# Patient Record
Sex: FEMALE | Race: BLACK OR AFRICAN AMERICAN | NOT HISPANIC OR LATINO | Employment: PART TIME | URBAN - METROPOLITAN AREA
[De-identification: names, ages, dates, MRNs, and addresses within clinical notes are randomized per-mention and may not be internally consistent; named-entity substitution may affect disease eponyms.]

---

## 2018-03-09 ENCOUNTER — HOSPITAL ENCOUNTER (EMERGENCY)
Facility: HOSPITAL | Age: 48
Discharge: HOME | End: 2018-03-09

## 2018-03-09 VITALS
RESPIRATION RATE: 15 BRPM | TEMPERATURE: 99 F | WEIGHT: 135 LBS | BODY MASS INDEX: 20.53 KG/M2 | OXYGEN SATURATION: 99 % | HEART RATE: 93 BPM | SYSTOLIC BLOOD PRESSURE: 167 MMHG | DIASTOLIC BLOOD PRESSURE: 83 MMHG

## 2018-03-09 DIAGNOSIS — B34.9 VIRAL SYNDROME: Primary | ICD-10-CM

## 2018-03-09 LAB
BASOPHILS # BLD AUTO: 0.01 K/UL
BASOPHILS NFR BLD: 0.1 %
DIFFERENTIAL METHOD: ABNORMAL
EOSINOPHIL # BLD AUTO: 0.5 K/UL
EOSINOPHIL NFR BLD: 6.9 %
ERYTHROCYTE [DISTWIDTH] IN BLOOD BY AUTOMATED COUNT: 14.9 %
HCT VFR BLD AUTO: 34.3 %
HGB BLD-MCNC: 10.8 G/DL
LYMPHOCYTES # BLD AUTO: 1 K/UL
LYMPHOCYTES NFR BLD: 14.2 %
MCH RBC QN AUTO: 25.2 PG
MCHC RBC AUTO-ENTMCNC: 31.5 G/DL
MCV RBC AUTO: 80 FL
MONOCYTES # BLD AUTO: 0.8 K/UL
MONOCYTES NFR BLD: 10.7 %
NEUTROPHILS # BLD AUTO: 4.8 K/UL
NEUTROPHILS NFR BLD: 68.1 %
PLATELET # BLD AUTO: 209 K/UL
PMV BLD AUTO: 12.1 FL
RBC # BLD AUTO: 4.29 M/UL
WBC # BLD AUTO: 7.1 K/UL

## 2018-03-09 PROCEDURE — 25000003 PHARM REV CODE 250: Performed by: NURSE PRACTITIONER

## 2018-03-09 PROCEDURE — 96374 THER/PROPH/DIAG INJ IV PUSH: CPT

## 2018-03-09 PROCEDURE — 63600175 PHARM REV CODE 636 W HCPCS: Performed by: NURSE PRACTITIONER

## 2018-03-09 PROCEDURE — 99283 EMERGENCY DEPT VISIT LOW MDM: CPT | Mod: 25

## 2018-03-09 PROCEDURE — 85025 COMPLETE CBC W/AUTO DIFF WBC: CPT

## 2018-03-09 RX ORDER — DEXAMETHASONE SODIUM PHOSPHATE 4 MG/ML
4 INJECTION, SOLUTION INTRA-ARTICULAR; INTRALESIONAL; INTRAMUSCULAR; INTRAVENOUS; SOFT TISSUE
Status: COMPLETED | OUTPATIENT
Start: 2018-03-09 | End: 2018-03-09

## 2018-03-09 RX ORDER — DEXAMETHASONE SODIUM PHOSPHATE 4 MG/ML
INJECTION, SOLUTION INTRA-ARTICULAR; INTRALESIONAL; INTRAMUSCULAR; INTRAVENOUS; SOFT TISSUE
Status: DISPENSED
Start: 2018-03-09 | End: 2018-03-10

## 2018-03-09 RX ORDER — DEXAMETHASONE SODIUM PHOSPHATE 4 MG/ML
8 INJECTION, SOLUTION INTRA-ARTICULAR; INTRALESIONAL; INTRAMUSCULAR; INTRAVENOUS; SOFT TISSUE
Status: DISCONTINUED | OUTPATIENT
Start: 2018-03-09 | End: 2018-03-09

## 2018-03-09 RX ORDER — DEXTROMETHORPHAN POLISTIREX 30 MG/5ML
60 SUSPENSION ORAL 2 TIMES DAILY
Qty: 150 ML | Refills: 0 | Status: SHIPPED | OUTPATIENT
Start: 2018-03-09 | End: 2018-03-19

## 2018-03-09 RX ORDER — CETIRIZINE HYDROCHLORIDE 10 MG/1
10 TABLET ORAL DAILY
Qty: 30 TABLET | Refills: 0 | Status: SHIPPED | OUTPATIENT
Start: 2018-03-09 | End: 2019-07-30

## 2018-03-09 RX ADMIN — SODIUM CHLORIDE 1000 ML: 0.9 INJECTION, SOLUTION INTRAVENOUS at 08:03

## 2018-03-09 RX ADMIN — DEXAMETHASONE SODIUM PHOSPHATE 4 MG: 4 INJECTION, SOLUTION INTRAMUSCULAR; INTRAVENOUS at 08:03

## 2018-03-10 NOTE — ED PROVIDER NOTES
Encounter Date: 3/9/2018       History     Chief Complaint   Patient presents with    Generalized Body Aches     Body aches and chilsl that started last night i feel awful.      48-year-old female presents to the emergency room complaining of body aches, chills, cough and just feeling bad since last night.  Has not taken any medications for symptoms.  Denies any nausea vomiting.  Denies any chest pain or shortness of breath.  States she feels generally achy and feverish.  Denies any burning or pain with urination.          Review of patient's allergies indicates:  No Known Allergies  Past Medical History:   Diagnosis Date    Aneurysm of cardiac wall, congenital     Diabetes type 2, uncontrolled     Hypertension      Past Surgical History:   Procedure Laterality Date    BREAST SURGERY      cyst removal     PARTIAL HYSTERECTOMY  2002     Family History   Problem Relation Age of Onset    Diabetes Mother     Heart disease Mother     Cancer Mother 40     breast    Diabetes Father     Cancer Maternal Grandmother 82     breast     Social History   Substance Use Topics    Smoking status: Never Smoker    Smokeless tobacco: Never Used    Alcohol use Yes      Comment: seldom     Review of Systems   Constitutional: Positive for chills. Negative for fever.   HENT: Positive for congestion. Negative for sore throat.    Respiratory: Positive for cough. Negative for shortness of breath.    Cardiovascular: Negative for chest pain.   Gastrointestinal: Negative for nausea.   Genitourinary: Negative for dysuria.   Musculoskeletal: Positive for arthralgias. Negative for back pain.   Skin: Negative for rash.   Neurological: Positive for weakness.   Hematological: Does not bruise/bleed easily.   All other systems reviewed and are negative.      Physical Exam     Initial Vitals [03/09/18 1826]   BP Pulse Resp Temp SpO2   (!) 177/85 104 15 99.1 °F (37.3 °C) 97 %      MAP       115.67         Physical Exam    Nursing note and  vitals reviewed.  Constitutional: She appears well-developed and well-nourished. No distress.   HENT:   Head: Normocephalic.   Nasal congestion and sinus pressure   Eyes: Conjunctivae are normal.   Neck: Normal range of motion. Neck supple.   Cardiovascular: Normal rate.   Pulmonary/Chest: Breath sounds normal. No respiratory distress.   Abdominal: Soft. Bowel sounds are normal. She exhibits no distension and no abdominal bruit. There is no tenderness. There is no rebound and no guarding. No hernia.   Neurological: She is alert and oriented to person, place, and time.   Skin: Skin is warm and dry. Capillary refill takes less than 2 seconds.   Psychiatric: She has a normal mood and affect. Her behavior is normal. Judgment and thought content normal.         ED Course   Procedures  Labs Reviewed   CBC W/ AUTO DIFFERENTIAL - Abnormal; Notable for the following:        Result Value    Hemoglobin 10.8 (*)     Hematocrit 34.3 (*)     MCV 80 (*)     MCH 25.2 (*)     MCHC 31.5 (*)     RDW 14.9 (*)     All other components within normal limits             Medical Decision Making:   Initial Assessment:   48-year-old female presents to the emergency room complaining of body aches, chills, cough and just feeling bad since last night.  Has not taken any medications for symptoms.  Denies any nausea vomiting.  Denies any chest pain or shortness of breath.  States she feels generally achy and feverish.  Denies any burning or pain with urination.  Differential Diagnosis:   URI, viral syndrome, RSV, pneumonia, bronchitis, croup, GERD, influenza, strep throat  ED Management:  Patient given medications for symptom relief.  Instructed to hydrate well.  Take Tylenol and Motrin as needed for fever.  Follow-up primary care doctor in 3-5 days.  If any symptoms worsen return to emergency room.  Patient verbalized understanding.                      Clinical Impression:   The encounter diagnosis was Viral syndrome.                            Sapna Ayon, ROSA  03/09/18 2033

## 2018-03-10 NOTE — ED NOTES
Ambulatory to ER room 6 with c/o generalized body aches x 2 days with cough; no distress noted; denies abd pain, N/V or any other complaints; will monitor closely.  ROSA Bruno at bedside for assessment

## 2018-03-16 ENCOUNTER — HOSPITAL ENCOUNTER (EMERGENCY)
Facility: HOSPITAL | Age: 48
Discharge: HOME | End: 2018-03-16
Attending: FAMILY MEDICINE

## 2018-03-16 VITALS
OXYGEN SATURATION: 99 % | HEIGHT: 68 IN | DIASTOLIC BLOOD PRESSURE: 113 MMHG | RESPIRATION RATE: 20 BRPM | SYSTOLIC BLOOD PRESSURE: 184 MMHG | TEMPERATURE: 98 F | BODY MASS INDEX: 19.7 KG/M2 | WEIGHT: 130 LBS | HEART RATE: 97 BPM

## 2018-03-16 DIAGNOSIS — N39.0 URINARY TRACT INFECTION WITHOUT HEMATURIA, SITE UNSPECIFIED: Primary | ICD-10-CM

## 2018-03-16 DIAGNOSIS — E11.9 TYPE 2 DIABETES MELLITUS WITHOUT COMPLICATION, WITHOUT LONG-TERM CURRENT USE OF INSULIN: ICD-10-CM

## 2018-03-16 DIAGNOSIS — R05.9 COUGH: ICD-10-CM

## 2018-03-16 LAB
ALBUMIN SERPL BCP-MCNC: 4.1 G/DL
ALP SERPL-CCNC: 105 U/L
ALT SERPL W/O P-5'-P-CCNC: 47 U/L
ANION GAP SERPL CALC-SCNC: 11 MMOL/L
AST SERPL-CCNC: 35 U/L
BACTERIA #/AREA URNS AUTO: ABNORMAL /HPF
BASOPHILS # BLD AUTO: 0.03 K/UL
BASOPHILS NFR BLD: 0.5 %
BILIRUB SERPL-MCNC: 0.3 MG/DL
BILIRUB UR QL STRIP: NEGATIVE
BUN SERPL-MCNC: 23 MG/DL
CALCIUM SERPL-MCNC: 9.5 MG/DL
CHLORIDE SERPL-SCNC: 96 MMOL/L
CLARITY UR REFRACT.AUTO: CLEAR
CO2 SERPL-SCNC: 30 MMOL/L
COLOR UR AUTO: ABNORMAL
CREAT SERPL-MCNC: 0.92 MG/DL
DIFFERENTIAL METHOD: ABNORMAL
EOSINOPHIL # BLD AUTO: 0.2 K/UL
EOSINOPHIL NFR BLD: 2.7 %
ERYTHROCYTE [DISTWIDTH] IN BLOOD BY AUTOMATED COUNT: 14.3 %
EST. GFR  (AFRICAN AMERICAN): >60 ML/MIN/1.73 M^2
EST. GFR  (NON AFRICAN AMERICAN): >60 ML/MIN/1.73 M^2
GLUCOSE SERPL-MCNC: 412 MG/DL
GLUCOSE UR QL STRIP: ABNORMAL
HCT VFR BLD AUTO: 37.4 %
HGB BLD-MCNC: 12 G/DL
HGB UR QL STRIP: NEGATIVE
KETONES UR QL STRIP: NEGATIVE
LEUKOCYTE ESTERASE UR QL STRIP: ABNORMAL
LYMPHOCYTES # BLD AUTO: 1.4 K/UL
LYMPHOCYTES NFR BLD: 26.2 %
MCH RBC QN AUTO: 25.3 PG
MCHC RBC AUTO-ENTMCNC: 32.1 G/DL
MCV RBC AUTO: 79 FL
MICROSCOPIC COMMENT: ABNORMAL
MONOCYTES # BLD AUTO: 0.4 K/UL
MONOCYTES NFR BLD: 7.1 %
NEUTROPHILS # BLD AUTO: 3.4 K/UL
NEUTROPHILS NFR BLD: 63.5 %
NITRITE UR QL STRIP: NEGATIVE
PH UR STRIP: 7 [PH] (ref 5–8)
PLATELET # BLD AUTO: 295 K/UL
PMV BLD AUTO: 10.4 FL
POTASSIUM SERPL-SCNC: 4.5 MMOL/L
PROT SERPL-MCNC: 7.3 G/DL
PROT UR QL STRIP: NEGATIVE
RBC # BLD AUTO: 4.75 M/UL
RBC #/AREA URNS AUTO: 3 /HPF (ref 0–4)
SODIUM SERPL-SCNC: 137 MMOL/L
SP GR UR STRIP: 1.01 (ref 1–1.03)
SQUAMOUS #/AREA URNS AUTO: ABNORMAL /HPF
URN SPEC COLLECT METH UR: ABNORMAL
UROBILINOGEN UR STRIP-ACNC: NEGATIVE EU/DL
WBC # BLD AUTO: 5.46 K/UL
WBC #/AREA URNS AUTO: 20 /HPF (ref 0–5)
WBC CLUMPS UR QL AUTO: ABNORMAL
YEAST UR QL AUTO: ABNORMAL

## 2018-03-16 PROCEDURE — 25000003 PHARM REV CODE 250: Performed by: FAMILY MEDICINE

## 2018-03-16 PROCEDURE — 80053 COMPREHEN METABOLIC PANEL: CPT

## 2018-03-16 PROCEDURE — 99284 EMERGENCY DEPT VISIT MOD MDM: CPT

## 2018-03-16 PROCEDURE — 81000 URINALYSIS NONAUTO W/SCOPE: CPT

## 2018-03-16 PROCEDURE — 85025 COMPLETE CBC W/AUTO DIFF WBC: CPT

## 2018-03-16 RX ORDER — METFORMIN HYDROCHLORIDE 1000 MG/1
1000 TABLET ORAL 2 TIMES DAILY WITH MEALS
COMMUNITY
End: 2019-06-17

## 2018-03-16 RX ORDER — BENZONATATE 100 MG/1
100 CAPSULE ORAL 3 TIMES DAILY PRN
Qty: 20 CAPSULE | Refills: 0 | Status: SHIPPED | OUTPATIENT
Start: 2018-03-16 | End: 2018-03-26

## 2018-03-16 RX ORDER — CIPROFLOXACIN 500 MG/1
500 TABLET ORAL
Status: COMPLETED | OUTPATIENT
Start: 2018-03-16 | End: 2018-03-16

## 2018-03-16 RX ORDER — CIPROFLOXACIN 500 MG/1
500 TABLET ORAL 2 TIMES DAILY
Qty: 20 TABLET | Refills: 0 | Status: SHIPPED | OUTPATIENT
Start: 2018-03-16 | End: 2018-03-26

## 2018-03-16 RX ADMIN — CIPROFLOXACIN 500 MG: 500 TABLET, FILM COATED ORAL at 09:03

## 2018-03-16 NOTE — ED TRIAGE NOTES
"Patient is AAOx3, reported x1 week reports dx with a "virus", weakness, fever x1 days ago. No relief with zyrtec & delysum. No relief, my patient reported been out from work for a week.   "

## 2018-03-17 NOTE — ED NOTES
BP reported to MD EMI states to instruct pt to take BP medication as soon as she get home. Pt states she understands and will. Pt states she normally takes her medication before she goes to bed and it is time for her to go bed.

## 2018-03-17 NOTE — ED TRIAGE NOTES
"Patient is AAOX3, reports x1 week reports dx with a "virus", weakness, fever x1 days ago. No relief with zyrtec & delysum.   "

## 2018-03-17 NOTE — ED PROVIDER NOTES
"Encounter Date: 3/16/2018       History     Chief Complaint   Patient presents with    Fatigue     x1 week reports dx with a "virus", weakness, fever x1 days ago. No relief with zyrtec & delysum     Patient complains of mild cough and intermittent shortness of breath since last 2 days.  She's been seen in the ER for fatigue and diffuse body aches.  Was diagnosed as viral syndrome-given steroid in the ER.  Patient also complains of low-grade fever.  No nausea or vomiting.  No chest pain.  No abdominal pain.  No diarrhea.  Patient has been taking Delsym.  The symptoms persisted she came to the ER.      The history is provided by the patient.     Review of patient's allergies indicates:  No Known Allergies  Past Medical History:   Diagnosis Date    Aneurysm of cardiac wall, congenital     Diabetes type 2, uncontrolled     Hypertension      Past Surgical History:   Procedure Laterality Date    BREAST SURGERY      cyst removal     PARTIAL HYSTERECTOMY  2002     Family History   Problem Relation Age of Onset    Diabetes Mother     Heart disease Mother     Cancer Mother 40     breast    Diabetes Father     Cancer Maternal Grandmother 82     breast     Social History   Substance Use Topics    Smoking status: Never Smoker    Smokeless tobacco: Never Used    Alcohol use Yes      Comment: seldom     Review of Systems   Constitutional: Positive for fever. Negative for activity change, appetite change, chills and fatigue.   HENT: Negative for congestion, ear discharge, ear pain, rhinorrhea, sinus pain, sinus pressure and sore throat.    Eyes: Negative for pain, redness and visual disturbance.   Respiratory: Negative for cough and shortness of breath.    Cardiovascular: Negative for chest pain.   Gastrointestinal: Negative for abdominal pain, diarrhea, nausea and vomiting.   Genitourinary: Negative for dysuria, flank pain and frequency.   Musculoskeletal: Negative for back pain, neck pain and neck stiffness.   Skin: " Negative for rash.   Neurological: Negative for dizziness, weakness and numbness.   Hematological: Does not bruise/bleed easily.   Psychiatric/Behavioral: Negative for behavioral problems, confusion, decreased concentration and hallucinations.       Physical Exam     Initial Vitals [03/16/18 1830]   BP Pulse Resp Temp SpO2   (!) 185/105 101 18 98 °F (36.7 °C) 97 %      MAP       131.67         Physical Exam    Nursing note and vitals reviewed.  Constitutional: Vital signs are normal. She appears well-developed and well-nourished. She is active.   HENT:   Head: Normocephalic and atraumatic.   Right Ear: External ear normal.   Left Ear: External ear normal.   Nose: Nose normal.   Mouth/Throat: Oropharynx is clear and moist.   Eyes: Conjunctivae, EOM and lids are normal. Pupils are equal, round, and reactive to light.   Neck: Trachea normal, normal range of motion and full passive range of motion without pain. Neck supple. Normal range of motion present. No neck rigidity.   Cardiovascular: Normal rate, regular rhythm, S1 normal, S2 normal, normal heart sounds, intact distal pulses and normal pulses.   Pulmonary/Chest: Breath sounds normal. No respiratory distress. She has no wheezes. She has no rhonchi. She has no rales.   Abdominal: Soft. Normal appearance and bowel sounds are normal. She exhibits no distension. There is no tenderness.   Musculoskeletal: Normal range of motion.   Lymphadenopathy:     She has no cervical adenopathy.   Neurological: She is alert and oriented to person, place, and time. She has normal reflexes. No cranial nerve deficit or sensory deficit. GCS eye subscore is 4. GCS verbal subscore is 5. GCS motor subscore is 6.   Skin: Skin is warm and intact. Capillary refill takes less than 2 seconds. No abrasion, no bruising and no rash noted.   Psychiatric: She has a normal mood and affect. Her speech is normal and behavior is normal. Judgment and thought content normal. She is not actively  hallucinating. Cognition and memory are normal. She is attentive.         ED Course   Procedures  Labs Reviewed   URINALYSIS   CBC W/ AUTO DIFFERENTIAL   COMPREHENSIVE METABOLIC PANEL          X-Rays:   Independently Interpreted Readings:   Chest X-Ray: Normal heart size.  No infiltrates.  No acute abnormalities.     Medical Decision Making:   Initial Assessment:   48-year-old lady presented to ER with fatigue and weakness and weakness.  She was diagnosed with viral syndrome about 10 days ago.  She was given a steroid shot and discharge but still complains of weakness and fatigue meso come to the ER for evaluation.  Differential Diagnosis:   Viral syndrome, urinary tract infection, pneumonia, anemia, dehydration.  Clinical Tests:   Lab Tests: Ordered and Reviewed  Medical Tests: Ordered and Reviewed  ED Management:  Patient urine showed infection and rest of her labs are within normal range.  He shouldn't is advised to hydrate adequately and continue antibiotic and follow-up with the primary care physician as needed she might be recovering from viral syndrome.                      Clinical Impression:   The primary encounter diagnosis was Urinary tract infection without hematuria, site unspecified. Diagnoses of Cough and Type 2 diabetes mellitus without complication, without long-term current use of insulin were also pertinent to this visit.    Disposition:   Disposition: Discharged  Condition: Martine Russ MD  03/17/18 0207

## 2018-03-17 NOTE — ED NOTES
"Pt updated on current plan of care, labs MD neema reviewing then will speak with pt, pt states she understands and responds with "thank you."  "

## 2019-06-17 ENCOUNTER — OFFICE VISIT (OUTPATIENT)
Dept: FAMILY MEDICINE | Facility: HOSPITAL | Age: 49
End: 2019-06-17
Attending: FAMILY MEDICINE

## 2019-06-17 ENCOUNTER — TELEPHONE (OUTPATIENT)
Dept: FAMILY MEDICINE | Facility: HOSPITAL | Age: 49
End: 2019-06-17

## 2019-06-17 VITALS
WEIGHT: 139.56 LBS | BODY MASS INDEX: 21.9 KG/M2 | SYSTOLIC BLOOD PRESSURE: 148 MMHG | HEIGHT: 67 IN | DIASTOLIC BLOOD PRESSURE: 102 MMHG | HEART RATE: 107 BPM

## 2019-06-17 DIAGNOSIS — I10 ESSENTIAL HYPERTENSION: ICD-10-CM

## 2019-06-17 DIAGNOSIS — E11.00 TYPE 2 DIABETES MELLITUS WITH HYPEROSMOLARITY WITHOUT COMA, WITHOUT LONG-TERM CURRENT USE OF INSULIN: Primary | Chronic | ICD-10-CM

## 2019-06-17 PROCEDURE — 99213 OFFICE O/P EST LOW 20 MIN: CPT | Performed by: STUDENT IN AN ORGANIZED HEALTH CARE EDUCATION/TRAINING PROGRAM

## 2019-06-17 RX ORDER — GLIPIZIDE 10 MG/1
10 TABLET ORAL
Qty: 90 TABLET | Refills: 3 | Status: SHIPPED | OUTPATIENT
Start: 2019-06-17 | End: 2019-06-26 | Stop reason: SDUPTHER

## 2019-06-17 RX ORDER — LISINOPRIL 20 MG/1
20 TABLET ORAL DAILY
Qty: 60 TABLET | Refills: 1 | Status: SHIPPED | OUTPATIENT
Start: 2019-06-17 | End: 2019-07-30

## 2019-06-17 RX ORDER — METFORMIN HYDROCHLORIDE 500 MG/1
TABLET ORAL
Qty: 180 TABLET | Refills: 3 | Status: SHIPPED | OUTPATIENT
Start: 2019-06-17 | End: 2019-06-26 | Stop reason: SDUPTHER

## 2019-06-17 NOTE — TELEPHONE ENCOUNTER
----- Message from Edward Muhammad sent at 6/17/2019  3:01 PM CDT -----  PT ASK IF DR AVILES CAN PLEASE GIVE HER A CALL, WAS NOT ABLE TO GET HER LABS DONE

## 2019-06-17 NOTE — PROGRESS NOTES
Subjective:       Patient ID: Bonnie Lino is a 49 y.o. female.    Chief Complaint: T2DM check-up    Patient is a 50 yo female pmhx of T2DM and HTN presenting for [facility] care and health maintenance. She has not taken medications for HTN or T2DM in over 8 or 9 months. Last A1c 3 years ago was 13, managed with glipizide, insulin, metformin, lisinopril-HCTZ. She denies n/v/d, problems with bowel or bladder function, peripheral neuropathy, blurry vision. She does report polydipsia and polyurea. No other complaints.       Pmhx- HTN and T2DM  Pshx- partial hysterectomy   Fhx- breast cancer in her mother and grandmother (on mothers side)  Shx- denies etoh use, never a smoker, denies illicit drug use  Medications- currently none  Allergies- NKDA    Health maintenance-    Last mammogram 3-4 years ago was normal  Never had a colonscopy  Last pap smear was 5 years ago and was normal     Review of Systems   Constitutional: Negative for chills and fever.   HENT: Negative for congestion, postnasal drip and sore throat.    Eyes: Negative for visual disturbance.   Respiratory: Negative for cough and shortness of breath.    Cardiovascular: Negative for chest pain.   Gastrointestinal: Negative for abdominal pain, diarrhea, nausea and vomiting.   Endocrine: Positive for polydipsia and polyuria.   Genitourinary: Negative for dysuria and frequency.   Musculoskeletal: Negative for gait problem.   Skin: Negative for rash.   Neurological: Negative for dizziness, weakness, numbness and headaches.       Objective:      Vitals:    06/17/19 1338   BP: (!) 148/102   Pulse: 107     Physical Exam   Constitutional: She is oriented to person, place, and time. She appears well-developed and well-nourished.   HENT:   Head: Normocephalic and atraumatic.   Eyes: Pupils are equal, round, and reactive to light. Conjunctivae are normal.   Neck: Normal range of motion.   Cardiovascular: Normal rate, regular rhythm, normal heart sounds and intact  distal pulses.   No murmur heard.  Pulmonary/Chest: Effort normal and breath sounds normal. No respiratory distress.   Abdominal: Soft. She exhibits no distension. There is no tenderness.   Musculoskeletal: Normal range of motion. She exhibits no edema.   Neurological: She is alert and oriented to person, place, and time.   Skin: Skin is warm and dry. Capillary refill takes less than 2 seconds.   Psychiatric: She has a normal mood and affect.   Nursing note and vitals reviewed.      Assessment:       1. Type 2 diabetes mellitus with hyperosmolarity without coma, without long-term current use of insulin    2. Essential hypertension        Plan:       Type 2 diabetes mellitus with hyperosmolarity without coma, without long-term current use of insulin  -     metFORMIN (GLUCOPHAGE) 500 MG tablet; Take 1 tab by mouth daily for first week.  Second week- take 1 tab by mouth twice daily  Dispense: 180 tablet; Refill: 3  -     glipiZIDE (GLUCOTROL) 10 MG tablet; Take 1 tablet (10 mg total) by mouth daily with breakfast.  Dispense: 90 tablet; Refill: 3        -     Lab work ordered- A1c, CBC, CMP, lipid panel- order sent with patient to [facility]    Essential hypertension  -     lisinopril (PRINIVIL,ZESTRIL) 20 MG tablet; Take 1 tablet (20 mg total) by mouth once daily.  Dispense: 60 tablet; Refill: 1    Health Maintenance:  Pap smear with HPV co testing at next visit  Will refer for colonoscopy at [facility] with free care  Will refer for mammogram at [facility]     Follow-up in 1 month for pap

## 2019-06-19 NOTE — PROGRESS NOTES
I have reviewed the notes, assessments, and/or procedures performed, I concur with her/his documentation of Bonnie Lino.

## 2019-06-26 DIAGNOSIS — I10 ESSENTIAL HYPERTENSION: ICD-10-CM

## 2019-06-26 DIAGNOSIS — F43.21 SITUATIONAL DEPRESSION: ICD-10-CM

## 2019-06-26 RX ORDER — GLIPIZIDE 10 MG/1
10 TABLET ORAL
Qty: 90 TABLET | Refills: 3 | Status: SHIPPED | OUTPATIENT
Start: 2019-06-26 | End: 2020-09-02 | Stop reason: SDUPTHER

## 2019-06-26 RX ORDER — METFORMIN HYDROCHLORIDE 500 MG/1
TABLET ORAL
Qty: 180 TABLET | Refills: 3 | Status: SHIPPED | OUTPATIENT
Start: 2019-06-26 | End: 2019-08-21 | Stop reason: DRUGHIGH

## 2019-06-26 RX ORDER — LISINOPRIL AND HYDROCHLOROTHIAZIDE 12.5; 2 MG/1; MG/1
1 TABLET ORAL DAILY
Qty: 30 TABLET | Refills: 11 | Status: SHIPPED | OUTPATIENT
Start: 2019-06-26 | End: 2019-11-26 | Stop reason: SDUPTHER

## 2019-07-30 ENCOUNTER — OFFICE VISIT (OUTPATIENT)
Dept: FAMILY MEDICINE | Facility: HOSPITAL | Age: 49
End: 2019-07-30
Attending: FAMILY MEDICINE

## 2019-07-30 VITALS
SYSTOLIC BLOOD PRESSURE: 152 MMHG | HEART RATE: 92 BPM | DIASTOLIC BLOOD PRESSURE: 94 MMHG | WEIGHT: 139.13 LBS | BODY MASS INDEX: 21.84 KG/M2 | HEIGHT: 67 IN

## 2019-07-30 DIAGNOSIS — E11.00 TYPE 2 DIABETES MELLITUS WITH HYPEROSMOLARITY WITHOUT COMA, WITHOUT LONG-TERM CURRENT USE OF INSULIN: Chronic | ICD-10-CM

## 2019-07-30 DIAGNOSIS — I10 ESSENTIAL HYPERTENSION: Primary | ICD-10-CM

## 2019-07-30 PROCEDURE — 99213 OFFICE O/P EST LOW 20 MIN: CPT | Performed by: STUDENT IN AN ORGANIZED HEALTH CARE EDUCATION/TRAINING PROGRAM

## 2019-07-30 NOTE — PROGRESS NOTES
Subjective:       Patient ID: Bonnie Lino is a 49 y.o. female.    Chief Complaint: Diabetes    Patient is a 50 yo female pmhx of T2DM and HTN presenting today for follow-up.  At last visit, patient was restarted on original medications.  She was unable to obtain lab work.  She reports improvement of symptoms- no longer experiencing increased thirst or urination.  She was able to obtain mammogram, results unknown.      Review of Systems   Constitutional: Negative for appetite change and fever.   HENT: Negative for congestion and sore throat.    Respiratory: Negative for cough, chest tightness and shortness of breath.    Cardiovascular: Negative for chest pain and palpitations.   Gastrointestinal: Negative for abdominal pain, diarrhea and nausea.   Endocrine: Negative for polydipsia and polyuria.   Neurological: Negative for dizziness, weakness and headaches.       Objective:      Vitals:    07/30/19 1113   BP: (!) 152/94   Pulse: 92     Physical Exam   Constitutional: She is oriented to person, place, and time. She appears well-developed and well-nourished. No distress.   HENT:   Head: Normocephalic and atraumatic.   Mouth/Throat: No oropharyngeal exudate.   Eyes: Pupils are equal, round, and reactive to light. No scleral icterus.   Neck: Normal range of motion.   Cardiovascular: Normal rate, regular rhythm and normal heart sounds.   No murmur heard.  Pulmonary/Chest: Effort normal. No respiratory distress.   Abdominal: Soft. She exhibits no distension.   Musculoskeletal: Normal range of motion. She exhibits no edema.   Neurological: She is alert and oriented to person, place, and time. No cranial nerve deficit. She exhibits normal muscle tone. Coordination normal.   Skin: Skin is warm and dry. She is not diaphoretic.   Nursing note and vitals reviewed.      Assessment:       1. Essential hypertension    2. Type 2 diabetes mellitus with hyperosmolarity without coma, without long-term current use of insulin         Plan:       Essential hypertension  - BP elevated today.  Patient reports multiple stressors- son recently diagnosed with  Crohn's disease and undergoing infusion treatment  - continue to monitor and continue current medication    Type 2 diabetes mellitus with hyperosmolarity without coma, without long-term current use of insulin  - on metformin 500 mg BID and glipizide 10mg daily  - have sent order for labwork with patient to [facility]- CBC, CMP, TSH, A1c, Lipid panel    Will follow-up in 1 month for pap      No follow-ups on file.

## 2019-08-21 ENCOUNTER — OFFICE VISIT (OUTPATIENT)
Dept: FAMILY MEDICINE | Facility: HOSPITAL | Age: 49
End: 2019-08-21
Attending: FAMILY MEDICINE

## 2019-08-21 VITALS
HEART RATE: 104 BPM | BODY MASS INDEX: 21.59 KG/M2 | WEIGHT: 137.56 LBS | SYSTOLIC BLOOD PRESSURE: 156 MMHG | DIASTOLIC BLOOD PRESSURE: 94 MMHG | HEIGHT: 67 IN

## 2019-08-21 DIAGNOSIS — E11.00 TYPE 2 DIABETES MELLITUS WITH HYPEROSMOLARITY WITHOUT COMA, WITHOUT LONG-TERM CURRENT USE OF INSULIN: Chronic | ICD-10-CM

## 2019-08-21 DIAGNOSIS — J06.9 VIRAL URI: Primary | ICD-10-CM

## 2019-08-21 PROCEDURE — 99213 OFFICE O/P EST LOW 20 MIN: CPT | Performed by: STUDENT IN AN ORGANIZED HEALTH CARE EDUCATION/TRAINING PROGRAM

## 2019-08-21 RX ORDER — METFORMIN HYDROCHLORIDE 1000 MG/1
1000 TABLET ORAL 2 TIMES DAILY WITH MEALS
Qty: 180 TABLET | Refills: 3 | Status: ON HOLD | OUTPATIENT
Start: 2019-08-21 | End: 2020-01-05 | Stop reason: HOSPADM

## 2019-08-21 RX ORDER — AZITHROMYCIN 250 MG/1
TABLET, FILM COATED ORAL
Qty: 6 TABLET | Refills: 0 | Status: SHIPPED | OUTPATIENT
Start: 2019-08-21 | End: 2019-08-26

## 2019-08-21 NOTE — PROGRESS NOTES
Subjective:       Patient ID: Bonnie Lino is a 49 y.o. female.    Chief Complaint: Cough; Chills; Nasal Congestion (running); and Sore Throat    Patient is a 48 yo female pmhx of HTN and T2DM presenting today for concerns of not feeling well.  Patient reports 3 day history of fever, chills, nasal congestion, cough and sore throat. Productive cough with green sputum.  Patient is a  and school recently started.  She reports multiple students with runny noses and congestion.      T2DM  - lab work reviewed  - A1c >15.5   - Patient compliant with glipizide 10mg and metformin 500mg BID    HTN  - patient compliant with lisinopril and HCTZ  - BP elevated at today's visit    Review of Systems   Constitutional: Positive for chills, fatigue and fever. Negative for appetite change.   HENT: Positive for congestion. Negative for sore throat.    Respiratory: Positive for cough. Negative for chest tightness and shortness of breath.    Cardiovascular: Negative for chest pain and palpitations.   Gastrointestinal: Negative for abdominal pain, diarrhea and nausea.   Musculoskeletal: Positive for arthralgias.   Neurological: Positive for weakness and headaches.       Objective:      Vitals:    08/21/19 1604   BP: (!) 156/94   Pulse: 104     Physical Exam   Constitutional: She is oriented to person, place, and time. She appears well-developed and well-nourished. No distress.   HENT:   Head: Normocephalic and atraumatic.   Mouth/Throat: No oropharyngeal exudate.   Eyes: Pupils are equal, round, and reactive to light. EOM are normal. No scleral icterus.   Neck: Normal range of motion.   Cardiovascular: Normal rate, regular rhythm and normal heart sounds.   No murmur heard.  Pulmonary/Chest: Effort normal. No respiratory distress.   Abdominal: Soft. She exhibits no distension.   Musculoskeletal: Normal range of motion. She exhibits no edema.   Lymphadenopathy:     She has no cervical adenopathy.   Neurological: She  is alert and oriented to person, place, and time. No cranial nerve deficit. She exhibits normal muscle tone. Coordination normal.   Skin: Skin is warm and dry. She is not diaphoretic.   Nursing note and vitals reviewed.      Assessment:       1. Viral URI    2. Type 2 diabetes mellitus with hyperosmolarity without coma, without long-term current use of insulin        Plan:       Viral URI       -     azithromycin (Z-SHAW) 250 MG tablet; Take 2 tablets by mouth on day      1; Take 1 tablet by mouth on days 2-5  Dispense: 6 tablet; Refill: 0       -     Discussed use of OTC cough drops, nasal sprays, ibuprofen and tylenol, hot tea and honey    Type 2 diabetes mellitus with hyperosmolarity without coma, without long-term current use of insulin  -     metFORMIN (GLUCOPHAGE) 1000 MG tablet; Take 1 tablet (1,000 mg total) by mouth 2 (two) times daily with meals.  Dispense: 180 tablet; Refill: 3        -     Discussed restarting patient on insulin as A1c >15.5.  Patient is currently uninsured.  She reports she may qualify for insurance through her work and will have a meeting within the week to see if she can receive coverage.  Once patient established with insurance, will need to initiate insulin therapy as well as provide glucometer.  Patient is also needing pap smear     No follow-ups on file.

## 2019-08-21 NOTE — LETTER
August 21, 2019    Bonnie Lino  [address]  [facility] [address]      [facility]  [address]  [place] [address]  Phone: [phone]  Fax: [phone] Bonnie Lino    Was treated here on 08/21/2019    May Return to work/school on 8/23/2019    No Restrictions        Sincerely,    Fatmata Lemus MD

## 2019-08-26 ENCOUNTER — OFFICE VISIT (OUTPATIENT)
Dept: URGENT CARE | Facility: CLINIC | Age: 49
End: 2019-08-26

## 2019-08-26 DIAGNOSIS — R03.0 ELEVATED BLOOD PRESSURE READING: ICD-10-CM

## 2019-08-26 DIAGNOSIS — B96.89 BACTERIAL SINUSITIS: Primary | ICD-10-CM

## 2019-08-26 DIAGNOSIS — R11.0 NAUSEA: ICD-10-CM

## 2019-08-26 DIAGNOSIS — R05.9 COUGH: ICD-10-CM

## 2019-08-26 DIAGNOSIS — R52 GENERALIZED BODY ACHES: ICD-10-CM

## 2019-08-26 DIAGNOSIS — J32.9 BACTERIAL SINUSITIS: Primary | ICD-10-CM

## 2019-08-26 LAB
CTP QC/QA: YES
FLUAV AG NPH QL: NEGATIVE
FLUBV AG NPH QL: NEGATIVE

## 2019-08-26 PROCEDURE — 99214 OFFICE O/P EST MOD 30 MIN: CPT | Mod: S$GLB,,, | Performed by: PHYSICIAN ASSISTANT

## 2019-08-26 PROCEDURE — 87804 POCT INFLUENZA A/B: ICD-10-PCS | Mod: QW,S$GLB,, | Performed by: PHYSICIAN ASSISTANT

## 2019-08-26 PROCEDURE — 87804 INFLUENZA ASSAY W/OPTIC: CPT | Mod: QW,S$GLB,, | Performed by: PHYSICIAN ASSISTANT

## 2019-08-26 PROCEDURE — 99214 PR OFFICE/OUTPT VISIT, EST, LEVL IV, 30-39 MIN: ICD-10-PCS | Mod: S$GLB,,, | Performed by: PHYSICIAN ASSISTANT

## 2019-08-26 RX ORDER — PREDNISONE 10 MG/1
20 TABLET ORAL DAILY
Qty: 6 TABLET | Refills: 0 | Status: SHIPPED | OUTPATIENT
Start: 2019-08-26 | End: 2019-08-29

## 2019-08-26 RX ORDER — BENZONATATE 100 MG/1
200 CAPSULE ORAL 3 TIMES DAILY PRN
Qty: 30 CAPSULE | Refills: 0 | Status: SHIPPED | OUTPATIENT
Start: 2019-08-26 | End: 2019-09-05

## 2019-08-26 RX ORDER — FLUTICASONE PROPIONATE 50 MCG
1 SPRAY, SUSPENSION (ML) NASAL DAILY PRN
Qty: 1 BOTTLE | Refills: 0 | Status: SHIPPED | OUTPATIENT
Start: 2019-08-26 | End: 2020-11-11

## 2019-08-26 RX ORDER — ONDANSETRON 4 MG/1
4 TABLET, ORALLY DISINTEGRATING ORAL EVERY 8 HOURS PRN
Qty: 12 TABLET | Refills: 0 | Status: SHIPPED | OUTPATIENT
Start: 2019-08-26 | End: 2020-05-07

## 2019-08-26 RX ORDER — AMOXICILLIN AND CLAVULANATE POTASSIUM 875; 125 MG/1; MG/1
1 TABLET, FILM COATED ORAL 2 TIMES DAILY
Qty: 20 TABLET | Refills: 0 | Status: SHIPPED | OUTPATIENT
Start: 2019-08-26 | End: 2019-09-03 | Stop reason: ALTCHOICE

## 2019-08-26 NOTE — PATIENT INSTRUCTIONS
If you were prescribed a narcotic or controlled medication, do not drive or operate heavy equipment or machinery while taking these medications.  You must understand that you've received an Urgent Care treatment only and that you may be released before all your medical problems are known or treated. You, the patient, will arrange for follow up care as instructed.  Follow up with your PCP or specialty clinic as directed if not improved or as needed. You can call [phone] to schedule an appointment with the appropriate provider.  If your condition worsens we recommend that you receive another evaluation at the Emergency Department for any concerns or worsening of condition.  Patient aware and verbalized understanding.    You tested NEGATIVE for flu today.  Patient aware and verbalized understanding.    Increase fluids and rest is important.  Avoid contact with sick individuals.  Humidifier use at home.  Augmentin RX as prescribed for sinusitis.  Prednisone RX as prescribed to help reduce inflammation/swelling.  Flonase Nasal Spray RX as prescribed for nasal congestion/seasonal allergies.  Tessalon Perles RX as prescribed for daytime cough.  Zofran RX as prescribed for nausea/vomiting as needed.  OTC Delsym as discussed for cough at night.  OTC Claritin or Zyrtec daily as directed.  OTC Tylenol or Motrin every 4 - 6 hours as needed for fever or pain.  Follow-up with your PCP in the next 48hrs or sooner for re-evaluation especially if no improvement in symptoms.  ER precautions given to patient.  Patient aware and verbalized understanding.      Self-Care for Sinusitis     Drinking plenty of water can help sinuses drain.   Sinusitis can often be managed with self-care. Self-care can keep sinuses moist and make you feel more comfortable. Remember to follow your doctor's instructions closely. This can make a big difference in getting your sinus problem under control.  Drink fluids  Drinking extra fluids helps thin  your mucus. This lets it drain from your sinuses more easily. Have a glass of water every hour or two. A humidifier helps in much the same way. Fluids can also offset the drying effects of certain medicines. If you use a humidifier, follow the product maker's instructions on how to use it. Clean it on a regular schedule.  Use saltwater rinses  Rinses help keep your sinuses and nose moist. Mix a teaspoon of salt in 8 ounces of fresh, warm water. Use a bulb syringe to gently squirt the water into your nose a few times a day. You can also buy ready-made saline nasal sprays.  Apply hot or cold packs  Applying heat to the area surrounding your sinuses may make you feel more comfortable. Use a hot water bottle or a hand towel dipped in hot water. Some people also find ice packs effective for relieving pain.  Medicines  Your doctor may prescribe medications to help treat your sinusitis. If you have an infection, antibiotics can help clear it up. If you are prescribed antibiotics, take all pills on schedule until they are gone, even if you feel better. Decongestants help relieve swelling. Use decongestant sprays for short periods only under the direction of your doctor. If you have allergies, your doctor may prescribe medications to help relieve them.   Date Last Reviewed: 10/1/2016  © 7715-7055 [employer]. [address] [place] [address], [place] [address]. All rights reserved. This information is not intended as a substitute for professional medical care. Always follow your healthcare professional's instructions.

## 2019-08-26 NOTE — LETTER
August 26, 2019      [facility]  [place] [address]  Phone: [phone]  Fax: [phone]       Patient: Bonnie Lino   YOB: 1970  Date of Visit: 08/26/2019    To Whom It May Concern:    Anuradha Lino  was at [facility] on 08/26/2019. She may return to work/school on 08/29/2019 with no restrictions. If you have any questions or concerns, or if I can be of further assistance, please do not hesitate to contact me.    Sincerely,    Radha Winchester MA

## 2019-08-26 NOTE — PROGRESS NOTES
"Subjective:       Patient ID: Bonnie Lino is a 49 y.o. female.    Vitals:  height is 5' 7" (1.702 m) and weight is 62.1 kg (137 lb). Her temperature is 97 °F (36.1 °C). Her blood pressure is 170/107 (abnormal) and her pulse is 110. Her respiration is 18 and oxygen saturation is 100%.     Chief Complaint: URI    URI    This is a recurrent problem. The current episode started in the past 7 days (6 days). The problem has been gradually worsening. There has been no fever. Associated symptoms include congestion, coughing, nausea and sinus pain. Pertinent negatives include no abdominal pain, chest pain, diarrhea, dysuria, ear pain, headaches, joint pain, joint swelling, neck pain, plugged ear sensation, rash, rhinorrhea, sneezing, sore throat, swollen glands, vomiting or wheezing. She has tried nothing for the symptoms.       Constitution: Positive for fatigue. Negative for chills, sweating and fever.   HENT: Positive for congestion, postnasal drip, sinus pain and sinus pressure. Negative for ear pain, nosebleeds, foreign body in nose, sore throat, trouble swallowing and voice change.    Neck: Negative for neck pain, neck stiffness, painful lymph nodes and neck swelling.   Cardiovascular: Negative for chest pain, leg swelling, palpitations, sob on exertion and passing out.   Eyes: Negative for eye pain, eye redness, photophobia, double vision, blurred vision and eyelid swelling.   Respiratory: Positive for cough and sputum production. Negative for chest tightness, bloody sputum, shortness of breath, stridor and wheezing.    Gastrointestinal: Positive for nausea. Negative for abdominal pain, abdominal bloating, vomiting, constipation, diarrhea and heartburn.   Genitourinary: Negative for dysuria.   Musculoskeletal: Positive for muscle ache. Negative for joint pain, joint swelling, back pain and muscle cramps.   Skin: Negative for rash.   Allergic/Immunologic: Negative for seasonal allergies and sneezing. "   Neurological: Negative for dizziness, light-headedness, passing out, loss of balance, headaches, altered mental status, loss of consciousness, numbness, tingling and seizures.   Hematologic/Lymphatic: Negative for swollen lymph nodes.   Psychiatric/Behavioral: Negative for altered mental status and nervous/anxious. The patient is not nervous/anxious.        Objective:      Physical Exam   Constitutional: She is oriented to person, place, and time. She appears well-developed and well-nourished. She is cooperative.  Non-toxic appearance. She does not appear ill. No distress.   Patient is stable, seated comfortably in NAD.   HENT:   Head: Normocephalic and atraumatic.   Right Ear: Hearing, tympanic membrane, external ear and ear canal normal.   Left Ear: Hearing, tympanic membrane, external ear and ear canal normal.   Nose: Mucosal edema and rhinorrhea present. No nasal deformity. No epistaxis. Right sinus exhibits maxillary sinus tenderness and frontal sinus tenderness. Left sinus exhibits maxillary sinus tenderness and frontal sinus tenderness.   Mouth/Throat: Uvula is midline and mucous membranes are normal. No trismus in the jaw. Normal dentition. No uvula swelling. Posterior oropharyngeal erythema present. No oropharyngeal exudate or posterior oropharyngeal edema. No tonsillar exudate.   Eyes: Conjunctivae and lids are normal. No scleral icterus.   Sclera clear bilat   Neck: Trachea normal, normal range of motion, full passive range of motion without pain and phonation normal. Neck supple.   Cardiovascular: Normal rate, regular rhythm, normal heart sounds, intact distal pulses and normal pulses.   Pulmonary/Chest: Effort normal and breath sounds normal. No respiratory distress.   Abdominal: Soft. Normal appearance and bowel sounds are normal. She exhibits no distension. There is no tenderness. There is no rigidity, no rebound, no guarding, no CVA tenderness, no tenderness at McBurney's point and negative  Wong's sign. No hernia.   Musculoskeletal: Normal range of motion. She exhibits no edema or deformity.   Lymphadenopathy:     She has no cervical adenopathy.   Neurological: She is alert and oriented to person, place, and time. She exhibits normal muscle tone. Coordination normal.   Skin: Skin is warm, dry and intact. Capillary refill takes less than 2 seconds. No rash noted. She is not diaphoretic. No pallor.   Psychiatric: She has a normal mood and affect. Her speech is normal and behavior is normal. Judgment and thought content normal. Cognition and memory are normal.   Nursing note and vitals reviewed.      Results for orders placed or performed in visit on 08/26/19   POCT Influenza A/B   Result Value Ref Range    Rapid Influenza A Ag Negative Negative    Rapid Influenza B Ag Negative Negative     Acceptable Yes        Assessment:       1. Bacterial sinusitis    2. Generalized body aches    3. Cough    4. Nausea    5. Elevated blood pressure reading        Plan:         Bacterial sinusitis  -     fluticasone propionate (FLONASE ALLERGY RELIEF) 50 mcg/actuation nasal spray; 1 spray (50 mcg total) by Each Nostril route daily as needed.  Dispense: 1 Bottle; Refill: 0  -     amoxicillin-clavulanate 875-125mg (AUGMENTIN) 875-125 mg per tablet; Take 1 tablet by mouth 2 (two) times daily. for 10 days  Dispense: 20 tablet; Refill: 0  -     predniSONE (DELTASONE) 10 MG tablet; Take 2 tablets (20 mg total) by mouth once daily. for 3 days  Dispense: 6 tablet; Refill: 0    Generalized body aches  -     POCT Influenza A/B    Cough  -     benzonatate (TESSALON PERLES) 100 MG capsule; Take 2 capsules (200 mg total) by mouth 3 (three) times daily as needed for Cough.  Dispense: 30 capsule; Refill: 0    Nausea  -     ondansetron (ZOFRAN-ODT) 4 MG TbDL; Take 1 tablet (4 mg total) by mouth every 8 (eight) hours as needed (nausea).  Dispense: 12 tablet; Refill: 0    Elevated blood pressure reading    Patient's BP  is elevated today. Patient did not take their  medication today. No headache, Chest pain, or SOB. Patient has been advised to take her medicine and monitor the BP for the next few days. If it stays elevated, patient should contact their PCP. ER precautions given to patient. Patient is stable, seated comfortably in NAD upon discharge. Patient aware, verbalized understanding and agreed with plan of care.    Patient Instructions     If you were prescribed a narcotic or controlled medication, do not drive or operate heavy equipment or machinery while taking these medications.  You must understand that you've received an Urgent Care treatment only and that you may be released before all your medical problems are known or treated. You, the patient, will arrange for follow up care as instructed.  Follow up with your PCP or specialty clinic as directed if not improved or as needed. You can call [phone] to schedule an appointment with the appropriate provider.  If your condition worsens we recommend that you receive another evaluation at the Emergency Department for any concerns or worsening of condition.  Patient aware and verbalized understanding.    You tested NEGATIVE for flu today.  Patient aware and verbalized understanding.    Increase fluids and rest is important.  Avoid contact with sick individuals.  Humidifier use at home.  Augmentin RX as prescribed for sinusitis.  Prednisone RX as prescribed to help reduce inflammation/swelling.  Flonase Nasal Spray RX as prescribed for nasal congestion/seasonal allergies.  Tessalon Perles RX as prescribed for daytime cough.  Zofran RX as prescribed for nausea/vomiting as needed.  OTC Delsym as discussed for cough at night.  OTC Claritin or Zyrtec daily as directed.  OTC Tylenol or Motrin every 4 - 6 hours as needed for fever or pain.  Follow-up with your PCP in the next 48hrs or sooner for re-evaluation especially if no improvement in symptoms.  ER precautions given to  patient.  Patient aware and verbalized understanding.      Self-Care for Sinusitis     Drinking plenty of water can help sinuses drain.   Sinusitis can often be managed with self-care. Self-care can keep sinuses moist and make you feel more comfortable. Remember to follow your doctor's instructions closely. This can make a big difference in getting your sinus problem under control.  Drink fluids  Drinking extra fluids helps thin your mucus. This lets it drain from your sinuses more easily. Have a glass of water every hour or two. A humidifier helps in much the same way. Fluids can also offset the drying effects of certain medicines. If you use a humidifier, follow the product maker's instructions on how to use it. Clean it on a regular schedule.  Use saltwater rinses  Rinses help keep your sinuses and nose moist. Mix a teaspoon of salt in 8 ounces of fresh, warm water. Use a bulb syringe to gently squirt the water into your nose a few times a day. You can also buy ready-made saline nasal sprays.  Apply hot or cold packs  Applying heat to the area surrounding your sinuses may make you feel more comfortable. Use a hot water bottle or a hand towel dipped in hot water. Some people also find ice packs effective for relieving pain.  Medicines  Your doctor may prescribe medications to help treat your sinusitis. If you have an infection, antibiotics can help clear it up. If you are prescribed antibiotics, take all pills on schedule until they are gone, even if you feel better. Decongestants help relieve swelling. Use decongestant sprays for short periods only under the direction of your doctor. If you have allergies, your doctor may prescribe medications to help relieve them.   Date Last Reviewed: 10/1/2016  © 7712-5666 [employer]. [address] [place] [address], [place] [address]. All rights reserved. This information is not intended as a substitute for professional medical care. Always follow your healthcare  professional's instructions.

## 2019-08-28 VITALS
OXYGEN SATURATION: 100 % | SYSTOLIC BLOOD PRESSURE: 170 MMHG | TEMPERATURE: 97 F | RESPIRATION RATE: 18 BRPM | DIASTOLIC BLOOD PRESSURE: 107 MMHG | HEART RATE: 110 BPM | BODY MASS INDEX: 21.5 KG/M2 | WEIGHT: 137 LBS | HEIGHT: 67 IN

## 2019-09-03 ENCOUNTER — HOSPITAL ENCOUNTER (EMERGENCY)
Facility: HOSPITAL | Age: 49
Discharge: HOME | End: 2019-09-03
Attending: EMERGENCY MEDICINE

## 2019-09-03 VITALS
HEIGHT: 67 IN | SYSTOLIC BLOOD PRESSURE: 188 MMHG | DIASTOLIC BLOOD PRESSURE: 85 MMHG | BODY MASS INDEX: 21.97 KG/M2 | OXYGEN SATURATION: 100 % | RESPIRATION RATE: 20 BRPM | TEMPERATURE: 98 F | WEIGHT: 140 LBS | HEART RATE: 94 BPM

## 2019-09-03 DIAGNOSIS — E11.65 UNCONTROLLED TYPE 2 DIABETES MELLITUS WITH HYPERGLYCEMIA: ICD-10-CM

## 2019-09-03 DIAGNOSIS — N17.9 AKI (ACUTE KIDNEY INJURY): Primary | ICD-10-CM

## 2019-09-03 DIAGNOSIS — R53.1 GENERALIZED WEAKNESS: ICD-10-CM

## 2019-09-03 LAB
ALBUMIN SERPL BCP-MCNC: 3.9 G/DL (ref 3.5–5.2)
ALP SERPL-CCNC: 82 U/L (ref 55–135)
ALT SERPL W/O P-5'-P-CCNC: 13 U/L (ref 10–44)
ANION GAP SERPL CALC-SCNC: 11 MMOL/L (ref 8–16)
AST SERPL-CCNC: 13 U/L (ref 10–40)
BACTERIA #/AREA URNS HPF: NORMAL /HPF
BASOPHILS # BLD AUTO: 0.02 K/UL (ref 0–0.2)
BASOPHILS NFR BLD: 0.3 % (ref 0–1.9)
BILIRUB SERPL-MCNC: 0.6 MG/DL (ref 0.1–1)
BILIRUB UR QL STRIP: NEGATIVE
BNP SERPL-MCNC: <10 PG/ML (ref 0–99)
BUN SERPL-MCNC: 29 MG/DL (ref 6–20)
CALCIUM SERPL-MCNC: 10 MG/DL (ref 8.7–10.5)
CHLORIDE SERPL-SCNC: 102 MMOL/L (ref 95–110)
CLARITY UR: CLEAR
CO2 SERPL-SCNC: 24 MMOL/L (ref 23–29)
COLOR UR: YELLOW
CREAT SERPL-MCNC: 2 MG/DL (ref 0.5–1.4)
DIFFERENTIAL METHOD: ABNORMAL
EOSINOPHIL # BLD AUTO: 0.2 K/UL (ref 0–0.5)
EOSINOPHIL NFR BLD: 2.8 % (ref 0–8)
ERYTHROCYTE [DISTWIDTH] IN BLOOD BY AUTOMATED COUNT: 13 % (ref 11.5–14.5)
EST. GFR  (AFRICAN AMERICAN): 33 ML/MIN/1.73 M^2
EST. GFR  (NON AFRICAN AMERICAN): 28 ML/MIN/1.73 M^2
GLUCOSE SERPL-MCNC: 251 MG/DL (ref 70–110)
GLUCOSE UR QL STRIP: ABNORMAL
HCT VFR BLD AUTO: 39.7 % (ref 37–48.5)
HGB BLD-MCNC: 12.9 G/DL (ref 12–16)
HGB UR QL STRIP: ABNORMAL
HYALINE CASTS #/AREA URNS LPF: 0 /LPF
KETONES UR QL STRIP: NEGATIVE
LEUKOCYTE ESTERASE UR QL STRIP: NEGATIVE
LYMPHOCYTES # BLD AUTO: 2 K/UL (ref 1–4.8)
LYMPHOCYTES NFR BLD: 31.4 % (ref 18–48)
MCH RBC QN AUTO: 25.8 PG (ref 27–31)
MCHC RBC AUTO-ENTMCNC: 32.5 G/DL (ref 32–36)
MCV RBC AUTO: 79 FL (ref 82–98)
MICROSCOPIC COMMENT: NORMAL
MONOCYTES # BLD AUTO: 0.3 K/UL (ref 0.3–1)
MONOCYTES NFR BLD: 3.9 % (ref 4–15)
NEUTROPHILS # BLD AUTO: 3.9 K/UL (ref 1.8–7.7)
NEUTROPHILS NFR BLD: 61.6 % (ref 38–73)
NITRITE UR QL STRIP: NEGATIVE
PH UR STRIP: 6 [PH] (ref 5–8)
PLATELET # BLD AUTO: 247 K/UL (ref 150–350)
PMV BLD AUTO: 11.5 FL (ref 9.2–12.9)
POCT GLUCOSE: 274 MG/DL (ref 70–110)
POTASSIUM SERPL-SCNC: 4.7 MMOL/L (ref 3.5–5.1)
PROT SERPL-MCNC: 7.6 G/DL (ref 6–8.4)
PROT UR QL STRIP: ABNORMAL
RBC # BLD AUTO: 5 M/UL (ref 4–5.4)
RBC #/AREA URNS HPF: 0 /HPF (ref 0–4)
SODIUM SERPL-SCNC: 137 MMOL/L (ref 136–145)
SP GR UR STRIP: 1.02 (ref 1–1.03)
SQUAMOUS #/AREA URNS HPF: 2 /HPF
TROPONIN I SERPL DL<=0.01 NG/ML-MCNC: <0.006 NG/ML (ref 0–0.03)
URN SPEC COLLECT METH UR: ABNORMAL
UROBILINOGEN UR STRIP-ACNC: NEGATIVE EU/DL
WBC # BLD AUTO: 6.41 K/UL (ref 3.9–12.7)
WBC #/AREA URNS HPF: 1 /HPF (ref 0–5)
YEAST URNS QL MICRO: NORMAL

## 2019-09-03 PROCEDURE — 85025 COMPLETE CBC W/AUTO DIFF WBC: CPT

## 2019-09-03 PROCEDURE — 84484 ASSAY OF TROPONIN QUANT: CPT

## 2019-09-03 PROCEDURE — 83880 ASSAY OF NATRIURETIC PEPTIDE: CPT

## 2019-09-03 PROCEDURE — 99285 EMERGENCY DEPT VISIT HI MDM: CPT | Mod: 25

## 2019-09-03 PROCEDURE — 80053 COMPREHEN METABOLIC PANEL: CPT

## 2019-09-03 PROCEDURE — 63600175 PHARM REV CODE 636 W HCPCS: Performed by: EMERGENCY MEDICINE

## 2019-09-03 PROCEDURE — 82962 GLUCOSE BLOOD TEST: CPT

## 2019-09-03 PROCEDURE — 81000 URINALYSIS NONAUTO W/SCOPE: CPT

## 2019-09-03 PROCEDURE — 93005 ELECTROCARDIOGRAM TRACING: CPT

## 2019-09-03 PROCEDURE — 96360 HYDRATION IV INFUSION INIT: CPT

## 2019-09-03 RX ADMIN — SODIUM CHLORIDE 1000 ML: 0.9 INJECTION, SOLUTION INTRAVENOUS at 04:09

## 2019-09-03 NOTE — ED NOTES
Pt presents to the ED w/ c/ of generalized fatigue. Pt reports that on 8/21 she was seen at urgent care and was started on abx for an URI. Reports generalized weakness and increased stress and denies improvement in symptoms since starting abx. Pt reports non productive cough for the past week. Pt reports a few episodes of vomiting a few days ago with intermittent nausea for the past few days. Reports that she gets winded easily. Denies SOB or chest pain but reports weakness at this time. Reports numbness in right foot for the past few days. Hx of diabetes.

## 2019-09-03 NOTE — DISCHARGE INSTRUCTIONS
Stay hydrated and drink 2 L of water per day  Follow up with outpatient lab and family practice on Thursday    Return for any emergent concerns      Thank you for choosing [facility]! We appreciate you coming to us for your medical care. We hope you feel better soon! Please come back to [facility] for all of your future medical needs.    Our goal in the emergency department is to always give you outstanding care and exceptional service. You may receive a survey by mail or e-mail in the next week regarding your experience in our ED. We would greatly appreciate your completing and returning the survey. Your feedback provides us with a way to recognize our staff who give very good care and it helps us learn how to improve when your experience was below our aspiration of excellence.       Sincerely,    Booker Ward MD  Medical Director  Emergency Department  [facility] [place]

## 2019-09-03 NOTE — ED PROVIDER NOTES
Encounter Date: 9/3/2019    SCRIBE #1 NOTE: I, Liberty Srivastava, am scribing for, and in the presence of,  Dr. Ward. I have scribed the entire note.       History     Chief Complaint   Patient presents with    Fatigue     pt was diagnosed with a URI on 8/21, then was started on antibiotics at urgent care last week. Has been having generalized weakness and increased stress. Also c/o numbness to right foot and weakness to right leg for the past few days.     This is a 49 y.o. female who has a past medical history of Aneurysm of cardiac wall, congenital, Diabetes type 2, uncontrolled, and Hypertension.     The patient presents to the Emergency Department with fatigue.   Symptoms are associated with weakness, dehydration, decreased appetite, some n/v/d and feeling tired.  Pt denies any pain.   The patient went to her doctor on August 21 due to coughing and sneezing for which Robitussin did not provide relief.  Her doctor gave her a Z-pack due to a diagnosed viral infection, which also did not provide relief.  The patient was given Augmentin a few days later.  She finished the Augmentin and continues to feel weak.    The history is provided by the patient.     Review of patient's allergies indicates:  No Known Allergies  Past Medical History:   Diagnosis Date    Aneurysm of cardiac wall, congenital     Diabetes type 2, uncontrolled     Hypertension      Past Surgical History:   Procedure Laterality Date    BREAST SURGERY      cyst removal     PARTIAL HYSTERECTOMY  2002     Family History   Problem Relation Age of Onset    Diabetes Mother     Heart disease Mother     Cancer Mother 40        breast    Diabetes Father     Cancer Maternal Grandmother 82        breast     Social History     Tobacco Use    Smoking status: Never Smoker    Smokeless tobacco: Never Used   Substance Use Topics    Alcohol use: Yes     Frequency: Monthly or less     Comment: seldom    Drug use: No     Review of Systems   Constitutional:  Positive for appetite change and fatigue. Negative for chills and fever.   HENT: Negative for rhinorrhea, sore throat and trouble swallowing.    Eyes: Negative for visual disturbance.   Respiratory: Positive for cough. Negative for shortness of breath.    Cardiovascular: Negative for chest pain.   Gastrointestinal: Positive for diarrhea, nausea and vomiting. Negative for abdominal pain.   Genitourinary: Negative for dysuria and hematuria.   Musculoskeletal: Negative for back pain.   Skin: Negative for rash.   Neurological: Positive for weakness. Negative for numbness and headaches.   Hematological: Negative for adenopathy.       Physical Exam     Initial Vitals [09/03/19 1042]   BP Pulse Resp Temp SpO2   133/83 (!) 128 20 98.7 °F (37.1 °C) 100 %      MAP       --         Physical Exam    Nursing note and vitals reviewed.  Constitutional: She appears well-developed and well-nourished. She is not diaphoretic. No distress.   HENT:   Head: Normocephalic and atraumatic.   Mouth/Throat: Oropharynx is clear and moist.   Eyes: Conjunctivae and EOM are normal. Pupils are equal, round, and reactive to light.   Neck: Normal range of motion. Neck supple.   Cardiovascular: Regular rhythm and normal heart sounds. Tachycardia present.  Exam reveals no gallop and no friction rub.    No murmur heard.  Borderline tachycardic   Pulmonary/Chest: Breath sounds normal. She has no wheezes. She has no rhonchi. She has no rales.   Abdominal: Soft. Bowel sounds are normal. There is tenderness (left lower quadrant). There is guarding (voluntary). There is no rebound.   Musculoskeletal: Normal range of motion. She exhibits no edema (bilateral lower extremities) or tenderness.   Normal strength to bilateral lower extremities   Lymphadenopathy:     She has no cervical adenopathy.   Neurological: She is alert and oriented to person, place, and time. She has normal strength. A sensory deficit (plantar aspect of right foot) is present.   Skin: Skin  is warm and dry. Capillary refill takes less than 2 seconds. No rash noted.         ED Course   Procedures  Labs Reviewed   URINALYSIS, REFLEX TO URINE CULTURE - Abnormal; Notable for the following components:       Result Value    Protein, UA 2+ (*)     Glucose, UA 3+ (*)     Occult Blood UA Trace (*)     All other components within normal limits    Narrative:     Preferred Collection Type->Urine, Clean Catch   CBC W/ AUTO DIFFERENTIAL - Abnormal; Notable for the following components:    Mean Corpuscular Volume 79 (*)     Mean Corpuscular Hemoglobin 25.8 (*)     Mono% 3.9 (*)     All other components within normal limits   COMPREHENSIVE METABOLIC PANEL - Abnormal; Notable for the following components:    Glucose 251 (*)     BUN, Bld 29 (*)     Creatinine 2.0 (*)     eGFR if  33 (*)     eGFR if non  28 (*)     All other components within normal limits   POCT GLUCOSE - Abnormal; Notable for the following components:    POCT Glucose 274 (*)     All other components within normal limits   URINALYSIS MICROSCOPIC    Narrative:     Preferred Collection Type->Urine, Clean Catch   COMPREHENSIVE METABOLIC PANEL   TROPONIN I   B-TYPE NATRIURETIC PEPTIDE   TROPONIN I   B-TYPE NATRIURETIC PEPTIDE     EKG Readings: (Independently Interpreted)   Rhythm: Normal Sinus Rhythm. Ectopy: No Ectopy. Conduction: Normal. ST Segments: Normal ST Segments. T Waves: Normal. Clinical Impression: Normal Sinus Rhythm   Rate 83     ECG Results          EKG 12-lead (Final result)  Result time 09/04/19 17:38:19    Final result by Interface, Lab In [facility] (09/04/19 17:38:19)                 Narrative:    Test Reason : R53.1,    Vent. Rate : 093 BPM     Atrial Rate : 093 BPM     P-R Int : 156 ms          QRS Dur : 078 ms      QT Int : 356 ms       P-R-T Axes : 076 058 070 degrees     QTc Int : 442 ms    Normal sinus rhythm  Normal ECG  No previous ECGs available  Confirmed by Edu Ferrera MD ([id number]) on  9/4/2019 5:38:05 PM    Referred By: SIGRID   SELF           Confirmed By:Edu Ferrera MD                            Imaging Results          X-Ray Chest PA And Lateral (Final result)  Result time 09/03/19 13:32:43    Final result by Leidy Goetz MD (09/03/19 13:32:43)                 Impression:      No acute cardiopulmonary abnormality.      Electronically signed by: Leidy Goetz  Date:    09/03/2019  Time:    13:32             Narrative:    EXAMINATION:  XR CHEST PA AND LATERAL    CLINICAL HISTORY:  Weakness    TECHNIQUE:  PA and lateral views of the chest were performed.    COMPARISON:  Multiple priors, most recent 03/16/2018    FINDINGS:  Surgical clips project over the left hemithorax.Left basilar scarring/atelectasis.  No focal consolidation, pleural effusion or pneumothorax.    Normal cardiomediastinal contour.    No acute or aggressive osseous abnormality.                                 Medical Decision Making:   Initial Assessment:   This is an emergent evaluation of a 49 y.o.female patient with presentation of fatigue and generalized weakness over the past 2 days.  Patient was previously diagnosed with a URI about a few weeks before and started on antibiotics.  URI symptoms have resolved, however patient now just does not feel good and has a little bit of loose stool.  Denies any pain, fever, chills,  complaints      Initial differentials include but are not limited to:  Dehydration, antibiotic side effect, ADELINA, electrolyte abnormality    Plan:  Basic labs, IV fluids    Clinical Tests:   Lab Tests: Ordered and Reviewed  Radiological Study: Reviewed and Ordered  Medical Tests: Reviewed and Ordered                   ED Course as of Sep 08 1942   Tue Sep 03, 2019   1154 This is a SORT/MSE of a 49 y.o. female presenting to the ED with c/o generalized weakness and fatigue over the past few days. Diagnosed with URI on 8/21 and started on antibiotics. Reports no improvement in symptoms. Associates  SOB. Denies chest pain.  Pertinent exam findings remarkable for tachycardia. Care will be transferred to an alternate provider when patient is roomed for a full evaluation and final disposition. JACKYJuanyOrtiz, FNP-BC 11:54 AM     [CH]   1537 BNP: <10 [NP]   1537 Troponin I: <0.006 [NP]   1538 Creatinine(!): 2.0 [NP]      ED Course User Index  [CH] Haresh Alcantar, NP  [NP] Booker Ward MD     Patient has mildly elevated creatinine and received IV hydration in the ED.  I was planed to give her further IV hydration prior to discharge, however patient states she would like to go home.  I encouraged her to continue aggressive p.o. hydration with at least 2 L of clears a day.  She is also instructed to follow up for a repeat chemistry and follow up with family practice clinic as referred.  Return for any other emergent concerns or decreased urination.      Clinical Impression:       ICD-10-CM ICD-9-CM   1. ADELINA (acute kidney injury) N17.9 584.9   2. Generalized weakness R53.1 780.79   3. Uncontrolled type 2 diabetes mellitus with hyperglycemia E11.65 250.02                   I, Dr. Booker Ward, personally performed the services described in this documentation.   All medical record entries made by the scribe were at my direction and in my presence.   I have reviewed the chart and agree that the record is accurate and complete.   Booker Ward MD.                Booker Ward MD  09/05/19 [id number]       Booker Ward MD  09/08/19 0494

## 2019-09-04 ENCOUNTER — TELEPHONE (OUTPATIENT)
Dept: FAMILY MEDICINE | Facility: HOSPITAL | Age: 49
End: 2019-09-04

## 2019-09-04 NOTE — TELEPHONE ENCOUNTER
----- Message from Edward Muhammad sent at 9/4/2019 10:00 AM CDT -----  Pt need order form to go get her blood work done for lab tiff she saw Dr Lemus on 08/21 and she said she was suppose to have blood work done.

## 2019-09-04 NOTE — TELEPHONE ENCOUNTER
----- Message from Selma Hooper MA sent at 9/4/2019  8:29 AM CDT -----  Patient was seen in er yesterday and orders were put in for blood work downstairs  but should have been filled out for lab tiff since patient has no insurance.  Please call patient at number above to discuss  lab order so she can .  Patient states the blood work has to be completed by tomorrow.  Thanks.

## 2019-09-05 NOTE — TELEPHONE ENCOUNTER
----- Message from Selma Hooper MA sent at 9/5/2019 12:07 PM CDT -----  Patient states she is still feeling bad and would like to discuss.  Please call patient at number above.  Thanks.

## 2019-09-06 ENCOUNTER — TELEPHONE (OUTPATIENT)
Dept: FAMILY MEDICINE | Facility: HOSPITAL | Age: 49
End: 2019-09-06

## 2019-09-06 DIAGNOSIS — E11.65 UNCONTROLLED TYPE 2 DIABETES MELLITUS WITH HYPERGLYCEMIA: Chronic | ICD-10-CM

## 2019-09-06 DIAGNOSIS — E87.5 HYPERKALEMIA: Primary | ICD-10-CM

## 2019-09-10 ENCOUNTER — TELEPHONE (OUTPATIENT)
Dept: FAMILY MEDICINE | Facility: HOSPITAL | Age: 49
End: 2019-09-10

## 2019-09-10 NOTE — TELEPHONE ENCOUNTER
----- Message from Edward Muhammad sent at 9/10/2019  8:51 AM CDT -----  PT CALL ASKING IF DR AVILES CAN PLEASE GIVE HER A CALL WITH LAB RESULTS.

## 2019-09-18 ENCOUNTER — TELEPHONE (OUTPATIENT)
Dept: FAMILY MEDICINE | Facility: HOSPITAL | Age: 49
End: 2019-09-18

## 2019-09-18 NOTE — TELEPHONE ENCOUNTER
----- Message from Edward Muhammad sent at 9/18/2019  9:00 AM CDT -----  PT CALL NEED HER LAB RESULTS HAS CALL A FEW TIMES ALREADY.

## 2019-11-06 ENCOUNTER — OFFICE VISIT (OUTPATIENT)
Dept: FAMILY MEDICINE | Facility: HOSPITAL | Age: 49
End: 2019-11-06
Attending: FAMILY MEDICINE

## 2019-11-06 VITALS
DIASTOLIC BLOOD PRESSURE: 93 MMHG | HEART RATE: 97 BPM | WEIGHT: 138.69 LBS | HEIGHT: 67 IN | SYSTOLIC BLOOD PRESSURE: 172 MMHG | BODY MASS INDEX: 21.77 KG/M2

## 2019-11-06 DIAGNOSIS — E78.5 HYPERLIPIDEMIA, UNSPECIFIED HYPERLIPIDEMIA TYPE: ICD-10-CM

## 2019-11-06 DIAGNOSIS — I10 ESSENTIAL HYPERTENSION: ICD-10-CM

## 2019-11-06 DIAGNOSIS — N76.0 ACUTE VAGINITIS: Primary | ICD-10-CM

## 2019-11-06 DIAGNOSIS — E11.65 UNCONTROLLED TYPE 2 DIABETES MELLITUS WITH HYPERGLYCEMIA: Chronic | ICD-10-CM

## 2019-11-06 PROCEDURE — 96372 THER/PROPH/DIAG INJ SC/IM: CPT

## 2019-11-06 PROCEDURE — 99213 OFFICE O/P EST LOW 20 MIN: CPT | Mod: 25 | Performed by: STUDENT IN AN ORGANIZED HEALTH CARE EDUCATION/TRAINING PROGRAM

## 2019-11-06 RX ORDER — AZITHROMYCIN 1 G/1
1 POWDER, FOR SUSPENSION ORAL ONCE
Qty: 1 PACKET | Refills: 0 | Status: SHIPPED | OUTPATIENT
Start: 2019-11-06 | End: 2019-11-06

## 2019-11-06 RX ORDER — CEFTRIAXONE 250 MG/1
250 INJECTION, POWDER, FOR SOLUTION INTRAMUSCULAR; INTRAVENOUS
Status: COMPLETED | OUTPATIENT
Start: 2019-11-06 | End: 2019-11-06

## 2019-11-06 RX ORDER — METRONIDAZOLE 500 MG/1
2000 TABLET ORAL ONCE
Qty: 4 TABLET | Refills: 0 | Status: SHIPPED | OUTPATIENT
Start: 2019-11-06 | End: 2019-11-06

## 2019-11-06 RX ADMIN — CEFTRIAXONE SODIUM 250 MG: 250 INJECTION, POWDER, FOR SOLUTION INTRAMUSCULAR; INTRAVENOUS at 04:11

## 2019-11-07 NOTE — PROGRESS NOTES
Subjective:       Patient ID: Bonnie Lino is a 49 y.o. female.    Chief Complaint: Vaginal Pain     HPI   50 y/o female with a pmhx of HTN, HLD and DM2, who presents to the clinic complaining of vaginal pain for the last week. Patient states that she is single and had sexual intercourse with her current partner 2 weeks ago. That was unprotected. Since that time, patient has had increased vaginal pain with white foul, frothy discharge. Patient reports that her partner has not had similar symptoms and denies any new sexual partners. Patient states that she is supposed to follow up with her PCP on Monday, however the discomfort was worsening which prompted her arrival  to clinic today.     Review of Systems   Constitutional: Negative for activity change and appetite change.   HENT: Negative for trouble swallowing.    Eyes: Negative for visual disturbance.   Respiratory: Negative for shortness of breath.    Cardiovascular: Negative for chest pain and palpitations.   Gastrointestinal: Negative for abdominal distention, diarrhea, nausea and vomiting.   Endocrine: Negative for cold intolerance and heat intolerance.   Genitourinary: Positive for dyspareunia, pelvic pain, vaginal discharge and vaginal pain. Negative for flank pain.   Musculoskeletal: Negative for arthralgias.   Skin: Negative for color change.   Allergic/Immunologic: Negative for immunocompromised state.   Neurological: Negative for headaches.   Hematological: Negative for adenopathy.   Psychiatric/Behavioral: Negative for agitation.       Objective:      Vitals:    11/06/19 1508   BP: (!) 172/93   Pulse: 97     Physical Exam   Constitutional: She is oriented to person, place, and time. She appears well-developed and well-nourished.   HENT:   Head: Normocephalic and atraumatic.   Eyes: Pupils are equal, round, and reactive to light. EOM are normal.   Neck: Normal range of motion. Neck supple.   Cardiovascular: Normal rate, regular rhythm, normal heart  sounds and intact distal pulses. Exam reveals no gallop and no friction rub.   No murmur heard.  Pulmonary/Chest: Effort normal and breath sounds normal. She has no wheezes. She has no rales.   Abdominal: Soft. Bowel sounds are normal. She exhibits no distension. There is no tenderness.   Genitourinary: Vaginal discharge found.   Genitourinary Comments: Exam performed with MAMIE Soto at bedside. White discharged noted along cervix. Cervix was normal appearing with frothy white vaginal discharge. Cervical Motion tenderness present.    Musculoskeletal: Normal range of motion.   Neurological: She is alert and oriented to person, place, and time.   Skin: Skin is warm and dry. Capillary refill takes less than 2 seconds.   Psychiatric: She has a normal mood and affect.       Assessment:       1. Acute vaginitis    2. Essential hypertension    3. Uncontrolled type 2 diabetes mellitus with hyperglycemia    4. Hyperlipidemia, unspecified hyperlipidemia type        Plan:       Acute vaginitis        -     G/C and Trichomonas screen collected   -     cefTRIAXone injection 250 mg  -     azithromycin (ZITHROMAX) 1 gram Pack; Take 1 g by mouth once. for 1 dose  Dispense: 1 packet; Refill: 0  -     metroNIDAZOLE (FLAGYL) 500 MG tablet; Take 4 tablets (2,000 mg total) by mouth once. for 1 dose  Dispense: 4 tablet; Refill: 0    Essential HTN         -    Blood pressure appears uncontrolled          -    May be elevated 2/2 to pain          -    Will need to be evaluated at next visit with PCP     Uncontrolled type 2 diabetes mellitus with hyperglycemia        -   Last  on file         -   Patient will require A1c at next visit         -   Diet and lifestyle changes discussed     Hyperlipidemia, unspecified hyperlipidemia type        -    Patient will require lipid panel at next visit             Follow up in about 1 week (around 11/13/2019) for DM2/HTN/HLD mgmt. .     Cade Zimmerman MD   [employer] [facility], PGY-2

## 2019-11-08 ENCOUNTER — TELEPHONE (OUTPATIENT)
Dept: FAMILY MEDICINE | Facility: HOSPITAL | Age: 49
End: 2019-11-08

## 2019-11-08 NOTE — TELEPHONE ENCOUNTER
----- Message from Selma Hooper MA sent at 11/7/2019 11:16 AM CST -----  Patient was seen yesterday; requests return call to number above to discuss no improvement in her condition.  Thanks.

## 2019-11-11 ENCOUNTER — OFFICE VISIT (OUTPATIENT)
Dept: FAMILY MEDICINE | Facility: HOSPITAL | Age: 49
End: 2019-11-11

## 2019-11-11 VITALS
SYSTOLIC BLOOD PRESSURE: 157 MMHG | HEIGHT: 66 IN | HEART RATE: 108 BPM | BODY MASS INDEX: 21.9 KG/M2 | DIASTOLIC BLOOD PRESSURE: 101 MMHG | WEIGHT: 136.25 LBS

## 2019-11-11 DIAGNOSIS — E11.00 TYPE 2 DIABETES MELLITUS WITH HYPEROSMOLARITY WITHOUT COMA, WITHOUT LONG-TERM CURRENT USE OF INSULIN: Primary | ICD-10-CM

## 2019-11-11 PROCEDURE — 99213 OFFICE O/P EST LOW 20 MIN: CPT | Performed by: STUDENT IN AN ORGANIZED HEALTH CARE EDUCATION/TRAINING PROGRAM

## 2019-11-11 RX ORDER — METRONIDAZOLE 500 MG/1
TABLET ORAL
Refills: 0 | COMMUNITY
Start: 2019-11-06 | End: 2019-11-26

## 2019-11-11 NOTE — PROGRESS NOTES
Subjective:       Patient ID: Bonnie Lino is a 49 y.o. female.    Chief Complaint: Follow-up (lab results)    Patient is a 48 yo female presenting for lab results.  She was seen last week for concerns of vaginitis.   Gonorrhea, chlamydia, and trich negative.  She received azithromycin, rocephin, and flagyl.  She reports improvement of symptoms since treatment.  She is also requesting A1c and lipid panel to be rechecked.  She will need to transition to insulin at next visit.      Review of Systems   Constitutional: Negative for appetite change and fever.   HENT: Negative for congestion and sore throat.    Respiratory: Negative for cough, chest tightness and shortness of breath.    Cardiovascular: Negative for chest pain and palpitations.   Gastrointestinal: Negative for abdominal pain, diarrhea and nausea.   Neurological: Negative for dizziness, weakness and headaches.       Objective:      Vitals:    11/11/19 1535   BP: (!) 157/101   Pulse: 108     Physical Exam   Constitutional: She is oriented to person, place, and time. She appears well-developed and well-nourished. No distress.   HENT:   Head: Normocephalic and atraumatic.   Eyes: Pupils are equal, round, and reactive to light.   Cardiovascular: Normal rate, regular rhythm and normal heart sounds.   No murmur heard.  Pulmonary/Chest: Effort normal. No respiratory distress.   Abdominal: Soft. She exhibits no distension.   Musculoskeletal: Normal range of motion.   Neurological: She is alert and oriented to person, place, and time. No cranial nerve deficit. She exhibits normal muscle tone. Coordination normal.   Skin: She is not diaphoretic.   Nursing note and vitals reviewed.      Assessment:       1. Type 2 diabetes mellitus with hyperosmolarity without coma, without long-term current use of insulin        Plan:       Type 2 diabetes mellitus with hyperosmolarity without coma, without long-term current use of insulin  - will recheck A1c   - transition to  insulin at next visit     Follow up in about 15 days (around 11/26/2019).

## 2019-11-26 ENCOUNTER — OFFICE VISIT (OUTPATIENT)
Dept: FAMILY MEDICINE | Facility: HOSPITAL | Age: 49
End: 2019-11-26
Attending: FAMILY MEDICINE

## 2019-11-26 VITALS
HEIGHT: 68 IN | SYSTOLIC BLOOD PRESSURE: 159 MMHG | DIASTOLIC BLOOD PRESSURE: 92 MMHG | BODY MASS INDEX: 20.98 KG/M2 | HEART RATE: 86 BPM | WEIGHT: 138.44 LBS

## 2019-11-26 DIAGNOSIS — I10 ESSENTIAL HYPERTENSION: ICD-10-CM

## 2019-11-26 DIAGNOSIS — F43.21 SITUATIONAL DEPRESSION: ICD-10-CM

## 2019-11-26 DIAGNOSIS — E11.65 UNCONTROLLED TYPE 2 DIABETES MELLITUS WITH HYPERGLYCEMIA: Primary | ICD-10-CM

## 2019-11-26 PROCEDURE — 99213 OFFICE O/P EST LOW 20 MIN: CPT | Performed by: STUDENT IN AN ORGANIZED HEALTH CARE EDUCATION/TRAINING PROGRAM

## 2019-11-26 RX ORDER — AMITRIPTYLINE HYDROCHLORIDE 25 MG/1
25 TABLET, FILM COATED ORAL NIGHTLY
Qty: 90 TABLET | Refills: 3 | Status: ON HOLD | OUTPATIENT
Start: 2019-11-26 | End: 2020-01-03

## 2019-11-26 RX ORDER — LISINOPRIL AND HYDROCHLOROTHIAZIDE 12.5; 2 MG/1; MG/1
1 TABLET ORAL DAILY
Qty: 30 TABLET | Refills: 11 | Status: SHIPPED | OUTPATIENT
Start: 2019-11-26 | End: 2019-12-19 | Stop reason: DRUGHIGH

## 2019-11-26 RX ORDER — INSULIN PUMP SYRINGE, 3 ML
EACH MISCELLANEOUS
Qty: 1 EACH | Refills: 0 | Status: SHIPPED | OUTPATIENT
Start: 2019-11-26 | End: 2023-09-28

## 2019-11-26 NOTE — PROGRESS NOTES
Subjective:       Patient ID: Bonnie Lino is a 49 y.o. female.    Chief Complaint: Hypertension    Patient is a 50 yo female pmhx of HTN and T2DM presenting today for follow-up.  Last A1c >15.5.  She is interested in starting insulin treatment at today's visit.  Has been on insulin before.    She also has concerns of difficulty sleeping.  She reports trouble falling asleep and staying asleep.      Review of Systems   Constitutional: Negative for appetite change and fever.   HENT: Negative for congestion and sore throat.    Eyes: Positive for visual disturbance.   Respiratory: Negative for cough, chest tightness and shortness of breath.    Cardiovascular: Negative for chest pain and palpitations.   Gastrointestinal: Negative for abdominal pain, diarrhea and nausea.   Neurological: Negative for dizziness, weakness and headaches.       Objective:      Vitals:    11/26/19 1544   BP: (!) 159/92   Pulse: 86     Physical Exam   Constitutional: She is oriented to person, place, and time. She appears well-developed and well-nourished. No distress.   HENT:   Head: Normocephalic and atraumatic.   Eyes: Pupils are equal, round, and reactive to light.   Cardiovascular: Normal rate, regular rhythm and normal heart sounds.   No murmur heard.  Pulmonary/Chest: Effort normal. No respiratory distress.   Abdominal: Soft.   Musculoskeletal: She exhibits no edema.   Neurological: She is alert and oriented to person, place, and time. No cranial nerve deficit. She exhibits normal muscle tone. Coordination normal.   Skin: She is not diaphoretic.   Nursing note and vitals reviewed.      Assessment:       1. Essential hypertension     2.     Uncontrolled type 2 diabetes mellitus with hyperglycemia  3.      Insomnia    Plan:     Essential hypertension  -     lisinopril-hydrochlorothiazide (PRINZIDE,ZESTORETIC) 20-12.5 mg per tablet; Take 1 tablet by mouth once daily.  Dispense: 30 tablet; Refill: 11    Uncontrolled type 2 diabetes  mellitus with hyperglycemia  -     blood-glucose meter (RELION ALL-IN-ONE METER) kit; Use as instructed  Dispense: 1 each; Refill: 0  -     insulin NPH-insulin regular, 70/30, (NOVOLIN 70/30) 100 unit/mL (70-30) injection; Inject 10 Units into the skin 2 (two) times daily.  Dispense: 10 mL; Refill: 0    Insomnia  -     amitriptyline (ELAVIL) 25 MG tablet; Take 1 tablet (25 mg total) by mouth every evening.  Dispense: 90 tablet; Refill: 3    Follow up in about 4 weeks (around 12/24/2019).

## 2019-12-19 ENCOUNTER — OFFICE VISIT (OUTPATIENT)
Dept: FAMILY MEDICINE | Facility: HOSPITAL | Age: 49
End: 2019-12-19
Attending: FAMILY MEDICINE

## 2019-12-19 VITALS
DIASTOLIC BLOOD PRESSURE: 96 MMHG | WEIGHT: 139.56 LBS | HEIGHT: 69 IN | HEART RATE: 91 BPM | BODY MASS INDEX: 20.67 KG/M2 | SYSTOLIC BLOOD PRESSURE: 146 MMHG

## 2019-12-19 DIAGNOSIS — I10 ESSENTIAL HYPERTENSION: ICD-10-CM

## 2019-12-19 DIAGNOSIS — E11.65 UNCONTROLLED TYPE 2 DIABETES MELLITUS WITH HYPERGLYCEMIA: Primary | ICD-10-CM

## 2019-12-19 DIAGNOSIS — N76.0 ACUTE VAGINITIS: ICD-10-CM

## 2019-12-19 PROCEDURE — 99213 OFFICE O/P EST LOW 20 MIN: CPT | Performed by: STUDENT IN AN ORGANIZED HEALTH CARE EDUCATION/TRAINING PROGRAM

## 2019-12-19 RX ORDER — FLUCONAZOLE 150 MG/1
150 TABLET ORAL DAILY
Qty: 1 TABLET | Refills: 0 | Status: SHIPPED | OUTPATIENT
Start: 2019-12-19 | End: 2019-12-20

## 2019-12-19 RX ORDER — LISINOPRIL AND HYDROCHLOROTHIAZIDE 20; 25 MG/1; MG/1
1 TABLET ORAL DAILY
Qty: 90 TABLET | Refills: 3 | Status: ON HOLD | OUTPATIENT
Start: 2019-12-19 | End: 2020-01-05 | Stop reason: HOSPADM

## 2019-12-19 NOTE — PROGRESS NOTES
Subjective:       Patient ID: Bonnie Lino is a 49 y.o. female.    Chief Complaint: T2DM f/u    Patient is a 50 yo female pmhx of HTN and T2DM presenting today for follow-up.  At last visit, she was started on Relion insulin for diabetes.  She is currently taking 11 units.  She reports fasting blood sugars are in the 200's.  She is also taking glipizide 10mg and metformin 1000mg BID.      She is on lisinopril 10 and HCTZ 12.5 mg for HTN.  BP is elevated today.    Patient also reports vaginal discharge. Foul smelling, white and thick      Review of Systems   Constitutional: Negative for appetite change and fever.   HENT: Negative for congestion and sore throat.    Respiratory: Negative for cough, chest tightness and shortness of breath.    Cardiovascular: Negative for chest pain and palpitations.   Gastrointestinal: Negative for abdominal pain, diarrhea and nausea.   Genitourinary: Positive for vaginal discharge. Negative for vaginal bleeding and vaginal pain.   Neurological: Negative for dizziness, weakness and headaches.       Objective:      Vitals:    12/19/19 1441   BP: (!) 146/96   Pulse: 91     Physical Exam   Constitutional: She is oriented to person, place, and time. She appears well-developed and well-nourished. No distress.   HENT:   Head: Normocephalic and atraumatic.   Mouth/Throat: No oropharyngeal exudate.   Eyes: Pupils are equal, round, and reactive to light.   Cardiovascular: Normal rate, regular rhythm and normal heart sounds.   No murmur heard.  Pulmonary/Chest: Effort normal. No respiratory distress.   Musculoskeletal: She exhibits no edema.   Neurological: She is alert and oriented to person, place, and time. No cranial nerve deficit. She exhibits normal muscle tone. Coordination normal.   Skin: She is not diaphoretic.   Nursing note and vitals reviewed.      Assessment:       1. Uncontrolled type 2 diabetes mellitus with hyperglycemia    2. Essential hypertension    3. Acute vaginitis         Plan:       Uncontrolled type 2 diabetes mellitus with hyperglycemia  - discussed titrating insulin by 1 unit every 3 days with a fasting blood glucose goal of 150.    Essential hypertension  -     lisinopril-hydrochlorothiazide (PRINZIDE,ZESTORETIC) 20-25 mg Tab; Take 1 tablet by mouth once daily.  Dispense: 90 tablet; Refill: 3    Acute vaginitis  -     fluconazole (DIFLUCAN) 150 MG Tab; Take 1 tablet (150 mg total) by mouth once daily. for 1 day  Dispense: 1 tablet; Refill: 0        Follow up in about 2 months (around 2/19/2020).

## 2019-12-20 NOTE — PROGRESS NOTES
I assume primary medical responsibility for this patient. I have reviewed the history, physical, and assessement & treatment plan with the resident and agree that the care is reasonable and necessary. This service has been performed by a resident without the presence of a teaching physician under the primary care exception. If necessary, an addendum of additional findings or evaluation beyond the resident documentation will be noted below.    Yareli Vitale MD

## 2020-01-02 ENCOUNTER — OFFICE VISIT (OUTPATIENT)
Dept: FAMILY MEDICINE | Facility: HOSPITAL | Age: 50
End: 2020-01-02
Payer: MEDICAID

## 2020-01-02 ENCOUNTER — TELEPHONE (OUTPATIENT)
Dept: FAMILY MEDICINE | Facility: HOSPITAL | Age: 50
End: 2020-01-02

## 2020-01-02 ENCOUNTER — HOSPITAL ENCOUNTER (OUTPATIENT)
Facility: HOSPITAL | Age: 50
Discharge: HOME | End: 2020-01-05
Attending: EMERGENCY MEDICINE | Admitting: FAMILY MEDICINE
Payer: MEDICAID

## 2020-01-02 VITALS
BODY MASS INDEX: 19.52 KG/M2 | HEART RATE: 115 BPM | TEMPERATURE: 97 F | HEIGHT: 69 IN | WEIGHT: 131.81 LBS | SYSTOLIC BLOOD PRESSURE: 155 MMHG | DIASTOLIC BLOOD PRESSURE: 98 MMHG

## 2020-01-02 DIAGNOSIS — E87.5 HYPERKALEMIA: ICD-10-CM

## 2020-01-02 DIAGNOSIS — E11.65 UNCONTROLLED TYPE 2 DIABETES MELLITUS WITH HYPERGLYCEMIA: Primary | ICD-10-CM

## 2020-01-02 DIAGNOSIS — E11.65 UNCONTROLLED TYPE 2 DIABETES MELLITUS WITH HYPERGLYCEMIA: Chronic | ICD-10-CM

## 2020-01-02 DIAGNOSIS — N17.9 AKI (ACUTE KIDNEY INJURY): ICD-10-CM

## 2020-01-02 DIAGNOSIS — R07.9 CHEST PAIN: ICD-10-CM

## 2020-01-02 DIAGNOSIS — E11.65 POORLY CONTROLLED DIABETES MELLITUS: Primary | ICD-10-CM

## 2020-01-02 DIAGNOSIS — E11.59 HYPERTENSION ASSOCIATED WITH TYPE 2 DIABETES MELLITUS: ICD-10-CM

## 2020-01-02 DIAGNOSIS — I15.2 HYPERTENSION ASSOCIATED WITH TYPE 2 DIABETES MELLITUS: ICD-10-CM

## 2020-01-02 DIAGNOSIS — E86.0 DEHYDRATION: ICD-10-CM

## 2020-01-02 LAB
ALBUMIN SERPL BCP-MCNC: 2.9 G/DL (ref 3.5–5.2)
ALBUMIN SERPL BCP-MCNC: 3.7 G/DL (ref 3.5–5.2)
ALP SERPL-CCNC: 108 U/L (ref 55–135)
ALP SERPL-CCNC: 84 U/L (ref 55–135)
ALT SERPL W/O P-5'-P-CCNC: 7 U/L (ref 10–44)
ALT SERPL W/O P-5'-P-CCNC: 8 U/L (ref 10–44)
ANION GAP SERPL CALC-SCNC: 12 MMOL/L (ref 8–16)
ANION GAP SERPL CALC-SCNC: 15 MMOL/L (ref 8–16)
AST SERPL-CCNC: 7 U/L (ref 10–40)
AST SERPL-CCNC: 9 U/L (ref 10–40)
B-OH-BUTYR BLD STRIP-SCNC: 0.1 MMOL/L (ref 0–0.5)
BACTERIA #/AREA URNS HPF: ABNORMAL /HPF
BASOPHILS # BLD AUTO: 0.02 K/UL (ref 0–0.2)
BASOPHILS NFR BLD: 0.2 % (ref 0–1.9)
BILIRUB SERPL-MCNC: 0.4 MG/DL (ref 0.1–1)
BILIRUB SERPL-MCNC: 0.5 MG/DL (ref 0.1–1)
BILIRUB UR QL STRIP: NEGATIVE
BUN SERPL-MCNC: 41 MG/DL (ref 6–20)
BUN SERPL-MCNC: 50 MG/DL (ref 6–20)
CALCIUM SERPL-MCNC: 9 MG/DL (ref 8.7–10.5)
CALCIUM SERPL-MCNC: 9.9 MG/DL (ref 8.7–10.5)
CHLORIDE SERPL-SCNC: 101 MMOL/L (ref 95–110)
CHLORIDE SERPL-SCNC: 94 MMOL/L (ref 95–110)
CK SERPL-CCNC: 53 U/L (ref 20–180)
CLARITY UR: CLEAR
CO2 SERPL-SCNC: 24 MMOL/L (ref 23–29)
CO2 SERPL-SCNC: 25 MMOL/L (ref 23–29)
COLOR UR: YELLOW
CREAT SERPL-MCNC: 2.2 MG/DL (ref 0.5–1.4)
CREAT SERPL-MCNC: 2.7 MG/DL (ref 0.5–1.4)
CREAT UR-MCNC: 89.7 MG/DL (ref 15–325)
DIFFERENTIAL METHOD: ABNORMAL
EOSINOPHIL # BLD AUTO: 0.2 K/UL (ref 0–0.5)
EOSINOPHIL NFR BLD: 2 % (ref 0–8)
ERYTHROCYTE [DISTWIDTH] IN BLOOD BY AUTOMATED COUNT: 12 % (ref 11.5–14.5)
EST. GFR  (AFRICAN AMERICAN): 23 ML/MIN/1.73 M^2
EST. GFR  (AFRICAN AMERICAN): 29 ML/MIN/1.73 M^2
EST. GFR  (NON AFRICAN AMERICAN): 20 ML/MIN/1.73 M^2
EST. GFR  (NON AFRICAN AMERICAN): 25 ML/MIN/1.73 M^2
GLUCOSE SERPL-MCNC: 349 MG/DL (ref 70–110)
GLUCOSE SERPL-MCNC: 441 MG/DL (ref 70–110)
GLUCOSE UR QL STRIP: ABNORMAL
HCT VFR BLD AUTO: 41.5 % (ref 37–48.5)
HGB BLD-MCNC: 13.6 G/DL (ref 12–16)
HGB UR QL STRIP: ABNORMAL
HYALINE CASTS #/AREA URNS LPF: 2 /LPF
KETONES UR QL STRIP: NEGATIVE
LEUKOCYTE ESTERASE UR QL STRIP: NEGATIVE
LIPASE SERPL-CCNC: 71 U/L (ref 4–60)
LYMPHOCYTES # BLD AUTO: 1.8 K/UL (ref 1–4.8)
LYMPHOCYTES NFR BLD: 20.5 % (ref 18–48)
MAGNESIUM SERPL-MCNC: 2.2 MG/DL (ref 1.6–2.6)
MCH RBC QN AUTO: 25.3 PG (ref 27–31)
MCHC RBC AUTO-ENTMCNC: 32.8 G/DL (ref 32–36)
MCV RBC AUTO: 77 FL (ref 82–98)
MICROSCOPIC COMMENT: ABNORMAL
MONOCYTES # BLD AUTO: 0.4 K/UL (ref 0.3–1)
MONOCYTES NFR BLD: 4.8 % (ref 4–15)
NEUTROPHILS # BLD AUTO: 6.5 K/UL (ref 1.8–7.7)
NEUTROPHILS NFR BLD: 72.5 % (ref 38–73)
NITRITE UR QL STRIP: NEGATIVE
PH UR STRIP: 6 [PH] (ref 5–8)
PLATELET # BLD AUTO: 340 K/UL (ref 150–350)
PMV BLD AUTO: 11.4 FL (ref 9.2–12.9)
POCT GLUCOSE: 428 MG/DL (ref 70–110)
POTASSIUM SERPL-SCNC: 3.7 MMOL/L (ref 3.5–5.1)
POTASSIUM SERPL-SCNC: 5.7 MMOL/L (ref 3.5–5.1)
PROT SERPL-MCNC: 6.5 G/DL (ref 6–8.4)
PROT SERPL-MCNC: 7.7 G/DL (ref 6–8.4)
PROT UR QL STRIP: ABNORMAL
RBC # BLD AUTO: 5.37 M/UL (ref 4–5.4)
RBC #/AREA URNS HPF: 2 /HPF (ref 0–4)
SODIUM SERPL-SCNC: 133 MMOL/L (ref 136–145)
SODIUM SERPL-SCNC: 138 MMOL/L (ref 136–145)
SODIUM UR-SCNC: 38 MMOL/L (ref 20–250)
SP GR UR STRIP: 1.02 (ref 1–1.03)
TROPONIN I SERPL DL<=0.01 NG/ML-MCNC: <0.006 NG/ML (ref 0–0.03)
URN SPEC COLLECT METH UR: ABNORMAL
UROBILINOGEN UR STRIP-ACNC: NEGATIVE EU/DL
WBC # BLD AUTO: 8.99 K/UL (ref 3.9–12.7)
WBC #/AREA URNS HPF: 2 /HPF (ref 0–5)
YEAST URNS QL MICRO: ABNORMAL

## 2020-01-02 PROCEDURE — G0378 HOSPITAL OBSERVATION PER HR: HCPCS

## 2020-01-02 PROCEDURE — 96361 HYDRATE IV INFUSION ADD-ON: CPT

## 2020-01-02 PROCEDURE — 83735 ASSAY OF MAGNESIUM: CPT

## 2020-01-02 PROCEDURE — 96375 TX/PRO/DX INJ NEW DRUG ADDON: CPT

## 2020-01-02 PROCEDURE — 99291 CRITICAL CARE FIRST HOUR: CPT | Mod: 25

## 2020-01-02 PROCEDURE — 83690 ASSAY OF LIPASE: CPT

## 2020-01-02 PROCEDURE — 82570 ASSAY OF URINE CREATININE: CPT

## 2020-01-02 PROCEDURE — 94640 AIRWAY INHALATION TREATMENT: CPT

## 2020-01-02 PROCEDURE — 82550 ASSAY OF CK (CPK): CPT

## 2020-01-02 PROCEDURE — 80053 COMPREHEN METABOLIC PANEL: CPT

## 2020-01-02 PROCEDURE — 82962 GLUCOSE BLOOD TEST: CPT

## 2020-01-02 PROCEDURE — 99213 OFFICE O/P EST LOW 20 MIN: CPT | Mod: 25 | Performed by: STUDENT IN AN ORGANIZED HEALTH CARE EDUCATION/TRAINING PROGRAM

## 2020-01-02 PROCEDURE — 63600175 PHARM REV CODE 636 W HCPCS: Performed by: EMERGENCY MEDICINE

## 2020-01-02 PROCEDURE — 82010 KETONE BODYS QUAN: CPT

## 2020-01-02 PROCEDURE — 63600175 PHARM REV CODE 636 W HCPCS: Performed by: STUDENT IN AN ORGANIZED HEALTH CARE EDUCATION/TRAINING PROGRAM

## 2020-01-02 PROCEDURE — 84484 ASSAY OF TROPONIN QUANT: CPT

## 2020-01-02 PROCEDURE — 93010 EKG 12-LEAD: ICD-10-PCS | Mod: ,,, | Performed by: INTERNAL MEDICINE

## 2020-01-02 PROCEDURE — 93005 ELECTROCARDIOGRAM TRACING: CPT

## 2020-01-02 PROCEDURE — 93010 ELECTROCARDIOGRAM REPORT: CPT | Mod: ,,, | Performed by: INTERNAL MEDICINE

## 2020-01-02 PROCEDURE — 25000003 PHARM REV CODE 250: Performed by: STUDENT IN AN ORGANIZED HEALTH CARE EDUCATION/TRAINING PROGRAM

## 2020-01-02 PROCEDURE — 94644 CONT INHLJ TX 1ST HOUR: CPT

## 2020-01-02 PROCEDURE — 84300 ASSAY OF URINE SODIUM: CPT

## 2020-01-02 PROCEDURE — 85025 COMPLETE CBC W/AUTO DIFF WBC: CPT

## 2020-01-02 PROCEDURE — 81000 URINALYSIS NONAUTO W/SCOPE: CPT

## 2020-01-02 PROCEDURE — 80053 COMPREHEN METABOLIC PANEL: CPT | Mod: 91

## 2020-01-02 PROCEDURE — 96365 THER/PROPH/DIAG IV INF INIT: CPT

## 2020-01-02 PROCEDURE — 25000242 PHARM REV CODE 250 ALT 637 W/ HCPCS: Performed by: EMERGENCY MEDICINE

## 2020-01-02 RX ORDER — DEXTROSE 50 % IN WATER (D50W) INTRAVENOUS SYRINGE
25
Status: COMPLETED | OUTPATIENT
Start: 2020-01-02 | End: 2020-01-02

## 2020-01-02 RX ORDER — ALBUTEROL SULFATE 2.5 MG/.5ML
15 SOLUTION RESPIRATORY (INHALATION)
Status: COMPLETED | OUTPATIENT
Start: 2020-01-02 | End: 2020-01-02

## 2020-01-02 RX ORDER — SODIUM CHLORIDE 9 MG/ML
INJECTION, SOLUTION INTRAVENOUS CONTINUOUS
Status: DISCONTINUED | OUTPATIENT
Start: 2020-01-02 | End: 2020-01-03

## 2020-01-02 RX ORDER — INSULIN ASPART 100 [IU]/ML
1-10 INJECTION, SOLUTION INTRAVENOUS; SUBCUTANEOUS
Status: CANCELLED | OUTPATIENT
Start: 2020-01-02

## 2020-01-02 RX ORDER — INDOMETHACIN 25 MG/1
50 CAPSULE ORAL
Status: COMPLETED | OUTPATIENT
Start: 2020-01-02 | End: 2020-01-02

## 2020-01-02 RX ADMIN — DEXTROSE MONOHYDRATE 25 G: 500 INJECTION PARENTERAL at 08:01

## 2020-01-02 RX ADMIN — CALCIUM GLUCONATE 1 G: 98 INJECTION, SOLUTION INTRAVENOUS at 08:01

## 2020-01-02 RX ADMIN — SODIUM CHLORIDE 2000 ML: 0.9 INJECTION, SOLUTION INTRAVENOUS at 05:01

## 2020-01-02 RX ADMIN — ALBUTEROL SULFATE 15 MG: 2.5 SOLUTION RESPIRATORY (INHALATION) at 07:01

## 2020-01-02 RX ADMIN — SODIUM BICARBONATE 50 MEQ: 84 INJECTION, SOLUTION INTRAVENOUS at 08:01

## 2020-01-02 RX ADMIN — INSULIN HUMAN 10 UNITS: 100 INJECTION, SOLUTION PARENTERAL at 08:01

## 2020-01-02 RX ADMIN — SODIUM CHLORIDE: 0.9 INJECTION, SOLUTION INTRAVENOUS at 10:01

## 2020-01-02 NOTE — TELEPHONE ENCOUNTER
----- Message from Arielle Chambers, Patient Care Assistant sent at 1/2/2020  8:08 AM CST -----  Contact: contact pt at [phone]  Pt needs a refill on Rx blood-glucose meter (RELION ALL-IN-ONE METER) kit pharmacy needs to know which brand the pt needs. Pharmacy says there is 3 different brands. Pt just can not remember which brand you recommended. She still has the Rx  You gave her.

## 2020-01-02 NOTE — ED TRIAGE NOTES
Pt presents to the ED with after seeing her PCP due to high BP.  Pt states she is newly diagnosed with DM and has not started taking her insulin due to not having filled the prescription.  Pt states there was some confusion on what she was supposed to take.  Pt also notes she has been nauseous with dry-heaving, as well as a dry cough for the past 2-3 days.  Pt states she is feeling weak for the past week or so as well and having to hold onto walls when walking.

## 2020-01-02 NOTE — ED PROVIDER NOTES
Encounter Date: 1/2/2020    SCRIBE #1 NOTE: I, Briseida Stone, am scribing for, and in the presence of,  Dr. Vitale. I have scribed the entire note.       History     Chief Complaint   Patient presents with    Fatigue     Reports CBG has been running high and over the past 4-5 days has been having fatigue and generalized weakness.  at triage.      Time seen by provider: 5:17 PM    This is a 49 y.o. female who presents with complaint of fatigue. Patient reports her blood sugar has been high for 5 days. She reports she has medication, and just received her insulin today. Patient reports she has weakness, cough, dehydration, nausea, and vomiting, but denies abdominal pain, or any other symptoms.    The history is provided by the patient.     Review of patient's allergies indicates:  No Known Allergies  Past Medical History:   Diagnosis Date    Aneurysm of cardiac wall, congenital     Diabetes type 2, uncontrolled     Hypertension      Past Surgical History:   Procedure Laterality Date    BREAST SURGERY      cyst removal     PARTIAL HYSTERECTOMY  2002     Family History   Problem Relation Age of Onset    Diabetes Mother     Heart disease Mother     Cancer Mother 40        breast    Diabetes Father     Cancer Maternal Grandmother 82        breast     Social History     Tobacco Use    Smoking status: Never Smoker    Smokeless tobacco: Never Used   Substance Use Topics    Alcohol use: Yes     Frequency: Monthly or less     Comment: seldom    Drug use: No     Review of Systems   Constitutional: Positive for fatigue.   Respiratory: Positive for cough.    Gastrointestinal: Positive for nausea and vomiting. Negative for abdominal pain.   Neurological: Positive for weakness.   All other systems reviewed and are negative.      Physical Exam     Initial Vitals [01/02/20 1600]   BP Pulse Resp Temp SpO2   119/75 108 17 97.8 °F (36.6 °C) 100 %      MAP       --         Physical Exam    Nursing note and vitals  reviewed.  Constitutional: She appears well-developed and well-nourished. She is not diaphoretic. No distress.   HENT:   Head: Normocephalic and atraumatic.   Mouth/Throat: Mucous membranes are dry.   Dry mucous membranes.   Eyes: Conjunctivae and EOM are normal.   Neck: Normal range of motion. Neck supple.   Cardiovascular: Regular rhythm and normal heart sounds. Tachycardia present.    Tachycardic.   Pulmonary/Chest: Breath sounds normal. No respiratory distress.   Lungs clear.   Abdominal: Soft. There is no tenderness.   Abdomen soft non tender.   Musculoskeletal: Normal range of motion. She exhibits no edema or tenderness.   Neurological: She is alert and oriented to person, place, and time. She has normal strength.   Skin: Skin is warm and dry. Capillary refill takes less than 2 seconds.         ED Course   Critical Care  Date/Time: 1/2/2020 6:53 PM  Performed by: Oseas Ruiz MD  Authorized by: Oseas Ruiz MD   Direct patient critical care time: 20 minutes  Additional history critical care time: 5 minutes  Ordering / reviewing critical care time: 15 minutes  Documentation critical care time: 15 minutes  Consulting other physicians critical care time: 5 minutes  Total critical care time (exclusive of procedural time) : 60 minutes  Critical care was time spent personally by me on the following activities: examination of patient, ordering and performing treatments and interventions, re-evaluation of patient's condition, pulse oximetry, review of old charts, ordering and review of laboratory studies, obtaining history from patient or surrogate, evaluation of patient's response to treatment and discussions with primary provider.        Labs Reviewed   URINALYSIS - Abnormal; Notable for the following components:       Result Value    Protein, UA 2+ (*)     Glucose, UA 3+ (*)     Occult Blood UA Trace (*)     All other components within normal limits   CBC W/ AUTO DIFFERENTIAL - Abnormal;  Notable for the following components:    Mean Corpuscular Volume 77 (*)     Mean Corpuscular Hemoglobin 25.3 (*)     All other components within normal limits   COMPREHENSIVE METABOLIC PANEL - Abnormal; Notable for the following components:    Sodium 133 (*)     Potassium 5.7 (*)     Chloride 94 (*)     Glucose 441 (*)     BUN, Bld 50 (*)     Creatinine 2.7 (*)     AST 9 (*)     ALT 8 (*)     eGFR if  23 (*)     eGFR if non  20 (*)     All other components within normal limits   LIPASE - Abnormal; Notable for the following components:    Lipase 71 (*)     All other components within normal limits   URINALYSIS MICROSCOPIC - Abnormal; Notable for the following components:    Bacteria Few (*)     Hyaline Casts, UA 2 (*)     All other components within normal limits   COMPREHENSIVE METABOLIC PANEL - Abnormal; Notable for the following components:    Glucose 349 (*)     BUN, Bld 41 (*)     Creatinine 2.2 (*)     Albumin 2.9 (*)     AST 7 (*)     ALT 7 (*)     eGFR if  29 (*)     eGFR if non  25 (*)     All other components within normal limits   POCT GLUCOSE - Abnormal; Notable for the following components:    POCT Glucose 428 (*)     All other components within normal limits   BETA - HYDROXYBUTYRATE, SERUM   CK   TROPONIN I   MAGNESIUM   CREATININE, URINE, RANDOM   SODIUM, URINE, RANDOM   SODIUM, URINE, RANDOM   CREATININE, URINE, RANDOM          Imaging Results    None          Medical Decision Making:   Clinical Tests:   Lab Tests: Ordered and Reviewed  ED Management:  49-year-old female with poorly controlled diabetes.  Patient is dehydrated and found to be in acute kidney injury. Potassium is 5.7 for which she was given treatment for here in the ED.  She will be admitted by the [facility] team for further treatment.                   ED Course as of Jan 02 1854   Thu Jan 02, 2020 1853 I discussed with [facility] resident, he will see  the patient in the ED to admit.    [RT]      ED Course User Index  [RT] Briseida Stone                Clinical Impression:       ICD-10-CM ICD-9-CM   1. Poorly controlled diabetes mellitus E11.65 250.00   2. Dehydration E86.0 276.51   3. ADELINA (acute kidney injury) N17.9 584.9   4. Hyperkalemia E87.5 276.7            I, Dr. Oseas Ruiz, personally performed the services described in this documentation. All medical record entries made by the scribe were at my direction and in my presence. I have reviewed the chart and agree that the record reflects my personal performance and is accurate and complete. Oseas Ruiz MD.  6:53 PM 01/02/2020                   Oseas Ruiz MD  01/03/20 1117

## 2020-01-02 NOTE — PROGRESS NOTES
Subjective:       Patient ID: Bonnie Lino is a 49 y.o. female.    Chief Complaint: weakness    Patient is a 50 yo female pmhx of HTN and T2DM presenting today for concerns of weakness.  Patient reports she has felt weak with little strength since 12/20.  She notes a decreased appetite and 8 lb weight loss since this time.  On 12/30, she stopped taking insulin and antihypertensives due to not feeling well.  She has been lethargic and confused at times.  She notes a cough for past 2 days with intermittent chills.  She notes feeling nauseated with emesis, non-bloody, non-bilious.  She was able to tolerate soup today.  She notes some constipation and increased urination.      Review of Systems   Constitutional: Positive for activity change, appetite change, chills and fatigue. Negative for fever.   HENT: Negative for congestion and sore throat.    Respiratory: Positive for cough. Negative for chest tightness and shortness of breath.    Cardiovascular: Negative for chest pain and palpitations.   Gastrointestinal: Positive for constipation, nausea and vomiting. Negative for abdominal pain.   Endocrine: Positive for polyuria.   Musculoskeletal: Positive for gait problem. Negative for arthralgias.   Neurological: Positive for weakness.       Objective:      Vitals:    01/02/20 1521   BP: (!) 155/98   Pulse: (!) 115   Temp: 96.9 °F (36.1 °C)     Physical Exam   Constitutional: She appears well-developed and well-nourished. No distress.   Appears uncomfortable, arrived in wheelchair   HENT:   Head: Normocephalic and atraumatic.   Eyes: Pupils are equal, round, and reactive to light.   Neck: Normal range of motion.   Cardiovascular: Regular rhythm.   No murmur heard.  tachycardic   Pulmonary/Chest: Effort normal. No respiratory distress.   Abdominal: Soft. She exhibits no distension.   Neurological: No cranial nerve deficit. Coordination normal.   Lethargic    Nursing note and vitals reviewed.      Assessment:       1.  Uncontrolled type 2 diabetes mellitus with hyperglycemia        Plan:       Uncontrolled type 2 diabetes mellitus with hyperglycemia  - as patient is lethargic and weak, has discontinued insulin for past 4 days, increased urination with nausea and emesis, concern for DKA is present.  Have discussed with patient and family member present.  Agreeable to evaluation in ED for concerns of hyperglycemia/DKA.  Patient will be escorted to ED with nursing staff.     Follow up in about 4 weeks (around 1/30/2020).

## 2020-01-03 LAB
25(OH)D3+25(OH)D2 SERPL-MCNC: 9 NG/ML (ref 30–96)
ALBUMIN SERPL BCP-MCNC: 2.7 G/DL (ref 3.5–5.2)
ALP SERPL-CCNC: 76 U/L (ref 55–135)
ALT SERPL W/O P-5'-P-CCNC: <5 U/L (ref 10–44)
ANION GAP SERPL CALC-SCNC: 10 MMOL/L (ref 8–16)
ANION GAP SERPL CALC-SCNC: 11 MMOL/L (ref 8–16)
ANION GAP SERPL CALC-SCNC: 11 MMOL/L (ref 8–16)
ANION GAP SERPL CALC-SCNC: 12 MMOL/L (ref 8–16)
AST SERPL-CCNC: 9 U/L (ref 10–40)
BASOPHILS # BLD AUTO: 0.01 K/UL (ref 0–0.2)
BASOPHILS NFR BLD: 0.2 % (ref 0–1.9)
BILIRUB SERPL-MCNC: 0.5 MG/DL (ref 0.1–1)
BUN SERPL-MCNC: 32 MG/DL (ref 6–20)
BUN SERPL-MCNC: 38 MG/DL (ref 6–20)
BUN SERPL-MCNC: 38 MG/DL (ref 6–20)
BUN SERPL-MCNC: 39 MG/DL (ref 6–20)
CALCIUM SERPL-MCNC: 8.7 MG/DL (ref 8.7–10.5)
CALCIUM SERPL-MCNC: 8.7 MG/DL (ref 8.7–10.5)
CALCIUM SERPL-MCNC: 8.9 MG/DL (ref 8.7–10.5)
CALCIUM SERPL-MCNC: 9 MG/DL (ref 8.7–10.5)
CHLORIDE SERPL-SCNC: 100 MMOL/L (ref 95–110)
CHLORIDE SERPL-SCNC: 101 MMOL/L (ref 95–110)
CO2 SERPL-SCNC: 24 MMOL/L (ref 23–29)
CO2 SERPL-SCNC: 25 MMOL/L (ref 23–29)
CO2 SERPL-SCNC: 26 MMOL/L (ref 23–29)
CO2 SERPL-SCNC: 26 MMOL/L (ref 23–29)
CREAT SERPL-MCNC: 1.8 MG/DL (ref 0.5–1.4)
CREAT SERPL-MCNC: 2.2 MG/DL (ref 0.5–1.4)
DIFFERENTIAL METHOD: ABNORMAL
EOSINOPHIL # BLD AUTO: 0.1 K/UL (ref 0–0.5)
EOSINOPHIL NFR BLD: 1.7 % (ref 0–8)
ERYTHROCYTE [DISTWIDTH] IN BLOOD BY AUTOMATED COUNT: 12 % (ref 11.5–14.5)
EST. GFR  (AFRICAN AMERICAN): 29 ML/MIN/1.73 M^2
EST. GFR  (AFRICAN AMERICAN): 37 ML/MIN/1.73 M^2
EST. GFR  (NON AFRICAN AMERICAN): 25 ML/MIN/1.73 M^2
EST. GFR  (NON AFRICAN AMERICAN): 32 ML/MIN/1.73 M^2
ESTIMATED AVG GLUCOSE: ABNORMAL MG/DL (ref 68–131)
GLUCOSE SERPL-MCNC: 389 MG/DL (ref 70–110)
GLUCOSE SERPL-MCNC: 474 MG/DL (ref 70–110)
GLUCOSE SERPL-MCNC: 482 MG/DL (ref 70–110)
GLUCOSE SERPL-MCNC: 482 MG/DL (ref 70–110)
HBA1C MFR BLD HPLC: >14 % (ref 4–5.6)
HCT VFR BLD AUTO: 33.2 % (ref 37–48.5)
HGB BLD-MCNC: 10.6 G/DL (ref 12–16)
LYMPHOCYTES # BLD AUTO: 1 K/UL (ref 1–4.8)
LYMPHOCYTES NFR BLD: 19.5 % (ref 18–48)
MAGNESIUM SERPL-MCNC: 1.8 MG/DL (ref 1.6–2.6)
MCH RBC QN AUTO: 25 PG (ref 27–31)
MCHC RBC AUTO-ENTMCNC: 31.9 G/DL (ref 32–36)
MCV RBC AUTO: 78 FL (ref 82–98)
MONOCYTES # BLD AUTO: 0.3 K/UL (ref 0.3–1)
MONOCYTES NFR BLD: 5.2 % (ref 4–15)
NEUTROPHILS # BLD AUTO: 3.9 K/UL (ref 1.8–7.7)
NEUTROPHILS NFR BLD: 73.4 % (ref 38–73)
PHOSPHATE SERPL-MCNC: 4.5 MG/DL (ref 2.7–4.5)
PLATELET # BLD AUTO: 253 K/UL (ref 150–350)
PMV BLD AUTO: 11.3 FL (ref 9.2–12.9)
POCT GLUCOSE: 149 MG/DL (ref 70–110)
POCT GLUCOSE: 169 MG/DL (ref 70–110)
POCT GLUCOSE: 231 MG/DL (ref 70–110)
POCT GLUCOSE: 375 MG/DL (ref 70–110)
POCT GLUCOSE: 393 MG/DL (ref 70–110)
POCT GLUCOSE: 401 MG/DL (ref 70–110)
POCT GLUCOSE: 407 MG/DL (ref 70–110)
POTASSIUM SERPL-SCNC: 4.4 MMOL/L (ref 3.5–5.1)
POTASSIUM SERPL-SCNC: 4.6 MMOL/L (ref 3.5–5.1)
PROCALCITONIN SERPL IA-MCNC: 0.28 NG/ML
PROT SERPL-MCNC: 6.2 G/DL (ref 6–8.4)
PTH-INTACT SERPL-MCNC: 91.6 PG/ML (ref 9–77)
RBC # BLD AUTO: 4.24 M/UL (ref 4–5.4)
SODIUM SERPL-SCNC: 136 MMOL/L (ref 136–145)
SODIUM SERPL-SCNC: 136 MMOL/L (ref 136–145)
SODIUM SERPL-SCNC: 137 MMOL/L (ref 136–145)
SODIUM SERPL-SCNC: 137 MMOL/L (ref 136–145)
TSH SERPL DL<=0.005 MIU/L-ACNC: 1.92 UIU/ML (ref 0.4–4)
WBC # BLD AUTO: 5.34 K/UL (ref 3.9–12.7)

## 2020-01-03 PROCEDURE — 94761 N-INVAS EAR/PLS OXIMETRY MLT: CPT

## 2020-01-03 PROCEDURE — 80053 COMPREHEN METABOLIC PANEL: CPT

## 2020-01-03 PROCEDURE — C9399 UNCLASSIFIED DRUGS OR BIOLOG: HCPCS | Performed by: STUDENT IN AN ORGANIZED HEALTH CARE EDUCATION/TRAINING PROGRAM

## 2020-01-03 PROCEDURE — G0378 HOSPITAL OBSERVATION PER HR: HCPCS

## 2020-01-03 PROCEDURE — 93010 EKG 12-LEAD: ICD-10-PCS | Mod: ,,, | Performed by: INTERNAL MEDICINE

## 2020-01-03 PROCEDURE — 82306 VITAMIN D 25 HYDROXY: CPT

## 2020-01-03 PROCEDURE — 83735 ASSAY OF MAGNESIUM: CPT

## 2020-01-03 PROCEDURE — 25000003 PHARM REV CODE 250: Performed by: STUDENT IN AN ORGANIZED HEALTH CARE EDUCATION/TRAINING PROGRAM

## 2020-01-03 PROCEDURE — 97116 GAIT TRAINING THERAPY: CPT

## 2020-01-03 PROCEDURE — 36415 COLL VENOUS BLD VENIPUNCTURE: CPT

## 2020-01-03 PROCEDURE — 93005 ELECTROCARDIOGRAM TRACING: CPT

## 2020-01-03 PROCEDURE — 85025 COMPLETE CBC W/AUTO DIFF WBC: CPT

## 2020-01-03 PROCEDURE — 84145 PROCALCITONIN (PCT): CPT

## 2020-01-03 PROCEDURE — 83970 ASSAY OF PARATHORMONE: CPT

## 2020-01-03 PROCEDURE — 97530 THERAPEUTIC ACTIVITIES: CPT

## 2020-01-03 PROCEDURE — 84443 ASSAY THYROID STIM HORMONE: CPT

## 2020-01-03 PROCEDURE — 97165 OT EVAL LOW COMPLEX 30 MIN: CPT

## 2020-01-03 PROCEDURE — 96361 HYDRATE IV INFUSION ADD-ON: CPT

## 2020-01-03 PROCEDURE — 97110 THERAPEUTIC EXERCISES: CPT

## 2020-01-03 PROCEDURE — 80048 BASIC METABOLIC PNL TOTAL CA: CPT

## 2020-01-03 PROCEDURE — 97535 SELF CARE MNGMENT TRAINING: CPT

## 2020-01-03 PROCEDURE — 84100 ASSAY OF PHOSPHORUS: CPT

## 2020-01-03 PROCEDURE — 96372 THER/PROPH/DIAG INJ SC/IM: CPT

## 2020-01-03 PROCEDURE — 97161 PT EVAL LOW COMPLEX 20 MIN: CPT

## 2020-01-03 PROCEDURE — 63600175 PHARM REV CODE 636 W HCPCS: Performed by: STUDENT IN AN ORGANIZED HEALTH CARE EDUCATION/TRAINING PROGRAM

## 2020-01-03 PROCEDURE — 93010 ELECTROCARDIOGRAM REPORT: CPT | Mod: ,,, | Performed by: INTERNAL MEDICINE

## 2020-01-03 PROCEDURE — 83036 HEMOGLOBIN GLYCOSYLATED A1C: CPT

## 2020-01-03 RX ORDER — HYDRALAZINE HYDROCHLORIDE 20 MG/ML
10 INJECTION INTRAMUSCULAR; INTRAVENOUS EVERY 8 HOURS PRN
Status: DISCONTINUED | OUTPATIENT
Start: 2020-01-03 | End: 2020-01-05 | Stop reason: HOSPADM

## 2020-01-03 RX ORDER — BENZONATATE 100 MG/1
100 CAPSULE ORAL 3 TIMES DAILY PRN
Status: DISCONTINUED | OUTPATIENT
Start: 2020-01-03 | End: 2020-01-05 | Stop reason: HOSPADM

## 2020-01-03 RX ORDER — ERGOCALCIFEROL 1.25 MG/1
50000 CAPSULE ORAL
Status: DISCONTINUED | OUTPATIENT
Start: 2020-01-04 | End: 2020-01-03

## 2020-01-03 RX ORDER — IBUPROFEN 200 MG
24 TABLET ORAL
Status: DISCONTINUED | OUTPATIENT
Start: 2020-01-03 | End: 2020-01-05 | Stop reason: HOSPADM

## 2020-01-03 RX ORDER — SODIUM CHLORIDE 0.9 % (FLUSH) 0.9 %
5 SYRINGE (ML) INJECTION
Status: DISCONTINUED | OUTPATIENT
Start: 2020-01-03 | End: 2020-01-05 | Stop reason: HOSPADM

## 2020-01-03 RX ORDER — NIFEDIPINE 30 MG/1
30 TABLET, EXTENDED RELEASE ORAL DAILY
Status: DISCONTINUED | OUTPATIENT
Start: 2020-01-03 | End: 2020-01-05 | Stop reason: HOSPADM

## 2020-01-03 RX ORDER — ACETAMINOPHEN 325 MG/1
650 TABLET ORAL EVERY 4 HOURS PRN
Status: DISCONTINUED | OUTPATIENT
Start: 2020-01-03 | End: 2020-01-05 | Stop reason: HOSPADM

## 2020-01-03 RX ORDER — INSULIN ASPART 100 [IU]/ML
1-10 INJECTION, SOLUTION INTRAVENOUS; SUBCUTANEOUS
Status: DISCONTINUED | OUTPATIENT
Start: 2020-01-03 | End: 2020-01-05 | Stop reason: HOSPADM

## 2020-01-03 RX ORDER — GLUCAGON 1 MG
1 KIT INJECTION
Status: DISCONTINUED | OUTPATIENT
Start: 2020-01-03 | End: 2020-01-05 | Stop reason: HOSPADM

## 2020-01-03 RX ORDER — ONDANSETRON 8 MG/1
8 TABLET, ORALLY DISINTEGRATING ORAL EVERY 6 HOURS PRN
Status: DISCONTINUED | OUTPATIENT
Start: 2020-01-03 | End: 2020-01-05 | Stop reason: HOSPADM

## 2020-01-03 RX ORDER — LIDOCAINE 50 MG/G
1 PATCH TOPICAL DAILY PRN
Status: DISCONTINUED | OUTPATIENT
Start: 2020-01-03 | End: 2020-01-05 | Stop reason: HOSPADM

## 2020-01-03 RX ORDER — ERGOCALCIFEROL 1.25 MG/1
50000 CAPSULE ORAL
Status: DISCONTINUED | OUTPATIENT
Start: 2020-01-04 | End: 2020-01-05 | Stop reason: HOSPADM

## 2020-01-03 RX ORDER — SODIUM CHLORIDE 9 MG/ML
INJECTION, SOLUTION INTRAVENOUS CONTINUOUS
Status: ACTIVE | OUTPATIENT
Start: 2020-01-03 | End: 2020-01-04

## 2020-01-03 RX ORDER — IBUPROFEN 200 MG
16 TABLET ORAL
Status: DISCONTINUED | OUTPATIENT
Start: 2020-01-03 | End: 2020-01-05 | Stop reason: HOSPADM

## 2020-01-03 RX ORDER — FAMOTIDINE 20 MG/1
20 TABLET, FILM COATED ORAL DAILY
Status: DISCONTINUED | OUTPATIENT
Start: 2020-01-03 | End: 2020-01-05 | Stop reason: HOSPADM

## 2020-01-03 RX ORDER — TALC
6 POWDER (GRAM) TOPICAL NIGHTLY PRN
Status: DISCONTINUED | OUTPATIENT
Start: 2020-01-03 | End: 2020-01-05 | Stop reason: HOSPADM

## 2020-01-03 RX ORDER — SODIUM CHLORIDE 9 MG/ML
INJECTION, SOLUTION INTRAVENOUS CONTINUOUS
Status: DISCONTINUED | OUTPATIENT
Start: 2020-01-03 | End: 2020-01-03

## 2020-01-03 RX ADMIN — BENZONATATE 100 MG: 100 CAPSULE ORAL at 03:01

## 2020-01-03 RX ADMIN — SODIUM CHLORIDE: 0.9 INJECTION, SOLUTION INTRAVENOUS at 08:01

## 2020-01-03 RX ADMIN — SODIUM CHLORIDE: 0.9 INJECTION, SOLUTION INTRAVENOUS at 06:01

## 2020-01-03 RX ADMIN — INSULIN DETEMIR 15 UNITS: 100 INJECTION, SOLUTION SUBCUTANEOUS at 01:01

## 2020-01-03 RX ADMIN — FAMOTIDINE 20 MG: 20 TABLET ORAL at 08:01

## 2020-01-03 RX ADMIN — NIFEDIPINE 30 MG: 30 TABLET, FILM COATED, EXTENDED RELEASE ORAL at 04:01

## 2020-01-03 RX ADMIN — INSULIN ASPART 10 UNITS: 100 INJECTION, SOLUTION INTRAVENOUS; SUBCUTANEOUS at 04:01

## 2020-01-03 RX ADMIN — ACETAMINOPHEN 650 MG: 325 TABLET ORAL at 05:01

## 2020-01-03 NOTE — PT/OT/SLP EVAL
Message  I contacted Shanell to follow-up with her regarding her cancelled fetal echo appointment and to obtain an update for our records following the patient's Fetal Consult with [facility] on 3/21/18.  No answer, VM left for the patient to contact me as soon as possible regarding this matter.     Signatures   Electronically signed by : Tri Crawley R.N.; Mar 26 2018 12:42PM CST (Author)     Occupational Therapy   Evaluation/tx    Name: Bonnie Lino  MRN: [id number]  Admitting Diagnosis:  Hyperkalemia      Recommendations:     Discharge Recommendations: (TBD pending progress)  Discharge Equipment Recommendations:  walker, rolling, bath bench, bedside commode  Barriers to discharge:  Inaccessible home environment, Decreased caregiver support    Assessment:   Pt presents w/ decreased overall endurance/conditioning, balance/mobility & coordination/sensation w/ subsequent decline in (I)/safety w/ BADLs, fxnl mobility & fxnl t/f's.  OT 5x/wk to increase phys/fxnl status & maximize potential to achieve established goals for d/c-->TBD pending progress w/ rec RW, BSC & TTB.    Bonnie Lino is a 49 y.o. female with a medical diagnosis of Hyperkalemia.  She presents with . Performance deficits affecting function: weakness, impaired endurance, impaired sensation, gait instability, impaired functional mobilty, impaired self care skills, impaired balance, decreased lower extremity function, decreased coordination, decreased safety awareness, abnormal tone, decreased ROM, impaired joint extensibility.      Rehab Prognosis: Good; patient would benefit from acute skilled OT services to address these deficits and reach maximum level of function.       Plan:     Patient to be seen 5 x/week to address the above listed problems via self-care/home management, therapeutic activities, therapeutic exercises  · Plan of Care Expires: 02/03/20  · Plan of Care Reviewed with: patient, family    Subjective     Chief Complaint: BLE heaviness  Patient/Family Comments/goals: return to PLOF    Occupational Profile:  Living Environment: alone in [facility] apt w/ 1flt w/ BHR to enter; t/s  Previous level of function: (I) w/o DME  Roles and Routines: IADLs, driving, working as a   Equipment Used at Home:  none  Assistance upon Discharge: limited    Pain/Comfort:  · Pain Rating 1: 0/10  · Pain Rating Post-Intervention 1:  "0/10    Patients cultural, spiritual, Caodaism conflicts given the current situation:      Objective:     Communicated with: nsg prior to session.  Patient found supine with bed alarm, peripheral IV, telemetry upon OT entry to room.    General Precautions: Standard, fall   Orthopedic Precautions:N/A   Braces: N/A     Occupational Performance:    Bed Mobility:    · Patient completed Supine to Sit with supervision    Functional Mobility/Transfers:  · Patient completed Sit <> Stand Transfer with minimum assistance  with  rolling walker   · Patient completed Toilet Transfer Stand Pivot technique with minimum assistance with  rolling walker and bedside commode  · Functional Mobility: via RW x ~3' w/ initial CGA, but increased to Mod A to maint    Activities of Daily Living:  · Grooming: total assistance standing; SBA sitting at sink  · Upper Body Dressing: supervision doff/don gown  · Toileting: moderate assistance for standing balance & Min for clothing mgmt    Cognitive/Visual Perceptual:  AO4    Physical Exam:  BUEs WFL  L shldr 4-/5; elb-->Ds 4/5  RUE 4/5 throughout    Decreased sensation B feet    Sit balance: F+  Stand balance: P+  Standing tolerance: P+    AMPAC 6 Click ADL:  AMPAC Total Score: 18    Treatment & Education:  Pt found in supine & agreeable to OT eval/tx this AM. Pt lives alone in 2nd fl apt w/ 1flt stair access w/ BHR. PLOF: (I) w/o DME w/ all fxnl tasks incl standing/sitting tub t/f's, IADLs, driving & working as a .  Currently, pt endorses BLE tingling/numbness/"heaviness" distal>proximal & perf the following: sup-->EOB w/ Sup & perf LAQs w/ 5 sec eccentric hold at EROM x ~10 reps ea; standing via RW w/ Min A & perf WSing x ~2min w/ emph on active sustained knee ext; amb via RW x ~3' w/ initial CGA, but increasing A to maint standing 2/2 progressive loss of B quad fxn. Brought BSC directly behind pt to A to sitting. Pt perf standing from BSC x 3 attempts using vanity/sink for " "BUE support to facilitate B knee "lock out" extension for toileting tasks w/ Min A to maint static balance during uni/bilat UE tasks. Perf G/H, gown change & UB hygiene tasks sitting at sink w/ SBA. Pt req'ed Min RBs throughout, but demo'ed increasing standing tolerance throughout tx. Perf sidestepping to R x ~1' followed by pivot t/f via RW-->b/s chair w/ Min A. Edu/tx re: HEP, general safety techs, endurance tx, energy conservation tx. Pt/pt's uncle verbalized understanding. Pt left UIC w/ alarm, uncle present & nsg notified.  **Pt & nsg informed that pt currently too unsafe for taking a shower or amb to toilet. Rec pt to get bed/sponge baths & use BSC until BLE strength/stability improves.     Patient left up in chair with all lines intact, call button in reach, chair alarm on, nsg notified and uncle present    GOALS:   Multidisciplinary Problems     Occupational Therapy Goals        Problem: Occupational Therapy Goal    Goal Priority Disciplines Outcome Interventions   Occupational Therapy Goal     OT, PT/OT Ongoing, Progressing    Description:  Goals to be met by: 02/03     Patient will increase functional independence with ADLs by performing:    LE Dressing with Stand-by Assistance.  Grooming while standing at sink with Stand-by Assistance.  Toileting from toilet with Stand-by Assistance for hygiene and clothing management.   Toilet transfer to toilet with Stand-by Assistance.  Increased functional strength to WFL for ADLs.                      History:     Past Medical History:   Diagnosis Date    Aneurysm of cardiac wall, congenital     Diabetes type 2, uncontrolled     Hypertension        Past Surgical History:   Procedure Laterality Date    BREAST SURGERY      cyst removal     PARTIAL HYSTERECTOMY  2002       Time Tracking:     OT Date of Treatment: 01/03/20  OT Start Time: 0906  OT Stop Time: 1030  OT Total Time (min): 84 min    Billable Minutes:Evaluation 10  Self Care/Home Management " 45  Therapeutic Activity 15  Therapeutic Exercise 14  Total Time 84    MEGHNA Santiago  1/3/2020

## 2020-01-03 NOTE — ED NOTES
Pt appears to be anxious, moving hands in rhythmic motion as if trembling.  Pt states that she doesn't know what happened but that she just started trembling suddenly.  Pt able to stop movement when hands placed lightly on her arms.  Pt is tearful and states she is maybe just cold. Pt provided with extra blanket. VSS, will continue to monitor

## 2020-01-03 NOTE — ED NOTES
Pt lying in bed resting comfortably with eyes closed, easily arousable, in no acute distress.  Pt aware of plan of care to be admitted.  Bed locked and in lowest position, side rails up x 2, call light within reach, VSS, will continue to monitor

## 2020-01-03 NOTE — SUBJECTIVE & OBJECTIVE
Past Medical History:   Diagnosis Date    Aneurysm of cardiac wall, congenital     Diabetes type 2, uncontrolled     Hypertension        Past Surgical History:   Procedure Laterality Date    BREAST SURGERY      cyst removal     PARTIAL HYSTERECTOMY  2002       Review of patient's allergies indicates:  No Known Allergies    No current facility-administered medications on file prior to encounter.      Current Outpatient Medications on File Prior to Encounter   Medication Sig    amitriptyline (ELAVIL) 25 MG tablet Take 1 tablet (25 mg total) by mouth every evening.    aspirin (ECOTRIN) 81 MG EC tablet Take 1 tablet (81 mg total) by mouth once daily.    benzocaine-resorcinol (VAGISIL) 5-2 % vaginal cream Place vaginally nightly.    blood glucose strip-disp meter Kit 1 application by Misc.(Non-Drug; Combo Route) route 3 (three) times daily.    blood-glucose meter (RELION ALL-IN-ONE METER) kit Use as instructed    fluticasone propionate (FLONASE ALLERGY RELIEF) 50 mcg/actuation nasal spray 1 spray (50 mcg total) by Each Nostril route daily as needed.    glipiZIDE (GLUCOTROL) 10 MG tablet Take 1 tablet (10 mg total) by mouth daily with breakfast.    insulin NPH-insulin regular, 70/30, (NOVOLIN 70/30) 100 unit/mL (70-30) injection Inject 10 Units into the skin 2 (two) times daily.    lancets Misc 1 application by Misc.(Non-Drug; Combo Route) route 3 (three) times daily.    lisinopril-hydrochlorothiazide (PRINZIDE,ZESTORETIC) 20-25 mg Tab Take 1 tablet by mouth once daily.    metFORMIN (GLUCOPHAGE) 1000 MG tablet Take 1 tablet (1,000 mg total) by mouth 2 (two) times daily with meals.    ondansetron (ZOFRAN-ODT) 4 MG TbDL Take 1 tablet (4 mg total) by mouth every 8 (eight) hours as needed (nausea).     Family History     Problem Relation (Age of Onset)    Cancer Mother (40), Maternal Grandmother (82)    Diabetes Mother, Father    Heart disease Mother        Tobacco Use    Smoking status: Never Smoker     Smokeless tobacco: Never Used   Substance and Sexual Activity    Alcohol use: Yes     Frequency: Monthly or less     Comment: seldom    Drug use: No    Sexual activity: Not on file     Review of Systems   Constitutional: Positive for appetite change, chills, fatigue and unexpected weight change. Negative for fever.   HENT: Positive for congestion.    Eyes: Positive for visual disturbance (intermittent blurry vision).   Respiratory: Positive for cough and shortness of breath. Negative for choking, chest tightness and wheezing.    Cardiovascular: Negative for chest pain, palpitations and leg swelling.   Gastrointestinal: Positive for nausea and vomiting. Negative for abdominal distention, abdominal pain, constipation and diarrhea.   Endocrine: Positive for polydipsia and polyuria.   Genitourinary: Negative for dysuria, flank pain and hematuria.   Skin: Negative for rash and wound.   Neurological: Positive for weakness, numbness (bilateral lower extremities) and headaches (frontal, associated with allergies). Negative for seizures and syncope.   Psychiatric/Behavioral: Negative for agitation and behavioral problems.     Objective:     Vital Signs (Most Recent):  Temp: 97.8 °F (36.6 °C) (01/02/20 1600)  Pulse: 97 (01/02/20 1914)  Resp: 16 (01/02/20 1914)  BP: 110/76 (01/02/20 1806)  SpO2: 100 % (01/02/20 1914) Vital Signs (24h Range):  Temp:  [96.9 °F (36.1 °C)-97.8 °F (36.6 °C)] 97.8 °F (36.6 °C)  Pulse:  [] 97  Resp:  [16-17] 16  SpO2:  [99 %-100 %] 100 %  BP: (110-155)/(75-98) 110/76     Weight: 59.4 kg (131 lb)  Body mass index is 20.52 kg/m².    Physical Exam   Constitutional: She is oriented to person, place, and time. She appears well-developed and well-nourished. No distress.   Patient very tearful during interview. Patient began to have bilateral upper extremity tremors but kept conversing throughout.      HENT:   Head: Normocephalic and atraumatic.   Eyes: Pupils are equal, round, and reactive to  light. EOM are normal.   Neck: Normal range of motion. Neck supple.   Cardiovascular: Regular rhythm, normal heart sounds and intact distal pulses. Tachycardia present. Exam reveals no gallop and no friction rub.   No murmur heard.  Pulmonary/Chest: Effort normal and breath sounds normal. No stridor. No respiratory distress. She has no wheezes. She has no rales. She exhibits no tenderness.   Abdominal: Soft. Bowel sounds are normal. She exhibits no distension. There is no tenderness.   Neurological: She is alert and oriented to person, place, and time. She has normal strength. No cranial nerve deficit or sensory deficit.   Skin: Skin is warm and dry. Capillary refill takes less than 2 seconds. No rash noted. She is not diaphoretic. No erythema. No pallor.   Nursing note and vitals reviewed.        CRANIAL NERVES     CN III, IV, VI   Pupils are equal, round, and reactive to light.  Extraocular motions are normal.        Significant Labs:   A1C: No results for input(s): HGBA1C in the last 4320 hours.  CBC:   Recent Labs   Lab 01/02/20  1737   WBC 8.99   HGB 13.6   HCT 41.5        CMP:   Recent Labs   Lab 01/02/20  1737   *   K 5.7*   CL 94*   CO2 24   *   BUN 50*   CREATININE 2.7*   CALCIUM 9.9   PROT 7.7   ALBUMIN 3.7   BILITOT 0.5   ALKPHOS 108   AST 9*   ALT 8*   ANIONGAP 15   EGFRNONAA 20*     POCT Glucose:   Recent Labs   Lab 01/02/20  1602   POCTGLUCOSE 428*     Urine Culture: No results for input(s): LABURIN in the last 48 hours.  Urine Studies:   Recent Labs   Lab 01/02/20  1759   COLORU Yellow   APPEARANCEUA Clear   PHUR 6.0   SPECGRAV 1.025   PROTEINUA 2+*   GLUCUA 3+*   KETONESU Negative   BILIRUBINUA Negative   OCCULTUA Trace*   NITRITE Negative   UROBILINOGEN Negative   LEUKOCYTESUR Negative   RBCUA 2   WBCUA 2   BACTERIA Few*   HYALINECASTS 2*     All pertinent labs within the past 24 hours have been reviewed.    Significant Imaging: I have reviewed and interpreted all pertinent  imaging results/findings within the past 24 hours.

## 2020-01-03 NOTE — SUBJECTIVE & OBJECTIVE
Interval History: Patient is doing better this morning. Tolerating diet with no nausea or vomiting. Ambulating with assistance. Denies any fever, chills, sob, chest pain, abdominal pain.     Review of Systems   Constitutional: Negative for activity change, chills, fatigue and unexpected weight change.   Respiratory: Negative for choking, shortness of breath and wheezing.    Cardiovascular: Negative for chest pain and leg swelling.   Gastrointestinal: Negative for abdominal pain, nausea and vomiting.   Skin: Negative for rash and wound.   Neurological: Positive for weakness.     Objective:     Vital Signs (Most Recent):  Temp: 97.5 °F (36.4 °C) (01/03/20 0430)  Pulse: 92 (01/03/20 0430)  Resp: 18 (01/03/20 0430)  BP: 119/72 (01/03/20 0430)  SpO2: 95 % (01/03/20 0430) Vital Signs (24h Range):  Temp:  [96.9 °F (36.1 °C)-98.1 °F (36.7 °C)] 97.5 °F (36.4 °C)  Pulse:  [] 92  Resp:  [14-48] 18  SpO2:  [95 %-100 %] 95 %  BP: ()/(52-98) 119/72     Weight: 61.5 kg (135 lb 9.3 oz)  Body mass index is 21.24 kg/m².    Intake/Output Summary (Last 24 hours) at 1/3/2020 0730  Last data filed at 1/3/2020 0625  Gross per 24 hour   Intake 3165 ml   Output 400 ml   Net 2765 ml      Physical Exam   Constitutional: She is oriented to person, place, and time. She appears well-developed and well-nourished. No distress.   HENT:   Head: Normocephalic and atraumatic.   Eyes: Pupils are equal, round, and reactive to light. EOM are normal.   Neck: Normal range of motion. Neck supple.   Cardiovascular: Normal rate, regular rhythm, normal heart sounds and intact distal pulses. Exam reveals no gallop and no friction rub.   No murmur heard.  Pulmonary/Chest: Effort normal and breath sounds normal. No stridor. No respiratory distress. She has no wheezes. She has no rales. She exhibits no tenderness.   Abdominal: Soft. Bowel sounds are normal. She exhibits no distension. There is no tenderness.   Neurological: She is alert and oriented to  person, place, and time. She has normal strength. No cranial nerve deficit or sensory deficit.   Skin: Skin is warm and dry. Capillary refill takes less than 2 seconds. No rash noted. She is not diaphoretic. No erythema. No pallor.   Nursing note and vitals reviewed.      Significant Labs:   A1C: No results for input(s): HGBA1C in the last 4320 hours.  CBC:   Recent Labs   Lab 01/02/20 1737 01/03/20  0439   WBC 8.99 5.34   HGB 13.6 10.6*   HCT 41.5 33.2*    253     CMP:   Recent Labs   Lab 01/02/20 1737 01/02/20 2212 01/03/20  0439   * 138 137  137   K 5.7* 3.7 4.6  4.6   CL 94* 101 100  100   CO2 24 25 26  26   * 349* 482*  482*   BUN 50* 41* 38*  38*   CREATININE 2.7* 2.2* 2.2*  2.2*   CALCIUM 9.9 9.0 8.7  8.7   PROT 7.7 6.5 6.2   ALBUMIN 3.7 2.9* 2.7*   BILITOT 0.5 0.4 0.5   ALKPHOS 108 84 76   AST 9* 7* 9*   ALT 8* 7* <5*   ANIONGAP 15 12 11  11   EGFRNONAA 20* 25* 25*  25*     Magnesium:   Recent Labs   Lab 01/02/20 1737 01/03/20  0439   MG 2.2 1.8       Significant Imaging:   Imaging Results    None

## 2020-01-03 NOTE — PLAN OF CARE
Problem: Physical Therapy Goal  Goal: Physical Therapy Goal  Description  Goals to be met by: 2/3/2020     Patient will increase functional independence with mobility by performin. Supine to sit with [place]  2. Sit to stand transfer with [place]  3. Bed to chair transfer with [place] using most appropriate AD or none  4. Gait  x >200 feet with [place] using no AD.      Outcome: Ongoing, Progressing   Recommendation pending progress Home with HH vs out patient

## 2020-01-03 NOTE — PLAN OF CARE
Patient AAOx3  Lives at home alone  No dme or home health  Patient does NOT have insurance- notified Lillie with [facility] and they will see patient to eval for medicaid    Per Lillie with Medicaid note-Room visit made, screening complete. Pt states she is single, working, no  minors, not 63, no VOC, no [place] moms. Pt is over income for expansion @ this  time.     Sees [facility] family medicine  Wants her discharge meds to go to [facility]    Future Appointments   Date Time Provider Department Center   1/6/2020  8:40 AM Jeff Garcia PA-C [facility]   2/10/2020  2:40 PM Fatmata Lemus MD [facility]     PT/OT eval pending    This  put name on white board and explained blue discharge folder to patient. Discharge planning brochure and/or business card given to patient.  Patient verbalized understanding.       01/03/20 1035   Discharge Assessment   Assessment Type Discharge Planning Assessment   Confirmed/corrected address and phone number on facesheet? Yes   Assessment information obtained from? Patient   Communicated expected length of stay with patient/caregiver yes   Prior to hospitilization cognitive status: Alert/Oriented   Prior to hospitalization functional status: Independent   Current cognitive status: Alert/Oriented   Current Functional Status: Independent;Assistive Equipment   Lives With alone   Is patient able to care for self after discharge? Yes   Patient's perception of discharge disposition home or selfcare   Readmission Within the Last 30 Days no previous admission in last 30 days   Patient currently being followed by outpatient case management? No   Patient currently receives any other outside agency services? No   Equipment Currently Used at Home glucometer   Do you have any problems affording any of your prescribed medications? No   Is the patient taking medications as prescribed? yes   Does the patient have transportation home? Yes   Transportation Anticipated  family or friend will provide   Discharge Plan A Home   Discharge Plan B Home     Jessica Mansfield, RN, CCM, CMSRN  RN Transition Navigator  [phone]

## 2020-01-03 NOTE — ASSESSMENT & PLAN NOTE
Glucose 441 on admission.   Hold oral hypoglycemics.  F/U A1c.  Given 10U regular insulin in ED.  Moderate SSI.  Diabetic diet.   Goal 140-180.   Continue to monitor.

## 2020-01-03 NOTE — NURSING
Dr duarte notified of  and pt c/o H/a and tylenol prn dose given. Stated that's fine, we dont want to give her any insulin at present time. No orders given.

## 2020-01-03 NOTE — PT/OT/SLP PROGRESS
Physical Therapy      Patient Name:  Bonnie Lino   MRN:  [id number]    Patient being seen by O T requested to be seen later in pm 2/2 fatigue. Will follow up as able    Nick Boston, PT

## 2020-01-03 NOTE — ED NOTES
Pt lying in bed resting comfortably with family at the bedside, in no acute distress.  Pt aware of plan of care to receive medications to correct potassium and BS levels.  Pt provided with extra blankets and a pillow.  Bed locked and in lowest position, side rails up x 2, call llight within reach, VSS, will continue to monitor

## 2020-01-03 NOTE — PT/OT/SLP EVAL
Physical Therapy Evaluation    Patient Name:  Bonnie Lino   MRN:  [id number]    Recommendations:     Discharge Recommendations:  other (see comments)(pending progress Home with HH vs out patient)   Discharge Equipment Recommendations: other (see comments)(TBD)   Barriers to discharge: Decreased caregiver support    Assessment:     Bonnie Lino is a 49 y.o. female admitted with a medical diagnosis of Hyperkalemia.  She presents with the following impairments/functional limitations:  weakness, impaired functional mobilty, gait instability, decreased lower extremity function, impaired balance, impaired endurance . Patient with decline in functional mobility and gait disturbance. .    Rehab Prognosis: Good; patient would benefit from acute skilled PT services to address these deficits and reach maximum level of function.    Recent Surgery: * No surgery found *      Plan:     During this hospitalization, patient to be seen 6 x/week to address the identified rehab impairments via gait training, therapeutic activities, therapeutic exercises and progress toward the following goals:    · Plan of Care Expires:  02/03/20    Subjective     Chief Complaint: generalized weakness  Patient/Family Comments/goals: go home  Pain/Comfort:  · Pain Rating 1: 0/10  · Pain Rating Post-Intervention 1: 0/10    Patients cultural, spiritual, Methodist conflicts given the current situation:      Living Environment:  Lives in 2 floor apt with no elevator access. T/s combo  Prior to admission, patients level of function was independent.  Equipment used at home: none.  DME owned (not currently used): none.  Upon discharge, patient will have assistance from unknown.    Objective:     Communicated with primary nurse prior to session.  Patient found HOB elevated with peripheral IV, telemetry  upon PT entry to room.    General Precautions: Standard, fall   Orthopedic Precautions:N/A   Braces: N/A     Exams:  · RLE ROM: WFL  · RLE Strength:  WFL  · LLE ROM: WFL  · LLE Strength: WFL    Functional Mobility:  · Bed Mobility:     · Supine to Sit: supervision  · Sit to Supine: supervision  · Transfers:     · Sit to Stand:  contact guard assistance and minimum assistance with rolling walker  · Gait: steps 4 forward and 4 backward with tendency to buckle then self correct  · Balance: poor + with RW      Therapeutic Activities and Exercises:   Reviewed and instructed in ankle pumps, heel slides, hip ABD/ADD and SLR    AM-PAC 6 CLICK MOBILITY  Total Score:18     Patient left HOB elevated with all lines intact, call button in reach and family/friend  present.    GOALS:   Multidisciplinary Problems     Physical Therapy Goals        Problem: Physical Therapy Goal    Goal Priority Disciplines Outcome Goal Variances Interventions   Physical Therapy Goal     PT, PT/OT Ongoing, Progressing     Description:  Goals to be met by: 2/3/2020     Patient will increase functional independence with mobility by performin. Supine to sit with [place]  2. Sit to stand transfer with [place]  3. Bed to chair transfer with [place] using most appropriate AD or none  4. Gait  x >200 feet with [place] using no AD.                       History:     Past Medical History:   Diagnosis Date    Aneurysm of cardiac wall, congenital     Diabetes type 2, uncontrolled     Hypertension        Past Surgical History:   Procedure Laterality Date    BREAST SURGERY      cyst removal     PARTIAL HYSTERECTOMY         Time Tracking:     PT Received On: 20  PT Start Time: 1315     PT Stop Time: 1340  PT Total Time (min): 25 min     Billable Minutes: Evaluation 10 and Gait Training 15      Nick Boston, PT  2020

## 2020-01-03 NOTE — ASSESSMENT & PLAN NOTE
In the ED, K of 5.7  Received Albuterol, Insulin, Calcium Gluconate and Sodium bicarb in the ED  EKG: NSR  Repeat K of 3.7  This AM, K 4.6  Will continue to monitor with PM labs

## 2020-01-03 NOTE — ASSESSMENT & PLAN NOTE
Baseline Cr unknown but was 2.0 in September and 0.8 in 2018  Continue NaCl 150cc/hr.  Avoid nephrotoxic agents.   Renal U/S pending.   Will continue to monitor.

## 2020-01-03 NOTE — ASSESSMENT & PLAN NOTE
K 5.7 in ED  EKG pending.  Albuterol, insulin, calcium gluconate and sodium bicarb in ED.  Pending repeat CMP.   Need tighter glycemic control.  Will reassess.

## 2020-01-03 NOTE — PLAN OF CARE
met with patient to discuss SW consult for grief. Patient was able to verify demographics and expressed to SW that her mother had recently passed 3 months ago. SW informed patient that resources could be offered. Patient informed  SW she thinks she would like to speak with someone regarding her recent loss. DREA provided community resources regarding dealing with grief and [facility] Human Resources number for further help.       01/03/20 7644   Post-Acute Status   Post-Acute Authorization Other   Other Status Community Services

## 2020-01-03 NOTE — HOSPITAL COURSE
In the ED, patient was found with potassium of 5.7. Patient received Calcium gluconate, albuterol, insulin, and D50. EKG showed NSR. Repeat BMP showed K of 3.7. Creatinine was found to be 2.7 on admission elevated from baseline. Patient was started on 1.5 mIVF. Anti-hypertensives were held on admission. Creatinine downtrended. Patient was started on Procardia and Detemir 15U BID. Patient's blood pressure was elevated and was restarted on Lisinopril. US of the kidneys shows morphology with elevated resistive indices bilaterally suggesting medical renal disease, without hydronephrosis.Patient tolerating diet with no nausea or vomiting. Ambulating with assistance.     Discussed with patient to start her Insulin 70/30 at 20U BID. Medications were sent to the pharmacy. Patient's weakness improved with Gabapentin. Patient was stable for discharge with follow up with PCP.

## 2020-01-03 NOTE — PROGRESS NOTES
[facility]  Progress Note    Patient Name: Bonnie Lino  MRN: [id number]  Patient Class: OP- Observation   Admission Date: 1/2/2020  Length of Stay: 0 days  Attending Physician: Severyn Yaroshevsky, MD  Primary Care Provider: Fatmata Lemus MD        Subjective:     Principal Problem:Hyperkalemia        HPI:  49 year old female with PMH HTN and uncontrolled T2DM(A1C >15.5%) brought to ED from our clinic earlier today for fatigue and lower extremity weakness for the past 1 week. Patient also endorses a cough and dry heaving with associated SOB for the past 2 days. Reports increased thirst and urination recently. She has not taken her medications for the past few days because she has not been feeling well. Her am fasting BG has been ranging in the 300s at home. She has lost weight from not eating.  Patient's mother recently passed away 3 months ago and states she has been very upset and feeling more weak since. Patient lives alone. Denies F/chills/abdominal pain/CP/palpitations. Was constipated a couple days ago, relieved with OTC laxative. She has lower extremity numbness and tingling. Patient denies tobacco or drug use. Drinks ETOH occasionally. According to PCP notes, A1c>15.5 (patient uses outside lab).     Overview/Hospital Course:  No notes on file    Interval History: Patient is doing better this morning. Tolerating diet with no nausea or vomiting. Ambulating with assistance. Denies any fever, chills, sob, chest pain, abdominal pain.     Review of Systems   Constitutional: Negative for activity change, chills, fatigue and unexpected weight change.   Respiratory: Negative for choking, shortness of breath and wheezing.    Cardiovascular: Negative for chest pain and leg swelling.   Gastrointestinal: Negative for abdominal pain, nausea and vomiting.   Skin: Negative for rash and wound.   Neurological: Positive for weakness.     Objective:     Vital Signs (Most  Recent):  Temp: 97.5 °F (36.4 °C) (01/03/20 0430)  Pulse: 92 (01/03/20 0430)  Resp: 18 (01/03/20 0430)  BP: 119/72 (01/03/20 0430)  SpO2: 95 % (01/03/20 0430) Vital Signs (24h Range):  Temp:  [96.9 °F (36.1 °C)-98.1 °F (36.7 °C)] 97.5 °F (36.4 °C)  Pulse:  [] 92  Resp:  [14-48] 18  SpO2:  [95 %-100 %] 95 %  BP: ()/(52-98) 119/72     Weight: 61.5 kg (135 lb 9.3 oz)  Body mass index is 21.24 kg/m².    Intake/Output Summary (Last 24 hours) at 1/3/2020 0730  Last data filed at 1/3/2020 0625  Gross per 24 hour   Intake 3165 ml   Output 400 ml   Net 2765 ml      Physical Exam   Constitutional: She is oriented to person, place, and time. She appears well-developed and well-nourished. No distress.   HENT:   Head: Normocephalic and atraumatic.   Eyes: Pupils are equal, round, and reactive to light. EOM are normal.   Neck: Normal range of motion. Neck supple.   Cardiovascular: Normal rate, regular rhythm, normal heart sounds and intact distal pulses. Exam reveals no gallop and no friction rub.   No murmur heard.  Pulmonary/Chest: Effort normal and breath sounds normal. No stridor. No respiratory distress. She has no wheezes. She has no rales. She exhibits no tenderness.   Abdominal: Soft. Bowel sounds are normal. She exhibits no distension. There is no tenderness.   Neurological: She is alert and oriented to person, place, and time. She has normal strength. No cranial nerve deficit or sensory deficit.   Skin: Skin is warm and dry. Capillary refill takes less than 2 seconds. No rash noted. She is not diaphoretic. No erythema. No pallor.   Nursing note and vitals reviewed.      Significant Labs:   A1C: No results for input(s): HGBA1C in the last 4320 hours.  CBC:   Recent Labs   Lab 01/02/20 1737 01/03/20 0439   WBC 8.99 5.34   HGB 13.6 10.6*   HCT 41.5 33.2*    253     CMP:   Recent Labs   Lab 01/02/20 1737 01/02/20 2212 01/03/20 0439   * 138 137  137   K 5.7* 3.7 4.6  4.6   CL 94* 101 100  100    CO2 24 25 26  26   * 349* 482*  482*   BUN 50* 41* 38*  38*   CREATININE 2.7* 2.2* 2.2*  2.2*   CALCIUM 9.9 9.0 8.7  8.7   PROT 7.7 6.5 6.2   ALBUMIN 3.7 2.9* 2.7*   BILITOT 0.5 0.4 0.5   ALKPHOS 108 84 76   AST 9* 7* 9*   ALT 8* 7* <5*   ANIONGAP 15 12 11  11   EGFRNONAA 20* 25* 25*  25*     Magnesium:   Recent Labs   Lab 01/02/20  1737 01/03/20  0439   MG 2.2 1.8       Significant Imaging:   Imaging Results    None             Assessment/Plan:      50 yo female with history of uncontrolled diabetes, HTN, HLD presenting for fatigue and weakness found to be hyperkalemic and elevated blood glucose with improvement this morning.     * Hyperkalemia  In the ED, K of 5.7  Received Albuterol, Insulin, Calcium Gluconate and Sodium bicarb in the ED  EKG: NSR  Repeat K of 3.7  This AM, K 4.6  Will continue to monitor with PM labs    ADELINA (acute kidney injury)  Baseline Cr unknown but was 2.0 in September and 0.8 in 2018  Cr on admission: 2.7  Cr improving  Continue IVFs  Avoid nephrotoxic agents.   Renal U/S pending.   Will continue to monitor.       HLD (hyperlipidemia)  Hold home meds.       Diabetes type 2, uncontrolled  On admission, glucose 441  Received 10U Insulin in the ED   Home medications include Insulin 70/30, Metformin and Glipizide  Not compliant with insulin at home  Will hold home oral hypoglycemics  A1C: Pending  Started on Detemir 15U  Moderate SSI  Diabetic Diet  Consulted Diabetic education  Goal glucose of 140-180 while inpatient  Continue to monitor.     Hypertension  BP goal < 140/80  Will hold home meds due to ADELINA        VTE Risk Mitigation (From admission, onward)         Ordered     IP VTE LOW RISK PATIENT  Once      01/03/20 0102     Place sequential compression device  Until discontinued      01/03/20 0102                Dispo: Pending PT/OT evaluation      Sosa Devine, PGY-2  [facility]  01/03/2020 7:55 AM

## 2020-01-03 NOTE — ASSESSMENT & PLAN NOTE
Baseline Cr unknown but was 2.0 in September and 0.8 in 2018  Cr on admission: 2.7  Cr improving  Continue IVFs  Avoid nephrotoxic agents.   Renal U/S pending.   Will continue to monitor.

## 2020-01-03 NOTE — ED NOTES
Pt lying in bed resting comfortably with family at the bedside, in no acute distress.  Pt aware of plan of care to await results from blood and urinalysis.  Bed locked and in lowest position, side rails up x 2, call light within reach, VSS, will continue to monitor

## 2020-01-03 NOTE — PLAN OF CARE
"  Problem: Occupational Therapy Goal  Goal: Occupational Therapy Goal  Description  Goals to be met by: 02/03     Patient will increase functional independence with ADLs by performing:    LE Dressing with Stand-by Assistance.  Grooming while standing at sink with Stand-by Assistance.  Toileting from toilet with Stand-by Assistance for hygiene and clothing management.   Toilet transfer to toilet with Stand-by Assistance.  Increased functional strength to WFL for ADLs.     Outcome: Ongoing, Progressing   Pt found in supine & agreeable to OT eval/tx this AM. Pt lives alone in 2nd fl apt w/ 1flt stair access w/ BHR. PLOF: (I) w/o DME w/ all fxnl tasks incl standing/sitting tub t/f's, IADLs, driving & working as a .  Currently, pt endorses BLE tingling/numbness/"heaviness" distal>proximal & perf the following: sup-->EOB w/ Sup & perf LAQs w/ 5 sec eccentric hold at EROM x ~10 reps ea; standing via RW w/ Min A & perf WSing x ~2min w/ emph on active sustained knee ext; amb via RW x ~3' w/ initial CGA, but increasing A to maint standing 2/2 progressive loss of B quad fxn. Brought BSC directly behind pt to A to sitting. Pt perf standing from BSC x 3 attempts using vanity/sink for BUE support to facilitate B knee "lock out" extension for toileting tasks w/ Min A to maint static balance during uni/bilat UE tasks. Perf G/H, gown change & UB hygiene tasks sitting at sink w/ SBA. Pt req'ed Min RBs throughout, but demo'ed increasing standing tolerance throughout tx. Perf sidestepping to R x ~1' followed by pivot t/f via RW-->b/s chair w/ Min A. Edu/tx re: HEP, general safety techs, endurance tx, energy conservation tx. Pt/pt's uncle verbalized understanding. Pt left [facility]IC w/ alarm, uncle present & nsg notified.  **Pt & nsg informed that pt currently too unsafe for taking a shower or amb to toilet. Rec pt to get bed/sponge baths & use BSC until BLE strength/stability improves.     Pt presents w/ decreased overall " endurance/conditioning, balance/mobility & coordination/sensation w/ subsequent decline in (I)/safety w/ BADLs, fxnl mobility & fxnl t/f's.  OT 5x/wk to increase phys/fxnl status & maximize potential to achieve established goals for d/c-->TBD pending progress w/ rec RW, BSC & TTB.

## 2020-01-03 NOTE — H&P
[facility] Medicine  History & Physical    Patient Name: Bonnie Lino  MRN: [id number]  Admission Date: 1/2/2020  Attending Physician: Severyn Yaroshevsky, MD   Primary Care Provider: Fatmata Lemus MD         Patient information was obtained from patient and ER records.     Subjective:     Principal Problem:Hyperkalemia    Chief Complaint:   Chief Complaint   Patient presents with    Fatigue     Reports CBG has been running high and over the past 4-5 days has been having fatigue and generalized weakness.  at triage.         HPI: 49 year old female with PMH HTN and uncontrolled T2DM(A1C >15.5%) brought to ED from our clinic earlier today for fatigue and lower extremity weakness for the past 1 week. Patient also endorses a cough and dry heaving with associated SOB for the past 2 days. Reports increased thirst and urination recently. She has not taken her medications for the past few days because she has not been feeling well. Her am fasting BG has been ranging in the 300s at home. She has lost weight from not eating.  Patient's mother recently passed away 3 months ago and states she has been very upset and feeling more weak since. Patient lives alone. Denies F/chills/abdominal pain/CP/palpitations. Was constipated a couple days ago, relieved with OTC laxative. She has lower extremity numbness and tingling. Patient denies tobacco or drug use. Drinks ETOH occasionally. According to PCP notes, A1c>15.5 (patient uses outside lab).     Past Medical History:   Diagnosis Date    Aneurysm of cardiac wall, congenital     Diabetes type 2, uncontrolled     Hypertension        Past Surgical History:   Procedure Laterality Date    BREAST SURGERY      cyst removal     PARTIAL HYSTERECTOMY  2002       Review of patient's allergies indicates:  No Known Allergies    No current facility-administered medications on file prior to encounter.      Current Outpatient Medications on File  Prior to Encounter   Medication Sig    amitriptyline (ELAVIL) 25 MG tablet Take 1 tablet (25 mg total) by mouth every evening.    aspirin (ECOTRIN) 81 MG EC tablet Take 1 tablet (81 mg total) by mouth once daily.    benzocaine-resorcinol (VAGISIL) 5-2 % vaginal cream Place vaginally nightly.    blood glucose strip-disp meter Kit 1 application by Misc.(Non-Drug; Combo Route) route 3 (three) times daily.    blood-glucose meter (RELION ALL-IN-ONE METER) kit Use as instructed    fluticasone propionate (FLONASE ALLERGY RELIEF) 50 mcg/actuation nasal spray 1 spray (50 mcg total) by Each Nostril route daily as needed.    glipiZIDE (GLUCOTROL) 10 MG tablet Take 1 tablet (10 mg total) by mouth daily with breakfast.    insulin NPH-insulin regular, 70/30, (NOVOLIN 70/30) 100 unit/mL (70-30) injection Inject 10 Units into the skin 2 (two) times daily.    lancets Misc 1 application by Misc.(Non-Drug; Combo Route) route 3 (three) times daily.    lisinopril-hydrochlorothiazide (PRINZIDE,ZESTORETIC) 20-25 mg Tab Take 1 tablet by mouth once daily.    metFORMIN (GLUCOPHAGE) 1000 MG tablet Take 1 tablet (1,000 mg total) by mouth 2 (two) times daily with meals.    ondansetron (ZOFRAN-ODT) 4 MG TbDL Take 1 tablet (4 mg total) by mouth every 8 (eight) hours as needed (nausea).     Family History     Problem Relation (Age of Onset)    Cancer Mother (40), Maternal Grandmother (82)    Diabetes Mother, Father    Heart disease Mother        Tobacco Use    Smoking status: Never Smoker    Smokeless tobacco: Never Used   Substance and Sexual Activity    Alcohol use: Yes     Frequency: Monthly or less     Comment: seldom    Drug use: No    Sexual activity: Not on file     Review of Systems   Constitutional: Positive for appetite change, chills, fatigue and unexpected weight change. Negative for fever.   HENT: Positive for congestion.    Eyes: Positive for visual disturbance (intermittent blurry vision).   Respiratory: Positive  for cough and shortness of breath. Negative for choking, chest tightness and wheezing.    Cardiovascular: Negative for chest pain, palpitations and leg swelling.   Gastrointestinal: Positive for nausea and vomiting. Negative for abdominal distention, abdominal pain, constipation and diarrhea.   Endocrine: Positive for polydipsia and polyuria.   Genitourinary: Negative for dysuria, flank pain and hematuria.   Skin: Negative for rash and wound.   Neurological: Positive for weakness, numbness (bilateral lower extremities) and headaches (frontal, associated with allergies). Negative for seizures and syncope.   Psychiatric/Behavioral: Negative for agitation and behavioral problems.     Objective:     Vital Signs (Most Recent):  Temp: 97.8 °F (36.6 °C) (01/02/20 1600)  Pulse: 97 (01/02/20 1914)  Resp: 16 (01/02/20 1914)  BP: 110/76 (01/02/20 1806)  SpO2: 100 % (01/02/20 1914) Vital Signs (24h Range):  Temp:  [96.9 °F (36.1 °C)-97.8 °F (36.6 °C)] 97.8 °F (36.6 °C)  Pulse:  [] 97  Resp:  [16-17] 16  SpO2:  [99 %-100 %] 100 %  BP: (110-155)/(75-98) 110/76     Weight: 59.4 kg (131 lb)  Body mass index is 20.52 kg/m².    Physical Exam   Constitutional: She is oriented to person, place, and time. She appears well-developed and well-nourished. No distress.   Patient very tearful during interview. Patient began to have bilateral upper extremity tremors but kept conversing throughout.      HENT:   Head: Normocephalic and atraumatic.   Eyes: Pupils are equal, round, and reactive to light. EOM are normal.   Neck: Normal range of motion. Neck supple.   Cardiovascular: Regular rhythm, normal heart sounds and intact distal pulses. Tachycardia present. Exam reveals no gallop and no friction rub.   No murmur heard.  Pulmonary/Chest: Effort normal and breath sounds normal. No stridor. No respiratory distress. She has no wheezes. She has no rales. She exhibits no tenderness.   Abdominal: Soft. Bowel sounds are normal. She exhibits no  distension. There is no tenderness.   Neurological: She is alert and oriented to person, place, and time. She has normal strength. No cranial nerve deficit or sensory deficit.   Skin: Skin is warm and dry. Capillary refill takes less than 2 seconds. No rash noted. She is not diaphoretic. No erythema. No pallor.   Nursing note and vitals reviewed.        CRANIAL NERVES     CN III, IV, VI   Pupils are equal, round, and reactive to light.  Extraocular motions are normal.        Significant Labs:   A1C: No results for input(s): HGBA1C in the last 4320 hours.  CBC:   Recent Labs   Lab 01/02/20  1737   WBC 8.99   HGB 13.6   HCT 41.5        CMP:   Recent Labs   Lab 01/02/20  1737   *   K 5.7*   CL 94*   CO2 24   *   BUN 50*   CREATININE 2.7*   CALCIUM 9.9   PROT 7.7   ALBUMIN 3.7   BILITOT 0.5   ALKPHOS 108   AST 9*   ALT 8*   ANIONGAP 15   EGFRNONAA 20*     POCT Glucose:   Recent Labs   Lab 01/02/20  1602   POCTGLUCOSE 428*     Urine Culture: No results for input(s): LABURIN in the last 48 hours.  Urine Studies:   Recent Labs   Lab 01/02/20  1759   COLORU Yellow   APPEARANCEUA Clear   PHUR 6.0   SPECGRAV 1.025   PROTEINUA 2+*   GLUCUA 3+*   KETONESU Negative   BILIRUBINUA Negative   OCCULTUA Trace*   NITRITE Negative   UROBILINOGEN Negative   LEUKOCYTESUR Negative   RBCUA 2   WBCUA 2   BACTERIA Few*   HYALINECASTS 2*     All pertinent labs within the past 24 hours have been reviewed.    Significant Imaging: I have reviewed and interpreted all pertinent imaging results/findings within the past 24 hours.    Assessment/Plan:     * Hyperkalemia  K 5.7 in ED  EKG pending.  Albuterol, insulin, calcium gluconate and sodium bicarb in ED.  Pending repeat CMP.   Need tighter glycemic control.  Will reassess.    ADELINA (acute kidney injury)  Baseline Cr unknown but was 2.0 in September and 0.8 in 2018  Continue NaCl 150cc/hr.  Avoid nephrotoxic agents.   Renal U/S pending.   Will continue to monitor.       HLD  (hyperlipidemia)  Hold home meds.       Diabetes type 2, uncontrolled  Glucose 441 on admission.   Hold oral hypoglycemics.  F/U A1c.  Given 10U regular insulin in ED.  Moderate SSI.  Diabetic diet.   Goal 140-180.   Continue to monitor.       Hypertension  Will hold home meds.         VTE Risk Mitigation (From admission, onward)    None             Te Claudio MD HO-1  Department of Hospital Medicine   [facility]

## 2020-01-03 NOTE — ASSESSMENT & PLAN NOTE
On admission, glucose 441  Received 10U Insulin in the ED   Home medications include Insulin 70/30, Metformin and Glipizide  Not compliant with insulin at home  Will hold home oral hypoglycemics  A1C: Pending  Started on Detemir 15U  Moderate SSI  Diabetic Diet  Consulted Diabetic education  Goal glucose of 140-180 while inpatient  Continue to monitor.

## 2020-01-03 NOTE — HPI
49 year old female with PMH HTN and uncontrolled T2DM(A1C >15.5%) brought to ED from our clinic earlier today for fatigue and lower extremity weakness for the past 1 week. Patient also endorses a cough and dry heaving with associated SOB for the past 2 days. Reports increased thirst and urination recently. She has not taken her medications for the past few days because she has not been feeling well. Her am fasting BG has been ranging in the 300s at home. She has lost weight from not eating.  Patient's mother recently passed away 3 months ago and states she has been very upset and feeling more weak since. Patient lives alone. Denies F/chills/abdominal pain/CP/palpitations. Was constipated a couple days ago, relieved with OTC laxative. She has lower extremity numbness and tingling. Patient denies tobacco or drug use. Drinks ETOH occasionally. According to PCP notes, A1c>15.5 (patient uses outside lab).

## 2020-01-03 NOTE — PLAN OF CARE
Pt AAOX4. Denies pain at this time. VSS. Family at the bedside. Safety maintained. Bed in lowest locked position. Bed alarm set. Will continue to monitor.  Problem: Fall Injury Risk  Goal: Absence of Fall and Fall-Related Injury  Outcome: Ongoing, Progressing     Problem: Adult Inpatient Plan of Care  Goal: Plan of Care Review  Outcome: Ongoing, Progressing  Goal: Absence of Hospital-Acquired Illness or Injury  Outcome: Ongoing, Progressing  Goal: Optimal Comfort and Wellbeing  Outcome: Ongoing, Progressing  Goal: Readiness for Transition of Care  Outcome: Ongoing, Progressing

## 2020-01-04 LAB
ALBUMIN SERPL BCP-MCNC: 2.5 G/DL (ref 3.5–5.2)
ALP SERPL-CCNC: 66 U/L (ref 55–135)
ALT SERPL W/O P-5'-P-CCNC: 6 U/L (ref 10–44)
ANION GAP SERPL CALC-SCNC: 9 MMOL/L (ref 8–16)
AST SERPL-CCNC: 12 U/L (ref 10–40)
BASOPHILS # BLD AUTO: 0.01 K/UL (ref 0–0.2)
BASOPHILS NFR BLD: 0.3 % (ref 0–1.9)
BILIRUB SERPL-MCNC: 0.4 MG/DL (ref 0.1–1)
BUN SERPL-MCNC: 28 MG/DL (ref 6–20)
CALCIUM SERPL-MCNC: 8.6 MG/DL (ref 8.7–10.5)
CHLORIDE SERPL-SCNC: 106 MMOL/L (ref 95–110)
CO2 SERPL-SCNC: 25 MMOL/L (ref 23–29)
CREAT SERPL-MCNC: 1.5 MG/DL (ref 0.5–1.4)
DIFFERENTIAL METHOD: ABNORMAL
EOSINOPHIL # BLD AUTO: 0.1 K/UL (ref 0–0.5)
EOSINOPHIL NFR BLD: 3.3 % (ref 0–8)
ERYTHROCYTE [DISTWIDTH] IN BLOOD BY AUTOMATED COUNT: 12.1 % (ref 11.5–14.5)
EST. GFR  (AFRICAN AMERICAN): 46 ML/MIN/1.73 M^2
EST. GFR  (NON AFRICAN AMERICAN): 40 ML/MIN/1.73 M^2
GLUCOSE SERPL-MCNC: 169 MG/DL (ref 70–110)
HCT VFR BLD AUTO: 30.3 % (ref 37–48.5)
HGB BLD-MCNC: 9.6 G/DL (ref 12–16)
LYMPHOCYTES # BLD AUTO: 1.4 K/UL (ref 1–4.8)
LYMPHOCYTES NFR BLD: 34.8 % (ref 18–48)
MAGNESIUM SERPL-MCNC: 1.7 MG/DL (ref 1.6–2.6)
MCH RBC QN AUTO: 24.9 PG (ref 27–31)
MCHC RBC AUTO-ENTMCNC: 31.7 G/DL (ref 32–36)
MCV RBC AUTO: 79 FL (ref 82–98)
MONOCYTES # BLD AUTO: 0.2 K/UL (ref 0.3–1)
MONOCYTES NFR BLD: 5.8 % (ref 4–15)
NEUTROPHILS # BLD AUTO: 2.2 K/UL (ref 1.8–7.7)
NEUTROPHILS NFR BLD: 55.8 % (ref 38–73)
PHOSPHATE SERPL-MCNC: 3.4 MG/DL (ref 2.7–4.5)
PLATELET # BLD AUTO: 236 K/UL (ref 150–350)
PMV BLD AUTO: 11.3 FL (ref 9.2–12.9)
POCT GLUCOSE: 177 MG/DL (ref 70–110)
POCT GLUCOSE: 182 MG/DL (ref 70–110)
POCT GLUCOSE: 261 MG/DL (ref 70–110)
POCT GLUCOSE: 361 MG/DL (ref 70–110)
POTASSIUM SERPL-SCNC: 3.9 MMOL/L (ref 3.5–5.1)
PROT SERPL-MCNC: 5.7 G/DL (ref 6–8.4)
RBC # BLD AUTO: 3.85 M/UL (ref 4–5.4)
SODIUM SERPL-SCNC: 140 MMOL/L (ref 136–145)
WBC # BLD AUTO: 3.99 K/UL (ref 3.9–12.7)

## 2020-01-04 PROCEDURE — 97110 THERAPEUTIC EXERCISES: CPT | Mod: CQ

## 2020-01-04 PROCEDURE — 94761 N-INVAS EAR/PLS OXIMETRY MLT: CPT

## 2020-01-04 PROCEDURE — 97116 GAIT TRAINING THERAPY: CPT | Mod: CQ

## 2020-01-04 PROCEDURE — 25000003 PHARM REV CODE 250: Performed by: STUDENT IN AN ORGANIZED HEALTH CARE EDUCATION/TRAINING PROGRAM

## 2020-01-04 PROCEDURE — 96375 TX/PRO/DX INJ NEW DRUG ADDON: CPT

## 2020-01-04 PROCEDURE — 84100 ASSAY OF PHOSPHORUS: CPT

## 2020-01-04 PROCEDURE — G0378 HOSPITAL OBSERVATION PER HR: HCPCS

## 2020-01-04 PROCEDURE — 36415 COLL VENOUS BLD VENIPUNCTURE: CPT

## 2020-01-04 PROCEDURE — 63600175 PHARM REV CODE 636 W HCPCS: Performed by: STUDENT IN AN ORGANIZED HEALTH CARE EDUCATION/TRAINING PROGRAM

## 2020-01-04 PROCEDURE — 97530 THERAPEUTIC ACTIVITIES: CPT | Mod: CQ

## 2020-01-04 PROCEDURE — 80053 COMPREHEN METABOLIC PANEL: CPT

## 2020-01-04 PROCEDURE — 96361 HYDRATE IV INFUSION ADD-ON: CPT

## 2020-01-04 PROCEDURE — 96372 THER/PROPH/DIAG INJ SC/IM: CPT

## 2020-01-04 PROCEDURE — 85025 COMPLETE CBC W/AUTO DIFF WBC: CPT

## 2020-01-04 PROCEDURE — 83735 ASSAY OF MAGNESIUM: CPT

## 2020-01-04 RX ORDER — GABAPENTIN 300 MG/1
300 CAPSULE ORAL 3 TIMES DAILY
Status: DISCONTINUED | OUTPATIENT
Start: 2020-01-04 | End: 2020-01-05 | Stop reason: HOSPADM

## 2020-01-04 RX ORDER — LISINOPRIL 2.5 MG/1
2.5 TABLET ORAL DAILY
Status: DISCONTINUED | OUTPATIENT
Start: 2020-01-04 | End: 2020-01-05 | Stop reason: HOSPADM

## 2020-01-04 RX ORDER — ATORVASTATIN CALCIUM 40 MG/1
40 TABLET, FILM COATED ORAL NIGHTLY
Status: DISCONTINUED | OUTPATIENT
Start: 2020-01-04 | End: 2020-01-05 | Stop reason: HOSPADM

## 2020-01-04 RX ORDER — HYDROCHLOROTHIAZIDE 12.5 MG/1
12.5 TABLET ORAL DAILY
Status: DISCONTINUED | OUTPATIENT
Start: 2020-01-04 | End: 2020-01-05 | Stop reason: HOSPADM

## 2020-01-04 RX ORDER — FOLIC ACID 5 MG/ML
1 INJECTION, SOLUTION INTRAMUSCULAR; INTRAVENOUS; SUBCUTANEOUS DAILY
Status: DISCONTINUED | OUTPATIENT
Start: 2020-01-04 | End: 2020-01-05 | Stop reason: HOSPADM

## 2020-01-04 RX ADMIN — ERGOCALCIFEROL 50000 UNITS: 1.25 CAPSULE ORAL at 09:01

## 2020-01-04 RX ADMIN — BENZONATATE 100 MG: 100 CAPSULE ORAL at 02:01

## 2020-01-04 RX ADMIN — HYDRALAZINE HYDROCHLORIDE 10 MG: 20 INJECTION INTRAMUSCULAR; INTRAVENOUS at 12:01

## 2020-01-04 RX ADMIN — GABAPENTIN 300 MG: 300 CAPSULE ORAL at 08:01

## 2020-01-04 RX ADMIN — SODIUM CHLORIDE: 0.9 INJECTION, SOLUTION INTRAVENOUS at 01:01

## 2020-01-04 RX ADMIN — ATORVASTATIN CALCIUM 40 MG: 40 TABLET, FILM COATED ORAL at 08:01

## 2020-01-04 RX ADMIN — BENZONATATE 100 MG: 100 CAPSULE ORAL at 03:01

## 2020-01-04 RX ADMIN — ACETAMINOPHEN 650 MG: 325 TABLET ORAL at 10:01

## 2020-01-04 RX ADMIN — FAMOTIDINE 20 MG: 20 TABLET ORAL at 09:01

## 2020-01-04 RX ADMIN — INSULIN ASPART 2 UNITS: 100 INJECTION, SOLUTION INTRAVENOUS; SUBCUTANEOUS at 09:01

## 2020-01-04 RX ADMIN — SODIUM CHLORIDE: 0.9 INJECTION, SOLUTION INTRAVENOUS at 10:01

## 2020-01-04 RX ADMIN — ACETAMINOPHEN 650 MG: 325 TABLET ORAL at 06:01

## 2020-01-04 RX ADMIN — LISINOPRIL 2.5 MG: 2.5 TABLET ORAL at 05:01

## 2020-01-04 RX ADMIN — GABAPENTIN 300 MG: 300 CAPSULE ORAL at 02:01

## 2020-01-04 RX ADMIN — INSULIN ASPART 3 UNITS: 100 INJECTION, SOLUTION INTRAVENOUS; SUBCUTANEOUS at 08:01

## 2020-01-04 RX ADMIN — INSULIN ASPART 2 UNITS: 100 INJECTION, SOLUTION INTRAVENOUS; SUBCUTANEOUS at 05:01

## 2020-01-04 RX ADMIN — FOLIC ACID 1 MG: 5 INJECTION, SOLUTION INTRAMUSCULAR; INTRAVENOUS; SUBCUTANEOUS at 02:01

## 2020-01-04 RX ADMIN — HYDROCHLOROTHIAZIDE 12.5 MG: 12.5 TABLET ORAL at 05:01

## 2020-01-04 RX ADMIN — NIFEDIPINE 30 MG: 30 TABLET, FILM COATED, EXTENDED RELEASE ORAL at 09:01

## 2020-01-04 RX ADMIN — INSULIN ASPART 10 UNITS: 100 INJECTION, SOLUTION INTRAVENOUS; SUBCUTANEOUS at 12:01

## 2020-01-04 NOTE — ASSESSMENT & PLAN NOTE
BP goal < 140/80  Will hold home meds due to ADELINA  Started on Procardia  BP stable and wnl this am

## 2020-01-04 NOTE — ASSESSMENT & PLAN NOTE
In the ED, K of 5.7  Received Albuterol, Insulin, Calcium Gluconate and Sodium bicarb in the ED  EKG: NSR  Repeat K of 3.7  This AM, K 3.9  Will continue to monitor

## 2020-01-04 NOTE — PROGRESS NOTES
[facility] Medicine  Progress Note    Patient Name: Bonnie Lino  MRN: [id number]  Patient Class: OP- Observation   Admission Date: 1/2/2020  Length of Stay: 0 days  Attending Physician: Severyn Yaroshevsky, MD  Primary Care Provider: Fatmata Lemus MD        Subjective:     Principal Problem:Hyperkalemia        HPI:  49 year old female with PMH HTN and uncontrolled T2DM(A1C >15.5%) brought to ED from our clinic earlier today for fatigue and lower extremity weakness for the past 1 week. Patient also endorses a cough and dry heaving with associated SOB for the past 2 days. Reports increased thirst and urination recently. She has not taken her medications for the past few days because she has not been feeling well. Her am fasting BG has been ranging in the 300s at home. She has lost weight from not eating.  Patient's mother recently passed away 3 months ago and states she has been very upset and feeling more weak since. Patient lives alone. Denies F/chills/abdominal pain/CP/palpitations. Was constipated a couple days ago, relieved with OTC laxative. She has lower extremity numbness and tingling. Patient denies tobacco or drug use. Drinks ETOH occasionally. According to PCP notes, A1c>15.5 (patient uses outside lab).     Overview/Hospital Course:  In the ED, patient was found with potassium of 5.7. Patient received Calcium gluconate, albuterol, insulin, and D50. EKG showed NSR. Repeat BMP showed K of 3.7. Creatinine was found to be 2.7 on admission elevated from baseline. Patient was started on 1.5 mIVF. Anti-hypertensives were held on admission. Creatinine downtrended. Patient was started on Procardia and Detemir 15U BID. Patient tolerating diet with no nausea or vomiting. Ambulating with assistance.   US of the kidneys shows morphology with elevated resistive indices bilaterally suggesting medical renal disease, without hydronephrosis.    Interval History: The patient was seen and  examined this morning at bedside, the patient is resting comfortably in NAD. The patient has no acute complaints this morning. She does note still feeling weak. The patient is tolerating diet well, good urinary output and has worked with PT/OT. The patient feels moderately better than on admission. The nurse reports no acute events over night. The patient denies any chest pain, SOB or focal neuro deficits       Review of Systems   Constitutional: Negative for activity change, chills, fatigue, fever and unexpected weight change.   HENT: Negative for congestion, ear pain, sinus pressure and sinus pain.    Eyes: Negative for visual disturbance.   Respiratory: Negative for cough, choking, chest tightness, shortness of breath and wheezing.    Cardiovascular: Negative for chest pain and leg swelling.   Gastrointestinal: Negative for abdominal pain, constipation, diarrhea, nausea and vomiting.   Genitourinary: Negative for difficulty urinating, dysuria, flank pain, frequency and urgency.   Musculoskeletal: Negative for back pain, myalgias, neck pain and neck stiffness.   Skin: Negative for rash and wound.   Neurological: Positive for weakness (improving ). Negative for dizziness, light-headedness, numbness and headaches.     Objective:     Vital Signs (Most Recent):  Temp: 98.3 °F (36.8 °C) (01/04/20 0359)  Pulse: 90 (01/04/20 0400)  Resp: 18 (01/04/20 0359)  BP: 109/60 (01/04/20 0359)  SpO2: 98 % (01/04/20 0215) Vital Signs (24h Range):  Temp:  [97.2 °F (36.2 °C)-98.7 °F (37.1 °C)] 98.3 °F (36.8 °C)  Pulse:  [] 90  Resp:  [17-18] 18  SpO2:  [97 %-100 %] 98 %  BP: (109-134)/(60-83) 109/60     Weight: 61 kg (134 lb 7.7 oz)  Body mass index is 21.06 kg/m².    Intake/Output Summary (Last 24 hours) at 1/4/2020 0620  Last data filed at 1/4/2020 0500  Gross per 24 hour   Intake 4150 ml   Output 1750 ml   Net 2400 ml      Physical Exam   Constitutional: She is oriented to person, place, and time. She appears well-developed  and well-nourished. No distress.   HENT:   Head: Normocephalic and atraumatic.   Right Ear: External ear normal.   Left Ear: External ear normal.   Mouth/Throat: Oropharynx is clear and moist.   Eyes: Pupils are equal, round, and reactive to light. EOM are normal.   Neck: Normal range of motion. Neck supple.   Cardiovascular: Normal rate, regular rhythm, normal heart sounds and intact distal pulses. Exam reveals no gallop and no friction rub.   No murmur heard.  Pulmonary/Chest: Effort normal and breath sounds normal. No stridor. No respiratory distress. She has no wheezes. She has no rales. She exhibits no tenderness.   Abdominal: Soft. Bowel sounds are normal. She exhibits no distension. There is no tenderness.   Musculoskeletal: Normal range of motion.   Neurological: She is alert and oriented to person, place, and time. She has normal strength. No cranial nerve deficit or sensory deficit.   Skin: Skin is warm and dry. Capillary refill takes less than 2 seconds. No rash noted. She is not diaphoretic. No erythema. No pallor.   Nursing note and vitals reviewed.      Significant Labs:   CBC:   Recent Labs   Lab 01/02/20  1737 01/03/20  0439 01/04/20  0413   WBC 8.99 5.34 3.99   HGB 13.6 10.6* 9.6*   HCT 41.5 33.2* 30.3*    253 236     CMP:   Recent Labs   Lab 01/02/20  2212 01/03/20  0439 01/03/20  1312 01/04/20  0413    136  137  137 136 140   K 3.7 4.6  4.6  4.6 4.4 3.9    100  100  100 101 106   CO2 25 24  26  26 25 25   * 474*  482*  482* 389* 169*   BUN 41* 39*  38*  38* 32* 28*   CREATININE 2.2* 2.2*  2.2*  2.2* 1.8* 1.5*   CALCIUM 9.0 8.9  8.7  8.7 9.0 8.6*   PROT 6.5 6.2  --  5.7*   ALBUMIN 2.9* 2.7*  --  2.5*   BILITOT 0.4 0.5  --  0.4   ALKPHOS 84 76  --  66   AST 7* 9*  --  12   ALT 7* <5*  --  6*   ANIONGAP 12 12  11  11 10 9   EGFRNONAA 25* 25*  25*  25* 32* 40*     Magnesium:   Recent Labs   Lab 01/02/20  1737 01/03/20  0439 01/04/20  0413   MG 2.2 1.8 1.7      POCT Glucose:   Recent Labs   Lab 01/03/20  1838 01/03/20  2047 01/03/20  2321   POCTGLUCOSE 169* 149* 231*     All pertinent labs within the past 24 hours have been reviewed.    Significant Imaging:   US Retroperitoneal Complete (Kidney and   Final Result      Normal renal morphology with elevated resistive indices bilaterally suggesting medical renal disease, without hydronephrosis.         Electronically signed by: Sujit Fox MD   Date:    01/03/2020   Time:    21:36           I have reviewed and interpreted all pertinent imaging results/findings within the past 24 hours.      Assessment/Plan:      * Hyperkalemia  In the ED, K of 5.7  Received Albuterol, Insulin, Calcium Gluconate and Sodium bicarb in the ED  EKG: NSR  Repeat K of 3.7  This AM, K 3.9  Will continue to monitor     ADELINA (acute kidney injury)  Baseline Cr unknown but was 2.0 in September and 0.8 in 2018  Cr on admission: 2.7  Cr improving now 1.5  Continue IVFs  Avoid nephrotoxic agents.   Renal U/S Normal renal morphology with elevated resistive indices bilaterally suggesting medical renal disease, without hydronephrosis.          HLD (hyperlipidemia)  Hold home meds.       Diabetes type 2, uncontrolled  On admission, glucose 441  Received 10U Insulin in the ED   Home medications include Insulin 70/30, Metformin and Glipizide  Not compliant with insulin at home  Will hold home oral hypoglycemics  A1C> 14  Started on Detemir 15U  Moderate SSI  Diabetic Diet  Consulted Diabetic education  Goal glucose of 140-180 while inpatient  Continue to monitor.     BGs much improved while admitted. Plan to D/c today with tailored home regimen will be discussed with pt at d/c    Hypertension  BP goal < 140/80  Will hold home meds due to ADELINA  Started on Procardia  BP stable and wnl this am      DISPO: Pt much improved since admission, Hyperkalemia resolved, Cr continues to trend down, BGs much better, Plan to D/C today, pt has Relion for now to use at home  until can get approved for insulin assistance program. Also started her on procardia for BP which has been stable here.    VTE Risk Mitigation (From admission, onward)         Ordered     IP VTE LOW RISK PATIENT  Once      01/03/20 0102     Place sequential compression device  Until discontinued      01/03/20 0102                      Al Kruse MD  Department of Hospital Medicine   [facility]

## 2020-01-04 NOTE — PLAN OF CARE
Problem: Fall Injury Risk  Goal: Absence of Fall and Fall-Related Injury  Outcome: Ongoing, Progressing  Intervention: Identify and Manage Contributors to Fall Injury Risk  Flowsheets (Taken 1/3/2020 2108)  Self-Care Promotion: independence encouraged; BADL personal objects within reach; BADL personal routines maintained  Medication Review/Management: medications reviewed; high risk medications identified  Intervention: Promote Injury-Free Environment  Flowsheets (Taken 1/3/2020 2108)  Safety Promotion/Fall Prevention: assistive device/personal item within reach; bed alarm set; diversional activities provided; Fall Risk reviewed with patient/family; Fall Risk signage in place; family to remain at bedside; nonskid shoes/socks when out of bed; side rails raised x 3; supervised activity; instructed to call staff for mobility; medications reviewed  Environmental Safety Modification: assistive device/personal items within reach; clutter free environment maintained; room organization consistent     Problem: Adult Inpatient Plan of Care  Goal: Plan of Care Review  Outcome: Ongoing, Progressing  Flowsheets (Taken 1/3/2020 2108)  Plan of Care Reviewed With: patient     Problem: Glycemic Control Impaired  Goal: Blood Glucose Level Within Desired Range  Outcome: Ongoing, Progressing  Intervention: Optimize Glycemic Control  Flowsheets (Taken 1/3/2020 2108)  Glycemic Management: blood glucose monitoring     Cued into patient's room.  Permission received per patient to turn camera to view patient.  Introduced as VN for night shift that will be working with floor nurse and nursing assistant.  Family member at bedside.  Educated patient on VN's role in patient care. Plan of care reviewed with patient. Education per flowsheet.  Opportunity given for questions and questions answered.  Requesting sleeping pill in an hour; KONG Alexandre notified.  Instructed to call for assistance.  Will cont to monitor.    Labs, notes, and orders  reviewed.

## 2020-01-04 NOTE — NURSING
Permission attained to enter room via virtual system.  Virtual rounds completed as documented.  Today's lab values, notes, and vital signs up to now have been reviewed. Educated regarding current plan of care including orthostatic vitals   Physical therapy in room and has attained values .  Values documented in chart.  Attending resident  entered room.   Values given verbally to him.  Physical therapist notified the resident of pt's problems ambulating with and without the walker. This nurse has left room to allow md and pt to discuss further plan of care

## 2020-01-04 NOTE — ASSESSMENT & PLAN NOTE
On admission, glucose 441  Received 10U Insulin in the ED   Home medications include Insulin 70/30, Metformin and Glipizide  Not compliant with insulin at home  Will hold home oral hypoglycemics  A1C> 14  Started on Detemir 15U  Moderate SSI  Diabetic Diet  Consulted Diabetic education  Goal glucose of 140-180 while inpatient  Continue to monitor.     BGs much improved while admitted. Plan to D/c today with tailored home regimen will be discussed with pt at d/c

## 2020-01-04 NOTE — PLAN OF CARE
Problem: Physical Therapy Goal  Goal: Physical Therapy Goal  Description  Goals to be met by: 2/3/2020     Patient will increase functional independence with mobility by performin. Supine to sit with Ross  2. Sit to stand transfer with Ross  3. Bed to chair transfer with Ross using most appropriate AD or none  4. Gait  x >200 feet with Ross using no AD.      Outcome: Ongoing, Progressing   Pt continues to work toward established goals.

## 2020-01-04 NOTE — SUBJECTIVE & OBJECTIVE
Interval History: The patient was seen and examined this morning at bedside, the patient is resting comfortably in NAD. The patient has no acute complaints this morning. She does note still feeling weak. The patient is tolerating diet well, good urinary output and has worked with PT/OT. The patient feels moderately better than on admission. The nurse reports no acute events over night. The patient denies any chest pain, SOB or focal neuro deficits       Review of Systems   Constitutional: Negative for activity change, chills, fatigue, fever and unexpected weight change.   HENT: Negative for congestion, ear pain, sinus pressure and sinus pain.    Eyes: Negative for visual disturbance.   Respiratory: Negative for cough, choking, chest tightness, shortness of breath and wheezing.    Cardiovascular: Negative for chest pain and leg swelling.   Gastrointestinal: Negative for abdominal pain, constipation, diarrhea, nausea and vomiting.   Genitourinary: Negative for difficulty urinating, dysuria, flank pain, frequency and urgency.   Musculoskeletal: Negative for back pain, myalgias, neck pain and neck stiffness.   Skin: Negative for rash and wound.   Neurological: Positive for weakness (improving ). Negative for dizziness, light-headedness, numbness and headaches.     Objective:     Vital Signs (Most Recent):  Temp: 98.3 °F (36.8 °C) (01/04/20 0359)  Pulse: 90 (01/04/20 0400)  Resp: 18 (01/04/20 0359)  BP: 109/60 (01/04/20 0359)  SpO2: 98 % (01/04/20 0215) Vital Signs (24h Range):  Temp:  [97.2 °F (36.2 °C)-98.7 °F (37.1 °C)] 98.3 °F (36.8 °C)  Pulse:  [] 90  Resp:  [17-18] 18  SpO2:  [97 %-100 %] 98 %  BP: (109-134)/(60-83) 109/60     Weight: 61 kg (134 lb 7.7 oz)  Body mass index is 21.06 kg/m².    Intake/Output Summary (Last 24 hours) at 1/4/2020 0620  Last data filed at 1/4/2020 0500  Gross per 24 hour   Intake 4150 ml   Output 1750 ml   Net 2400 ml      Physical Exam   Constitutional: She is oriented to person,  place, and time. She appears well-developed and well-nourished. No distress.   HENT:   Head: Normocephalic and atraumatic.   Right Ear: External ear normal.   Left Ear: External ear normal.   Mouth/Throat: Oropharynx is clear and moist.   Eyes: Pupils are equal, round, and reactive to light. EOM are normal.   Neck: Normal range of motion. Neck supple.   Cardiovascular: Normal rate, regular rhythm, normal heart sounds and intact distal pulses. Exam reveals no gallop and no friction rub.   No murmur heard.  Pulmonary/Chest: Effort normal and breath sounds normal. No stridor. No respiratory distress. She has no wheezes. She has no rales. She exhibits no tenderness.   Abdominal: Soft. Bowel sounds are normal. She exhibits no distension. There is no tenderness.   Musculoskeletal: Normal range of motion.   Neurological: She is alert and oriented to person, place, and time. She has normal strength. No cranial nerve deficit or sensory deficit.   Skin: Skin is warm and dry. Capillary refill takes less than 2 seconds. No rash noted. She is not diaphoretic. No erythema. No pallor.   Nursing note and vitals reviewed.      Significant Labs:   CBC:   Recent Labs   Lab 01/02/20  1737 01/03/20  0439 01/04/20  0413   WBC 8.99 5.34 3.99   HGB 13.6 10.6* 9.6*   HCT 41.5 33.2* 30.3*    253 236     CMP:   Recent Labs   Lab 01/02/20  2212 01/03/20  0439 01/03/20  1312 01/04/20  0413    136  137  137 136 140   K 3.7 4.6  4.6  4.6 4.4 3.9    100  100  100 101 106   CO2 25 24  26  26 25 25   * 474*  482*  482* 389* 169*   BUN 41* 39*  38*  38* 32* 28*   CREATININE 2.2* 2.2*  2.2*  2.2* 1.8* 1.5*   CALCIUM 9.0 8.9  8.7  8.7 9.0 8.6*   PROT 6.5 6.2  --  5.7*   ALBUMIN 2.9* 2.7*  --  2.5*   BILITOT 0.4 0.5  --  0.4   ALKPHOS 84 76  --  66   AST 7* 9*  --  12   ALT 7* <5*  --  6*   ANIONGAP 12 12  11  11 10 9   EGFRNONAA 25* 25*  25*  25* 32* 40*     Magnesium:   Recent Labs   Lab 01/02/20  1731  01/03/20  0439 01/04/20  0413   MG 2.2 1.8 1.7     POCT Glucose:   Recent Labs   Lab 01/03/20  1838 01/03/20  2047 01/03/20  2321   POCTGLUCOSE 169* 149* 231*     All pertinent labs within the past 24 hours have been reviewed.    Significant Imaging:   US Retroperitoneal Complete (Kidney and   Final Result      Normal renal morphology with elevated resistive indices bilaterally suggesting medical renal disease, without hydronephrosis.         Electronically signed by: Sujit Fox MD   Date:    01/03/2020   Time:    21:36           I have reviewed and interpreted all pertinent imaging results/findings within the past 24 hours.

## 2020-01-04 NOTE — ASSESSMENT & PLAN NOTE
Baseline Cr unknown but was 2.0 in September and 0.8 in 2018  Cr on admission: 2.7  Cr improving now 1.5  Continue IVFs  Avoid nephrotoxic agents.   Renal U/S Normal renal morphology with elevated resistive indices bilaterally suggesting medical renal disease, without hydronephrosis.

## 2020-01-04 NOTE — PT/OT/SLP PROGRESS
"Physical Therapy Treatment    Patient Name:  Bonnie Lino   MRN:  [id number]    Recommendations:     Discharge Recommendations:  ( Pending progress home with home health vs out patient PT)   Discharge Equipment Recommendations: ( TBD)   Barriers to discharge: Decreased caregiver support    Assessment:     Bonnie Lino is a 49 y.o. female admitted with a medical diagnosis of Hyperkalemia.  She presents with the following impairments/functional limitations:  weakness, impaired endurance, gait instability, impaired functional mobilty, impaired balance, decreased lower extremity function. Pt performed 3 ambulation trials with and without RW requiring min/mod A. Demonstrates bilateral knee buckling throughout ambulation which was increased during 3rd trial. BP's taken starting during 2nd ambulation trail at nurses request - In standing 171/92  after ~2 minutes in sitting 176/94 , again in sitting after ~2 minutes 181/99 , in supine after ~5 minutes 193/100 . Would benefit from continued PT services to increase pt's independent functional mobility to level prior to admission addressing all impairments listed above.    Rehab Prognosis: Good; patient would benefit from acute skilled PT services to address these deficits and reach maximum level of function.    Recent Surgery: * No surgery found *      Plan:     During this hospitalization, patient to be seen 6 x/week to address the identified rehab impairments via gait training, therapeutic activities, therapeutic exercises and progress toward the following goals:    · Plan of Care Expires:  02/03/20    Subjective     Chief Complaint: Numbness and tingling in BLE's and feet  Patient/Family Comments/goals: "I don't know why my feet are tingling and feel like they are on fire".  Pain/Comfort:  · Pain Rating 1: (Describes BLE's and feet as being numb and tingling)  · Location - Side 1: Bilateral  · Location - Orientation 1: generalized  · Location 1: " (BLE's and feet)  · Pain Addressed 1: Cessation of Activity, Distraction, Nurse notified  · Pain Rating Post-Intervention 1: (Same as above)      Objective:     Communicated with  nurse prior to session.  Patient found up in chair with peripheral IV, telemetry upon PT entry to room.     General Precautions: Standard, fall   Orthopedic Precautions:N/A   Braces: N/A     Functional Mobility:  · Bed Mobility:     · Rolling Left:  independence and modified independence  · Scooting: independence and modified independence  · Bridging: independence and  To HOB  · Supine to Sit: modified independence and supervision  · Sit to Supine: modified independence and supervision  · Transfers:     · Sit to Stand:  contact guard assistance and minimum assistance with no AD and hand-held assist  · Gait:  3 trials with and without RW. 1st trial ~12 ft with pt holding and advancing IV pole also holding PTA's hand,  2nd trial ~30 ft with RW, 3rd trial ~10 ft pt holding and advancing IV pole and holding PTA's hand.  · Balance: static stance (No AD)=F dynamic stance (ambulation)= F-      AM-PAC 6 CLICK MOBILITY  Turning over in bed (including adjusting bedclothes, sheets and blankets)?: 4  Sitting down on and standing up from a chair with arms (e.g., wheelchair, bedside commode, etc.): 3  Moving from lying on back to sitting on the side of the bed?: 4  Moving to and from a bed to a chair (including a wheelchair)?: 3  Need to walk in hospital room?: 2  Climbing 3-5 steps with a railing?: 2  Basic Mobility Total Score: 18       Therapeutic Activities and Exercises:   Prior to beginning seated therapeutic exercises pt stated she felt tingling and a burning sensation in her feet and BLE's. Became tearful when explaining this and was redirected by PTA back to exercises being performed. 1 x 10 reps BLE's consisting of hip add/abd, LAQ's with an approximate 3 sec hold at end range, and hip/knee flexion (seated marching). 3 ambulation trials  performed with and without RW which required min/mod A as pt demonstrates bilateral knee buckling which was increased during 3rd ambulation trial. 3rd ambulation trial with resident present. 1st trial ~12 ft with pt holding onto and advancing IV pole and holding PTA's hand, 2nd trial ~30 ft with and without RW with nurse (Dominga) present, 3rd trial ~10 ft with pt holding and advancing IV pole holding PTA's hand. Throughout ambulation distances pt's bilateral knees buckle which she does not correct despite verbal cueing. Also demonstrates an increased trunk flexion which she will correct only after max and constant verbal/tatcile cueing. Please see BP readings in assessment note above.    Patient left HOB elevated with all lines intact, call button in reach, bed alarm on and  nurse notified..    GOALS:   Multidisciplinary Problems     Physical Therapy Goals        Problem: Physical Therapy Goal    Goal Priority Disciplines Outcome Goal Variances Interventions   Physical Therapy Goal     PT, PT/OT Ongoing, Progressing     Description:  Goals to be met by: 2/3/2020     Patient will increase functional independence with mobility by performin. Supine to sit with [place]  2. Sit to stand transfer with [place]  3. Bed to chair transfer with [place] using most appropriate AD or none  4. Gait  x >200 feet with [place] using no AD.                       Time Tracking:     PT Received On: 20  PT Start Time: 1113     PT Stop Time: 1205  PT Total Time (min): 52 min     Billable Minutes: Gait Training  21, Therapeutic Activity 16 and Therapeutic Exercise  15    Treatment Type: Treatment  PT/PTA: PTA     PTA Visit Number: Manpreet     Nora Riley, PTA  2020

## 2020-01-05 VITALS
TEMPERATURE: 98 F | BODY MASS INDEX: 22.63 KG/M2 | OXYGEN SATURATION: 97 % | RESPIRATION RATE: 18 BRPM | SYSTOLIC BLOOD PRESSURE: 128 MMHG | WEIGHT: 144.19 LBS | DIASTOLIC BLOOD PRESSURE: 76 MMHG | HEIGHT: 67 IN | HEART RATE: 88 BPM

## 2020-01-05 LAB
ALBUMIN SERPL BCP-MCNC: 2.5 G/DL (ref 3.5–5.2)
ALP SERPL-CCNC: 66 U/L (ref 55–135)
ALT SERPL W/O P-5'-P-CCNC: 7 U/L (ref 10–44)
ANION GAP SERPL CALC-SCNC: 10 MMOL/L (ref 8–16)
AST SERPL-CCNC: 12 U/L (ref 10–40)
BASOPHILS # BLD AUTO: 0.01 K/UL (ref 0–0.2)
BASOPHILS NFR BLD: 0.2 % (ref 0–1.9)
BILIRUB SERPL-MCNC: 0.2 MG/DL (ref 0.1–1)
BUN SERPL-MCNC: 28 MG/DL (ref 6–20)
CALCIUM SERPL-MCNC: 9.1 MG/DL (ref 8.7–10.5)
CHLORIDE SERPL-SCNC: 106 MMOL/L (ref 95–110)
CO2 SERPL-SCNC: 24 MMOL/L (ref 23–29)
CREAT SERPL-MCNC: 1.4 MG/DL (ref 0.5–1.4)
DIFFERENTIAL METHOD: ABNORMAL
EOSINOPHIL # BLD AUTO: 0.2 K/UL (ref 0–0.5)
EOSINOPHIL NFR BLD: 3.8 % (ref 0–8)
ERYTHROCYTE [DISTWIDTH] IN BLOOD BY AUTOMATED COUNT: 12.1 % (ref 11.5–14.5)
EST. GFR  (AFRICAN AMERICAN): 50 ML/MIN/1.73 M^2
EST. GFR  (NON AFRICAN AMERICAN): 44 ML/MIN/1.73 M^2
GLUCOSE SERPL-MCNC: 227 MG/DL (ref 70–110)
HCT VFR BLD AUTO: 31.3 % (ref 37–48.5)
HGB BLD-MCNC: 9.8 G/DL (ref 12–16)
LYMPHOCYTES # BLD AUTO: 1.6 K/UL (ref 1–4.8)
LYMPHOCYTES NFR BLD: 37.2 % (ref 18–48)
MAGNESIUM SERPL-MCNC: 1.7 MG/DL (ref 1.6–2.6)
MCH RBC QN AUTO: 24.8 PG (ref 27–31)
MCHC RBC AUTO-ENTMCNC: 31.3 G/DL (ref 32–36)
MCV RBC AUTO: 79 FL (ref 82–98)
MONOCYTES # BLD AUTO: 0.2 K/UL (ref 0.3–1)
MONOCYTES NFR BLD: 4.1 % (ref 4–15)
NEUTROPHILS # BLD AUTO: 2.3 K/UL (ref 1.8–7.7)
NEUTROPHILS NFR BLD: 54.7 % (ref 38–73)
PHOSPHATE SERPL-MCNC: 4.5 MG/DL (ref 2.7–4.5)
PLATELET # BLD AUTO: 259 K/UL (ref 150–350)
PMV BLD AUTO: 11.3 FL (ref 9.2–12.9)
POCT GLUCOSE: 174 MG/DL (ref 70–110)
POCT GLUCOSE: 225 MG/DL (ref 70–110)
POCT GLUCOSE: 379 MG/DL (ref 70–110)
POTASSIUM SERPL-SCNC: 3.7 MMOL/L (ref 3.5–5.1)
PROT SERPL-MCNC: 5.9 G/DL (ref 6–8.4)
RBC # BLD AUTO: 3.95 M/UL (ref 4–5.4)
SODIUM SERPL-SCNC: 140 MMOL/L (ref 136–145)
WBC # BLD AUTO: 4.17 K/UL (ref 3.9–12.7)

## 2020-01-05 PROCEDURE — 85025 COMPLETE CBC W/AUTO DIFF WBC: CPT

## 2020-01-05 PROCEDURE — 96372 THER/PROPH/DIAG INJ SC/IM: CPT

## 2020-01-05 PROCEDURE — 84100 ASSAY OF PHOSPHORUS: CPT

## 2020-01-05 PROCEDURE — 25000003 PHARM REV CODE 250: Performed by: STUDENT IN AN ORGANIZED HEALTH CARE EDUCATION/TRAINING PROGRAM

## 2020-01-05 PROCEDURE — 83735 ASSAY OF MAGNESIUM: CPT

## 2020-01-05 PROCEDURE — 94761 N-INVAS EAR/PLS OXIMETRY MLT: CPT

## 2020-01-05 PROCEDURE — 80053 COMPREHEN METABOLIC PANEL: CPT

## 2020-01-05 PROCEDURE — 63600175 PHARM REV CODE 636 W HCPCS: Performed by: STUDENT IN AN ORGANIZED HEALTH CARE EDUCATION/TRAINING PROGRAM

## 2020-01-05 PROCEDURE — 96376 TX/PRO/DX INJ SAME DRUG ADON: CPT

## 2020-01-05 PROCEDURE — 36415 COLL VENOUS BLD VENIPUNCTURE: CPT

## 2020-01-05 PROCEDURE — G0378 HOSPITAL OBSERVATION PER HR: HCPCS

## 2020-01-05 PROCEDURE — 96375 TX/PRO/DX INJ NEW DRUG ADDON: CPT

## 2020-01-05 RX ORDER — MAGNESIUM SULFATE HEPTAHYDRATE 40 MG/ML
2 INJECTION, SOLUTION INTRAVENOUS ONCE
Status: COMPLETED | OUTPATIENT
Start: 2020-01-05 | End: 2020-01-05

## 2020-01-05 RX ORDER — GABAPENTIN 300 MG/1
300 CAPSULE ORAL 3 TIMES DAILY
Qty: 90 CAPSULE | Refills: 11 | Status: SHIPPED | OUTPATIENT
Start: 2020-01-05 | End: 2020-12-14 | Stop reason: SDUPTHER

## 2020-01-05 RX ORDER — LISINOPRIL 2.5 MG/1
2.5 TABLET ORAL DAILY
Qty: 90 TABLET | Refills: 3 | Status: SHIPPED | OUTPATIENT
Start: 2020-01-06 | End: 2020-06-08 | Stop reason: DRUGHIGH

## 2020-01-05 RX ORDER — VIT C/E/ZN/COPPR/LUTEIN/ZEAXAN 250MG-90MG
1000 CAPSULE ORAL DAILY
Qty: 30 CAPSULE | Refills: 0 | Status: SHIPPED | OUTPATIENT
Start: 2020-01-05 | End: 2020-02-04

## 2020-01-05 RX ORDER — NIFEDIPINE 30 MG/1
30 TABLET, EXTENDED RELEASE ORAL DAILY
Qty: 30 TABLET | Refills: 11 | Status: SHIPPED | OUTPATIENT
Start: 2020-01-06 | End: 2020-01-30 | Stop reason: DRUGHIGH

## 2020-01-05 RX ADMIN — FOLIC ACID 1 MG: 5 INJECTION, SOLUTION INTRAMUSCULAR; INTRAVENOUS; SUBCUTANEOUS at 09:01

## 2020-01-05 RX ADMIN — ACETAMINOPHEN 650 MG: 325 TABLET ORAL at 04:01

## 2020-01-05 RX ADMIN — GABAPENTIN 300 MG: 300 CAPSULE ORAL at 09:01

## 2020-01-05 RX ADMIN — HYDROCHLOROTHIAZIDE 12.5 MG: 12.5 TABLET ORAL at 09:01

## 2020-01-05 RX ADMIN — GABAPENTIN 300 MG: 300 CAPSULE ORAL at 03:01

## 2020-01-05 RX ADMIN — FAMOTIDINE 20 MG: 20 TABLET ORAL at 09:01

## 2020-01-05 RX ADMIN — MAGNESIUM SULFATE IN WATER 2 G: 40 INJECTION, SOLUTION INTRAVENOUS at 09:01

## 2020-01-05 RX ADMIN — LISINOPRIL 2.5 MG: 2.5 TABLET ORAL at 09:01

## 2020-01-05 RX ADMIN — NIFEDIPINE 30 MG: 30 TABLET, FILM COATED, EXTENDED RELEASE ORAL at 09:01

## 2020-01-05 RX ADMIN — INSULIN ASPART 4 UNITS: 100 INJECTION, SOLUTION INTRAVENOUS; SUBCUTANEOUS at 09:01

## 2020-01-05 RX ADMIN — BENZONATATE 100 MG: 100 CAPSULE ORAL at 05:01

## 2020-01-05 RX ADMIN — INSULIN ASPART 2 UNITS: 100 INJECTION, SOLUTION INTRAVENOUS; SUBCUTANEOUS at 01:01

## 2020-01-05 RX ADMIN — INSULIN ASPART 10 UNITS: 100 INJECTION, SOLUTION INTRAVENOUS; SUBCUTANEOUS at 06:01

## 2020-01-05 RX ADMIN — BENZONATATE 100 MG: 100 CAPSULE ORAL at 01:01

## 2020-01-05 NOTE — PLAN OF CARE
POC reviewed, pt verbalize understanding. Patient denies pain/discomfort. NSR on tele monitor. Fall precaution maintained: bed on lowest position, call light within reach, bed alarm set, side rail up x 2, non skid sock on. Will continue to monitor.

## 2020-01-05 NOTE — ASSESSMENT & PLAN NOTE
In the ED, K of 5.7  Received Albuterol, Insulin, Calcium Gluconate and Sodium bicarb in the ED  EKG: NSR  Repeat K of 3.7  This AM, K WNL  Will continue to monitor

## 2020-01-05 NOTE — SUBJECTIVE & OBJECTIVE
Interval History: Patient reports improvement in her weakness from yesterday. Tolerating diet with no nausea or vomiting. Ambulating with assistance. Denies any fever, chills, sob, chest pain.     Review of Systems   Constitutional: Negative for activity change, chills, fatigue, fever and unexpected weight change.   HENT: Negative for congestion, ear pain, sinus pressure and sinus pain.    Eyes: Negative for visual disturbance.   Respiratory: Negative for cough, choking, chest tightness, shortness of breath and wheezing.    Cardiovascular: Negative for chest pain and leg swelling.   Gastrointestinal: Negative for abdominal pain, constipation, diarrhea, nausea and vomiting.   Genitourinary: Negative for difficulty urinating, dysuria, flank pain, frequency and urgency.   Musculoskeletal: Negative for back pain, myalgias, neck pain and neck stiffness.   Skin: Negative for rash and wound.   Neurological: Positive for weakness (improving ). Negative for dizziness, light-headedness, numbness and headaches.     Objective:     Vital Signs (Most Recent):  Temp: 97.1 °F (36.2 °C) (01/05/20 0759)  Pulse: 73 (01/05/20 0759)  Resp: 19 (01/05/20 0759)  BP: 133/77 (01/05/20 0759)  SpO2: 98 % (01/05/20 0821) Vital Signs (24h Range):  Temp:  [97.1 °F (36.2 °C)-98 °F (36.7 °C)] 97.1 °F (36.2 °C)  Pulse:  [] 73  Resp:  [18-20] 19  SpO2:  [95 %-98 %] 98 %  BP: (129-193)/() 133/77     Weight: 65.4 kg (144 lb 2.9 oz)  Body mass index is 22.58 kg/m².    Intake/Output Summary (Last 24 hours) at 1/5/2020 1122  Last data filed at 1/5/2020 0700  Gross per 24 hour   Intake 250 ml   Output 1475 ml   Net -1225 ml      Physical Exam   Constitutional: She is oriented to person, place, and time. She appears well-developed and well-nourished. No distress.   HENT:   Head: Normocephalic and atraumatic.   Right Ear: External ear normal.   Left Ear: External ear normal.   Mouth/Throat: Oropharynx is clear and moist.   Eyes: Pupils are equal,  round, and reactive to light. EOM are normal.   Neck: Normal range of motion. Neck supple.   Cardiovascular: Normal rate, regular rhythm, normal heart sounds and intact distal pulses. Exam reveals no gallop and no friction rub.   No murmur heard.  Pulmonary/Chest: Effort normal and breath sounds normal. No stridor. No respiratory distress. She has no wheezes. She has no rales. She exhibits no tenderness.   Abdominal: Soft. Bowel sounds are normal. She exhibits no distension. There is no tenderness.   Musculoskeletal: Normal range of motion.   Neurological: She is alert and oriented to person, place, and time. She has normal strength. No cranial nerve deficit or sensory deficit.   Skin: Skin is warm and dry. Capillary refill takes less than 2 seconds. No rash noted. She is not diaphoretic. No erythema. No pallor.   Nursing note and vitals reviewed.      Significant Labs:   CBC:   Recent Labs   Lab 01/04/20  0413 01/05/20  0404   WBC 3.99 4.17   HGB 9.6* 9.8*   HCT 30.3* 31.3*    259     CMP:   Recent Labs   Lab 01/03/20  1312 01/04/20  0413 01/05/20  0404    140 140   K 4.4 3.9 3.7    106 106   CO2 25 25 24   * 169* 227*   BUN 32* 28* 28*   CREATININE 1.8* 1.5* 1.4   CALCIUM 9.0 8.6* 9.1   PROT  --  5.7* 5.9*   ALBUMIN  --  2.5* 2.5*   BILITOT  --  0.4 0.2   ALKPHOS  --  66 66   AST  --  12 12   ALT  --  6* 7*   ANIONGAP 10 9 10   EGFRNONAA 32* 40* 44*       Significant Imaging:   Imaging Results    None

## 2020-01-05 NOTE — PROGRESS NOTES
[facility] Medicine  Progress Note    Patient Name: Bonnie Lino  MRN: [id number]  Patient Class: OP- Observation   Admission Date: 1/2/2020  Length of Stay: 0 days  Attending Physician: Severyn Yaroshevsky, MD  Primary Care Provider: Fatmata Lemus MD        Subjective:     Principal Problem:Hyperkalemia        HPI:  49 year old female with PMH HTN and uncontrolled T2DM(A1C >15.5%) brought to ED from our clinic earlier today for fatigue and lower extremity weakness for the past 1 week. Patient also endorses a cough and dry heaving with associated SOB for the past 2 days. Reports increased thirst and urination recently. She has not taken her medications for the past few days because she has not been feeling well. Her am fasting BG has been ranging in the 300s at home. She has lost weight from not eating.  Patient's mother recently passed away 3 months ago and states she has been very upset and feeling more weak since. Patient lives alone. Denies F/chills/abdominal pain/CP/palpitations. Was constipated a couple days ago, relieved with OTC laxative. She has lower extremity numbness and tingling. Patient denies tobacco or drug use. Drinks ETOH occasionally. According to PCP notes, A1c>15.5 (patient uses outside lab).     Overview/Hospital Course:  In the ED, patient was found with potassium of 5.7. Patient received Calcium gluconate, albuterol, insulin, and D50. EKG showed NSR. Repeat BMP showed K of 3.7. Creatinine was found to be 2.7 on admission elevated from baseline. Patient was started on 1.5 mIVF. Anti-hypertensives were held on admission. Creatinine downtrended. Patient was started on Procardia and Detemir 15U BID. Patient tolerating diet with no nausea or vomiting. Ambulating with assistance.   US of the kidneys shows morphology with elevated resistive indices bilaterally suggesting medical renal disease, without hydronephrosis.    Interval History: Patient reports  improvement in her weakness from yesterday. Tolerating diet with no nausea or vomiting. Ambulating with assistance. Denies any fever, chills, sob, chest pain.     Review of Systems   Constitutional: Negative for activity change, chills, fatigue, fever and unexpected weight change.   HENT: Negative for congestion, ear pain, sinus pressure and sinus pain.    Eyes: Negative for visual disturbance.   Respiratory: Negative for cough, choking, chest tightness, shortness of breath and wheezing.    Cardiovascular: Negative for chest pain and leg swelling.   Gastrointestinal: Negative for abdominal pain, constipation, diarrhea, nausea and vomiting.   Genitourinary: Negative for difficulty urinating, dysuria, flank pain, frequency and urgency.   Musculoskeletal: Negative for back pain, myalgias, neck pain and neck stiffness.   Skin: Negative for rash and wound.   Neurological: Positive for weakness (improving ). Negative for dizziness, light-headedness, numbness and headaches.     Objective:     Vital Signs (Most Recent):  Temp: 97.1 °F (36.2 °C) (01/05/20 0759)  Pulse: 73 (01/05/20 0759)  Resp: 19 (01/05/20 0759)  BP: 133/77 (01/05/20 0759)  SpO2: 98 % (01/05/20 0821) Vital Signs (24h Range):  Temp:  [97.1 °F (36.2 °C)-98 °F (36.7 °C)] 97.1 °F (36.2 °C)  Pulse:  [] 73  Resp:  [18-20] 19  SpO2:  [95 %-98 %] 98 %  BP: (129-193)/() 133/77     Weight: 65.4 kg (144 lb 2.9 oz)  Body mass index is 22.58 kg/m².    Intake/Output Summary (Last 24 hours) at 1/5/2020 1122  Last data filed at 1/5/2020 0700  Gross per 24 hour   Intake 250 ml   Output 1475 ml   Net -1225 ml      Physical Exam   Constitutional: She is oriented to person, place, and time. She appears well-developed and well-nourished. No distress.   HENT:   Head: Normocephalic and atraumatic.   Right Ear: External ear normal.   Left Ear: External ear normal.   Mouth/Throat: Oropharynx is clear and moist.   Eyes: Pupils are equal, round, and reactive to light. EOM  are normal.   Neck: Normal range of motion. Neck supple.   Cardiovascular: Normal rate, regular rhythm, normal heart sounds and intact distal pulses. Exam reveals no gallop and no friction rub.   No murmur heard.  Pulmonary/Chest: Effort normal and breath sounds normal. No stridor. No respiratory distress. She has no wheezes. She has no rales. She exhibits no tenderness.   Abdominal: Soft. Bowel sounds are normal. She exhibits no distension. There is no tenderness.   Musculoskeletal: Normal range of motion.   Neurological: She is alert and oriented to person, place, and time. She has normal strength. No cranial nerve deficit or sensory deficit.   Skin: Skin is warm and dry. Capillary refill takes less than 2 seconds. No rash noted. She is not diaphoretic. No erythema. No pallor.   Nursing note and vitals reviewed.      Significant Labs:   CBC:   Recent Labs   Lab 01/04/20  0413 01/05/20  0404   WBC 3.99 4.17   HGB 9.6* 9.8*   HCT 30.3* 31.3*    259     CMP:   Recent Labs   Lab 01/03/20  1312 01/04/20  0413 01/05/20  0404    140 140   K 4.4 3.9 3.7    106 106   CO2 25 25 24   * 169* 227*   BUN 32* 28* 28*   CREATININE 1.8* 1.5* 1.4   CALCIUM 9.0 8.6* 9.1   PROT  --  5.7* 5.9*   ALBUMIN  --  2.5* 2.5*   BILITOT  --  0.4 0.2   ALKPHOS  --  66 66   AST  --  12 12   ALT  --  6* 7*   ANIONGAP 10 9 10   EGFRNONAA 32* 40* 44*       Significant Imaging:   Imaging Results    None             Assessment/Plan:      50 yo female with history of HTN, DMII (A1C > 14) presenting with history of generalized fatigue and weakness found with hyperkalemia and elevated glucose. Hyperkalemia has now resolved.     * Hyperkalemia  In the ED, K of 5.7  Received Albuterol, Insulin, Calcium Gluconate and Sodium bicarb in the ED  EKG: NSR  Repeat K of 3.7  This AM, K WNL  Will continue to monitor     ADELINA (acute kidney injury)  Baseline Cr unknown but was 2.0 in September and 0.8 in 2018  Cr on admission: 2.7  Cr  improving now to 1.4  Continue IVFs  Avoid nephrotoxic agents.   Renal U/S Normal renal morphology with elevated resistive indices bilaterally suggesting medical renal disease, without hydronephrosis.          HLD (hyperlipidemia)  Hold home meds.       Diabetes type 2, uncontrolled  On admission, glucose 441  Received 10U Insulin in the ED   Home medications include Insulin 70/30, Metformin and Glipizide  Not compliant with insulin at home  Will hold home oral hypoglycemics  A1C> 14  Started on Detemir 15U BID  Moderate SSI  Diabetic Diet  Consulted Diabetic education  Goal glucose of 140-180 while inpatient  Continue to monitor.         Hypertension  BP goal < 140/80  Home meds originally held due to ADELINA  Started on Procardia  Restarted on home medications  BP stable and wnl this am        Ppx: SCD  PT/OT: Following  Diet: Diabetic, Cardiac    Dispo: Pending PT evaluation, Most likely discharge today.     Discussed with attending    Sosa Devine, PGY-2  [facility]  01/05/2020 11:28 AM

## 2020-01-05 NOTE — ASSESSMENT & PLAN NOTE
Baseline Cr unknown but was 2.0 in September and 0.8 in 2018  Cr on admission: 2.7  Cr improving now to 1.4  Continue IVFs  Avoid nephrotoxic agents.   Renal U/S Normal renal morphology with elevated resistive indices bilaterally suggesting medical renal disease, without hydronephrosis.

## 2020-01-05 NOTE — PLAN OF CARE
Patient safety maintained. Medications administered per order. PRN hydralazine given once for elevated BP. Patient started on gabapentin due to reports of numbness and tingling to the lower extremities. Assistance provided for patient ambulating to bathroom. Continued telemetry monitoring and glucose level check.

## 2020-01-05 NOTE — ASSESSMENT & PLAN NOTE
BP goal < 140/80  Home meds originally held due to ADELINA  Started on Procardia  Restarted on home medications  BP stable and wnl this am

## 2020-01-05 NOTE — PLAN OF CARE
Discharge orders noted, no HH or HME ordered.    Future Appointments   Date Time Provider Department Center   1/6/2020  8:40 AM Jeff Garcia PA-C [facility]   2/10/2020  2:40 PM Fatmata Lemus MD [facility]       Pt's nurse will go over medications/signs and symptoms prior to discharge       01/05/20 1548   Final Note   Assessment Type Final Discharge Note   Anticipated Discharge Disposition Home   What phone number can be called within the next 1-3 days to see how you are doing after discharge? [phone]   Hospital Follow Up  Appt(s) scheduled? Yes   Right Care Referral Info   Post Acute Recommendation No Care     Alicia Delgado, RN Transitional Navigator  [phone]

## 2020-01-05 NOTE — ASSESSMENT & PLAN NOTE
On admission, glucose 441  Received 10U Insulin in the ED   Home medications include Insulin 70/30, Metformin and Glipizide  Not compliant with insulin at home  Will hold home oral hypoglycemics  A1C> 14  Started on Detemir 15U BID  Moderate SSI  Diabetic Diet  Consulted Diabetic education  Goal glucose of 140-180 while inpatient  Continue to monitor.

## 2020-01-06 ENCOUNTER — OFFICE VISIT (OUTPATIENT)
Dept: FAMILY MEDICINE | Facility: HOSPITAL | Age: 50
End: 2020-01-06
Attending: FAMILY MEDICINE
Payer: MEDICAID

## 2020-01-06 VITALS
BODY MASS INDEX: 22 KG/M2 | DIASTOLIC BLOOD PRESSURE: 92 MMHG | WEIGHT: 140.19 LBS | SYSTOLIC BLOOD PRESSURE: 142 MMHG | HEART RATE: 88 BPM | HEIGHT: 67 IN

## 2020-01-06 DIAGNOSIS — N17.9 AKI (ACUTE KIDNEY INJURY): ICD-10-CM

## 2020-01-06 DIAGNOSIS — E78.5 HYPERLIPIDEMIA, UNSPECIFIED HYPERLIPIDEMIA TYPE: ICD-10-CM

## 2020-01-06 DIAGNOSIS — I10 ESSENTIAL HYPERTENSION: ICD-10-CM

## 2020-01-06 DIAGNOSIS — E11.65 UNCONTROLLED TYPE 2 DIABETES MELLITUS WITH HYPERGLYCEMIA: Primary | Chronic | ICD-10-CM

## 2020-01-06 PROCEDURE — 99213 OFFICE O/P EST LOW 20 MIN: CPT | Performed by: PHYSICIAN ASSISTANT

## 2020-01-06 NOTE — PLAN OF CARE
Patient has received discharge instructions. Prescriptions called to pharmacy of pt's choice. Instructions reviewed with pt using teachback method. All questions answered to pt satisfaction. IV access and telemetry removed per floor nurse. Transport requested for discharge.

## 2020-01-06 NOTE — NURSING
Permission attained to enter room via virtual system.  Virtual rounds completed as documented.  Today's lab values, notes, and vital signs up to now have been reviewed. Much stronger today   Reported ambulation much improved   Ready for discharge

## 2020-01-06 NOTE — NURSING
Patient safety maintained. Medications administered per order. Assistance provided as needed. Printed documentation provided for discharge. IV and telemetry removed, patient tolerated well. Educations completed per VN.

## 2020-01-06 NOTE — PROGRESS NOTES
Subjective:       Patient ID: Bonnie Lino is a 49 y.o. female.    Chief Complaint: Follow-up (hospital)    PMH of HTN and uncontrolled T2DM who was recently admitted due to lower extremity weakness and hyperkalemia (resolved). She was sent to ED from clinic. For a few weeks prior to her admission she had not been feeling good and for the few days prior she did not take any of her medications. In the ED she was found to have K+ of 5.7. She was givent calcium gluconate, albuterol, insulin, and D50. EKG showed NSR. Repeat BMP showed K of 3.7. Creatinine was found to be 2.7, elevated from baseline. Started on Procardia and Detemir 15U BID. US of the kidneys shows morphology with elevated resistive indices bilaterally suggesting medical renal disease without hydronephrosis.     Today she is unaccompanied during the visit. She was just released from the hospital last night and has not been able to  her medications yet. She will be doing that later today. She does report feeling much better, but is still having some LE weakness.     Hyperkalemia:  Resolved prior to admission. Last K+ 3.7.     ADELINA:  Baseline Cr unknown; on admission: 2.7, improved to 1.4.Renal U/S Normal renal morphology with elevated resistive indices bilaterally suggesting medical renal disease without hydronephrosis.       DM2:  Last A1c>14. Currently taking 10u 70/30 BID and glipizide. AM BG today was 340s. Last night was the first time using her insulin. Will start monitoring her blood glucose at home and recording. Brought paperwork today for medication assistance.     HTN:  BP goal < 140/80; mildly elevated today at 142/92. She has not been able to  her procardia yet, but will do so today. She does have a BP cuff at home and will start monitoring.      LE Weakness:  Improved since her admission. It was recommended that she participate in formal PT, but she is currently uninsured. She is doing strengthening exercises at home, which  she thinks are helping.     HLD:  Was previously on a statin, but is not currently taking.     Review of Systems   Constitutional: Negative.    HENT: Negative.    Respiratory: Negative.    Cardiovascular: Negative.    Gastrointestinal: Negative.    Musculoskeletal: Negative.    Skin: Negative.    Neurological: Positive for weakness.   Psychiatric/Behavioral: Negative.        Objective:      Vitals:    01/06/20 0855   BP: (!) 142/92   Pulse: 88     Physical Exam   Constitutional: She is oriented to person, place, and time. She appears well-developed and well-nourished. No distress.   HENT:   Head: Normocephalic and atraumatic.   Eyes: Conjunctivae and EOM are normal. No scleral icterus.   Neck: Normal range of motion. No JVD present. No tracheal deviation present.   Cardiovascular: Normal rate.   Pulmonary/Chest: Effort normal. No respiratory distress.   Musculoskeletal: Normal range of motion. She exhibits no deformity.   Neurological: She is alert and oriented to person, place, and time. Coordination normal.   Skin: Skin is warm and dry. She is not diaphoretic.   Psychiatric: She has a normal mood and affect. Her behavior is normal. Thought content normal.   Nursing note and vitals reviewed.      Assessment:       1. Uncontrolled type 2 diabetes mellitus with hyperglycemia    2. Essential hypertension    3. ADELINA (acute kidney injury)    4. Hyperlipidemia, unspecified hyperlipidemia type        Plan:       Uncontrolled type 2 diabetes mellitus with hyperglycemia   -A1c>14   -Will continue glipizide 10mg daily and 10u 70/30 BID at this time, but will likely need to titrate up   -Paperwork completed for Triseba and novolog   -Will monitor and record at home    Essential hypertension   -BP mildly elevated at 142/92   -Will  procardia today and begin   -Will monitor at home and record    ADELINA (acute kidney injury)   -Improved   -Will check again at follow up visit    Hyperlipidemia, unspecified hyperlipidemia  type   -Likely needs to begin statin again    Future Appointments   Date Time Provider Department Center   1/21/2020  3:00 PM Bettie Martino, PhD [facility]   2/10/2020  2:40 PM Fatmata Lemus MD [facility]       No follow-ups on file.

## 2020-01-07 NOTE — DISCHARGE SUMMARY
[facility] Hospital Medicine  Discharge Summary      Patient Name: Bonnie Lino  MRN: [id number]  Admission Date: 1/2/2020  Hospital Length of Stay: 0 days  Discharge Date and Time: 1/5/2020  6:45 PM  Attending Physician: No att. providers found   Discharging Provider: Sosa Devine MD  Primary Care Provider: Fatmata Lemus MD      HPI:   49 year old female with PMH HTN and uncontrolled T2DM(A1C >15.5%) brought to ED from our clinic earlier today for fatigue and lower extremity weakness for the past 1 week. Patient also endorses a cough and dry heaving with associated SOB for the past 2 days. Reports increased thirst and urination recently. She has not taken her medications for the past few days because she has not been feeling well. Her am fasting BG has been ranging in the 300s at home. She has lost weight from not eating.  Patient's mother recently passed away 3 months ago and states she has been very upset and feeling more weak since. Patient lives alone. Denies F/chills/abdominal pain/CP/palpitations. Was constipated a couple days ago, relieved with OTC laxative. She has lower extremity numbness and tingling. Patient denies tobacco or drug use. Drinks ETOH occasionally. According to PCP notes, A1c>15.5 (patient uses outside lab).     * No surgery found *      Hospital Course:   In the ED, patient was found with potassium of 5.7. Patient received Calcium gluconate, albuterol, insulin, and D50. EKG showed NSR. Repeat BMP showed K of 3.7. Creatinine was found to be 2.7 on admission elevated from baseline. Patient was started on 1.5 mIVF. Anti-hypertensives were held on admission. Creatinine downtrended. Patient was started on Procardia and Detemir 15U BID. Patient's blood pressure was elevated and was restarted on Lisinopril. US of the kidneys shows morphology with elevated resistive indices bilaterally suggesting medical renal disease, without hydronephrosis.Patient tolerating diet  with no nausea or vomiting. Ambulating with assistance.     Discussed with patient to start her Insulin 70/30 at 20U BID. Medications were sent to the pharmacy. Patient's weakness improved with Gabapentin. Patient was stable for discharge with follow up with PCP.        Consults:   Consults (From admission, onward)        Status Ordering Provider     Case Management  Once     Provider:  (Not yet assigned)    Completed GAVI ENGEL     Inpatient consult to Social Work  Once     Provider:  (Not yet assigned)    Completed YAROSHEVSKY, SEVERYN          No new Assessment & Plan notes have been filed under this hospital service since the last note was generated.  Service: Hospital Medicine    Final Active Diagnoses:    Diagnosis Date Noted POA    PRINCIPAL PROBLEM:  Hyperkalemia [E87.5] 09/06/2019 Yes    ADELINA (acute kidney injury) [N17.9] 01/02/2020 Yes    Diabetes type 2, uncontrolled [E11.65] 09/23/2014 Yes     Chronic    HLD (hyperlipidemia) [E78.5] 09/23/2014 Yes    Hypertension [I10]  Yes      Problems Resolved During this Admission:       Discharged Condition: stable    Disposition: Home or Self Care    Follow Up:  Follow-up Information     Fatmata Lemus MD. Go on 2/10/2020.    Specialty:  Family Medicine  Why:  @ 2:40 pm  Contact information:  [address]  [address]  [place] [address]  [phone]             Jeff L Garcia, PA-C. Go on 1/6/2020.    Specialty:  Family Medicine  Why:  @ 8:40 AM  Contact information:  [address]  [address]  [place] [address]  [phone]                 Patient Instructions:      Diet diabetic     Notify your health care provider if you experience any of the following:  temperature >100.4     Notify your health care provider if you experience any of the following:  persistent nausea and vomiting or diarrhea     Notify your health care provider if you experience any of the following:  severe uncontrolled pain     Notify your health care provider if you  experience any of the following:  redness, tenderness, or signs of infection (pain, swelling, redness, odor or green/yellow discharge around incision site)     Notify your health care provider if you experience any of the following:  difficulty breathing or increased cough     Notify your health care provider if you experience any of the following:  severe persistent headache     Notify your health care provider if you experience any of the following:  worsening rash     Activity as tolerated       Significant Diagnostic Studies: Labs:     Pending Diagnostic Studies:     None         Medications:  Reconciled Home Medications:      Medication List      START taking these medications    cholecalciferol (vitamin D3) 25 mcg (1,000 unit) capsule  Commonly known as:  VITAMIN D3  Take 1 capsule (1,000 Units total) by mouth once daily.     gabapentin 300 MG capsule  Commonly known as:  NEURONTIN  Take 1 capsule (300 mg total) by mouth 3 (three) times daily.     lisinopril 2.5 MG tablet  Commonly known as:  PRINIVIL,ZESTRIL  Take 1 tablet (2.5 mg total) by mouth once daily.     NIFEdipine 30 MG (OSM) 24 hr tablet  Commonly known as:  PROCARDIA-XL  Take 1 tablet (30 mg total) by mouth once daily.        CHANGE how you take these medications    insulin NPH-insulin regular (70/30) 100 unit/mL (70-30) injection  Commonly known as:  NOVOLIN 70/30  Inject 20 Units into the skin 2 (two) times daily.  What changed:  how much to take        CONTINUE taking these medications    blood glucose strip-disp meter Kit  1 application by Misc.(Non-Drug; Combo Route) route 3 (three) times daily.     blood-glucose meter kit  Commonly known as:  ReliOn All-In-One Meter  Use as instructed     fluticasone propionate 50 mcg/actuation nasal spray  Commonly known as:  Flonase Allergy Relief  1 spray (50 mcg total) by Each Nostril route daily as needed.     glipiZIDE 10 MG tablet  Commonly known as:  GLUCOTROL  Take 1 tablet (10 mg total) by mouth  daily with breakfast.     lancets Misc  1 application by Misc.(Non-Drug; Combo Route) route 3 (three) times daily.     ondansetron 4 MG Tbdl  Commonly known as:  ZOFRAN-ODT  Take 1 tablet (4 mg total) by mouth every 8 (eight) hours as needed (nausea).        STOP taking these medications    lisinopril-hydrochlorothiazide 20-25 mg Tab  Commonly known as:  PRINZIDE,ZESTORETIC     metFORMIN 1000 MG tablet  Commonly known as:  GLUCOPHAGE            Indwelling Lines/Drains at time of discharge:   Lines/Drains/Airways     None                 Time spent on the discharge of patient: > 30 minutes  Patient was seen and examined on the date of discharge and determined to be suitable for discharge.         Sosa Devine MD  Department of Hospital Medicine  [facility]

## 2020-01-09 ENCOUNTER — TELEPHONE (OUTPATIENT)
Dept: FAMILY MEDICINE | Facility: HOSPITAL | Age: 50
End: 2020-01-09

## 2020-01-09 NOTE — TELEPHONE ENCOUNTER
----- Message from Arielle Chambers, Patient Care Assistant sent at 1/9/2020  2:02 PM CST -----  Contact: contact pt at [phone]  Mrs. Bull from [employer] Patient Assistance [employer] (phone nubmer [phone]) fax number ([phone]) received paper work from the above pt but the hippa guidelines page is missing. It needs to be signed and dated by the pt.

## 2020-01-14 ENCOUNTER — TELEPHONE (OUTPATIENT)
Dept: FAMILY MEDICINE | Facility: HOSPITAL | Age: 50
End: 2020-01-14

## 2020-01-14 NOTE — TELEPHONE ENCOUNTER
----- Message from Christie Downs sent at 1/14/2020  4:07 PM CST -----  Contact: pt called [phone]  Pt would like to talk to  in regards to her ankle swelling. She is very concerned. Please call pt back

## 2020-01-21 ENCOUNTER — OFFICE VISIT (OUTPATIENT)
Dept: FAMILY MEDICINE | Facility: HOSPITAL | Age: 50
End: 2020-01-21
Attending: FAMILY MEDICINE
Payer: MEDICAID

## 2020-01-21 DIAGNOSIS — Z63.4 BEREAVEMENT, UNCOMPLICATED: ICD-10-CM

## 2020-01-21 PROCEDURE — 99211 OFF/OP EST MAY X REQ PHY/QHP: CPT | Performed by: PSYCHOLOGIST

## 2020-01-21 SDOH — SOCIAL DETERMINANTS OF HEALTH (SDOH): DISSAPEARANCE AND DEATH OF FAMILY MEMBER: Z63.4

## 2020-01-23 NOTE — PROGRESS NOTES
"[facility] [place]   [address]  [place] [address]    [facility]   Initial Note    Provider introduced herself to Patient and explained her role in the clinic (e.g., behavioral health services).  Descriptions of the assessment process, the initial 50-55 minute diagnostic appointment, and 20-30 minute follow-up appointments were given. Patient was fully cooperative throughout the interview and was an adequate historian. Patient willingly signed a consent form for treatment and limits of confidentiality were explained in this context.    Note: All information described in this report has been directly reported by the patient in the appointment (unless specifically noted) except for Mental Status, Diagnosis, and Conclusions/Recommendations/Initial Treatment Goals.      Date of Service: 2020   Patient Name:  Bonnie Lino  Preferred Name: Bonnie  MRN: [id number]  : 1970  Age:  49 y.o.  CPT Code: 34806  Length of Session: 30 Minute  Present in Session: Patient  Provider: Bettie Martino, PhD,     Referral Source:  Patient was referred by her PCP (Fatmata Lemus MD) for an assessment of deprssion.    Chief Complaint(s): "My mother passed away in September."    Assessment of Chief Complaint(s):  Patient presented with concerns related to a change in mood after the death of her mother in 2019. She endorsed feeling sad and crying for no reason. She endorsed having some loss of interest in enjoyable activities. She reported an increase in feeling bad about herself since her mother passed. For instance, she stated she has been feeling more lonely since her divorce three years ago.      Past treatment for referral problem:  None.    Psychotropic Medications:  None.    Other Problems:  Patient reported she was recently hospitalized for an illness.     Current Mental Health Symptoms:   Psychotic symptoms: Denied.   Suicidal Ideation: Patient denied any current " "suicidal ideation, plan, means, or intent.   Homicidal Ideation: Patient denied any current homicidal ideation, plan, means, or intent.    Self-harming behaviors: Patient denied engaging in any current self-harming behaviors (e.g., cutting or burning).     Mental Health History:  Psychiatric History:  Psychiatric Hospitalizations: Denied.   Suicidal ideation/attempts: Denied.   Psychiatric Medications:Patient denied taking any other past psychiatric medications.   Family Psychiatric History: Patient denied past family psychiatric history or past suicide attempts.   Psychotherapy History:Denied.     Functional Assessment/Typical Day:  Appetite: Patient denied changes in appetite.  Sleep: Patient denied problems related to falling asleep, staying asleep, waking too early and sleeping too much.  Work:  Patient is currently employed as a . Patient denied changes in her work performance.  Physical Activity: Patient endorsed engaging in current physical activity.     Current Substance Use  Caffeine:  Patient denied any current caffeine use.  Tobacco:  Patient denied any current tobacco use.   Alcohol:  Patient denied any current alcohol use.   Street Drugs:  Patient denied any current street drug use.     Intervention & Response:  Rapport building, assessment, and psychoeducation were utilized.  Patient was open to receiving psychoeducation about grief, bereavement, and depression. Also discussed psychotropic medications.     Handouts given:  What is "normal" grief?  Coping with Depression    Measures:  PHQ-2: 2: Minimal depression (0-4).  GT-7: 1: Minimal anxiety (0-5).    Interactive Complexity:  None.    Mental Status Exam:  Appearance: Patient was appropriately dressed for her session and had good hygiene.  Expressed Mood: alright."  Affect: Flat. Affect was consistent with expressed mood.  Attitude during Interview: Open and friendly.  Movement and Behavior: No unusual movements or psychomotor " changes.  Speech: Normal rate/tone/volume, without pressure  Eye Contact: Makes eye contact  Thought processes: Clear, coherent, and organized.  Thought Content: No indication of suicidal ideation, homicidal ideation, hallucinations, delusions, obsessions, ideas of reference, or symptoms of dissociation.  Orientation: Oriented x 4 (person, place, time, and situation)  Memory/concentration: WNL  Judgement: Good.    Diagnostic Impressions:  (V62.82) (Z63.4) Uncomplicated Bereavement  R/O MDD    Treatment Recommendations:  On the basis of the above information, the following recommendations are made with respect to treatment:    1. 1. Ongoing evaluation is recommended to determine whether or not Patient develops symptoms of complicated grief (e.g., unusually severe, prolonged, impairment of functioning, emotional pain, an inability to accept the loss, and difficulty imagining a meaningful future).      2. It is recommended that Patient continue to engage in social support networks and participate in activities with friends and family members.     Need for Future Services:  Patient will return to the clinic in 2-4 weeks for an appointment with Provider.         Guillaume,     Bettie Martino, PhD,   Licensed Psychologist

## 2020-01-30 ENCOUNTER — OFFICE VISIT (OUTPATIENT)
Dept: FAMILY MEDICINE | Facility: HOSPITAL | Age: 50
End: 2020-01-30
Attending: FAMILY MEDICINE
Payer: MEDICAID

## 2020-01-30 VITALS
WEIGHT: 145.5 LBS | SYSTOLIC BLOOD PRESSURE: 147 MMHG | DIASTOLIC BLOOD PRESSURE: 93 MMHG | HEART RATE: 94 BPM | HEIGHT: 67 IN | BODY MASS INDEX: 22.84 KG/M2

## 2020-01-30 DIAGNOSIS — E11.65 UNCONTROLLED TYPE 2 DIABETES MELLITUS WITH HYPERGLYCEMIA: Primary | ICD-10-CM

## 2020-01-30 DIAGNOSIS — I10 ESSENTIAL HYPERTENSION: ICD-10-CM

## 2020-01-30 PROCEDURE — 99214 OFFICE O/P EST MOD 30 MIN: CPT | Performed by: STUDENT IN AN ORGANIZED HEALTH CARE EDUCATION/TRAINING PROGRAM

## 2020-01-30 RX ORDER — NIFEDIPINE 60 MG/1
60 TABLET, EXTENDED RELEASE ORAL DAILY
Qty: 30 TABLET | Refills: 11 | Status: SHIPPED | OUTPATIENT
Start: 2020-01-30 | End: 2020-09-02

## 2020-01-30 NOTE — PROGRESS NOTES
Subjective:       Patient ID: Bonnie Lino is a 49 y.o. female.    Chief Complaint: T2DM follow-up    Patient is a 50 yo female pmhx of T2DM and HTN presenting today for follow-up.  Patient on procardia 30mg daily and Tresiba 15 units qhs.  Fasting BG range 200-300.  She denies hypoglycemic events.  She reports improvement in diet, limiting sodas to once weekly.      Review of Systems   Constitutional: Negative for appetite change and fever.   HENT: Negative for congestion and sore throat.    Respiratory: Negative for cough, chest tightness and shortness of breath.    Cardiovascular: Negative for chest pain and palpitations.   Gastrointestinal: Negative for abdominal pain, diarrhea and nausea.   Neurological: Negative for dizziness, weakness and headaches.       Objective:      Vitals:    01/30/20 1432   BP: (!) 147/93   Pulse: 94     Physical Exam   Constitutional: She is oriented to person, place, and time. She appears well-developed and well-nourished. No distress.   HENT:   Head: Normocephalic and atraumatic.   Eyes: Pupils are equal, round, and reactive to light.   Cardiovascular: Normal rate, regular rhythm and normal heart sounds.   No murmur heard.  Pulmonary/Chest: Effort normal. No respiratory distress.   Musculoskeletal: She exhibits edema.   Neurological: She is alert and oriented to person, place, and time. No cranial nerve deficit. She exhibits normal muscle tone. Coordination normal.   Skin: She is not diaphoretic.   Nursing note and vitals reviewed.      Assessment:       1. Uncontrolled type 2 diabetes mellitus with hyperglycemia    2. Essential hypertension        Plan:       Uncontrolled type 2 diabetes mellitus with hyperglycemia  -     Ambulatory consult to Diabetic Education; Future; Expected date: 01/30/2020  -     Will increase tresiba to 20 units qhs today.  Given instructions to titrate insulin by 2 units nightly every 3 days if blood glucose above 150.      Essential hypertension  -      NIFEdipine (PROCARDIA-XL) 60 MG (OSM) 24 hr tablet; Take 1 tablet (60 mg total) by mouth once daily.  Dispense: 30 tablet; Refill: 11      Follow up in about 4 weeks (around 2/27/2020).

## 2020-01-31 ENCOUNTER — TELEPHONE (OUTPATIENT)
Dept: FAMILY MEDICINE | Facility: HOSPITAL | Age: 50
End: 2020-01-31

## 2020-02-26 ENCOUNTER — OFFICE VISIT (OUTPATIENT)
Dept: FAMILY MEDICINE | Facility: HOSPITAL | Age: 50
End: 2020-02-26
Attending: FAMILY MEDICINE
Payer: MEDICAID

## 2020-02-26 VITALS
DIASTOLIC BLOOD PRESSURE: 87 MMHG | HEART RATE: 100 BPM | WEIGHT: 147.94 LBS | SYSTOLIC BLOOD PRESSURE: 131 MMHG | HEIGHT: 68 IN | BODY MASS INDEX: 22.42 KG/M2

## 2020-02-26 DIAGNOSIS — E11.9 TYPE 2 DIABETES MELLITUS WITHOUT COMPLICATION: ICD-10-CM

## 2020-02-26 DIAGNOSIS — E11.65 UNCONTROLLED TYPE 2 DIABETES MELLITUS WITH HYPERGLYCEMIA: Primary | ICD-10-CM

## 2020-02-26 DIAGNOSIS — I10 ESSENTIAL HYPERTENSION: ICD-10-CM

## 2020-02-26 PROCEDURE — 99213 OFFICE O/P EST LOW 20 MIN: CPT | Performed by: STUDENT IN AN ORGANIZED HEALTH CARE EDUCATION/TRAINING PROGRAM

## 2020-02-26 NOTE — PROGRESS NOTES
Subjective:       Patient ID: Bonnie Lino is a 50 y.o. female.    Chief Complaint: T2DM follow-up    Patient is a 49 yo female pmhx of T2DM and HTN presenting today for follow-up.  Patient on procardia 60mg daily and BP is at goal.  Taking Tresiba 24 units qhs at 7:30 PM.  Morning  this morning.  Ranges from 's in the morning.  She denies hypoglycemic events.  She reports improvement in diet, limiting sodas to once weekly, drinks sugar free juices and drinks.          Review of Systems   Constitutional: Negative for appetite change and fever.   HENT: Negative for congestion and sore throat.    Respiratory: Negative for cough, chest tightness and shortness of breath.    Cardiovascular: Negative for chest pain and palpitations.   Gastrointestinal: Negative for abdominal pain, diarrhea and nausea.   Neurological: Negative for dizziness, weakness and headaches.       Objective:      Vitals:    02/26/20 1439   BP: 131/87   Pulse: 100     Physical Exam   Constitutional: She is oriented to person, place, and time. She appears well-developed and well-nourished. No distress.   HENT:   Head: Normocephalic and atraumatic.   Eyes: Pupils are equal, round, and reactive to light.   Cardiovascular: Normal rate, regular rhythm and normal heart sounds.   No murmur heard.  Pulmonary/Chest: Effort normal. No respiratory distress.   Neurological: She is alert and oriented to person, place, and time. No cranial nerve deficit. She exhibits normal muscle tone. Coordination normal.   Skin: She is not diaphoretic.   Nursing note and vitals reviewed.      Assessment:       1. Uncontrolled type 2 diabetes mellitus with hyperglycemia    2. Essential hypertension    3. Type 2 diabetes mellitus without complication        Plan:       Uncontrolled type 2 diabetes mellitus with hyperglycemia  - continue tresiba 24 units qhs    Essential hypertension  - BP at goal    Type 2 diabetes mellitus without complication      Follow up in  about 4 weeks (around 3/25/2020).

## 2020-03-04 ENCOUNTER — CLINICAL SUPPORT (OUTPATIENT)
Dept: DIABETES | Facility: CLINIC | Age: 50
End: 2020-03-04
Payer: MEDICAID

## 2020-03-04 DIAGNOSIS — E11.65 UNCONTROLLED TYPE 2 DIABETES MELLITUS WITH HYPERGLYCEMIA: ICD-10-CM

## 2020-03-04 PROCEDURE — 99999 PR PBB SHADOW E&M-EST. PATIENT-LVL II: CPT | Mod: PBBFAC,,,

## 2020-03-04 PROCEDURE — 99999 PR PBB SHADOW E&M-EST. PATIENT-LVL II: ICD-10-PCS | Mod: PBBFAC,,,

## 2020-03-04 PROCEDURE — G0108 DIAB MANAGE TRN  PER INDIV: HCPCS | Mod: PBBFAC,PO | Performed by: REGISTERED NURSE

## 2020-03-04 PROCEDURE — 99212 OFFICE O/P EST SF 10 MIN: CPT | Mod: PBBFAC,PO | Performed by: REGISTERED NURSE

## 2020-03-06 VITALS — WEIGHT: 149 LBS | BODY MASS INDEX: 22.66 KG/M2

## 2020-03-06 NOTE — PROGRESS NOTES
Diabetes Education  Author: Linwood Sultana RN  Date: 3/6/2020  Diabetes Care Management Summary  Diabetes Education Record Assessment/Progress: Initial  Current Diabetes Risk Level: High   Last A1c:   Lab Results   Component Value Date    HGBA1C >14.0 (H) 01/03/2020     Last Visit with Diabetes Educator: : 03/04/2020  Last OPCM Referral: : Not Found   Enrolled in OPCM: No  Diabetes Type  Diabetes Type : Type II  Diabetes History  Diabetes Diagnosis: >10 years  Current Treatment: Oral Medication, Diet, Injectable, Exercise  Health Maintenance was reviewed today with patient. Discussed with patient importance of routine eye exams, foot exams/foot care, blood work (i.e.: A1c, microalbumin, and lipid), dental visits, yearly flu vaccine, and pneumonia vaccine as indicated by PCP. Patient verbalized understanding.     Health Maintenance Topics with due status: Not Due       Topic Last Completion Date    Hemoglobin A1c 01/03/2020     Health Maintenance Due   Topic Date Due    Eye Exam  02/04/1978    TETANUS VACCINE  02/04/1986    Pneumococcal Vaccine (Medium Risk) (1 of 1 - PPSV23) 02/04/1987    Low Dose Statin  02/04/1989    Mammogram  03/25/2015    Lipid Panel  03/25/2015    Foot Exam  02/10/2017    Colonoscopy  02/04/2018   Nutrition  Meal Planning: skipping meals, water, diet drinks, artificial sweeteners, eats out often, snacks between meal  What type of sweetener do you use?: Splenda  What type of beverages do you drink?: diet soda/tea, water  Meal Plan 24 Hour Recall - Breakfast: apple oatmeal, 2 eggs, 8 oz oj  Meal Plan 24 Hour Recall - Lunch: jambalaya  Meal Plan 24 Hour Recall - Dinner: steak and cheese sandwich, chips  Meal Plan 24 Hour Recall - Snack: raisins  Monitoring   Self Monitoring : pt has a meter and is testing fasting in am and before dinner. instructed pt on the normal bs ranges. instructed pt to keep a record of her bs's and to bring the record to her md visits.  Blood Glucose Logs: No  Do  you use a personal continuous glucose monitor?: No  In the last month, how often have you had a low blood sugar reaction?: more than once a week  What are your symptoms of low blood sugar?: 2-3 times a week wakes up between 1-2 am sweaty, hungry   How do you treat low blood sugar?: eats something-   Can you tell when your blood sugar is too high?: no  Exercise   Exercise Type: none  Current Diabetes Treatment   Current Treatment: Oral Medication, Diet, Injectable, Exercise  Social History  Preferred Learning Method: Face to Face, Group Education, Demonstration, Reading Materials  Primary Support: Self  Educational Level: High School  Occupation:   Smoking Status: Never a Smoker  Alcohol Use: Rarely  DDS-2 Score  ( > 3 = SIGNIFICANT DISTRESS): 1   Barriers to Change  Barriers to Change: None  Learning Challenges : None  Readiness to Learn   Readiness to Learn : Acceptance  Cultural Influences  Cultural Influences: None  Diabetes Education Assessment/Progress  Diabetes Disease Process (diabetes disease process and treatment options): Discussion, Instructed, Demonstrates Understanding/Competency(verbalizes/demonstrates), Individual Session, Written Materials Provided  Nutrition (Incorporating nutritional management into one's lifestyle): Discussion, Demonstration, Instructed, Demonstrates Understanding/Competency (verbalizes/demonstrates), Individual Session, Written Materials Provided  Physical Activity (incorporating physical activity into one's lifestyle): Discussion, Instructed, Demonstrates Understanding/Competency (verbalizes/demonstrates), Individual Session, Written Materials Provided  Medications (states correct name, dose, onset, peak, duration, side effects & timing of meds): Discussion, Instructed, Demonstrates Understanding/Competency(verbalizes/demonstrates), Written Materials Provided  Monitoring (monitoring blood glucose/other parameters & using results): Discussion, Instructed,  Individual Session, Written Materials Provided  Acute Complications (preventing, detecting, and treating acute complications): Discussion, Instructed, Demonstrates Understanding/Competency (verbalizes/demonstrates), Individual Session, Written Materials Provided  Chronic Complications (preventing, detecting, and treating chronic complications): Discussion, Instructed, Demonstrates Understanding/Competency (verbalizes/demonstrates), Individual Session, Written Materials Provided  Clinical (diabetes, other pertinent medical history, and relevant comorbidities reviewed during visit): Discussion, Instructed, Demonstrates Understanding/Competency (verbalizes/demonstrates), Individual Session  Cognitive (knowledge of self-management skills, functional health literacy): Discussion, Instructed, Demonstrates Understanding/Competency (verbalizes/demonstrates), Individual Session  Psychosocial (emotional response to diabetes): Discussion, Instructed, Demonstrates Understanding/Competency (verbalizes/demonstrates), Individual Session  Diabetes Distress and Support Systems: Discussion, Instructed, Demonstrates Understanding/Competency (verbalizes/demonstrates), Individual Session  Behavioral (readiness for change, lifestyle practices, self-care behaviors): Discussion, Instructed, Demonstrates Understanding/Competency (verbalizes/demonstrates), Individual Session  Goals  Patient has selected/evaluated goals during today's session: Yes, selected  Healthy Eating: Set  Start Date: 03/04/20  Target Date: 05/06/20  Monitoring: Set  Start Date: 03/04/20  Target Date: 05/06/20  Diabetes Care Plan/Intervention  Education Plan/Intervention: Group Follow-Up DSMT, Eye Exam, Foot Exam, Dental Exam  Diabetes Meal Plan  Restrictions: Restricted Carbohydrate  Calories: 1800  Carbohydrate Per Meal: 30-45g  Carbohydrate Per Snack : 15-20g  Instructed pt on the food groups, how to read labels and count carbs. Pt was given sample menus and meal  plans as examples. Discussed with pt the importance of eating 3 balanced and portioned meals per day. Pt usually skips lunch. Pt states that she has this terrible craving for raisins and eats a few small boxes per day. Explained to pt that a serving of raisins is 2 tbsp and that is equal to 15 grams of carbs. Discussed with pt foods that she likes that she could include in her meal plan. Discussed with pt how to put her meals together and the importance of measuring and portioning. Discussed with pt how to make the healthier choices when eating out in a restaurant. Pt had good understanding of meal plan and is aware of the adjustments that she needs to make. Pt will work on improving her meals. Pt was advised to call for any problems or questions.  Today's Self-Management Care Plan was developed with the patient's input and is based on barriers identified during today's assessment.    The long and short-term goals in the care plan were written with the patient/caregiver's input. The patient has agreed to work toward these goals to improve her overall diabetes control.      The patient received a copy of today's self-management plan and verbalized understanding of the care plan, goals, and all of today's instructions.      The patient was encouraged to communicate with her physician and care team regarding her condition(s) and treatment.  I provided the patient with my contact information today and encouraged her to contact me via phone or patient portal as needed.     Education Units of Time   Time Spent: 60 min

## 2020-03-10 ENCOUNTER — TELEPHONE (OUTPATIENT)
Dept: FAMILY MEDICINE | Facility: HOSPITAL | Age: 50
End: 2020-03-10

## 2020-03-10 NOTE — TELEPHONE ENCOUNTER
----- Message from Selma Hooper MA sent at 3/9/2020  4:22 PM CDT -----  Contact: Patient [phone]  Patient requests call to number above to discuss swelling in legs.  Thanks.

## 2020-03-16 DIAGNOSIS — E11.9 TYPE 2 DIABETES MELLITUS WITHOUT COMPLICATION, WITH LONG-TERM CURRENT USE OF INSULIN: Primary | ICD-10-CM

## 2020-03-16 DIAGNOSIS — Z79.4 TYPE 2 DIABETES MELLITUS WITHOUT COMPLICATION, WITH LONG-TERM CURRENT USE OF INSULIN: Primary | ICD-10-CM

## 2020-03-16 RX ORDER — INSULIN DEGLUDEC 100 U/ML
30 INJECTION, SOLUTION SUBCUTANEOUS NIGHTLY
Qty: 9 ML | Refills: 3
Start: 2020-03-16 | End: 2020-03-25

## 2020-03-17 ENCOUNTER — HOSPITAL ENCOUNTER (EMERGENCY)
Facility: HOSPITAL | Age: 50
Discharge: HOME | End: 2020-03-17
Attending: EMERGENCY MEDICINE
Payer: MEDICAID

## 2020-03-17 VITALS
HEIGHT: 67 IN | DIASTOLIC BLOOD PRESSURE: 87 MMHG | SYSTOLIC BLOOD PRESSURE: 151 MMHG | WEIGHT: 147 LBS | HEART RATE: 96 BPM | OXYGEN SATURATION: 98 % | BODY MASS INDEX: 23.07 KG/M2 | TEMPERATURE: 98 F | RESPIRATION RATE: 16 BRPM

## 2020-03-17 DIAGNOSIS — B34.9 VIRAL SYNDROME: ICD-10-CM

## 2020-03-17 DIAGNOSIS — R05.9 COUGH: ICD-10-CM

## 2020-03-17 DIAGNOSIS — J06.9 VIRAL URI WITH COUGH: Primary | ICD-10-CM

## 2020-03-17 LAB
INFLUENZA A, MOLECULAR: NEGATIVE
INFLUENZA B, MOLECULAR: NEGATIVE
POCT GLUCOSE: 196 MG/DL (ref 70–110)
SPECIMEN SOURCE: NORMAL

## 2020-03-17 PROCEDURE — 82962 GLUCOSE BLOOD TEST: CPT

## 2020-03-17 PROCEDURE — 25000003 PHARM REV CODE 250: Performed by: EMERGENCY MEDICINE

## 2020-03-17 PROCEDURE — 87502 INFLUENZA DNA AMP PROBE: CPT

## 2020-03-17 PROCEDURE — 99283 EMERGENCY DEPT VISIT LOW MDM: CPT | Mod: 25

## 2020-03-17 RX ORDER — IBUPROFEN 600 MG/1
600 TABLET ORAL
Status: COMPLETED | OUTPATIENT
Start: 2020-03-17 | End: 2020-03-17

## 2020-03-17 RX ORDER — ACETAMINOPHEN 500 MG
1000 TABLET ORAL
Status: COMPLETED | OUTPATIENT
Start: 2020-03-17 | End: 2020-03-17

## 2020-03-17 RX ORDER — ACETAMINOPHEN 325 MG/1
650 TABLET ORAL
Status: DISCONTINUED | OUTPATIENT
Start: 2020-03-17 | End: 2020-03-17

## 2020-03-17 RX ADMIN — IBUPROFEN 600 MG: 600 TABLET, FILM COATED ORAL at 11:03

## 2020-03-17 RX ADMIN — ACETAMINOPHEN 1000 MG: 500 TABLET, FILM COATED ORAL at 11:03

## 2020-03-17 NOTE — DISCHARGE INSTRUCTIONS
Please self quarantine for 14 days and contact your primary care physician for outpatient referral for corona virus testing

## 2020-03-17 NOTE — ED PROVIDER NOTES
Encounter Date: 3/17/2020       History     Chief Complaint   Patient presents with    Generalized Body Aches     c/o chills, cough, congestion, body aches, and fatigue. Cough began 1 week ago, but symptoms worsened on Sunday.      50-year-old female presents with flu-like illness.  States she has had a nonproductive cough and congestion for about 1 week and then beginning yesterday developed body aches subjective fever and chills, rhinorrhea.  No hemoptysis.  No chest pain.  No shortness of breath.  She feels generalized weakness.  No sick contact        Review of patient's allergies indicates:  No Known Allergies  Past Medical History:   Diagnosis Date    Aneurysm of cardiac wall, congenital     Diabetes type 2, uncontrolled     Hypertension      Past Surgical History:   Procedure Laterality Date    BREAST SURGERY      cyst removal     PARTIAL HYSTERECTOMY  2002     Family History   Problem Relation Age of Onset    Diabetes Mother     Heart disease Mother     Cancer Mother 40        breast    Diabetes Father     Cancer Maternal Grandmother 82        breast     Social History     Tobacco Use    Smoking status: Never Smoker    Smokeless tobacco: Never Used   Substance Use Topics    Alcohol use: Yes     Frequency: Monthly or less     Comment: seldom    Drug use: No     Review of Systems   Constitutional: Positive for chills, fatigue and fever.   HENT: Positive for congestion and rhinorrhea. Negative for ear pain and sore throat.    Eyes: Negative for visual disturbance.   Respiratory: Positive for cough. Negative for shortness of breath.    Cardiovascular: Negative for chest pain.   Gastrointestinal: Negative for abdominal pain, diarrhea, nausea and vomiting.   Genitourinary: Negative for dysuria and hematuria.   Musculoskeletal: Positive for myalgias. Negative for arthralgias.   Skin: Negative for pallor and rash.   Neurological: Positive for weakness. Negative for numbness and headaches.        Physical Exam     Initial Vitals [03/17/20 1015]   BP Pulse Resp Temp SpO2   (!) 151/86 (!) 113 20 99.1 °F (37.3 °C) 100 %      MAP       --         Physical Exam    Nursing note and vitals reviewed.  Constitutional: She appears well-developed and well-nourished. No distress.   HENT:   Head: Normocephalic and atraumatic.   Mouth/Throat: Oropharynx is clear and moist.   Eyes: Conjunctivae and EOM are normal. Pupils are equal, round, and reactive to light.   Neck: Normal range of motion. Neck supple.   Cardiovascular: Normal rate and regular rhythm.   Pulmonary/Chest: Breath sounds normal. No stridor. No respiratory distress.   Abdominal: Soft. She exhibits no distension. There is no tenderness.   Musculoskeletal: Normal range of motion.   Neurological: She is alert and oriented to person, place, and time. She has normal strength.   CN 2-12 appear grossly intact   Skin: Skin is warm and dry.   Psychiatric: She has a normal mood and affect.         ED Course   Procedures  Labs Reviewed   INFLUENZA A & B BY MOLECULAR   POCT GLUCOSE MONITORING CONTINUOUS          Imaging Results    None       X-Rays:   Independently Interpreted Readings:   Other Readings:  Imaging Results          X-Ray Chest 1 View (Final result)  Result time 03/17/20 10:57:22    Final result by Andrew Yip MD (03/17/20 10:57:22)                 Impression:      No acute findings.      Electronically signed by: Andrew Yip MD  Date:    03/17/2020  Time:    10:57             Narrative:    EXAMINATION:  XR CHEST 1 VIEW    CLINICAL HISTORY:  Cough    TECHNIQUE:  Single frontal view of the chest was performed.    COMPARISON:  09/03/2019    FINDINGS:  Cardiomediastinal contour is within normal limits.  Lungs are clear.  No   pneumothorax or pleural effusions.                              Medical Decision Making:   Differential Diagnosis:   Viral syndrome, influenza, corona virus, pneumonia  Clinical Tests:   Lab Tests: Ordered and Reviewed        <> Summary of Lab: Flu negative  ED Management:    Counseled the patient on the results of the workup and the current testing capabilities of the healthcare system in regards to corona virus.  Patient's vital signs within normal limits with reassuring exam and negative flu test.  Patient does not meet current testing protocols and will be discharged and instructed to self quarantine at home for 14 days.  Patient is also directed to contact primary care for referral for outpatient corona virus testing at one of the new testing centers.                       ED Course as of Mar 17 1039   Tue Mar 17, 2020   1020 Sort note: Bonnie Lino nontoxic/afebrile 50 y.o.  presented to the ED with c/o URI symptoms, cough, body aches.    Patient seen and medically screened by Nurse practitioner in Sort process due to ED crowding.  Appropriate tests and/or medications ordered.  Care transferred to an alternate provider when patient was placed in an Exam Room from the [facility] for physical exam, additional orders, and disposition. 10:20 AM. JS        [JS]      ED Course User Index  [JS] EMMANUEL Walton                Clinical Impression:       ICD-10-CM ICD-9-CM   1. Viral URI with cough J06.9 465.9    B97.89    2. Cough R05 786.2   3. Viral syndrome B34.9 079.99                                Ok Barrera,   03/17/20 1115

## 2020-03-17 NOTE — ED TRIAGE NOTES
Pt presents to ED with complaints of congestion with a cough for about a week. Pt states that after she attended [employer] om Sunday she began having chills and body aches. Pt denies chest pain, SOB. Pt states she just feels weak. Pt denies sick contacts. Pt states pain is 9/10.    APPEARANCE: Alert, oriented and in no acute distress.  CARDIAC: Normal rate and rhythm  PERIPHERAL VASCULAR: peripheral pulses present. Normal cap refill. No edema. Warm to touch.    RESPIRATORY:Normal rate and effort, breath sounds clear bilaterally throughout chest. Respirations are equal and unlabored no obvious signs of distress.  GASTRO: soft, no tenderness, no abdominal distention.  MUSC: Full ROM. No bony tenderness or soft tissue tenderness. No obvious deformity.  SKIN: Skin is warm and dry, normal skin turgor, mucous membranes moist.  NEURO: 5/5 strength major flexors/extensors bilaterally. Sensory intact to light touch bilaterally. Anshu coma scale: eyes open spontaneously-4, oriented & converses-5, obeys commands-6. No neurological abnormalities.   MENTAL STATUS: awake, alert and aware of environment.

## 2020-03-19 ENCOUNTER — DOCUMENTATION ONLY (OUTPATIENT)
Dept: FAMILY MEDICINE | Facility: HOSPITAL | Age: 50
End: 2020-03-19

## 2020-03-19 NOTE — PROGRESS NOTES
Called patient regarding T2DM follow-up.  She reports she ran out of tresiba and has been unable to get new prescription.  Has Relion 70/30 at home she has been taking.  20 units in AM and 28 units in PM.  Morning blood sugar has been running high.  New prescription for Tresiba 28 units has been sent in.  Patient was instructed by nursing staff to call 3/20 to see if new medication has made it in yet.  Otherwise, she reports she has subjective fevers and chills and cough.  Instructed by ED to self quarantine for 14 days.  Instructed patient to call clinic at anytime if concerns arise.

## 2020-03-20 ENCOUNTER — DOCUMENTATION ONLY (OUTPATIENT)
Dept: FAMILY MEDICINE | Facility: HOSPITAL | Age: 50
End: 2020-03-20

## 2020-03-20 ENCOUNTER — TELEPHONE (OUTPATIENT)
Dept: FAMILY MEDICINE | Facility: HOSPITAL | Age: 50
End: 2020-03-20

## 2020-03-20 NOTE — PROGRESS NOTES
Called patient regarding concerns of leg pain at night.  Patient has been experiencing peripheral neuropathy, improved with gabapentin during the day.  We discussed increasing night time dose to 600mg.  Will follow-up next week regarding improvement.  Can consider increasing to 900mg night or adding Elavil to regimen.  All questions answered.      Fatmata Lemus MD  [facility]

## 2020-03-20 NOTE — TELEPHONE ENCOUNTER
----- Message from Slema Hooper MA sent at 3/20/2020  8:32 AM CDT -----  Contact: [phone] Patient  Patient states she had a phone appointment with you yesterday and forgot to mention that she is having leg pain that is interfering with her sleep at night.  Patient requests return call to number above to discuss further.   Thanks.

## 2020-03-25 ENCOUNTER — TELEPHONE (OUTPATIENT)
Dept: FAMILY MEDICINE | Facility: HOSPITAL | Age: 50
End: 2020-03-25

## 2020-03-25 DIAGNOSIS — Z79.4 TYPE 2 DIABETES MELLITUS WITHOUT COMPLICATION, WITH LONG-TERM CURRENT USE OF INSULIN: ICD-10-CM

## 2020-03-25 DIAGNOSIS — E11.9 TYPE 2 DIABETES MELLITUS WITHOUT COMPLICATION, WITH LONG-TERM CURRENT USE OF INSULIN: ICD-10-CM

## 2020-03-25 RX ORDER — INSULIN DEGLUDEC 200 U/ML
30 INJECTION, SOLUTION SUBCUTANEOUS DAILY
Qty: 14 ML | Refills: 3
Start: 2020-03-25 | End: 2020-05-26 | Stop reason: SDUPTHER

## 2020-03-25 NOTE — TELEPHONE ENCOUNTER
----- Message from Edward Muhammad sent at 3/24/2020  3:25 PM CDT -----   REP CALLED ASKING IF WE COULD FAX BACK TO THEM, THEY NEED A FAX COVER SHEET WITH NEW ORDER STATING THE STRENGTH OF INSULIN. insulin degludec (TRESIBA FLEXTOUCH U-100) 100 unit/mL (3 mL) InPn THE ONE THEY HAVE SAYS UNIT 200 AND THE ONE WE HAVE IN SYSTEMS SAYS UNIT 100. [facility] PATIENT ASSISTANCE PROGRAM PH:[phone], [phone]

## 2020-04-06 ENCOUNTER — TELEPHONE (OUTPATIENT)
Dept: FAMILY MEDICINE | Facility: HOSPITAL | Age: 50
End: 2020-04-06

## 2020-04-06 NOTE — TELEPHONE ENCOUNTER
----- Message from Christie Downs sent at 4/6/2020  1:15 PM CDT -----  Contact: Pt called [phone]  Pt would like to speak to dr in regards to her insulin. Please call pt back

## 2020-04-07 ENCOUNTER — TELEPHONE (OUTPATIENT)
Dept: DIABETES | Facility: CLINIC | Age: 50
End: 2020-04-07

## 2020-04-08 ENCOUNTER — CLINICAL SUPPORT (OUTPATIENT)
Dept: DIABETES | Facility: CLINIC | Age: 50
End: 2020-04-08
Payer: MEDICAID

## 2020-04-08 VITALS — BODY MASS INDEX: 23.02 KG/M2 | WEIGHT: 147 LBS

## 2020-04-08 DIAGNOSIS — E11.65 UNCONTROLLED TYPE 2 DIABETES MELLITUS WITH HYPERGLYCEMIA: Chronic | ICD-10-CM

## 2020-04-08 PROCEDURE — 99999 PR PBB SHADOW E&M-EST. PATIENT-LVL I: ICD-10-PCS | Mod: PBBFAC,,,

## 2020-04-08 PROCEDURE — G0108 DIAB MANAGE TRN  PER INDIV: HCPCS | Mod: PBBFAC,PO | Performed by: REGISTERED NURSE

## 2020-04-08 PROCEDURE — 99999 PR PBB SHADOW E&M-EST. PATIENT-LVL I: CPT | Mod: PBBFAC,,,

## 2020-04-08 PROCEDURE — 99211 OFF/OP EST MAY X REQ PHY/QHP: CPT | Mod: PBBFAC,PO | Performed by: REGISTERED NURSE

## 2020-04-08 NOTE — PROGRESS NOTES
Telephone - [id number] ([phone])    Diabetes Care Specialist Telehealth Visit Note  This visit was conducted using Telehealth/Telephonic Technology. It was in the patient's best interest to receive diabetes self-management education and support services in this format to prevent the exposure to COVID-19. Diabetes Education  Author: Linwood Sultana RN  Date: 4/8/2020  Diabetes Care Management Summary  Diabetes Education Record Assessment/Progress: Post Program/Follow-up  Current Diabetes Risk Level: High   Last A1c:   Lab Results   Component Value Date    HGBA1C >14.0 (H) 01/03/2020     Last Visit with Diabetes Educator: : 04/08/2020  Last OPCM Referral: : Not Found   Enrolled in OPCM: No  Diabetes Type  Diabetes Type : Type II  Diabetes History  Current Treatment: Oral Medication, Diet, Injectable, Exercise, Insulin  Health Maintenance was reviewed today with patient. Discussed with patient importance of routine eye exams, foot exams/foot care, blood work (i.e.: A1c, microalbumin, and lipid), dental visits, yearly flu vaccine, and pneumonia vaccine as indicated by PCP. Patient verbalized understanding.     Health Maintenance Topics with due status: Not Due       Topic Last Completion Date    Hemoglobin A1c 01/03/2020     Health Maintenance Due   Topic Date Due    Eye Exam  02/04/1978    TETANUS VACCINE  02/04/1986    Pneumococcal Vaccine (Medium Risk) (1 of 1 - PPSV23) 02/04/1987    Low Dose Statin  02/04/1989    Mammogram  03/25/2015    Lipid Panel  03/25/2015    Foot Exam  02/10/2017    Colonoscopy  02/04/2018   Nutrition  Meal Planning: water, 3 meals per day  What type of beverages do you drink?: diet soda/tea, water  Meal Plan 24 Hour Recall - Breakfast: oateal, egg, cranberries, sugar free gatorade  Meal Plan 24 Hour Recall - Lunch: sandwich, green beans, chips, sugar free pudding  Meal Plan 24 Hour Recall - Dinner: soup, crackers, meat, apple  Monitoring   Self Monitoring : pt has a eter and is  checking 2 times a day- fasting in the am and before her injection at night. Pt reports that her fasting bs's are between 80 and 100.  Blood Glucose Logs: Yes  Do you use a personal continuous glucose monitor?: No  In the last month, how often have you had a low blood sugar reaction?: never  Can you tell when your blood sugar is too high?: no  Exercise   Exercise Type: walking  Intensity: Low  Frequency: 3-5 Times per week  Duration: 15 min  Current Diabetes Treatment   Current Treatment: Oral Medication, Diet, Injectable, Exercise, Insulin  DDS-2 Score  ( > 3 = SIGNIFICANT DISTRESS): 1     Barriers to Change  Barriers to Change: None  Learning Challenges : None  Readiness to Learn   Readiness to Learn : Acceptance  Cultural Influences  Cultural Influences: None  Diabetes Education Assessment/Progress  Diabetes Disease Process (diabetes disease process and treatment options): Not Covered/Deferred  Nutrition (Incorporating nutritional management into one's lifestyle): Discussion, Instructed, Demonstrates Understanding/Competency (verbalizes/demonstrates), Individual Session  Physical Activity (incorporating physical activity into one's lifestyle): Discussion, Instructed, Demonstrates Understanding/Competency (verbalizes/demonstrates), Individual Session  Medications (states correct name, dose, onset, peak, duration, side effects & timing of meds): Discussion, Instructed, Demonstrates Understanding/Competency(verbalizes/demonstrates), Individual Session  Monitoring (monitoring blood glucose/other parameters & using results): Discussion, Instructed, Demonstrates Understanding/Competency (verbalizes/demonstrates), Individual Session  Acute Complications (preventing, detecting, and treating acute complications): Not Covered/Deferred  Chronic Complications (preventing, detecting, and treating chronic complications): Not Covered/Deferred  Clinical (diabetes, other pertinent medical history, and relevant comorbidities  reviewed during visit): Discussion, Instructed, Demonstrates Understanding/Competency (verbalizes/demonstrates), Individual Session  Cognitive (knowledge of self-management skills, functional health literacy): Discussion, Instructed, Demonstrates Understanding/Competency (verbalizes/demonstrates), Individual Session  Psychosocial (emotional response to diabetes): Discussion, Instructed, Demonstrates Understanding/Competency (verbalizes/demonstrates), Individual Session  Diabetes Distress and Support Systems: Discussion, Instructed, Demonstrates Understanding/Competency (verbalizes/demonstrates), Individual Session  Behavioral (readiness for change, lifestyle practices, self-care behaviors): Discussion, Instructed, Demonstrates Understanding/Competency (verbalizes/demonstrates), Individual Session  Goals  Patient has selected/evaluated goals during today's session: Yes, evaluated  Healthy Eating: % Met  Met Percentage : 75%  Monitoring: % Met  Met Percentage : 100%  Diabetes Care Plan/Intervention  Education Plan/Intervention: Individual Follow-Up DSMT  Diabetes Meal Plan  Restrictions: Restricted Carbohydrate  Calories: 1800  Carbohydrate Per Meal: 30-45g  Carbohydrate Per Snack : 15-20g  Pt completed diabetes fu visit vis phone. Pt reports that she has just completed a 14 day isolation for the virus. Pt has done very well in bringing her bs's down and following her meal plan. Pt is compliant with checking her bs's and taking her medication. 24 hour meal recall showed that pt is putting her meals together well. She is using variety and getting in all of the food groups. Pt will continue to work on her meal plan. Pt reports that she is still having trouble with her eyes and seeing close. Pt purchased mag magnifying glasses however she bought a very low strength. She will purchase a higher strength and see if that helps. Pt will see the eye dr as soon as it is possible. Pt was advised to call for any problems or  questions. Will fu in a few weeks.  Today's Self-Management Care Plan was developed with the patient's input and is based on barriers identified during today's assessment.    The long and short-term goals in the care plan were written with the patient/caregiver's input. The patient has agreed to work toward these goals to improve her overall diabetes control.      The patient received a copy of today's self-management plan and verbalized understanding of the care plan, goals, and all of today's instructions.      The patient was encouraged to communicate with her physician and care team regarding her condition(s) and treatment.  I provided the patient with my contact information today and encouraged her to contact me via phone or patient portal as needed.     Education Units of Time   Time Spent: 60 min

## 2020-04-14 ENCOUNTER — E-VISIT (OUTPATIENT)
Dept: FAMILY MEDICINE | Facility: HOSPITAL | Age: 50
End: 2020-04-14

## 2020-04-14 RX ORDER — ALBUTEROL SULFATE 90 UG/1
2 AEROSOL, METERED RESPIRATORY (INHALATION) EVERY 6 HOURS PRN
Qty: 18 G | Refills: 1 | Status: SHIPPED | OUTPATIENT
Start: 2020-04-14 | End: 2020-05-07

## 2020-04-14 NOTE — PROGRESS NOTES
Subjective:       Patient ID: Bonnie Lino is a 50 y.o. female.    Chief Complaint: Cough     HPI     49 y/o female with a pmhx DM2 presents via telemedicine visit with complaint of cough and SOB x 1 week. On 3/15 patient states that she developed chills and body aches that lasted 2 days. Presented to ER [facility] 2 days later wasn't tested for coronavirus and was flu negative on 3/17. One week ago has developed cough with SOB. Patient initially attributed this after restarting a exercise regimen.Patient tried Mucinex DM with improvement in SOB, however cough has persisted. Currently on 70/30 units 28 units in AM and PM. BG has been controlled (around 100's) on current regimen. Awaiting for Agusshira to arrive in clinic to begin regimen. Patient reports being tested for coronavirus on 4/10 at [facility],  is awaiting results and will notify clinic of result when available.     Review of Systems   Constitutional: Negative for activity change and appetite change.   HENT: Negative for trouble swallowing.    Eyes: Negative for visual disturbance.   Respiratory: Positive for cough, chest tightness and shortness of breath.    Cardiovascular: Negative for chest pain and palpitations.   Gastrointestinal: Negative for abdominal distention, diarrhea, nausea and vomiting.   Endocrine: Negative for cold intolerance and heat intolerance.   Genitourinary: Negative for flank pain.   Musculoskeletal: Negative for arthralgias.   Skin: Negative for color change.   Allergic/Immunologic: Negative for immunocompromised state.   Neurological: Negative for headaches.   Hematological: Negative for adenopathy.   Psychiatric/Behavioral: Negative for agitation.       Objective:      There were no vitals filed for this visit.     Physical Exam    No acute distress.  Alert oriented x4.  Able to speak in full sentences without difficulty.  Mood normal.  Judgment normal.  Assessment:       1. Viral URI with cough    2. Type 2 diabetes  mellitus without complication, unspecified whether long term insulin use        Plan:       Viral URI with cough   - continue Mucinex DM for cough  - albuterol Q 4 to6 hours p.r.n. for shortness of breath  - Tylenol p.r.n. fever    Type 2 diabetes mellitus without complication, unspecified whether long term insulin use  - Last A1c > 14. 3 mo ago   - Currently on 70/30; 28 units qam and qpm. BG controlled on regimen. Will transition to Tresiba.   - Continue dietary and lifestyle changes  - repeat A1c and CMP  - follow-up with optometry and for annual foot exam    Follow up in clinic if symptoms worsen     The patient location is: Home   The chief complaint leading to consultation is: Cough  Visit type: audio only  Total time spent with patient: 11 min   Each patient to whom he or she provides medical services by telemedicine is:  (1) informed of the relationship between the physician and patient and the respective role of any other health care provider with respect to management of the patient; and (2) notified that he or she may decline to receive medical services by telemedicine and may withdraw from such care at any time.    Cade Zimmerman MD   [employer] [facility], PGY-2

## 2020-04-27 ENCOUNTER — CLINICAL SUPPORT (OUTPATIENT)
Dept: DIABETES | Facility: CLINIC | Age: 50
End: 2020-04-27
Payer: MEDICAID

## 2020-04-27 VITALS — BODY MASS INDEX: 22.71 KG/M2 | WEIGHT: 145 LBS

## 2020-04-27 DIAGNOSIS — E11.65 UNCONTROLLED TYPE 2 DIABETES MELLITUS WITH HYPERGLYCEMIA: Chronic | ICD-10-CM

## 2020-04-27 PROCEDURE — 99999 PR PBB SHADOW E&M-EST. PATIENT-LVL I: ICD-10-PCS | Mod: PBBFAC,,,

## 2020-04-27 PROCEDURE — G0108 DIAB MANAGE TRN  PER INDIV: HCPCS | Mod: PBBFAC,PO | Performed by: REGISTERED NURSE

## 2020-04-27 PROCEDURE — 99999 PR PBB SHADOW E&M-EST. PATIENT-LVL I: CPT | Mod: PBBFAC,,,

## 2020-04-27 PROCEDURE — 99211 OFF/OP EST MAY X REQ PHY/QHP: CPT | Mod: PBBFAC,PO | Performed by: REGISTERED NURSE

## 2020-04-27 NOTE — PROGRESS NOTES
Telephone - [id number] [phone]    Diabetes Care Specialist Telehealth Visit Note  This visit was conducted using Telehealth/Telephonic Technology. It was in the patient's best interest to receive diabetes self-management education and support services in this format to prevent the exposure to COVID-19. Diabetes Education  Author: Linwood Sultana RN  Date: 4/27/2020  Diabetes Care Management Summary  Diabetes Education Record Assessment/Progress: Post Program/Follow-up  Current Diabetes Risk Level: High   Last A1c:   Lab Results   Component Value Date    HGBA1C >14.0 (H) 01/03/2020     Last Visit with Diabetes Educator: : 04/27/2020  Last OPCM Referral: : Not Found   Enrolled in OPCM: No    Diabetes Type  Diabetes Type : Type II  Diabetes History  Current Treatment: Oral Medication, Diet, Injectable, Exercise  Health Maintenance was reviewed today with patient. Discussed with patient importance of routine eye exams, foot exams/foot care, blood work (i.e.: A1c, microalbumin, and lipid), dental visits, yearly flu vaccine, and pneumonia vaccine as indicated by PCP. Patient verbalized understanding.     Health Maintenance Topics with due status: Not Due       Topic Last Completion Date    Hemoglobin A1c 01/03/2020     Health Maintenance Due   Topic Date Due    Eye Exam  02/04/1978    TETANUS VACCINE  02/04/1986    Pneumococcal Vaccine (Medium Risk) (1 of 1 - PPSV23) 02/04/1987    Low Dose Statin  02/04/1989    Mammogram  03/25/2015    Lipid Panel  03/25/2015    Foot Exam  02/10/2017    Colonoscopy  02/04/2018   Nutrition  Meal Planning: water, diet drinks, artificial sweeteners  What type of sweetener do you use?: Splenda  What type of beverages do you drink?: diet soda/tea, water, juice  Meal Plan 24 Hour Recall - Breakfast: oatmeal, raisins, egg, 1/2 orange juice  Meal Plan 24 Hour Recall - Lunch: chicken sandwich, cheese, jello, chips, corn, jello  Meal Plan 24 Hour Recall - Dinner: heese, crackers,  marsha  Monitoring   Self Monitoring : pt has a meter and is checking her bs's once a day fasting in the am. Pt states bs was 140 this am.  Blood Glucose Logs: Yes  Do you use a personal continuous glucose monitor?: No  In the last month, how often have you had a low blood sugar reaction?: never  Can you tell when your blood sugar is too high?: no  Exercise   Exercise Type: walking  Intensity: Low  Frequency: 3-5 Times per week  Duration: 15 min  Current Diabetes Treatment   Current Treatment: Oral Medication, Diet, Injectable, Exercise  Social History  Primary Support: Self    DDS-2 Score  ( > 3 = SIGNIFICANT DISTRESS): 1   Barriers to Change  Barriers to Change: None  Learning Challenges : None  Readiness to Learn   Readiness to Learn : Acceptance  Cultural Influences  Cultural Influences: None  Diabetes Education Assessment/Progress  Diabetes Disease Process (diabetes disease process and treatment options): Not Covered/Deferred  Nutrition (Incorporating nutritional management into one's lifestyle): Discussion, Instructed, Demonstrates Understanding/Competency (verbalizes/demonstrates), Individual Session  Physical Activity (incorporating physical activity into one's lifestyle): Discussion, Instructed, Demonstrates Understanding/Competency (verbalizes/demonstrates), Individual Session  Medications (states correct name, dose, onset, peak, duration, side effects & timing of meds): Discussion, Instructed, Demonstrates Understanding/Competency(verbalizes/demonstrates), Individual Session  Monitoring (monitoring blood glucose/other parameters & using results): Discussion, Instructed, Demonstrates Understanding/Competency (verbalizes/demonstrates), Individual Session  Acute Complications (preventing, detecting, and treating acute complications): Not Covered/Deferred  Chronic Complications (preventing, detecting, and treating chronic complications): Not Covered/Deferred  Clinical (diabetes, other pertinent medical  history, and relevant comorbidities reviewed during visit): Discussion, Instructed, Demonstrates Understanding/Competency (verbalizes/demonstrates), Individual Session  Cognitive (knowledge of self-management skills, functional health literacy): Discussion, Instructed, Demonstrates Understanding/Competency (verbalizes/demonstrates), Individual Session  Psychosocial (emotional response to diabetes): Discussion, Instructed, Demonstrates Understanding/Competency (verbalizes/demonstrates), Individual Session  Diabetes Distress and Support Systems: Discussion, Instructed, Demonstrates Understanding/Competency (verbalizes/demonstrates), Individual Session  Behavioral (readiness for change, lifestyle practices, self-care behaviors): Discussion, Instructed, Demonstrates Understanding/Competency (verbalizes/demonstrates), Individual Session  Goals  Patient has selected/evaluated goals during today's session: Yes, evaluated  Healthy Eating: % Met  Met Percentage : 75%  Physical Activity: % Met  Met Percentage : 100%  Monitoring: % Met  Met Percentage : 100%    Diabetes Meal Plan  Restrictions: Restricted Carbohydrate  Calories: 1800  Carbohydrate Per Meal: 30-45g  Carbohydrate Per Snack : 15-20g  Pt completed diabetes fu visit via phone. Pt is checking her bs once a day in the am.Pts am bs was 140. Pt is still upset because she took a test for the corona virus and has not received her results in one month. She now has to retake the test. Pt has been working on her meals She is planning her meals and trying to eat balanced. Discussed with pt 24 hour meal recall and what needed to be adjusted in her meals to make them more balanced. Discussed with pt that corn is not a veggie it is a carb.  Pt will continue to work on meal plan. Pt is anxious to get her bs's down. Discussed covid 19 precautions with pt. Will fu in a few weeks. Pt was advised to call for any problems or questions.  Today's Self-Management Care Plan was developed  with the patient's input and is based on barriers identified during today's assessment.    The long and short-term goals in the care plan were written with the patient/caregiver's input. The patient has agreed to work toward these goals to improve her overall diabetes control.      The patient received a copy of today's self-management plan and verbalized understanding of the care plan, goals, and all of today's instructions.      The patient was encouraged to communicate with her physician and care team regarding her condition(s) and treatment.  I provided the patient with my contact information today and encouraged her to contact me via phone or patient portal as needed.     Education Units of Time   Time Spent: 60 min

## 2020-04-30 NOTE — TELEPHONE ENCOUNTER
I assume primary medical responsibility for this patient. I have reviewed the history, physical, and assessement & treatment plan with the resident and agree that the care is reasonable and necessary. This service has been performed by a resident via audio-only telemedicine with immediate supervision available by attending. If necessary, an addendum of additional findings or evaluation beyond the resident documentation will be noted below.    Yareli Vitale MD

## 2020-05-07 ENCOUNTER — OFFICE VISIT (OUTPATIENT)
Dept: FAMILY MEDICINE | Facility: HOSPITAL | Age: 50
End: 2020-05-07
Attending: FAMILY MEDICINE
Payer: MEDICAID

## 2020-05-07 ENCOUNTER — LAB VISIT (OUTPATIENT)
Dept: LAB | Facility: HOSPITAL | Age: 50
End: 2020-05-07
Attending: FAMILY MEDICINE
Payer: MEDICAID

## 2020-05-07 VITALS
SYSTOLIC BLOOD PRESSURE: 157 MMHG | BODY MASS INDEX: 24.19 KG/M2 | HEART RATE: 100 BPM | HEIGHT: 68 IN | DIASTOLIC BLOOD PRESSURE: 95 MMHG | WEIGHT: 159.63 LBS

## 2020-05-07 DIAGNOSIS — E11.00 TYPE 2 DIABETES MELLITUS WITH HYPEROSMOLARITY WITHOUT COMA, WITHOUT LONG-TERM CURRENT USE OF INSULIN: ICD-10-CM

## 2020-05-07 DIAGNOSIS — Z23 PNEUMOCOCCAL VACCINATION ADMINISTERED AT CURRENT VISIT: ICD-10-CM

## 2020-05-07 DIAGNOSIS — I10 ESSENTIAL HYPERTENSION: ICD-10-CM

## 2020-05-07 DIAGNOSIS — E11.00 TYPE 2 DIABETES MELLITUS WITH HYPEROSMOLARITY WITHOUT COMA, WITHOUT LONG-TERM CURRENT USE OF INSULIN: Primary | ICD-10-CM

## 2020-05-07 LAB
CHOLEST SERPL-MCNC: 264 MG/DL (ref 120–199)
CHOLEST/HDLC SERPL: 4.5 {RATIO} (ref 2–5)
ESTIMATED AVG GLUCOSE: 151 MG/DL (ref 68–131)
HBA1C MFR BLD HPLC: 6.9 % (ref 4–5.6)
HDLC SERPL-MCNC: 59 MG/DL (ref 40–75)
HDLC SERPL: 22.3 % (ref 20–50)
LDLC SERPL CALC-MCNC: 152.4 MG/DL (ref 63–159)
NONHDLC SERPL-MCNC: 205 MG/DL
TRIGL SERPL-MCNC: 263 MG/DL (ref 30–150)

## 2020-05-07 PROCEDURE — 90471 IMMUNIZATION ADMIN: CPT

## 2020-05-07 PROCEDURE — 80061 LIPID PANEL: CPT

## 2020-05-07 PROCEDURE — 83036 HEMOGLOBIN GLYCOSYLATED A1C: CPT

## 2020-05-07 PROCEDURE — 36415 COLL VENOUS BLD VENIPUNCTURE: CPT

## 2020-05-07 PROCEDURE — 99214 OFFICE O/P EST MOD 30 MIN: CPT | Mod: 25 | Performed by: STUDENT IN AN ORGANIZED HEALTH CARE EDUCATION/TRAINING PROGRAM

## 2020-05-07 RX ORDER — METFORMIN HYDROCHLORIDE 1000 MG/1
TABLET ORAL
COMMUNITY
End: 2020-05-07

## 2020-05-07 NOTE — PROGRESS NOTES
Subjective:       Patient ID: Bonnie Lino is a 50 y.o. female.    Chief Complaint: Follow-up    Patient is a 49 yo female pmhx of T2DM and HTN presenting today for follow-up.  Patient on procardia 60mg daily and BP is elevated at today's visit.  Taking [id number] units qhs at 7:30 PM.  Morning BG 88 this morning.  Ranges from 's in the morning.  She denies hypoglycemic events.  She reports improvement in diet, limiting sodas to once weekly, drinks sugar free juices and drinks.  Has been meeting with dietician.  Reports better control of her sugars.  Also notes an increase in weight since starting insulin.      Review of Systems   Constitutional: Negative for appetite change and fever.   HENT: Negative for congestion and sore throat.    Respiratory: Negative for cough, chest tightness and shortness of breath.    Cardiovascular: Negative for chest pain and palpitations.   Gastrointestinal: Negative for abdominal pain, diarrhea and nausea.   Neurological: Negative for dizziness, weakness and headaches.       Objective:      Vitals:    05/07/20 1542   BP: (!) 157/95   Pulse: 100     Physical Exam   Constitutional: She is oriented to person, place, and time. She appears well-developed and well-nourished. No distress.   HENT:   Head: Normocephalic and atraumatic.   Eyes: Pupils are equal, round, and reactive to light.   Neck: Normal range of motion.   Cardiovascular: Normal rate, regular rhythm and normal heart sounds.   No murmur heard.  Pulmonary/Chest: Effort normal.   Musculoskeletal: She exhibits no edema.   Neurological: She is alert and oriented to person, place, and time. No cranial nerve deficit. She exhibits normal muscle tone. Coordination normal.   Skin: Skin is warm. She is not diaphoretic.   Nursing note and vitals reviewed.      Assessment:       1. Type 2 diabetes mellitus with hyperosmolarity without coma, without long-term current use of insulin    2. Essential hypertension    3. Pneumococcal  vaccination administered at current visit        Plan:       Type 2 diabetes mellitus with hyperosmolarity without coma, without long-term current use of insulin  -     Hemoglobin A1C; Future; Expected date: 05/07/2020  -     Lipid Panel; Future; Expected date: 05/07/2020    Essential hypertension    Pneumococcal vaccination administered at current visit  -     Pneumococcal Polysaccharide Vaccine (23 Valent) (SQ/IM)      No follow-ups on file.

## 2020-05-07 NOTE — PROGRESS NOTES
Two patient identifiers verified.  Allergies reviewed. Pnuemovax 23   IM administered to Left deltoid per MD order.  Patient tolerated injection well; no redness, bleeding, or bruising noted to injection site.  Patient instructed to remain in clinic setting for 15 minutes.  Verbalizes understanding.

## 2020-05-25 ENCOUNTER — OFFICE VISIT (OUTPATIENT)
Dept: PODIATRY | Facility: CLINIC | Age: 50
End: 2020-05-25
Payer: MEDICAID

## 2020-05-25 VITALS
SYSTOLIC BLOOD PRESSURE: 130 MMHG | HEART RATE: 78 BPM | BODY MASS INDEX: 24.27 KG/M2 | HEIGHT: 68 IN | DIASTOLIC BLOOD PRESSURE: 84 MMHG | WEIGHT: 160.13 LBS

## 2020-05-25 DIAGNOSIS — E11.00 TYPE 2 DIABETES MELLITUS WITH HYPEROSMOLARITY WITHOUT COMA, WITHOUT LONG-TERM CURRENT USE OF INSULIN: ICD-10-CM

## 2020-05-25 DIAGNOSIS — E11.49 TYPE II DIABETES MELLITUS WITH NEUROLOGICAL MANIFESTATIONS: Primary | ICD-10-CM

## 2020-05-25 PROCEDURE — 99999 PR PBB SHADOW E&M-EST. PATIENT-LVL IV: ICD-10-PCS | Mod: PBBFAC,,, | Performed by: PODIATRIST

## 2020-05-25 PROCEDURE — 99203 PR OFFICE/OUTPT VISIT, NEW, LEVL III, 30-44 MIN: ICD-10-PCS | Mod: S$PBB,,, | Performed by: PODIATRIST

## 2020-05-25 PROCEDURE — 99999 PR PBB SHADOW E&M-EST. PATIENT-LVL IV: CPT | Mod: PBBFAC,,, | Performed by: PODIATRIST

## 2020-05-25 PROCEDURE — 99214 OFFICE O/P EST MOD 30 MIN: CPT | Mod: PBBFAC,PN | Performed by: PODIATRIST

## 2020-05-25 PROCEDURE — 99203 OFFICE O/P NEW LOW 30 MIN: CPT | Mod: S$PBB,,, | Performed by: PODIATRIST

## 2020-05-25 NOTE — PROGRESS NOTES
Subjective:      Patient ID: Bonnie Lino is a 50 y.o. female.    Chief Complaint: PCP Visit (Dr. Lemus last visit 5/7/2020) and Diabetic Foot Exam    Bonnie SOLOMON is a 50 y.o. female who presents to the clinic for evaluation and treatment of high risk feet. Bonnie SOLOMON has a past medical history of Aneurysm of cardiac wall, congenital, Diabetes type 2, uncontrolled, and Hypertension.  This patient has documented high risk feet requiring routine maintenance secondary to peripheral neuropathy.  Complains of numbness and tingling bilateral lower extremities.  Last glucose check was 89.  Patient tells me she was diagnosed with diabetic neuropathy.      PCP: Fatmata Lemus MD    Date Last Seen by PCP:  In epic      Hemoglobin A1C   Date Value Ref Range Status   05/07/2020 6.9 (H) 4.0 - 5.6 % Final     Comment:     ADA Screening Guidelines:  5.7-6.4%  Consistent with prediabetes  >or=6.5%  Consistent with diabetes  High levels of fetal hemoglobin interfere with the HbA1C  assay. Heterozygous hemoglobin variants (HbS, HgC, etc)do  not significantly interfere with this assay.   However, presence of multiple variants may affect accuracy.     01/03/2020 >14.0 (H) 4.0 - 5.6 % Final     Comment:     ADA Screening Guidelines:  5.7-6.4%  Consistent with prediabetes  >or=6.5%  Consistent with diabetes  High levels of fetal hemoglobin interfere with the HbA1C  assay. Heterozygous hemoglobin variants (HbS, HgC, etc)do  not significantly interfere with this assay.   However, presence of multiple variants may affect accuracy.         Review of Systems   Constitution: Negative for chills, decreased appetite, fever and malaise/fatigue.   HENT: Negative for congestion, ear discharge and sore throat.    Eyes: Negative for discharge and pain.   Cardiovascular: Negative for chest pain, claudication and leg swelling.   Respiratory: Negative for cough and shortness of breath.    Skin: Positive for color change. Negative for nail  changes and rash.   Musculoskeletal: Positive for stiffness. Negative for arthritis, joint pain, joint swelling and muscle weakness.   Gastrointestinal: Negative for bloating, abdominal pain, diarrhea, nausea and vomiting.   Genitourinary: Negative for flank pain and hematuria.   Neurological: Positive for numbness and sensory change. Negative for headaches and weakness.   Psychiatric/Behavioral: Negative for altered mental status.             Past Medical History:   Diagnosis Date    Aneurysm of cardiac wall, congenital     Diabetes type 2, uncontrolled     Hypertension        Past Surgical History:   Procedure Laterality Date    BREAST SURGERY      cyst removal     PARTIAL HYSTERECTOMY  2002       Family History   Problem Relation Age of Onset    Diabetes Mother     Heart disease Mother     Cancer Mother 40        breast    Diabetes Father     Cancer Maternal Grandmother 82        breast       Social History     Socioeconomic History    Marital status: Single     Spouse name: Not on file    Number of children: Not on file    Years of education: Not on file    Highest education level: Not on file   Occupational History     Employer: [employer]   Social Needs    Financial resource strain: Not on file    Food insecurity:     Worry: Not on file     Inability: Not on file    Transportation needs:     Medical: Not on file     Non-medical: Not on file   Tobacco Use    Smoking status: Never Smoker    Smokeless tobacco: Never Used   Substance and Sexual Activity    Alcohol use: Yes     Frequency: Monthly or less     Comment: seldom    Drug use: No    Sexual activity: Not on file   Lifestyle    Physical activity:     Days per week: Not on file     Minutes per session: Not on file    Stress: Not on file   Relationships    Social connections:     Talks on phone: Not on file     Gets together: Not on file     Attends Jew service: Not on file     Active member of club or organization:  "Not on file     Attends meetings of clubs or organizations: Not on file     Relationship status: Not on file   Other Topics Concern    Not on file   Social History Narrative    Not on file       Current Outpatient Medications   Medication Sig Dispense Refill    blood-glucose meter (RELION ALL-IN-ONE METER) kit Use as instructed 1 each 0    fluticasone propionate (FLONASE ALLERGY RELIEF) 50 mcg/actuation nasal spray 1 spray (50 mcg total) by Each Nostril route daily as needed. 1 Bottle 0    gabapentin (NEURONTIN) 300 MG capsule Take 1 capsule (300 mg total) by mouth 3 (three) times daily. 90 capsule 11    glipiZIDE (GLUCOTROL) 10 MG tablet Take 1 tablet (10 mg total) by mouth daily with breakfast. 90 tablet 3    lancets Misc 1 application by Misc.(Non-Drug; Combo Route) route 3 (three) times daily. 100 each 1    lisinopril (PRINIVIL,ZESTRIL) 2.5 MG tablet Take 1 tablet (2.5 mg total) by mouth once daily. 90 tablet 3    NIFEdipine (PROCARDIA-XL) 60 MG (OSM) 24 hr tablet Take 1 tablet (60 mg total) by mouth once daily. 30 tablet 11    blood glucose strip-disp meter Kit 1 application by Misc.(Non-Drug; Combo Route) route 3 (three) times daily. 1 kit 1    insulin degludec (TRESIBA FLEXTOUCH U-200) 200 unit/mL (3 mL) InPn Inject 30 Units into the skin once daily. 14 mL 3    lisinopriL 1 mg/mL Soln        No current facility-administered medications for this visit.        Review of patient's allergies indicates:  No Known Allergies    Vitals:    05/25/20 1047   BP: 130/84   Pulse: 78   Weight: 72.6 kg (160 lb 1.6 oz)   Height: 5' 8" (1.727 m)   PainSc: 0-No pain       Objective:      Physical Exam   Constitutional: She is oriented to person, place, and time. She appears well-developed and well-nourished. No distress.   HENT:   Nose: Nose normal.   Eyes: Conjunctivae are normal.   Neck: Normal range of motion.   Pulmonary/Chest: Effort normal. She exhibits no tenderness.   Abdominal: There is no tenderness. "   Neurological: She is alert and oriented to person, place, and time.   Psychiatric: She has a normal mood and affect. Her behavior is normal.       Vascular: Distal DP/PT pulses palpable 2/4. CRT < 3 sec to tips of toes. No vericosities noted to LEs. Hair growth present LE, warm to touch LE, No edema noted to LE.    Dermatologic: No open lesions, lacerations or wounds. Interdigital spaces clean, dry and intact. No erythema, rubor, calor noted LE, Toenails 1-5 bilaterally are elongated by 2-3 mm, thickened by 2-3 mm, discolored/yellowed. No incurvation. Mild xerosis noted, bilat.     Musculoskeletal: MMT 5/5 in DF/PF/Inv/Ev resistance with no reproduction of pain in any direction. Passive range of motion of ankle and pedal joints is painless. No calf tenderness LE, Compartments soft/compressible.    Neurological: Light touch, proprioception, and sharp/dull sensation are all intact. Protective threshold with the Warba-Wienstein monofilament is intact. Vibratory sensation intact.         Assessment:       Encounter Diagnoses   Name Primary?    Type 2 diabetes mellitus with hyperosmolarity without coma, without long-term current use of insulin     Type II diabetes mellitus with neurological manifestations Yes         Plan:       Bonnie SOLOMON was seen today for pcp visit and diabetic foot exam.    Diagnoses and all orders for this visit:    Type II diabetes mellitus with neurological manifestations    Type 2 diabetes mellitus with hyperosmolarity without coma, without long-term current use of insulin  -     Ambulatory referral/consult to Podiatry      I counseled the patient on her conditions, their implications and medical management.    50 y.o. female with diabetic foot risk assessment.    -nails x 10 sharply debrided with nail Nipper.  Tolerated well. Verbal consent was obtained.   -It was discussed the importance of wearing shoes with adequate room in toe box to accommodate toe deformities. Recommended New  Balance/[employer]ics shoe brands with adequate arch supports to alleviate abnormal pressure and improve stability of foot while walking. Avoid flat shoes and barefoot walking as these will exacerbate or worsen symptoms.   -Shoe inspection. General Foot Education. Patient reminded of the importance of good nutrition. Patient instructed on proper foot hygeine. We discussed wearing proper shoe gear, daily foot inspections, never walking without protective shoe gear, caution putting sharp instruments to feet. Discussed general foot care:  Wear comfortable, proper fitting shoes. Wash feet daily. Dry well. After drying, apply moisturizer to feet (no lotion to webspaces). Inspect feet daily for skin breaks, blisters, swelling, or redness. Wear cotton socks (preferably white)  Change socks every day. Do NOT walk barefoot. Do NOT use heating pads or warm/hot water soaks   -The nature of the condition, options for management, as well as potential risks and complications were discussed in detail with patient. Patient was amenable to my recommendations and left my office fully informed and will follow up as instructed or sooner if necessary.    -Patient was advised of signs and symptoms of infection including redness, drainage, purulence, odor, streaking, fever, chills and I advised patient to seek medical attention (ER or urgent care) if these symptoms arise.   -Advised for optimal glucose control and maintenance per primary care physician. Patient was also educated on healthy diet that is naturally rich in nutrients and low in fat and calories.   -f/u 6 months       Note dictated with voice recognition software, please excuse any grammatical errors.

## 2020-05-25 NOTE — LETTER
May 25, 2020      Fatmata Lemus MD  [address]  [address]  [facility] [address]           [facility]  [address], [address]  [facility] [address]  Phone: [phone]  Fax: [phone]          Patient: Bonnie Lino   MR Number: [id number]   YOB: 1970   Date of Visit: 5/25/2020       Dear Dr. Fatmata Lemus:    Thank you for referring Bonnie Lino to me for evaluation. Attached you will find relevant portions of my assessment and plan of care.    If you have questions, please do not hesitate to call me. I look forward to following Bonnie Lino along with you.    Sincerely,    Rosita Hanna, DPBRUCE    Enclosure  CC:  No Recipients    If you would like to receive this communication electronically, please contact [email or web address] or [phone] to request more information on [employer] Link access.    For providers and/or their staff who would like to refer a patient to [facility], please contact us through our one-stop-shop provider referral line, [facility], at [phone].    If you feel you have received this communication in error or would no longer like to receive these types of communications, please e-mail [email or web address]

## 2020-05-26 ENCOUNTER — CLINICAL SUPPORT (OUTPATIENT)
Dept: DIABETES | Facility: CLINIC | Age: 50
End: 2020-05-26
Payer: MEDICAID

## 2020-05-26 VITALS — WEIGHT: 174 LBS | BODY MASS INDEX: 26.46 KG/M2

## 2020-05-26 DIAGNOSIS — E55.9 VITAMIN D DEFICIENCY: Primary | ICD-10-CM

## 2020-05-26 DIAGNOSIS — E11.65 UNCONTROLLED TYPE 2 DIABETES MELLITUS WITH HYPERGLYCEMIA: Chronic | ICD-10-CM

## 2020-05-26 DIAGNOSIS — E11.9 TYPE 2 DIABETES MELLITUS WITHOUT COMPLICATION, WITH LONG-TERM CURRENT USE OF INSULIN: ICD-10-CM

## 2020-05-26 DIAGNOSIS — Z79.4 TYPE 2 DIABETES MELLITUS WITHOUT COMPLICATION, WITH LONG-TERM CURRENT USE OF INSULIN: ICD-10-CM

## 2020-05-26 PROCEDURE — 99999 PR PBB SHADOW E&M-EST. PATIENT-LVL I: CPT | Mod: PBBFAC,,,

## 2020-05-26 PROCEDURE — 99211 OFF/OP EST MAY X REQ PHY/QHP: CPT | Mod: PBBFAC,PO | Performed by: REGISTERED NURSE

## 2020-05-26 PROCEDURE — G0108 DIAB MANAGE TRN  PER INDIV: HCPCS | Mod: PBBFAC,PO | Performed by: REGISTERED NURSE

## 2020-05-26 PROCEDURE — 99999 PR PBB SHADOW E&M-EST. PATIENT-LVL I: ICD-10-PCS | Mod: PBBFAC,,,

## 2020-05-26 RX ORDER — INSULIN DEGLUDEC 200 U/ML
30 INJECTION, SOLUTION SUBCUTANEOUS DAILY
Qty: 9 ML | Refills: 3 | Status: SHIPPED | OUTPATIENT
Start: 2020-05-26 | End: 2020-11-16 | Stop reason: SDUPTHER

## 2020-05-26 RX ORDER — ERGOCALCIFEROL 1.25 MG/1
50000 CAPSULE ORAL
Qty: 12 CAPSULE | Refills: 1 | Status: SHIPPED | OUTPATIENT
Start: 2020-05-26 | End: 2020-11-22

## 2020-05-26 NOTE — PROGRESS NOTES
Telephone - [id number] [phone]    Diabetes Care Specialist Telehealth Visit Note  This visit was conducted using Telehealth/Telephonic Technology. It was in the patient's best interest to receive diabetes self-management education and support services in this format to prevent the exposure to COVID-19. Diabetes Education  Author: Linwood Sultana RN  Date: 5/26/2020  Diabetes Care Management Summary  Diabetes Education Record Assessment/Progress: Post Program/Follow-up  Current Diabetes Risk Level: Moderate   Last A1c:   Lab Results   Component Value Date    HGBA1C 6.9 (H) 05/07/2020     Lat visit with Diabetes Educator: : 05/26/2020  Diabetes Type  Diabetes Type : Type II  Diabetes History  Current Treatment: Oral Medication, Diet, Injectable, Insulin  Health Maintenance was reviewed today with patient. Discussed with patient importance of routine eye exams, foot exams/foot care, blood work (i.e.: A1c, microalbumin, and lipid), dental visits, yearly flu vaccine, and pneumonia vaccine as indicated by PCP. Patient verbalized understanding.     Health Maintenance Topics with due status: Not Due       Topic Last Completion Date    Lipid Panel 05/07/2020    Hemoglobin A1c 05/07/2020    Influenza Vaccine Not Due     Health Maintenance Due   Topic Date Due    Eye Exam  02/04/1978    TETANUS VACCINE  02/04/1986    Low Dose Statin  02/04/1989    Mammogram  03/25/2015    Foot Exam  02/10/2017    Colonoscopy  02/04/2018   Nutrition  Meal Planning: water, 3 meals per day, diet drinks, artificial sweeteners, snacks between meal  What type of sweetener do you use?: Splenda  What type of beverages do you drink?: water, diet soda/tea  Meal Plan 24 Hour Recall - Breakfast: oatmeal, egg, 1/2 cup oj  Meal Plan 24 Hour Recall - Lunch: sandwich with turkey and cheese, 1 cup corn, 1/2 fruit punch, veggies  Meal Plan 24 Hour Recall - Dinner: fish, veggies, corn  Meal Plan 24 Hour Recall - Snack: popcorn  Monitoring   Self  Monitoring : pt has a meter and is checking 2 times a day. pt reports bs in normal range  Blood Glucose Logs: Yes  Do you use a personal continuous glucose monitor?: No  In the last month, how often have you had a low blood sugar reaction?: never  Can you tell when your blood sugar is too high?: no  Exercise   Exercise Type: walking  Intensity: Moderate  Frequency: 3-5 Times per week  Duration: 15 min  Current Diabetes Treatment   Current Treatment: Oral Medication, Diet, Injectable, Insulin  Social History  Primary Support: Self    DDS-2 Score  ( > 3 = SIGNIFICANT DISTRESS): 1     Barriers to Change  Barriers to Change: None  Learning Challenges : None  Readiness to Learn   Readiness to Learn : Acceptance  Cultural Influences  Cultural Influences: None  Diabetes Education Assessment/Progress  Diabetes Disease Process (diabetes disease process and treatment options): Not Covered/Deferred  Nutrition (Incorporating nutritional management into one's lifestyle): Discussion, Instructed, Demonstrates Understanding/Competency (verbalizes/demonstrates), Individual Session  Physical Activity (incorporating physical activity into one's lifestyle): Discussion, Instructed, Demonstrates Understanding/Competency (verbalizes/demonstrates), Individual Session  Medications (states correct name, dose, onset, peak, duration, side effects & timing of meds): Discussion, Instructed, Demonstrates Understanding/Competency(verbalizes/demonstrates), Individual Session  Monitoring (monitoring blood glucose/other parameters & using results): Discussion, Instructed, Demonstrates Understanding/Competency (verbalizes/demonstrates), Individual Session  Acute Complications (preventing, detecting, and treating acute complications): Not Covered/Deferred  Chronic Complications (preventing, detecting, and treating chronic complications): Not Covered/Deferred  Clinical (diabetes, other pertinent medical history, and relevant comorbidities reviewed  during visit): Discussion, Instructed, Demonstrates Understanding/Competency (verbalizes/demonstrates), Individual Session  Cognitive (knowledge of self-management skills, functional health literacy): Discussion, Instructed, Demonstrates Understanding/Competency (verbalizes/demonstrates), Individual Session  Psychosocial (emotional response to diabetes): Discussion, Instructed, Demonstrates Understanding/Competency (verbalizes/demonstrates), Individual Session  Diabetes Distress and Support Systems: Discussion, Instructed, Demonstrates Understanding/Competency (verbalizes/demonstrates), Individual Session  Behavioral (readiness for change, lifestyle practices, self-care behaviors): Discussion, Instructed, Demonstrates Understanding/Competency (verbalizes/demonstrates), Individual Session  Goals  Patient has selected/evaluated goals during today's session: Yes, evaluated  Healthy Eating: % Met  Met Percentage : 75%  Physical Activity: % Met  Met Percentage : 75%  Monitoring: % Met  Met Percentage : 100%  Diabetes Meal Plan  Restrictions: Restricted Carbohydrate  Calories: 1800  Carbohydrate Per Meal: 30-45g  Carbohydrate Per Snack : 15-20g  Pt completed diabetes fu visit via phone. Pt reports that she has gained 14 lbs. Pt reports that she is stressed with family issues. Pt has been walking and trying to eat balanced 24 hour meal recall showed that pt is eating too many carbs at some of her meals. Discussed meal adjustments with pt. Discussed portions with pt. Suggested to pt v8 fusion which woul count as a veggie and a carb. Discussed tropicana 50 which has less carbs for a greater volume of juice. Pt will try these products. Pt has brought her a1c from greated than 14 to 6.9 which is great. She was encouraged to continue to work on what she has been doing.Discussed covid 19 precautions with pt. Pt was advised to call for any problems or questions. Will fu in one month.   Today's Self-Management Care Plan was  developed with the patient's input and is based on barriers identified during today's assessment.    The long and short-term goals in the care plan were written with the patient/caregiver's input. The patient has agreed to work toward these goals to improve her overall diabetes control.      The patient received a copy of today's self-management plan and verbalized understanding of the care plan, goals, and all of today's instructions.      The patient was encouraged to communicate with her physician and care team regarding her condition(s) and treatment.  I provided the patient with my contact information today and encouraged her to contact me via phone or patient portal as needed.     Education Units of Time   Time Spent: 60 min

## 2020-06-08 ENCOUNTER — OFFICE VISIT (OUTPATIENT)
Dept: FAMILY MEDICINE | Facility: HOSPITAL | Age: 50
End: 2020-06-08
Attending: FAMILY MEDICINE
Payer: MEDICAID

## 2020-06-08 VITALS
HEIGHT: 68 IN | SYSTOLIC BLOOD PRESSURE: 158 MMHG | HEART RATE: 91 BPM | BODY MASS INDEX: 24.35 KG/M2 | WEIGHT: 160.69 LBS | DIASTOLIC BLOOD PRESSURE: 87 MMHG

## 2020-06-08 DIAGNOSIS — Z79.4 TYPE 2 DIABETES MELLITUS WITHOUT COMPLICATION, WITH LONG-TERM CURRENT USE OF INSULIN: Primary | ICD-10-CM

## 2020-06-08 DIAGNOSIS — E11.9 TYPE 2 DIABETES MELLITUS WITHOUT COMPLICATION, WITH LONG-TERM CURRENT USE OF INSULIN: Primary | ICD-10-CM

## 2020-06-08 DIAGNOSIS — I10 ESSENTIAL HYPERTENSION: ICD-10-CM

## 2020-06-08 PROCEDURE — 99213 OFFICE O/P EST LOW 20 MIN: CPT | Performed by: STUDENT IN AN ORGANIZED HEALTH CARE EDUCATION/TRAINING PROGRAM

## 2020-06-08 RX ORDER — DUREZOL 0.5 MG/ML
EMULSION OPHTHALMIC
COMMUNITY
Start: 2020-06-04 | End: 2022-04-13

## 2020-06-08 RX ORDER — PANTOPRAZOLE SODIUM 40 MG/1
40 TABLET, DELAYED RELEASE ORAL DAILY
Qty: 30 TABLET | Refills: 0 | Status: SHIPPED | OUTPATIENT
Start: 2020-06-08 | End: 2020-07-15

## 2020-06-08 RX ORDER — LISINOPRIL 20 MG/1
20 TABLET ORAL DAILY
Qty: 90 TABLET | Refills: 1 | Status: SHIPPED | OUTPATIENT
Start: 2020-06-08 | End: 2020-08-03

## 2020-06-08 RX ORDER — ACETAZOLAMIDE 250 MG/1
TABLET ORAL
COMMUNITY
Start: 2020-06-04 | End: 2020-07-15

## 2020-06-08 NOTE — PROGRESS NOTES
Subjective:       Patient ID: Bonnie Lino is a 50 y.o. female.    Chief Complaint: Follow-up    Patient is a 51 yo female pmhx of T2DM and HTN presenting today for follow-up.  Last A1c 6.9.  Continues taking tresiba 30 units qhs and glipizide.  BP is at goal today, currently taking 60mg procardia daily.      Patient on procardia 60mg and lisinopril 2.5 mg daily and BP is elevated at today's visit.  Taking Tresiba 30 units qhs at 7:30 PM.  Morning  this morning.  Ranges from 's in the morning.  She denies hypoglycemic events.    Seen by ophthalmology with concerns of bilateral cataracts and diabetic retinopathy.  Received injections and scheduled for cataract removal.      Review of Systems   Constitutional: Negative for appetite change and fever.   HENT: Negative for congestion and sore throat.    Respiratory: Negative for cough, chest tightness and shortness of breath.    Cardiovascular: Negative for chest pain and palpitations.   Gastrointestinal: Negative for abdominal pain, diarrhea and nausea.   Neurological: Negative for dizziness, weakness and headaches.       Objective:      Vitals:    06/08/20 1344   BP: (!) 158/87   Pulse: 91     Physical Exam   Constitutional: She is oriented to person, place, and time. She appears well-developed and well-nourished. No distress.   HENT:   Head: Normocephalic and atraumatic.   Eyes: Pupils are equal, round, and reactive to light.   Neurological: She is alert and oriented to person, place, and time. No cranial nerve deficit. She exhibits normal muscle tone. Coordination normal.   Skin: She is not diaphoretic.   Nursing note and vitals reviewed.      Assessment:       1. Type 2 diabetes mellitus without complication, with long-term current use of insulin    2. Essential hypertension        Plan:       Type 2 diabetes mellitus without complication, with long-term current use of insulin  - continue current regimen     Essential hypertension  -     pantoprazole  (PROTONIX) 40 MG tablet; Take 1 tablet (40 mg total) by mouth once daily.  Dispense: 30 tablet; Refill: 0  -     lisinopriL (PRINIVIL,ZESTRIL) 20 MG tablet; Take 1 tablet (20 mg total) by mouth once daily.  Dispense: 90 tablet; Refill: 1      Follow up in about 2 months (around 8/8/2020).

## 2020-06-11 ENCOUNTER — CLINICAL SUPPORT (OUTPATIENT)
Dept: DIABETES | Facility: CLINIC | Age: 50
End: 2020-06-11
Payer: MEDICAID

## 2020-06-11 PROCEDURE — 99999 PR PBB SHADOW E&M-EST. PATIENT-LVL I: CPT | Mod: PBBFAC,,,

## 2020-06-11 PROCEDURE — G0108 DIAB MANAGE TRN  PER INDIV: HCPCS | Mod: PBBFAC,PO | Performed by: REGISTERED NURSE

## 2020-06-11 PROCEDURE — 99211 OFF/OP EST MAY X REQ PHY/QHP: CPT | Mod: PBBFAC,PO | Performed by: REGISTERED NURSE

## 2020-06-11 PROCEDURE — 99999 PR PBB SHADOW E&M-EST. PATIENT-LVL I: ICD-10-PCS | Mod: PBBFAC,,,

## 2020-06-13 VITALS — WEIGHT: 160 LBS | BODY MASS INDEX: 24.33 KG/M2

## 2020-06-13 NOTE — PROGRESS NOTES
Diabetes Education  Author: Linwood Sultana RN  Date: 6/13/2020  Diabetes Care Management Summary  Diabetes Education Record Assessment/Progress: Post Program/Follow-up  Current Diabetes Risk Level: Low   Diabetes Type  Diabetes Type : Type II  Diabetes History  Current Treatment: Oral Medication, Diet, Injectable, Exercise, Insulin  Health Maintenance was reviewed today with patient. Discussed with patient importance of routine eye exams, foot exams/foot care, blood work (i.e.: A1c, microalbumin, and lipid), dental visits, yearly flu vaccine, and pneumonia vaccine as indicated by PCP. Patient verbalized understanding.     Health Maintenance Topics with due status: Not Due       Topic Last Completion Date    Lipid Panel 05/07/2020    Hemoglobin A1c 05/07/2020    Influenza Vaccine Not Due     Health Maintenance Due   Topic Date Due    Eye Exam  02/04/1978    TETANUS VACCINE  02/04/1986    Low Dose Statin  02/04/1989    Mammogram  03/25/2015    Foot Exam  02/10/2017       Nutrition  Meal Planning: water, 3 meals per day, diet drinks  What type of sweetener do you use?: Splenda  What type of beverages do you drink?: diet soda/tea, water, juice  Meal Plan 24 Hour Recall - Breakfast: oatmeal, egg, orange juice  Meal Plan 24 Hour Recall - Lunch: chicken breast, cheese, 2 bread, green beans, yellow rice  Meal Plan 24 Hour Recall - Dinner: baked fish, salad, 2 bread, fruit  Monitoring   Self Monitoring : pt has a meter and is checking 2-3 times a day- fasting and was checking 30 mins after meals. Instructed pt to test 2 hours after a meals if she wants to check then, bs this am was 137.  Blood Glucose Logs: No  Do you use a personal continuous glucose monitor?: No  In the last month, how often have you had a low blood sugar reaction?: never  Can you tell when your blood sugar is too high?: no  Exercise   Exercise Type: walking  Intensity: Low  Frequency: 3-5 Times per week  Duration: 15 min  Current Diabetes Treatment    Current Treatment: Oral Medication, Diet, Injectable, Exercise, Insulin  Social History  Primary Support: Self  DDS-2 Score  ( > 3 = SIGNIFICANT DISTRESS): 1        Barriers to Change  Barriers to Change: None  Learning Challenges : None  Readiness to Learn   Readiness to Learn : Acceptance  Diabetes Education Assessment/Progress  Diabetes Disease Process (diabetes disease process and treatment options): Not Covered/Deferred  Nutrition (Incorporating nutritional management into one's lifestyle): Discussion, Instructed, Individual Session, Demonstrates Understanding/Competency (verbalizes/demonstrates)  Physical Activity (incorporating physical activity into one's lifestyle): Discussion, Individual Session  Medications (states correct name, dose, onset, peak, duration, side effects & timing of meds): Discussion, Individual Session  Monitoring (monitoring blood glucose/other parameters & using results): Discussion, Instructed, Individual Session, Demonstrates Understanding/Competency (verbalizes/demonstrates)  Acute Complications (preventing, detecting, and treating acute complications): Not Covered/Deferred  Chronic Complications (preventing, detecting, and treating chronic complications): Not Covered/Deferred  Clinical (diabetes, other pertinent medical history, and relevant comorbidities reviewed during visit): Discussion, Individual Session, Demonstrates Understanding/Competency (verbalizes/demonstrates)  Cognitive (knowledge of self-management skills, functional health literacy): Discussion, Individual Session, Demonstrates Understanding/Competency (verbalizes/demonstrates)  Psychosocial (emotional response to diabetes): Discussion, Individual Session, Demonstrates Understanding/Competency (verbalizes/demonstrates)  Diabetes Distress and Support Systems: Discussion, Individual Session, Demonstrates Understanding/Competency (verbalizes/demonstrates)  Behavioral (readiness for change, lifestyle practices,  self-care behaviors): Discussion, Individual Session, Demonstrates Understanding/Competency (verbalizes/demonstrates)  Goals  Patient has selected/evaluated goals during today's session: Yes, evaluated  Healthy Eating: % Met  Met Percentage : 75%  Physical Activity: % Met  Met Percentage : 75%  Monitoring: % Met  Met Percentage : 75%    Diabetes Care Plan/Intervention  Education Plan/Intervention: Individual Follow-Up DSMT  Diabetes Meal Plan  Restrictions: Restricted Carbohydrate  Calories: 1800  Carbohydrate Per Meal: 30-45g  Carbohydrate Per Snack : 15-20g  Pt completed diabetes fu visit via phone. Pt has been testing her bs's a few times a day. They are still slightly elevated. Pt is walking Pt states that she has had family issues and has gained weight because of this. Pt is now trying to get back on track. Pt did not try the V8 fusion which we discussed last time but plans to do so. Pt has had some issues with bleeding in her retinas and has been getting injections. 24 hour meal recall showed that pt is working on balancing her meals. Pt plans her meals and is putting them together. Pt was encouraged to continue on working with her meals. Discussed covid 19 precautions with pt. Pt had good understanding of adjustments she needs to make and will work on them. Pt was advised to call for any problems or questions. Will fu in one month.  Today's Self-Management Care Plan was developed with the patient's input and is based on barriers identified during today's assessment.    The long and short-term goals in the care plan were written with the patient/caregiver's input. The patient has agreed to work toward these goals to improve her overall diabetes control.      The patient received a copy of today's self-management plan and verbalized understanding of the care plan, goals, and all of today's instructions.      The patient was encouraged to communicate with her physician and care team regarding her condition(s)  and treatment.  I provided the patient with my contact information today and encouraged her to contact me via phone or patient portal as needed.     Education Units of Time   Time Spent: 60 min Telephone - [id number] ([phone]    Diabetes Care Specialist Telehealth Visit Note  This visit was conducted using Telehealth/Telephonic Technology. It was in the patient's best interest to receive diabetes self-management education and support services in this format to prevent the exposure to COVID-19. Telephone - [id number] ([phone])    Diabetes Care Specialist Telehealth Visit Note  This visit was conducted using Telehealth/Telephonic Technology. It was in the patient's best interest to receive diabetes self-management education and support services in this format to prevent the exposure to COVID-19.

## 2020-07-01 ENCOUNTER — TELEPHONE (OUTPATIENT)
Dept: FAMILY MEDICINE | Facility: HOSPITAL | Age: 50
End: 2020-07-01

## 2020-07-01 NOTE — TELEPHONE ENCOUNTER
----- Message from Selma Hooper MA sent at 7/1/2020  2:43 PM CDT -----  Regarding: Medication question nifidepine and lisinopril  Contact: Patient [phone]  Please call patient; has question regarding her two blood pressure medications.  Is not sure if she should be taking both.  Please call to discuss;  thanks.

## 2020-07-06 ENCOUNTER — TELEPHONE (OUTPATIENT)
Dept: FAMILY MEDICINE | Facility: HOSPITAL | Age: 50
End: 2020-07-06

## 2020-07-06 NOTE — TELEPHONE ENCOUNTER
Called and discussed medications with patient. She will resume both medications on 7/8 after her cataract surgery tomorrow.     Dorothea Saha MD  PGY-3  [facility]  07/06/2020          ----- Message from Christie Downs sent at 7/2/2020 10:45 AM CDT -----  Regarding: Medication question nifidepine and lisinopril  Contact: Patient [phone]  Please call patient; has question regarding her two blood pressure medications.  Is not sure if she should be taking both.  Please call to discuss;  thanks.

## 2020-07-15 ENCOUNTER — OFFICE VISIT (OUTPATIENT)
Dept: FAMILY MEDICINE | Facility: HOSPITAL | Age: 50
End: 2020-07-15
Attending: FAMILY MEDICINE
Payer: MEDICAID

## 2020-07-15 VITALS
HEIGHT: 68 IN | HEART RATE: 96 BPM | BODY MASS INDEX: 24.86 KG/M2 | WEIGHT: 164 LBS | DIASTOLIC BLOOD PRESSURE: 98 MMHG | SYSTOLIC BLOOD PRESSURE: 158 MMHG

## 2020-07-15 DIAGNOSIS — E11.9 TYPE 2 DIABETES MELLITUS WITHOUT COMPLICATION, WITHOUT LONG-TERM CURRENT USE OF INSULIN: ICD-10-CM

## 2020-07-15 DIAGNOSIS — E78.5 HYPERLIPIDEMIA ASSOCIATED WITH TYPE 2 DIABETES MELLITUS: ICD-10-CM

## 2020-07-15 DIAGNOSIS — E11.59 HYPERTENSION ASSOCIATED WITH TYPE 2 DIABETES MELLITUS: Primary | ICD-10-CM

## 2020-07-15 DIAGNOSIS — E11.69 HYPERLIPIDEMIA ASSOCIATED WITH TYPE 2 DIABETES MELLITUS: ICD-10-CM

## 2020-07-15 DIAGNOSIS — I15.2 HYPERTENSION ASSOCIATED WITH TYPE 2 DIABETES MELLITUS: Primary | ICD-10-CM

## 2020-07-15 PROCEDURE — 99214 OFFICE O/P EST MOD 30 MIN: CPT | Performed by: STUDENT IN AN ORGANIZED HEALTH CARE EDUCATION/TRAINING PROGRAM

## 2020-07-15 RX ORDER — ATORVASTATIN CALCIUM 40 MG/1
40 TABLET, FILM COATED ORAL DAILY
Qty: 90 TABLET | Refills: 3 | Status: SHIPPED | OUTPATIENT
Start: 2020-07-15 | End: 2020-08-03

## 2020-07-16 NOTE — PROGRESS NOTES
Subjective:       Patient ID: Bonnie Lino is a 50 y.o. female.    Chief Complaint: Diabetes and Hypertension    HPI     HTN: Patient started on lisinopril 20 and procardia 60 at last visit. Asymptomatic, but has only been taking the procardia for 2 weeks due to surgery and medication confusion. She has been taking both medications regularly during those 2 weeks. She has not been able to take her BP at home.     DM2: A1c 6.9. Well controlled. Patient not on a statin and has HLD. Denies s/s of hypoglycemia.       Review of Systems   Constitutional: Negative for activity change, appetite change, fatigue and fever.   HENT: Negative for hearing loss and trouble swallowing.    Eyes: Negative for visual disturbance.   Respiratory: Negative for cough and shortness of breath.    Cardiovascular: Negative for chest pain and leg swelling.   Gastrointestinal: Negative for abdominal pain, constipation, diarrhea, nausea and vomiting.   Genitourinary: Negative for difficulty urinating.   Musculoskeletal: Negative for arthralgias and myalgias.   Neurological: Negative for speech difficulty, numbness and headaches.   Psychiatric/Behavioral: Negative for dysphoric mood and sleep disturbance. The patient is not nervous/anxious.        Objective:      Vitals:    07/15/20 1106   BP: (!) 158/98   Pulse: 96     Body mass index is 24.94 kg/m².      Physical Exam  Vitals signs reviewed.   Constitutional:       General: She is not in acute distress.     Appearance: Normal appearance. She is well-developed. She is not ill-appearing or toxic-appearing.   HENT:      Head: Normocephalic and atraumatic.   Eyes:      Extraocular Movements: Extraocular movements intact.      Conjunctiva/sclera: Conjunctivae normal.   Neck:      Musculoskeletal: Normal range of motion and neck supple.      Thyroid: No thyromegaly.   Cardiovascular:      Rate and Rhythm: Normal rate and regular rhythm.      Pulses: Normal pulses.      Heart sounds: Normal heart  sounds.   Pulmonary:      Effort: Pulmonary effort is normal.      Breath sounds: Normal breath sounds.   Abdominal:      General: Bowel sounds are normal.      Palpations: Abdomen is soft.   Musculoskeletal: Normal range of motion.   Skin:     General: Skin is warm and dry.      Capillary Refill: Capillary refill takes less than 2 seconds.   Neurological:      Mental Status: She is alert and oriented to person, place, and time.   Psychiatric:         Mood and Affect: Mood normal.         Behavior: Behavior normal.         Thought Content: Thought content normal.         Judgment: Judgment normal.         Assessment:       1. Hypertension associated with type 2 diabetes mellitus    2. Hyperlipidemia associated with type 2 diabetes mellitus    3. Type 2 diabetes mellitus without complication, without long-term current use of insulin        Plan:       Hypertension associated with type 2 diabetes mellitus        -     Uncontrolled. Patient only been on new medication for approximately 2 weeks. Will try another 2 week trial and likely increase lisinopril at next visit. Patient continues with lifestyle changes as well.     Hyperlipidemia associated with type 2 diabetes mellitus  -     atorvastatin (LIPITOR) 40 MG tablet; Take 1 tablet (40 mg total) by mouth once daily.  Dispense: 90 tablet; Refill: 3  -     Discussed role of statin in DM2 management. Discussed side effects and patient agreed to start medication.     Type 2 diabetes mellitus without complication, without long-term current use of insulin        -     Controlled. Continue current management.     Follow up in about 2 weeks (around 7/29/2020) for HTN Management.

## 2020-07-29 ENCOUNTER — CLINICAL SUPPORT (OUTPATIENT)
Dept: DIABETES | Facility: CLINIC | Age: 50
End: 2020-07-29
Payer: MEDICAID

## 2020-07-29 VITALS — BODY MASS INDEX: 25.54 KG/M2 | WEIGHT: 168 LBS

## 2020-07-29 DIAGNOSIS — E11.65 UNCONTROLLED TYPE 2 DIABETES MELLITUS WITH HYPERGLYCEMIA: Chronic | ICD-10-CM

## 2020-07-29 PROCEDURE — 99999 PR PBB SHADOW E&M-EST. PATIENT-LVL I: CPT | Mod: PBBFAC,,,

## 2020-07-29 PROCEDURE — G0108 DIAB MANAGE TRN  PER INDIV: HCPCS | Mod: PBBFAC,PO | Performed by: REGISTERED NURSE

## 2020-07-29 PROCEDURE — 99999 PR PBB SHADOW E&M-EST. PATIENT-LVL I: ICD-10-PCS | Mod: PBBFAC,,,

## 2020-07-29 PROCEDURE — 99211 OFF/OP EST MAY X REQ PHY/QHP: CPT | Mod: PBBFAC,PO | Performed by: REGISTERED NURSE

## 2020-08-03 ENCOUNTER — LAB VISIT (OUTPATIENT)
Dept: LAB | Facility: HOSPITAL | Age: 50
End: 2020-08-03
Attending: FAMILY MEDICINE
Payer: MEDICAID

## 2020-08-03 ENCOUNTER — OFFICE VISIT (OUTPATIENT)
Dept: FAMILY MEDICINE | Facility: HOSPITAL | Age: 50
End: 2020-08-03
Attending: FAMILY MEDICINE
Payer: MEDICAID

## 2020-08-03 VITALS
BODY MASS INDEX: 25.96 KG/M2 | HEART RATE: 104 BPM | SYSTOLIC BLOOD PRESSURE: 147 MMHG | DIASTOLIC BLOOD PRESSURE: 80 MMHG | WEIGHT: 165.38 LBS | HEIGHT: 67 IN

## 2020-08-03 DIAGNOSIS — Z79.4 TYPE 2 DIABETES MELLITUS WITHOUT COMPLICATION, WITH LONG-TERM CURRENT USE OF INSULIN: ICD-10-CM

## 2020-08-03 DIAGNOSIS — E11.59 HYPERTENSION ASSOCIATED WITH TYPE 2 DIABETES MELLITUS: Primary | ICD-10-CM

## 2020-08-03 DIAGNOSIS — Z12.31 BREAST CANCER SCREENING BY MAMMOGRAM: ICD-10-CM

## 2020-08-03 DIAGNOSIS — E11.9 TYPE 2 DIABETES MELLITUS WITHOUT COMPLICATION, WITH LONG-TERM CURRENT USE OF INSULIN: ICD-10-CM

## 2020-08-03 DIAGNOSIS — Z12.11 ENCOUNTER FOR SCREENING COLONOSCOPY FOR NON-HIGH-RISK PATIENT: ICD-10-CM

## 2020-08-03 DIAGNOSIS — I15.2 HYPERTENSION ASSOCIATED WITH TYPE 2 DIABETES MELLITUS: Primary | ICD-10-CM

## 2020-08-03 LAB
ALBUMIN SERPL BCP-MCNC: 3.8 G/DL (ref 3.5–5.2)
ALP SERPL-CCNC: 76 U/L (ref 55–135)
ALT SERPL W/O P-5'-P-CCNC: 12 U/L (ref 10–44)
ANION GAP SERPL CALC-SCNC: 8 MMOL/L (ref 8–16)
AST SERPL-CCNC: 12 U/L (ref 10–40)
BASOPHILS # BLD AUTO: 0.03 K/UL (ref 0–0.2)
BASOPHILS NFR BLD: 0.5 % (ref 0–1.9)
BILIRUB SERPL-MCNC: 0.4 MG/DL (ref 0.1–1)
BUN SERPL-MCNC: 54 MG/DL (ref 6–20)
CALCIUM SERPL-MCNC: 9.5 MG/DL (ref 8.7–10.5)
CHLORIDE SERPL-SCNC: 113 MMOL/L (ref 95–110)
CO2 SERPL-SCNC: 20 MMOL/L (ref 23–29)
CREAT SERPL-MCNC: 1.8 MG/DL (ref 0.5–1.4)
DIFFERENTIAL METHOD: ABNORMAL
EOSINOPHIL # BLD AUTO: 0.3 K/UL (ref 0–0.5)
EOSINOPHIL NFR BLD: 4.3 % (ref 0–8)
ERYTHROCYTE [DISTWIDTH] IN BLOOD BY AUTOMATED COUNT: 13.7 % (ref 11.5–14.5)
EST. GFR  (AFRICAN AMERICAN): 37 ML/MIN/1.73 M^2
EST. GFR  (NON AFRICAN AMERICAN): 32 ML/MIN/1.73 M^2
ESTIMATED AVG GLUCOSE: 169 MG/DL (ref 68–131)
GLUCOSE SERPL-MCNC: 103 MG/DL (ref 70–110)
HBA1C MFR BLD HPLC: 7.5 % (ref 4–5.6)
HCT VFR BLD AUTO: 36.6 % (ref 37–48.5)
HGB BLD-MCNC: 11.5 G/DL (ref 12–16)
IMM GRANULOCYTES # BLD AUTO: 0.02 K/UL (ref 0–0.04)
IMM GRANULOCYTES NFR BLD AUTO: 0.3 % (ref 0–0.5)
LYMPHOCYTES # BLD AUTO: 1.7 K/UL (ref 1–4.8)
LYMPHOCYTES NFR BLD: 28 % (ref 18–48)
MCH RBC QN AUTO: 25.4 PG (ref 27–31)
MCHC RBC AUTO-ENTMCNC: 31.4 G/DL (ref 32–36)
MCV RBC AUTO: 81 FL (ref 82–98)
MONOCYTES # BLD AUTO: 0.4 K/UL (ref 0.3–1)
MONOCYTES NFR BLD: 6.3 % (ref 4–15)
NEUTROPHILS # BLD AUTO: 3.8 K/UL (ref 1.8–7.7)
NEUTROPHILS NFR BLD: 60.6 % (ref 38–73)
NRBC BLD-RTO: 0 /100 WBC
PLATELET # BLD AUTO: 213 K/UL (ref 150–350)
PMV BLD AUTO: 11.6 FL (ref 9.2–12.9)
POTASSIUM SERPL-SCNC: 5.3 MMOL/L (ref 3.5–5.1)
PROT SERPL-MCNC: 7.9 G/DL (ref 6–8.4)
RBC # BLD AUTO: 4.52 M/UL (ref 4–5.4)
SODIUM SERPL-SCNC: 141 MMOL/L (ref 136–145)
WBC # BLD AUTO: 6.22 K/UL (ref 3.9–12.7)

## 2020-08-03 PROCEDURE — 80053 COMPREHEN METABOLIC PANEL: CPT

## 2020-08-03 PROCEDURE — 99214 OFFICE O/P EST MOD 30 MIN: CPT | Performed by: STUDENT IN AN ORGANIZED HEALTH CARE EDUCATION/TRAINING PROGRAM

## 2020-08-03 PROCEDURE — 85025 COMPLETE CBC W/AUTO DIFF WBC: CPT

## 2020-08-03 PROCEDURE — 83036 HEMOGLOBIN GLYCOSYLATED A1C: CPT

## 2020-08-03 PROCEDURE — 36415 COLL VENOUS BLD VENIPUNCTURE: CPT

## 2020-08-03 RX ORDER — LISINOPRIL 40 MG/1
40 TABLET ORAL DAILY
Qty: 30 TABLET | Refills: 3 | Status: SHIPPED | OUTPATIENT
Start: 2020-08-03 | End: 2020-12-17 | Stop reason: SDUPTHER

## 2020-08-03 NOTE — PROGRESS NOTES
Subjective                                                                                                                                                                           Chief Complaint: HTN    History of Present Illness  Bonnie Lino is a 50 y.o. female who  has a past medical history of Aneurysm of cardiac wall, congenital, Diabetes type 2, uncontrolled, and Hypertension. The patient presents to clinic for HTN.     HTN: Patient denies headaches or changes in vision. She has been taking it at home and her Bps have been 140s/80s-90s. This is on lisinopril 20 and procardia 60mg.     DM2: Last A1c 6.9, needs repeat. Denies s/s hypoglycemia. No increased urination or thirst. She stopped taking her statin due to muscle aches after a few days. 140 glucose this AM. 90s-100s typically.     Healthcare Maintenance:   Health Maintenance   Topic Date Due    Hepatitis C Screening  1970    Eye Exam  02/04/1978    TETANUS VACCINE  02/04/1986    Low Dose Statin  02/04/1989    Mammogram  03/25/2015    Foot Exam  02/10/2017    Hemoglobin A1c  11/07/2020    Lipid Panel  05/07/2021    Pneumococcal Vaccine (Medium Risk)  Completed       Review of Systems   Constitutional: Negative for activity change, appetite change, fatigue and fever.   HENT: Negative for hearing loss and trouble swallowing.    Eyes: Negative for visual disturbance.   Respiratory: Negative for cough and shortness of breath.    Cardiovascular: Negative for chest pain and leg swelling.   Gastrointestinal: Negative for abdominal pain, constipation, diarrhea, nausea and vomiting.   Genitourinary: Negative for difficulty urinating.   Musculoskeletal: Negative for arthralgias and myalgias.   Neurological: Negative for speech difficulty, numbness and headaches.   Psychiatric/Behavioral: Negative for dysphoric mood and sleep disturbance. The patient is not nervous/anxious.         Past Medical History:   Diagnosis Date    Aneurysm of cardiac  wall, congenital     Diabetes type 2, uncontrolled     Hypertension        Past Surgical History:   Procedure Laterality Date    BREAST SURGERY      cyst removal     PARTIAL HYSTERECTOMY  2002       Family History   Problem Relation Age of Onset    Diabetes Mother     Heart disease Mother     Cancer Mother 40        breast    Diabetes Father     Cancer Maternal Grandmother 82        breast       Review of patient's allergies indicates:  No Known Allergies      Current Outpatient Medications:     ergocalciferol (ERGOCALCIFEROL) 50,000 unit Cap, Take 1 capsule (50,000 Units total) by mouth every 7 days., Disp: 12 capsule, Rfl: 1    fluticasone propionate (FLONASE ALLERGY RELIEF) 50 mcg/actuation nasal spray, 1 spray (50 mcg total) by Each Nostril route daily as needed., Disp: 1 Bottle, Rfl: 0    gabapentin (NEURONTIN) 300 MG capsule, Take 1 capsule (300 mg total) by mouth 3 (three) times daily., Disp: 90 capsule, Rfl: 11    insulin degludec (TRESIBA FLEXTOUCH U-200) 200 unit/mL (3 mL) InPn, Inject 30 Units into the skin once daily. ( must last 60 days ), Disp: 9 mL, Rfl: 3    lancets Misc, 1 application by Misc.(Non-Drug; Combo Route) route 3 (three) times daily., Disp: 100 each, Rfl: 1    NIFEdipine (PROCARDIA-XL) 60 MG (OSM) 24 hr tablet, Take 1 tablet (60 mg total) by mouth once daily., Disp: 30 tablet, Rfl: 11    blood glucose strip-disp meter Kit, 1 application by Misc.(Non-Drug; Combo Route) route 3 (three) times daily., Disp: 1 kit, Rfl: 1    blood-glucose meter (RELION ALL-IN-ONE METER) kit, Use as instructed, Disp: 1 each, Rfl: 0    DUREZOL 0.05 % Drop ophthalmic solution, INSTILL 1 DROP TO SURGERY EYE 4 TIMES DAILY AFTER SURGERY FOR 30 DAYS, Disp: , Rfl:     glipiZIDE (GLUCOTROL) 10 MG tablet, Take 1 tablet (10 mg total) by mouth daily with breakfast., Disp: 90 tablet, Rfl: 3    lisinopriL (PRINIVIL,ZESTRIL) 40 MG tablet, Take 1 tablet (40 mg total) by mouth once daily., Disp: 30 tablet,  "Rfl: 3     Social History     Tobacco Use    Smoking status: Never Smoker    Smokeless tobacco: Never Used   Substance Use Topics    Alcohol use: Yes     Frequency: Monthly or less     Comment: seldom    Drug use: No       The 10-year ASCVD risk score (Rhett HARRY Jr., et al., 2013) is: 18%    Values used to calculate the score:      Age: 50 years      Sex: Female      Is Non- : Yes      Diabetic: Yes      Tobacco smoker: No      Systolic Blood Pressure: 147 mmHg      Is BP treated: Yes      HDL Cholesterol: 59 mg/dL      Total Cholesterol: 264 mg/dL      Objective                                                                                                                                                                             Vitals:    08/03/20 1101   BP: (!) 147/80   Pulse: 104   Weight: 75 kg (165 lb 5.5 oz)   Height: 5' 7" (1.702 m)      Body mass index is 25.9 kg/m².    Physical Exam  Constitutional:       Appearance: She is well-developed.   HENT:      Head: Normocephalic and atraumatic.   Eyes:      Extraocular Movements: Extraocular movements intact.      Conjunctiva/sclera: Conjunctivae normal.   Neck:      Musculoskeletal: Normal range of motion and neck supple.      Thyroid: No thyromegaly.   Cardiovascular:      Rate and Rhythm: Normal rate and regular rhythm.      Pulses: Normal pulses.      Heart sounds: Normal heart sounds.   Pulmonary:      Effort: Pulmonary effort is normal.      Breath sounds: Normal breath sounds.   Abdominal:      General: Bowel sounds are normal.      Palpations: Abdomen is soft.   Musculoskeletal: Normal range of motion.   Skin:     General: Skin is warm and dry.      Capillary Refill: Capillary refill takes less than 2 seconds.   Neurological:      General: No focal deficit present.      Mental Status: She is alert and oriented to person, place, and time.   Psychiatric:         Mood and Affect: Mood normal.         Behavior: Behavior normal.    "      Thought Content: Thought content normal.         Judgment: Judgment normal.            Assessment/Plan                                                                                                                                                                Bonnie Lino is a 50 y.o. female who presents to clinic with:    1. Hypertension associated with type 2 diabetes mellitus    2. Type 2 diabetes mellitus without complication, with long-term current use of insulin    3. Encounter for screening colonoscopy for non-high-risk patient    4. Breast cancer screening by mammogram         Bonnie SOLOMON was seen today for hypertension.    Diagnoses and all orders for this visit:    Hypertension associated with type 2 diabetes mellitus  -     lisinopriL (PRINIVIL,ZESTRIL) 40 MG tablet; Take 1 tablet (40 mg total) by mouth once daily.  -     Increased lisinopril to 40mg as BP not at goal. Discussed dietary changes.     Type 2 diabetes mellitus without complication, with long-term current use of insulin  -     Hemoglobin A1C; Future  -     Comprehensive metabolic panel; Future  -     CBC auto differential; Future  -     Stable, continue current management. Discussed dietary changes.     Encounter for screening colonoscopy for non-high-risk patient  -     Case request GI: COLONOSCOPY  -     Needs routine screening.     Breast Cancer Screening         -      Requested via DIS. No symptoms or lumps that concern patient.       Medication List with Changes/Refills   New Medications    LISINOPRIL (PRINIVIL,ZESTRIL) 40 MG TABLET    Take 1 tablet (40 mg total) by mouth once daily.   Current Medications    BLOOD GLUCOSE STRIP-DISP METER KIT    1 application by Misc.(Non-Drug; Combo Route) route 3 (three) times daily.    BLOOD-GLUCOSE METER (RELION ALL-IN-ONE METER) KIT    Use as instructed    DUREZOL 0.05 % DROP OPHTHALMIC SOLUTION    INSTILL 1 DROP TO SURGERY EYE 4 TIMES DAILY AFTER SURGERY FOR 30 DAYS    ERGOCALCIFEROL  (ERGOCALCIFEROL) 50,000 UNIT CAP    Take 1 capsule (50,000 Units total) by mouth every 7 days.    FLUTICASONE PROPIONATE (FLONASE ALLERGY RELIEF) 50 MCG/ACTUATION NASAL SPRAY    1 spray (50 mcg total) by Each Nostril route daily as needed.    GABAPENTIN (NEURONTIN) 300 MG CAPSULE    Take 1 capsule (300 mg total) by mouth 3 (three) times daily.    GLIPIZIDE (GLUCOTROL) 10 MG TABLET    Take 1 tablet (10 mg total) by mouth daily with breakfast.    INSULIN DEGLUDEC (TRESIBA FLEXTOUCH U-200) 200 UNIT/ML (3 ML) INPN    Inject 30 Units into the skin once daily. ( must last 60 days )    LANCETS MISC    1 application by Misc.(Non-Drug; Combo Route) route 3 (three) times daily.    NIFEDIPINE (PROCARDIA-XL) 60 MG (OSM) 24 HR TABLET    Take 1 tablet (60 mg total) by mouth once daily.   Discontinued Medications    ATORVASTATIN (LIPITOR) 40 MG TABLET    Take 1 tablet (40 mg total) by mouth once daily.    LISINOPRIL (PRINIVIL,ZESTRIL) 20 MG TABLET    Take 1 tablet (20 mg total) by mouth once daily.       Patient counseled about the importance of healthy dietary habits as well as routine physical activity and exercise for better health outcomes.    The patient's diagnosis and medications were discussed. Proper use of medications was dicussed. I also discussed the importance of close follow up to discuss labs, change or modify medications if needed, monitor side effects, and further evaluation of medical problems. I also discussed the importance of cancer screening.     No follow-ups on file. for further workup and reassessment if labs and tests obtained are stable or sooner as needed    Understanding expressed after counseling regarding diagnosis and recommendations.      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 Discussed mitigation strategies for COVID-19. 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 Patient letter for work will be written with these  recommendations.       Dorothea Saha MD  PGY-3  [facility]

## 2020-08-10 ENCOUNTER — TELEPHONE (OUTPATIENT)
Dept: FAMILY MEDICINE | Facility: HOSPITAL | Age: 50
End: 2020-08-10

## 2020-08-10 NOTE — TELEPHONE ENCOUNTER
Called patient advised that a letter was work letter was done 08/05/2020 by Dr. Saha. Advised patient we could fax it to her employer if she could provide a fax number or she could come  a copy or set up a [email or web address] and copy it herself. Patient will call back with fax number.

## 2020-08-10 NOTE — TELEPHONE ENCOUNTER
----- Message from Selma Hooper MA sent at 8/10/2020 11:08 AM CDT -----  Contact: Patient  [phone]  Patient requesting status of her letter to return to work; discussed at 8/3 appointment.  Please advise patient.  Thanks.

## 2020-08-31 ENCOUNTER — TELEPHONE (OUTPATIENT)
Dept: DIABETES | Facility: CLINIC | Age: 50
End: 2020-08-31

## 2020-09-02 ENCOUNTER — OFFICE VISIT (OUTPATIENT)
Dept: FAMILY MEDICINE | Facility: HOSPITAL | Age: 50
End: 2020-09-02
Attending: FAMILY MEDICINE
Payer: MEDICAID

## 2020-09-02 VITALS
SYSTOLIC BLOOD PRESSURE: 160 MMHG | HEIGHT: 65 IN | WEIGHT: 167.56 LBS | BODY MASS INDEX: 27.92 KG/M2 | HEART RATE: 89 BPM | DIASTOLIC BLOOD PRESSURE: 89 MMHG

## 2020-09-02 DIAGNOSIS — E11.59 HYPERTENSION ASSOCIATED WITH TYPE 2 DIABETES MELLITUS: Primary | Chronic | ICD-10-CM

## 2020-09-02 DIAGNOSIS — E11.65 UNCONTROLLED TYPE 2 DIABETES MELLITUS WITH HYPERGLYCEMIA: Chronic | ICD-10-CM

## 2020-09-02 DIAGNOSIS — E87.5 HYPERKALEMIA: ICD-10-CM

## 2020-09-02 DIAGNOSIS — I15.2 HYPERTENSION ASSOCIATED WITH TYPE 2 DIABETES MELLITUS: Primary | Chronic | ICD-10-CM

## 2020-09-02 PROCEDURE — 99214 OFFICE O/P EST MOD 30 MIN: CPT | Performed by: STUDENT IN AN ORGANIZED HEALTH CARE EDUCATION/TRAINING PROGRAM

## 2020-09-02 RX ORDER — GLIPIZIDE 10 MG/1
10 TABLET ORAL
Qty: 90 TABLET | Refills: 3 | Status: SHIPPED | OUTPATIENT
Start: 2020-09-02 | End: 2020-11-11

## 2020-09-02 RX ORDER — NEOMYCIN SULFATE, POLYMYXIN B SULFATE, AND DEXAMETHASONE 3.5; 10000; 1 MG/G; [USP'U]/G; MG/G
OINTMENT OPHTHALMIC
COMMUNITY
Start: 2020-08-11 | End: 2021-04-13

## 2020-09-02 RX ORDER — KETOROLAC TROMETHAMINE 5 MG/ML
SOLUTION OPHTHALMIC
COMMUNITY
Start: 2020-08-29 | End: 2022-04-13

## 2020-09-02 RX ORDER — NIFEDIPINE 90 MG/1
90 TABLET, EXTENDED RELEASE ORAL DAILY
Qty: 90 TABLET | Refills: 3 | Status: SHIPPED | OUTPATIENT
Start: 2020-09-02 | End: 2020-12-14 | Stop reason: SDUPTHER

## 2020-09-03 NOTE — PROGRESS NOTES
Subjective:      Patient ID: Bonnie Lino is a 50 y.o. female.    Chief Complaint: Follow-up (HTN)      HPI   49 yo F with a PMHx as documented below presents to clinic for follow up of HTN, DM, and left knee abnormality.   - HTN: Pt w/ history of HTN, current medications include Procardia-XL 60 mg daily and lisinopril 40 mg daily (lisinopril increased from 20 to 40 mg daily on 8/3/20). Pt reports strict medication adherence at home. Pt reports having BP cuff at home, but she has not taken home BPs over the past month 2/2 being busy with work. Denies headache, vision changes, dizziness, lightheadedness. Pt is very concerned about her elevated BP of 160/89, measured at the start of today's visit.   - DM: Pt w/ history of DM, current medications include glipizide 10 mg daily and Tresiba 30 units subQ qhs. Pt reports strict medication adherence at home. Her fasting BG averages ~140. Denies hypoglycemic events, increased thirst, frequent urination, abdominal pain. N/V. Per clinic visit on 8/3/20, pt did not tolerate statin 2/2 muscle cramps. She continues to work on improving her diet and exercise regimen. Most recent A1c 7.5 on 8/3/20, up from 6.9 on 5/7/20.   - Left knee abnormality: Pt reports small, non-painful bump on the lateral side of her left knee x2-3 months. Denies trauma to the area. The bump has remained constant in size. Denies overlying skin abnormalities. Range of motion is not restricted. Pt states that the bump does not bother her, but that she is curious as to what it might be.    Past Medical History:   Diagnosis Date    Aneurysm of cardiac wall, congenital     Bilateral lower extremity edema     2/2 calcium channel blocker    Diabetes type 2, uncontrolled     HLD (hyperlipidemia)     Hypertension     Vitamin D deficiency        Review of Systems   Constitutional: Negative for chills and fever.   HENT: Negative for congestion, postnasal drip, rhinorrhea, sinus pressure, sinus pain, sneezing  and sore throat.    Respiratory: Negative for cough and shortness of breath.    Cardiovascular: Positive for leg swelling. Negative for chest pain and palpitations.   Gastrointestinal: Negative for abdominal distention, abdominal pain, constipation, diarrhea, nausea and vomiting.   Endocrine: Negative for polydipsia and polyphagia.   Genitourinary: Negative for difficulty urinating, dysuria, frequency, hematuria, menstrual problem, pelvic pain and urgency.   Musculoskeletal: Negative for arthralgias, back pain, myalgias and neck pain.   Skin: Negative for rash and wound.   Allergic/Immunologic: Negative.    Neurological: Negative for dizziness, weakness, light-headedness and headaches.   Hematological: Negative.    Psychiatric/Behavioral: Negative.        Objective:      Vitals:    09/02/20 1623   BP: (!) 160/89   Pulse: 89     Body mass index is 27.88 kg/m².    Physical Exam  Constitutional:       General: She is not in acute distress.     Appearance: Normal appearance. She is well-developed.   HENT:      Head: Normocephalic and atraumatic.      Nose: Nose normal. No congestion or rhinorrhea.      Mouth/Throat:      Mouth: Mucous membranes are moist.      Pharynx: Oropharynx is clear. No oropharyngeal exudate or posterior oropharyngeal erythema.   Eyes:      Extraocular Movements: Extraocular movements intact.      Conjunctiva/sclera: Conjunctivae normal.      Pupils: Pupils are equal, round, and reactive to light.   Neck:      Musculoskeletal: Normal range of motion and neck supple.      Thyroid: No thyromegaly.      Vascular: No JVD.   Cardiovascular:      Rate and Rhythm: Normal rate and regular rhythm.      Pulses: Normal pulses.      Heart sounds: Normal heart sounds.   Pulmonary:      Effort: Pulmonary effort is normal. No respiratory distress.      Breath sounds: Normal breath sounds.   Abdominal:      General: Bowel sounds are normal. There is no distension.      Palpations: Abdomen is soft.       Tenderness: There is no abdominal tenderness.   Musculoskeletal: Normal range of motion.      Right lower leg: Edema (+1 pitting edema) present.      Left lower leg: Edema (+2 pitting edema) present.      Comments: Small, quarter-sized, non-tender, fixed, fluctuant nodule, just lateral to the patella, with no overlying skin abnormalities.    Skin:     General: Skin is warm and dry.      Capillary Refill: Capillary refill takes less than 2 seconds.   Neurological:      Mental Status: She is alert and oriented to person, place, and time.         Assessment:       1. Hypertension associated with type 2 diabetes mellitus    2. Uncontrolled type 2 diabetes mellitus with hyperglycemia    3. Hyperkalemia        Plan:       Hypertension associated with type 2 diabetes mellitus: Continue lisinopril 40 mg daily; Procardia-XL increased from 60 to 90 mg daily. Advised patient to keep BP log and bring to next appointment. BLE edema 2/2 CCB - counseled pt on symptomatic relief via compression stockings and elevating feet. Continue to monitor closely.        -     Basic metabolic panel; Future; Expected date: 09/02/2020  -     NIFEdipine (PROCARDIA-XL) 90 MG (OSM) 24 hr tablet; Take 1 tablet (90 mg total) by mouth once daily.  Dispense: 90 tablet; Refill: 3    Uncontrolled type 2 diabetes mellitus with hyperglycemia: Continue glipizide 10 mg daily. Advised patient to increase Tresiba by one unit each night until AM fasting  (current dose 30 units subQ qhs) and to call/notify clinic of results. Encouraged ASA 81 mg daily (OTC). Per clinic visit on 8/3/20, pt did not tolerate statin 2/2 muscle cramps. Encouraged continued efforts with improving diet and exercise regimen.  -     Hemoglobin A1C; Future; Expected date: 09/02/2020  -     glipiZIDE (GLUCOTROL) 10 MG tablet; Take 1 tablet (10 mg total) by mouth daily with breakfast.  Dispense: 90 tablet; Refill: 3    Hyperkalemia:         -     Basic metabolic panel; Future;  Expected date: 09/02/2020    Left knee abnormality: Cystic in appearence, with consideration given to history and exam findings. Will continue to monitor. No acute intervention needed at this time.    Follow up in about 4 weeks (around 9/30/2020), or if symptoms worsen or fail to improve.      Case discussed with attending physician, Dr. Lino.     Odilia Corrales MD  [facility] PGY-2  09/02/2020

## 2020-09-16 ENCOUNTER — TELEPHONE (OUTPATIENT)
Dept: GASTROENTEROLOGY | Facility: CLINIC | Age: 50
End: 2020-09-16

## 2020-10-05 ENCOUNTER — OFFICE VISIT (OUTPATIENT)
Dept: FAMILY MEDICINE | Facility: HOSPITAL | Age: 50
End: 2020-10-05
Attending: FAMILY MEDICINE
Payer: MEDICAID

## 2020-10-05 DIAGNOSIS — E11.59 HYPERTENSION ASSOCIATED WITH TYPE 2 DIABETES MELLITUS: Primary | ICD-10-CM

## 2020-10-05 DIAGNOSIS — I15.2 HYPERTENSION ASSOCIATED WITH TYPE 2 DIABETES MELLITUS: Primary | ICD-10-CM

## 2020-10-05 PROCEDURE — 99214 OFFICE O/P EST MOD 30 MIN: CPT | Performed by: STUDENT IN AN ORGANIZED HEALTH CARE EDUCATION/TRAINING PROGRAM

## 2020-10-05 RX ORDER — INFLUENZA A VIRUS A/VICTORIA/2454/2019 IVR-207 (H1N1) ANTIGEN (PROPIOLACTONE INACTIVATED), INFLUENZA A VIRUS A/HONG KONG/2671/2019 IVR-208 (H3N2) ANTIGEN (PROPIOLACTONE INACTIVATED), INFLUENZA B VIRUS B/VICTORIA/705/2018 BVR-11 ANTIGEN (PROPIOLACTONE INACTIVATED), INFLUENZA B VIRUS B/PHUKET/3073/2013 BVR-1B ANTIGEN (PROPIOLACTONE INACTIVATED) 15; 15; 15; 15 UG/.5ML; UG/.5ML; UG/.5ML; UG/.5ML
INJECTION, SUSPENSION INTRAMUSCULAR
COMMUNITY
Start: 2020-09-29 | End: 2020-12-17

## 2020-10-06 VITALS
DIASTOLIC BLOOD PRESSURE: 78 MMHG | BODY MASS INDEX: 25.66 KG/M2 | HEART RATE: 99 BPM | HEIGHT: 68 IN | WEIGHT: 169.31 LBS | SYSTOLIC BLOOD PRESSURE: 130 MMHG

## 2020-10-06 NOTE — PROGRESS NOTES
Subjective:       Patient ID: Bonnie Lino is a 50 y.o. female.    Chief Complaint: Hypertension    Patient is a 49yo F with PMHx of HTN and DM2 that presents for HTN management. Patient nifedipine increased to 90mg at last visit. She has tolerated the increase well without headaches or changes in vision.     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 She denies cough, fever, or chills.     Of note, she had her flu shot last week and plans to get her shingrix this week.     Review of Systems   Constitutional: Negative for activity change, appetite change, fatigue and fever.   HENT: Negative for hearing loss and trouble swallowing.    Eyes: Negative for visual disturbance.   Respiratory: Negative for cough and shortness of breath.    Cardiovascular: Negative for chest pain and leg swelling.   Gastrointestinal: Negative for abdominal pain, constipation, diarrhea, nausea and vomiting.   Genitourinary: Negative for difficulty urinating.   Musculoskeletal: Negative for arthralgias and myalgias.   Neurological: Negative for speech difficulty, numbness and headaches.   Psychiatric/Behavioral: Negative for dysphoric mood and sleep disturbance. The patient is not nervous/anxious.        Objective:      Vitals:    10/05/20 1609   BP: 130/78   Pulse: 99     Body mass index is 25.74 kg/m².     Physical Exam  Vitals signs reviewed.   Constitutional:       Appearance: Normal appearance. She is well-developed.   HENT:      Head: Normocephalic and atraumatic.   Cardiovascular:      Rate and Rhythm: Normal rate and regular rhythm.      Pulses: Normal pulses.      Heart sounds: Normal heart sounds.   Pulmonary:      Effort: Pulmonary effort is normal.      Breath sounds: Normal breath sounds.   Abdominal:      General: Bowel sounds are normal.      Palpations: Abdomen is soft.   Musculoskeletal: Normal range of motion.   Skin:     General: Skin is warm and dry.      Capillary Refill:  Capillary refill takes less than 2 seconds.   Neurological:      Mental Status: She is alert and oriented to person, place, and time.   Psychiatric:         Mood and Affect: Mood normal.         Behavior: Behavior normal.         Thought Content: Thought content normal.         Judgment: Judgment normal.         Assessment:       1. Hypertension associated with type 2 diabetes mellitus        Plan:       Hypertension associated with type 2 diabetes mellitus  -Patient at goal. Patient to continue current management. Patient to get repeat labs in November. Patient may benefit form GLP-1 in the future.     Follow up in about 6 weeks (around 11/16/2020) for DM Management.

## 2020-10-21 ENCOUNTER — OFFICE VISIT (OUTPATIENT)
Dept: URGENT CARE | Facility: CLINIC | Age: 50
End: 2020-10-21
Payer: MEDICAID

## 2020-10-21 VITALS
HEART RATE: 99 BPM | DIASTOLIC BLOOD PRESSURE: 89 MMHG | WEIGHT: 169 LBS | RESPIRATION RATE: 20 BRPM | TEMPERATURE: 98 F | HEIGHT: 68 IN | OXYGEN SATURATION: 98 % | SYSTOLIC BLOOD PRESSURE: 144 MMHG | BODY MASS INDEX: 25.61 KG/M2

## 2020-10-21 DIAGNOSIS — J06.9 VIRAL UPPER RESPIRATORY TRACT INFECTION WITH COUGH: Primary | ICD-10-CM

## 2020-10-21 LAB
CTP QC/QA: YES
CTP QC/QA: YES
FLUAV AG NPH QL: NEGATIVE
FLUBV AG NPH QL: NEGATIVE
SARS-COV-2 RDRP RESP QL NAA+PROBE: NEGATIVE

## 2020-10-21 PROCEDURE — 99214 PR OFFICE/OUTPT VISIT, EST, LEVL IV, 30-39 MIN: ICD-10-PCS | Mod: S$GLB,,, | Performed by: PHYSICIAN ASSISTANT

## 2020-10-21 PROCEDURE — 99214 OFFICE O/P EST MOD 30 MIN: CPT | Mod: S$GLB,,, | Performed by: PHYSICIAN ASSISTANT

## 2020-10-21 RX ORDER — ONDANSETRON 4 MG/1
4 TABLET, ORALLY DISINTEGRATING ORAL EVERY 8 HOURS PRN
Qty: 12 TABLET | Refills: 0 | Status: SHIPPED | OUTPATIENT
Start: 2020-10-21 | End: 2021-06-10

## 2020-10-21 RX ORDER — BENZONATATE 200 MG/1
200 CAPSULE ORAL 3 TIMES DAILY PRN
Qty: 30 CAPSULE | Refills: 0 | Status: SHIPPED | OUTPATIENT
Start: 2020-10-21 | End: 2020-10-31

## 2020-10-21 RX ORDER — LEVOCETIRIZINE DIHYDROCHLORIDE 5 MG/1
5 TABLET, FILM COATED ORAL NIGHTLY
Qty: 14 TABLET | Refills: 0 | Status: SHIPPED | OUTPATIENT
Start: 2020-10-21 | End: 2021-02-22

## 2020-10-21 RX ORDER — PROMETHAZINE HYDROCHLORIDE AND DEXTROMETHORPHAN HYDROBROMIDE 6.25; 15 MG/5ML; MG/5ML
5 SYRUP ORAL NIGHTLY PRN
Qty: 118 ML | Refills: 0 | Status: SHIPPED | OUTPATIENT
Start: 2020-10-21 | End: 2020-10-31

## 2020-10-21 NOTE — PROGRESS NOTES
"Subjective:       Patient ID: Bonnie Lino is a 50 y.o. female.    Vitals:  height is 5' 8" (1.727 m) and weight is 76.7 kg (169 lb). Her temperature is 97.9 °F (36.6 °C). Her blood pressure is 144/89 (abnormal) and her pulse is 99. Her respiration is 20 and oxygen saturation is 98%.     Chief Complaint: Cough    Patient presents with cough, sore throat, nausea, fatigue, body aches that began 5 days ago.  She denies vomiting or diarrhea or abdominal pain.  She thinks that the nausea is from the mucus that she is swallowing from postnasal drip and also sputum production.   Patient denies chest pain, shortness of breath, fever.  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     Cough  The current episode started in the past 7 days (last friday). The problem has been gradually worsening. The cough is productive of sputum. Associated symptoms include postnasal drip and a sore throat. Pertinent negatives include no chest pain, chills, ear pain, eye redness, fever, headaches, hemoptysis, myalgias, rash, shortness of breath or wheezing. Nothing aggravates the symptoms. The treatment provided no relief.       Constitution: Positive for fatigue. Negative for chills, sweating, fever and unexpected weight change.   HENT: Positive for congestion, postnasal drip and sore throat. Negative for ear pain, sinus pain, sinus pressure and voice change.    Neck: Negative for neck pain, neck stiffness, painful lymph nodes, neck swelling and degenerative disc disease.   Cardiovascular: Negative for chest pain, leg swelling and sob on exertion.   Eyes: Negative for eye itching, eye pain and eye redness.   Respiratory: Positive for cough and sputum production. Negative for chest tightness, bloody sputum, COPD, shortness of breath, stridor, wheezing and asthma.    Gastrointestinal: Positive for nausea. Negative for abdominal pain, vomiting, diarrhea and dark colored stools. "   Genitourinary: Negative for dysuria.   Musculoskeletal: Negative for pain and muscle ache.   Skin: Negative for rash.   Allergic/Immunologic: Negative for seasonal allergies and asthma.   Neurological: Negative for dizziness and headaches.   Hematologic/Lymphatic: Negative for swollen lymph nodes.       Objective:      Physical Exam   Constitutional: She is oriented to person, place, and time. She appears well-developed. She is cooperative.  Non-toxic appearance. She does not appear ill. No distress.      Comments:Patient sitting comfortably in no acute distress.  Nontoxic appearing.   HENT:   Head: Normocephalic and atraumatic.   Ears:   Right Ear: Hearing, tympanic membrane, external ear and ear canal normal.   Left Ear: Hearing, tympanic membrane, external ear and ear canal normal.   Nose: Mucosal edema present. No rhinorrhea, purulent discharge or nasal deformity. No epistaxis. Right sinus exhibits no maxillary sinus tenderness and no frontal sinus tenderness. Left sinus exhibits no maxillary sinus tenderness and no frontal sinus tenderness.   Mouth/Throat: Uvula is midline and mucous membranes are normal. No trismus in the jaw. Normal dentition. No uvula swelling. Posterior oropharyngeal erythema present. No oropharyngeal exudate, posterior oropharyngeal edema, tonsillar abscesses or cobblestoning. Tonsils are 1+ on the right. Tonsils are 1+ on the left. No tonsillar exudate.   Eyes: Conjunctivae and lids are normal. No scleral icterus.   Neck: Trachea normal, full passive range of motion without pain and phonation normal. Neck supple. No neck rigidity. No edema and no erythema present.   Cardiovascular: Normal rate, regular rhythm, normal heart sounds and normal pulses.      Comments: No lower extremity TTP or edema   Pulmonary/Chest: Effort normal and breath sounds normal. No accessory muscle usage or stridor. No tachypnea and no bradypnea. No respiratory distress. She has no decreased breath sounds. She  has no wheezes. She has no rhonchi. She has no rales.   No cough throughout exam.  No evidence of respiratory distress.  Lungs are clear to auscultation bilaterally.    Comments: No cough throughout exam.  No evidence of respiratory distress.  Lungs are clear to auscultation bilaterally.    Abdominal: Normal appearance.   Musculoskeletal: Normal range of motion.         General: No deformity.      Right lower leg: No edema.      Left lower leg: No edema.   Lymphadenopathy:        Head (right side): No submandibular, no preauricular and no posterior auricular adenopathy present.        Head (left side): No submandibular, no preauricular and no posterior auricular adenopathy present.     She has no cervical adenopathy.   Neurological: She is alert and oriented to person, place, and time. She displays no weakness, no atrophy and no tremor. She exhibits normal muscle tone. Gait and coordination normal. Coordination normal. GCS eye subscore is 4. GCS verbal subscore is 5. GCS motor subscore is 6.   Skin: Skin is warm, dry, intact, not diaphoretic and not pale. Psychiatric: Her speech is normal and behavior is normal. Judgment and thought content normal.   Nursing note and vitals reviewed.          Results for orders placed or performed in visit on 10/21/20   POCT COVID-19 Rapid Screening   Result Value Ref Range    POC Rapid COVID Negative Negative     Acceptable Yes    POCT Influenza A/B   Result Value Ref Range    Rapid Influenza A Ag Negative Negative    Rapid Influenza B Ag Negative Negative     Acceptable Yes        Assessment:       1. Viral upper respiratory tract infection with cough        Plan:         Viral upper respiratory tract infection with cough  -     POCT COVID-19 Rapid Screening  -     POCT Influenza A/B  -     benzonatate (TESSALON) 200 MG capsule; Take 1 capsule (200 mg total) by mouth 3 (three) times daily as needed for Cough.  Dispense: 30 capsule; Refill: 0  -      promethazine-dextromethorphan (PROMETHAZINE-DM) 6.25-15 mg/5 mL Syrp; Take 5 mLs by mouth nightly as needed.  Dispense: 118 mL; Refill: 0  -     ondansetron (ZOFRAN-ODT) 4 MG TbDL; Take 1 tablet (4 mg total) by mouth every 8 (eight) hours as needed.  Dispense: 12 tablet; Refill: 0  -     levocetirizine (XYZAL) 5 MG tablet; Take 1 tablet (5 mg total) by mouth every evening. for 14 days  Dispense: 14 tablet; Refill: 0      Patient Instructions   - Rest.    - Drink plenty of fluids.      - Viral upper respiratory infections typically run their course in 10-14 days.     - Tylenol or Ibuprofen as directed as needed for fever/pain. Avoid tylenol if you have a history of liver disease. Do not take ibuprofen if you have a history of GI bleeding, kidney disease, or if you take blood thinners.     - You can take the prescribed xyzal (levocetirizine) as prescribed.  These are antihistamines that can help with runny nose, nasal congestion, sneezing, and helps to dry up post-nasal drip, which usually causes sore throat and cough.    - You can use Flonase (fluticasone) nasal spray as directed for sinus congestion and postnasal drip. This is a steroid nasal spray that works locally over time to decrease the inflammation in your nose/sinuses and help with allergic symptoms. This is not an quick- relief spray like afrin, but it works well if used daily.  Discontinue if you develop nose bleed  - use nasal saline prior to Flonase.    - Use Ocean Spray Nasal Saline 1-3 puffs each nostril every 2-3 hours then blow out onto tissue. This is to irrigate the nasal passage way to clear the sinus openings. Use until sinus problem resolved.    - you can take plain Mucinex (guaifenesin) 1200 mg twice a day to help loosen mucous    -warm salt water gargles can help with sore throat    - warm tea with honey can help with cough. Honey is a natural cough suppressant.    - Take zofran (ondansetron) 4-8 mg every 8 hours as needed, as prescribed for  nausea.  This dissolves under the tongue    - Take the tessalon (benzonatate) as needed as prescribed for cough   - Only take the promethazine- DM as directed, as needed at night for cough. This medication may cause drowsiness.     - Follow up with your PCP or specialty clinic as directed in the next 1-2 weeks if not improved or as needed.  You can call [phone] to schedule an appointment with the appropriate provider.      - Go to the ER if you develop new or worsening symptoms.     - You must understand that you have received an Urgent Care treatment only and that you may be released before all of your medical problems are known or treated.   - You, the patient, will arrange for follow up care as instructed.   - If your condition worsens or fails to improve we recommend that you receive another evaluation at the ER immediately or contact your PCP to discuss your concerns or return here.         Viral Upper Respiratory Illness (Adult)  You have a viral upper respiratory illness (URI), which is another term for the common cold. This illness is contagious during the first few days. It is spread through the air by coughing and sneezing. It may also be spread by direct contact (touching the sick person and then touching your own eyes, nose, or mouth). Frequent handwashing will decrease risk of spread. Most viral illnesses go away within 7 to 10 days with rest and simple home remedies. Sometimes the illness may last for several weeks. Antibiotics will not kill a virus, and they are generally not prescribed for this condition.    Home care  · If symptoms are severe, rest at home for the first 2 to 3 days. When you resume activity, don't let yourself get too tired.  · Avoid being exposed to cigarette smoke (yours or others).  · You may use acetaminophen or ibuprofen to control pain and fever, unless another medicine was prescribed. (Note: If you have chronic liver or kidney disease, have ever had a stomach ulcer or  gastrointestinal bleeding, or are taking blood-thinning medicines, talk with your healthcare provider before using these medicines.) Aspirin should never be given to anyone under 18 years of age who is ill with a viral infection or fever. It may cause severe liver or brain damage.  · Your appetite may be poor, so a light diet is fine. Avoid dehydration by drinking 6 to 8 glasses of fluids per day (water, soft drinks, juices, tea, or soup). Extra fluids will help loosen secretions in the nose and lungs.  · Over-the-counter cold medicines will not shorten the length of time youre sick, but they may be helpful for the following symptoms: cough, sore throat, and nasal and sinus congestion. (Note: Do not use decongestants if you have high blood pressure.)  Follow-up care  Follow up with your healthcare provider, or as advised.  When to seek medical advice  Call your healthcare provider right away if any of these occur:  · Cough with lots of colored sputum (mucus)  · Severe headache; face, neck, or ear pain  · Difficulty swallowing due to throat pain  · Fever of 100.4°F (38°C)  Call 911, or get immediate medical care  Call emergency services right away if any of these occur:  · Chest pain, shortness of breath, wheezing, or difficulty breathing  · Coughing up blood  · Inability to swallow due to throat pain  Date Last Reviewed: 9/13/2015  © 0919-8295 [employer]. [address] [place] [address]. All rights reserved. This information is not intended as a substitute for professional medical care. Always follow your healthcare professional's instructions.        Viral Syndrome (Adult)  A viral illness may cause a number of symptoms. The symptoms depend on the part of the body that the virus affects. If it settles in your nose, throat, and lungs, it may cause cough, sore throat, congestion, and sometimes headache. If it settles in your stomach and intestinal tract, it may cause vomiting and diarrhea.  "Sometimes it causes vague symptoms like "aching all over," feeling tired, loss of appetite, or fever.  A viral illness usually lasts 1 to 2 weeks, but sometimes it lasts longer. In some cases, a more serious infection can look like a viral syndrome in the first few days of the illness. You may need another exam and additional tests to know the difference. Watch for the warning signs listed below.  Home care  Follow these guidelines for taking care of yourself at home:  · If symptoms are severe, rest at home for the first 2 to 3 days.  · Stay away from cigarette smoke - both your smoke and the smoke from others.  · You may use over-the-counter acetaminophen or ibuprofen for fever, muscle aching, and headache, unless another medicine was prescribed for this. If you have chronic liver or kidney disease or ever had a stomach ulcer or GI bleeding, talk with your doctor before using these medicines. No one who is younger than 18 and ill with a fever should take aspirin. It may cause severe disease or death.  · Your appetite may be poor, so a light diet is fine. Avoid dehydration by drinking 8 to 12 8-ounce glasses of fluids each day. This may include water; orange juice; lemonade; apple, grape, and cranberry juice; clear fruit drinks; electrolyte replacement and sports drinks; and decaffeinated teas and coffee. If you have been diagnosed with a kidney disease, ask your doctor how much and what types of fluids you should drink to prevent dehydration. If you have kidney disease, drinking too much fluid can cause it build up in the your body and be dangerous to your health.  · Over-the-counter remedies won't shorten the length of the illness but may be helpful for cough, sore throat; and nasal and sinus congestion. Don't use decongestants if you have high blood pressure.  Follow-up care  Follow up with your healthcare provider if you do not improve over the next week.  Call 911  Get emergency medical care if any of the " following occur:  · Convulsion  · Feeling weak, dizzy, or like you are going to faint  · Chest pain, shortness of breath, wheezing, or difficulty breathing  When to seek medical advice  Call your healthcare provider right away if any of these occur:  · Cough with lots of colored sputum (mucus) or blood in your sputum  · Chest pain, shortness of breath, wheezing, or difficulty breathing  · Severe headache; face, neck, or ear pain  · Severe, constant pain in the lower right side of your belly (abdominal)  · Continued vomiting (cant keep liquids down)  · Frequent diarrhea (more than 5 times a day); blood (red or black color) or mucus in diarrhea  · Feeling weak, dizzy, or like you are going to faint  · Extreme thirst  · Fever of 100.4°F (38°C) or higher, or as directed by your healthcare provider  Date Last Reviewed: 9/25/2015  © 9691-9943 [employer]. [address] [place] [address], [place] [address]. All rights reserved. This information is not intended as a substitute for professional medical care. Always follow your healthcare professional's instructions.

## 2020-10-21 NOTE — PATIENT INSTRUCTIONS
- Rest.    - Drink plenty of fluids.      - Viral upper respiratory infections typically run their course in 10-14 days.     - Tylenol or Ibuprofen as directed as needed for fever/pain. Avoid tylenol if you have a history of liver disease. Do not take ibuprofen if you have a history of GI bleeding, kidney disease, or if you take blood thinners.     - You can take the prescribed xyzal (levocetirizine) as prescribed.  These are antihistamines that can help with runny nose, nasal congestion, sneezing, and helps to dry up post-nasal drip, which usually causes sore throat and cough.    - You can use Flonase (fluticasone) nasal spray as directed for sinus congestion and postnasal drip. This is a steroid nasal spray that works locally over time to decrease the inflammation in your nose/sinuses and help with allergic symptoms. This is not an quick- relief spray like afrin, but it works well if used daily.  Discontinue if you develop nose bleed  - use nasal saline prior to Flonase.    - Use Ocean Spray Nasal Saline 1-3 puffs each nostril every 2-3 hours then blow out onto tissue. This is to irrigate the nasal passage way to clear the sinus openings. Use until sinus problem resolved.    - you can take plain Mucinex (guaifenesin) 1200 mg twice a day to help loosen mucous    -warm salt water gargles can help with sore throat    - warm tea with honey can help with cough. Honey is a natural cough suppressant.    - Take zofran (ondansetron) 4-8 mg every 8 hours as needed, as prescribed for nausea.  This dissolves under the tongue    - Take the tessalon (benzonatate) as needed as prescribed for cough   - Only take the promethazine- DM as directed, as needed at night for cough. This medication may cause drowsiness.     - Follow up with your PCP or specialty clinic as directed in the next 1-2 weeks if not improved or as needed.  You can call [phone] to schedule an appointment with the appropriate provider.      - Go to the ER  if you develop new or worsening symptoms.     - You must understand that you have received an Urgent Care treatment only and that you may be released before all of your medical problems are known or treated.   - You, the patient, will arrange for follow up care as instructed.   - If your condition worsens or fails to improve we recommend that you receive another evaluation at the ER immediately or contact your PCP to discuss your concerns or return here.         Viral Upper Respiratory Illness (Adult)  You have a viral upper respiratory illness (URI), which is another term for the common cold. This illness is contagious during the first few days. It is spread through the air by coughing and sneezing. It may also be spread by direct contact (touching the sick person and then touching your own eyes, nose, or mouth). Frequent handwashing will decrease risk of spread. Most viral illnesses go away within 7 to 10 days with rest and simple home remedies. Sometimes the illness may last for several weeks. Antibiotics will not kill a virus, and they are generally not prescribed for this condition.    Home care  · If symptoms are severe, rest at home for the first 2 to 3 days. When you resume activity, don't let yourself get too tired.  · Avoid being exposed to cigarette smoke (yours or others).  · You may use acetaminophen or ibuprofen to control pain and fever, unless another medicine was prescribed. (Note: If you have chronic liver or kidney disease, have ever had a stomach ulcer or gastrointestinal bleeding, or are taking blood-thinning medicines, talk with your healthcare provider before using these medicines.) Aspirin should never be given to anyone under 18 years of age who is ill with a viral infection or fever. It may cause severe liver or brain damage.  · Your appetite may be poor, so a light diet is fine. Avoid dehydration by drinking 6 to 8 glasses of fluids per day (water, soft drinks, juices, tea, or soup).  "Extra fluids will help loosen secretions in the nose and lungs.  · Over-the-counter cold medicines will not shorten the length of time youre sick, but they may be helpful for the following symptoms: cough, sore throat, and nasal and sinus congestion. (Note: Do not use decongestants if you have high blood pressure.)  Follow-up care  Follow up with your healthcare provider, or as advised.  When to seek medical advice  Call your healthcare provider right away if any of these occur:  · Cough with lots of colored sputum (mucus)  · Severe headache; face, neck, or ear pain  · Difficulty swallowing due to throat pain  · Fever of 100.4°F (38°C)  Call 911, or get immediate medical care  Call emergency services right away if any of these occur:  · Chest pain, shortness of breath, wheezing, or difficulty breathing  · Coughing up blood  · Inability to swallow due to throat pain  Date Last Reviewed: 9/13/2015  © 4052-7126 [employer]. [address] [place] [address], [place] [address]. All rights reserved. This information is not intended as a substitute for professional medical care. Always follow your healthcare professional's instructions.        Viral Syndrome (Adult)  A viral illness may cause a number of symptoms. The symptoms depend on the part of the body that the virus affects. If it settles in your nose, throat, and lungs, it may cause cough, sore throat, congestion, and sometimes headache. If it settles in your stomach and intestinal tract, it may cause vomiting and diarrhea. Sometimes it causes vague symptoms like "aching all over," feeling tired, loss of appetite, or fever.  A viral illness usually lasts 1 to 2 weeks, but sometimes it lasts longer. In some cases, a more serious infection can look like a viral syndrome in the first few days of the illness. You may need another exam and additional tests to know the difference. Watch for the warning signs listed below.  Home care  Follow these guidelines for " taking care of yourself at home:  · If symptoms are severe, rest at home for the first 2 to 3 days.  · Stay away from cigarette smoke - both your smoke and the smoke from others.  · You may use over-the-counter acetaminophen or ibuprofen for fever, muscle aching, and headache, unless another medicine was prescribed for this. If you have chronic liver or kidney disease or ever had a stomach ulcer or GI bleeding, talk with your doctor before using these medicines. No one who is younger than 18 and ill with a fever should take aspirin. It may cause severe disease or death.  · Your appetite may be poor, so a light diet is fine. Avoid dehydration by drinking 8 to 12 8-ounce glasses of fluids each day. This may include water; orange juice; lemonade; apple, grape, and cranberry juice; clear fruit drinks; electrolyte replacement and sports drinks; and decaffeinated teas and coffee. If you have been diagnosed with a kidney disease, ask your doctor how much and what types of fluids you should drink to prevent dehydration. If you have kidney disease, drinking too much fluid can cause it build up in the your body and be dangerous to your health.  · Over-the-counter remedies won't shorten the length of the illness but may be helpful for cough, sore throat; and nasal and sinus congestion. Don't use decongestants if you have high blood pressure.  Follow-up care  Follow up with your healthcare provider if you do not improve over the next week.  Call 911  Get emergency medical care if any of the following occur:  · Convulsion  · Feeling weak, dizzy, or like you are going to faint  · Chest pain, shortness of breath, wheezing, or difficulty breathing  When to seek medical advice  Call your healthcare provider right away if any of these occur:  · Cough with lots of colored sputum (mucus) or blood in your sputum  · Chest pain, shortness of breath, wheezing, or difficulty breathing  · Severe headache; face, neck, or ear pain  · Severe,  constant pain in the lower right side of your belly (abdominal)  · Continued vomiting (cant keep liquids down)  · Frequent diarrhea (more than 5 times a day); blood (red or black color) or mucus in diarrhea  · Feeling weak, dizzy, or like you are going to faint  · Extreme thirst  · Fever of 100.4°F (38°C) or higher, or as directed by your healthcare provider  Date Last Reviewed: 9/25/2015  © 4720-7724 [employer]. [address] [place] [address]. All rights reserved. This information is not intended as a substitute for professional medical care. Always follow your healthcare professional's instructions.

## 2020-10-21 NOTE — LETTER
October 21, 2020      [facility]  [place] [address]  Phone: [phone]  Fax: [phone]       Patient: Bonnie Lino   YOB: 1970  Date of Visit: 10/21/2020    To Whom It May Concern:    Anuradha Lino  was at [facility] on 10/21/2020. She may return to work/school on 10/26/2020 (or sooner if symptoms improve and she goes 24 hours without fever without fever reducing medication) with no restrictions. If you have any questions or concerns, or if I can be of further assistance, please do not hesitate to contact me.    Sincerely,    Jesus Prabhakar PA-C

## 2020-10-22 ENCOUNTER — TELEPHONE (OUTPATIENT)
Dept: GASTROENTEROLOGY | Facility: CLINIC | Age: 50
End: 2020-10-22

## 2020-10-22 NOTE — TELEPHONE ENCOUNTER
Referral was sent from Dr. Saha to get pt scheduled for a Screening Colonoscopy. Left a message on patients voicemail to call the Clinic back to schedule procedure.

## 2020-11-11 ENCOUNTER — OFFICE VISIT (OUTPATIENT)
Dept: FAMILY MEDICINE | Facility: HOSPITAL | Age: 50
End: 2020-11-11
Attending: SPECIALIST
Payer: MEDICAID

## 2020-11-11 VITALS
DIASTOLIC BLOOD PRESSURE: 88 MMHG | HEIGHT: 67 IN | HEART RATE: 96 BPM | WEIGHT: 171.31 LBS | BODY MASS INDEX: 26.89 KG/M2 | SYSTOLIC BLOOD PRESSURE: 138 MMHG

## 2020-11-11 DIAGNOSIS — I15.2 HYPERTENSION ASSOCIATED WITH TYPE 2 DIABETES MELLITUS: ICD-10-CM

## 2020-11-11 DIAGNOSIS — E11.9 TYPE 2 DIABETES MELLITUS WITHOUT COMPLICATION, WITH LONG-TERM CURRENT USE OF INSULIN: Primary | ICD-10-CM

## 2020-11-11 DIAGNOSIS — Z12.11 ENCOUNTER FOR SCREENING COLONOSCOPY FOR NON-HIGH-RISK PATIENT: ICD-10-CM

## 2020-11-11 DIAGNOSIS — E11.59 HYPERTENSION ASSOCIATED WITH TYPE 2 DIABETES MELLITUS: ICD-10-CM

## 2020-11-11 DIAGNOSIS — Z79.4 TYPE 2 DIABETES MELLITUS WITHOUT COMPLICATION, WITH LONG-TERM CURRENT USE OF INSULIN: Primary | ICD-10-CM

## 2020-11-11 PROCEDURE — 99214 OFFICE O/P EST MOD 30 MIN: CPT | Performed by: STUDENT IN AN ORGANIZED HEALTH CARE EDUCATION/TRAINING PROGRAM

## 2020-11-11 RX ORDER — LIRAGLUTIDE 6 MG/ML
INJECTION SUBCUTANEOUS
Qty: 3 ML | Refills: 3 | Status: SHIPPED | OUTPATIENT
Start: 2020-11-11 | End: 2021-02-22

## 2020-11-11 NOTE — PROGRESS NOTES
Subjective:       Patient ID: Bonnie Lino is a 50 y.o. female.    Chief Complaint: Hypertension and Diabetes    Patient is a 49yo F with PMHx of HTN and DM2 who presents to clinic for HTN and DM2 management.     HTN: Asymptomatic. Tolerating medications.     DM2: On tresiba 30units nightly and glipizide. Fasting BS 100s-200s, but has had 64 and 70 measurements. Asymptomatic with the low glucoses. No tingling/numbness, increased urination, or increased thirst. On ACEi, cannot tolerate statin.     Wants order for colonoscopy    Review of Systems   Constitutional: Negative for activity change, appetite change, fatigue and fever.   HENT: Negative for hearing loss and trouble swallowing.    Eyes: Negative for visual disturbance.   Respiratory: Negative for cough and shortness of breath.    Cardiovascular: Negative for chest pain and leg swelling.   Gastrointestinal: Negative for abdominal pain, constipation, diarrhea, nausea and vomiting.   Endocrine: Negative for polydipsia and polyuria.   Genitourinary: Negative for difficulty urinating.   Musculoskeletal: Negative for arthralgias and myalgias.   Neurological: Negative for speech difficulty, numbness and headaches.   Psychiatric/Behavioral: Negative for dysphoric mood and sleep disturbance. The patient is not nervous/anxious.        Objective:      Vitals:    11/11/20 1600   BP: 138/88   Pulse: 96     Body mass index is 26.83 kg/m².    Physical Exam  Vitals signs reviewed.   Constitutional:       Appearance: Normal appearance. She is well-developed.   HENT:      Head: Normocephalic and atraumatic.   Cardiovascular:      Rate and Rhythm: Normal rate and regular rhythm.      Pulses: Normal pulses.      Heart sounds: Normal heart sounds.   Pulmonary:      Effort: Pulmonary effort is normal.      Breath sounds: Normal breath sounds.   Abdominal:      General: Bowel sounds are normal.      Palpations: Abdomen is soft.   Musculoskeletal: Normal range of motion.   Skin:      General: Skin is warm and dry.      Capillary Refill: Capillary refill takes less than 2 seconds.   Neurological:      Mental Status: She is alert and oriented to person, place, and time.   Psychiatric:         Mood and Affect: Mood normal.         Behavior: Behavior normal.         Thought Content: Thought content normal.         Judgment: Judgment normal.         Assessment:       1. Type 2 diabetes mellitus without complication, with long-term current use of insulin    2. Encounter for screening colonoscopy for non-high-risk patient    3. Hypertension associated with type 2 diabetes mellitus        Plan:       Type 2 diabetes mellitus without complication, with long-term current use of insulin  -     liraglutide 0.6 mg/0.1 mL, 18 mg/3 mL, subq PNIJ (VICTOZA 3-SHAW) 0.6 mg/0.1 mL (18 mg/3 mL) PnIj pen; Inject 0.6 mg into the skin once daily for 7 days, THEN 1.2 mg once daily for 21 days.  Dispense: 3 mL; Refill: 3  -    PA completed, if not approved, may benefit from restarting metformin. Glipizide stopped given hypoglycemic measurements. Patient will continue tresiba and get repeat A1c. She will continue BS measurements.     Encounter for screening colonoscopy for non-high-risk patient  -     Case request GI: COLONOSCOPY    Hypertension associated with type 2 diabetes mellitus         -     Stable, continue current management.    Follow up in about 4 weeks (around 12/9/2020) for DM Management.

## 2020-11-12 ENCOUNTER — TELEPHONE (OUTPATIENT)
Dept: FAMILY MEDICINE | Facility: HOSPITAL | Age: 50
End: 2020-11-12

## 2020-11-12 DIAGNOSIS — L70.9 ACNE, UNSPECIFIED ACNE TYPE: Primary | ICD-10-CM

## 2020-11-12 RX ORDER — INSULIN DEGLUDEC 100 U/ML
30 INJECTION, SOLUTION SUBCUTANEOUS NIGHTLY
COMMUNITY
End: 2021-02-22 | Stop reason: SDUPTHER

## 2020-11-12 NOTE — TELEPHONE ENCOUNTER
Called patient to discuss that victoza PA has failed. Patient will continue with tresiba 30 units at this time, but she will need/benefit from secondary agent. She was taken off of metformin due to worsening kidney function, which her GFR in August was 37, meaning it is improper to restart this medication. Patient has had hypoglycemic episodes with glipizide and tresiba together. Will investigate use of SGLT as this can offer nephropathy protection.     Patient would also like to see a dermatologist for skin break outs. Referral placed.     Dorothea Saha MD  PGY-3  [facility]  11/12/2020

## 2020-11-16 DIAGNOSIS — Z79.4 TYPE 2 DIABETES MELLITUS WITHOUT COMPLICATION, WITH LONG-TERM CURRENT USE OF INSULIN: ICD-10-CM

## 2020-11-16 DIAGNOSIS — E11.9 TYPE 2 DIABETES MELLITUS WITHOUT COMPLICATION, WITH LONG-TERM CURRENT USE OF INSULIN: ICD-10-CM

## 2020-11-16 RX ORDER — INSULIN DEGLUDEC 200 U/ML
30 INJECTION, SOLUTION SUBCUTANEOUS DAILY
Qty: 9 ML | Refills: 3 | Status: CANCELLED | OUTPATIENT
Start: 2020-11-16 | End: 2021-02-14

## 2020-11-16 RX ORDER — LIRAGLUTIDE 6 MG/ML
1.2 INJECTION SUBCUTANEOUS DAILY
Qty: 3 ML | Refills: 3 | OUTPATIENT
Start: 2020-11-16 | End: 2020-11-23

## 2020-11-17 RX ORDER — INSULIN DEGLUDEC 200 U/ML
30 INJECTION, SOLUTION SUBCUTANEOUS DAILY
Qty: 9 ML | Refills: 3 | Status: SHIPPED | OUTPATIENT
Start: 2020-11-17 | End: 2021-04-13

## 2020-11-17 NOTE — TELEPHONE ENCOUNTER
----- Message from Ai Toney sent at 11/17/2020  9:34 AM CST -----  Hey patient would like a refill on her tresiba insulin sent to [facility] please

## 2020-11-25 ENCOUNTER — OFFICE VISIT (OUTPATIENT)
Dept: PODIATRY | Facility: CLINIC | Age: 50
End: 2020-11-25
Payer: MEDICAID

## 2020-11-25 VITALS
HEART RATE: 78 BPM | HEIGHT: 67 IN | RESPIRATION RATE: 16 BRPM | WEIGHT: 167.5 LBS | BODY MASS INDEX: 26.29 KG/M2 | SYSTOLIC BLOOD PRESSURE: 118 MMHG | DIASTOLIC BLOOD PRESSURE: 78 MMHG

## 2020-11-25 DIAGNOSIS — R60.9 EDEMA, UNSPECIFIED TYPE: ICD-10-CM

## 2020-11-25 DIAGNOSIS — E11.49 TYPE II DIABETES MELLITUS WITH NEUROLOGICAL MANIFESTATIONS: Primary | ICD-10-CM

## 2020-11-25 PROCEDURE — 99213 OFFICE O/P EST LOW 20 MIN: CPT | Mod: S$PBB,,, | Performed by: PODIATRIST

## 2020-11-25 PROCEDURE — 99213 PR OFFICE/OUTPT VISIT, EST, LEVL III, 20-29 MIN: ICD-10-PCS | Mod: S$PBB,,, | Performed by: PODIATRIST

## 2020-11-25 PROCEDURE — 99999 PR PBB SHADOW E&M-EST. PATIENT-LVL IV: CPT | Mod: PBBFAC,,, | Performed by: PODIATRIST

## 2020-11-25 PROCEDURE — 99214 OFFICE O/P EST MOD 30 MIN: CPT | Mod: PBBFAC,PN | Performed by: PODIATRIST

## 2020-11-25 PROCEDURE — 99999 PR PBB SHADOW E&M-EST. PATIENT-LVL IV: ICD-10-PCS | Mod: PBBFAC,,, | Performed by: PODIATRIST

## 2020-11-25 NOTE — PROGRESS NOTES
Subjective:      Patient ID: Bonnie Lino is a 50 y.o. female.    Chief Complaint: Diabetic Foot Exam (B/L feet), Foot Swelling (B/L ankles. wearing compression socks), and Dr. Saha 11/11/2020 (PCP visit)    Bonnie SOLOMON is a 50 y.o. female who presents to the clinic for evaluation and treatment of high risk feet. Bonnie SOLOMON has a past medical history of Aneurysm of cardiac wall, congenital, Bilateral lower extremity edema, Diabetes type 2, uncontrolled, HLD (hyperlipidemia), Hypertension, and Vitamin D deficiency.  This patient has documented high risk feet requiring routine maintenance secondary to peripheral neuropathy.  Complains of numbness and tingling bilateral lower extremities.  Also complains of bilateral lower extremity edema.  Patient tells me her A1c is pending next Monday.    PCP: Dorothea Saha MD    Date Last Seen by PCP:  In epic      Hemoglobin A1C   Date Value Ref Range Status   08/03/2020 7.5 (H) 4.0 - 5.6 % Final     Comment:     ADA Screening Guidelines:  5.7-6.4%  Consistent with prediabetes  >or=6.5%  Consistent with diabetes  High levels of fetal hemoglobin interfere with the HbA1C  assay. Heterozygous hemoglobin variants (HbS, HgC, etc)do  not significantly interfere with this assay.   However, presence of multiple variants may affect accuracy.     05/07/2020 6.9 (H) 4.0 - 5.6 % Final     Comment:     ADA Screening Guidelines:  5.7-6.4%  Consistent with prediabetes  >or=6.5%  Consistent with diabetes  High levels of fetal hemoglobin interfere with the HbA1C  assay. Heterozygous hemoglobin variants (HbS, HgC, etc)do  not significantly interfere with this assay.   However, presence of multiple variants may affect accuracy.     01/03/2020 >14.0 (H) 4.0 - 5.6 % Final     Comment:     ADA Screening Guidelines:  5.7-6.4%  Consistent with prediabetes  >or=6.5%  Consistent with diabetes  High levels of fetal hemoglobin interfere with the HbA1C  assay. Heterozygous hemoglobin variants (HbS, HgC,  etc)do  not significantly interfere with this assay.   However, presence of multiple variants may affect accuracy.         Review of Systems   Constitution: Negative for chills, decreased appetite, fever and malaise/fatigue.   HENT: Negative for congestion, ear discharge and sore throat.    Eyes: Negative for discharge and pain.   Cardiovascular: Positive for leg swelling. Negative for chest pain and claudication.   Respiratory: Negative for cough and shortness of breath.    Skin: Positive for color change. Negative for nail changes and rash.   Musculoskeletal: Positive for stiffness. Negative for arthritis, joint pain, joint swelling and muscle weakness.   Gastrointestinal: Negative for bloating, abdominal pain, diarrhea, nausea and vomiting.   Genitourinary: Negative for flank pain and hematuria.   Neurological: Positive for numbness and sensory change. Negative for headaches and weakness.   Psychiatric/Behavioral: Negative for altered mental status.             Past Medical History:   Diagnosis Date    Aneurysm of cardiac wall, congenital     Bilateral lower extremity edema     2/2 calcium channel blocker    Diabetes type 2, uncontrolled     HLD (hyperlipidemia)     Hypertension     Vitamin D deficiency        Past Surgical History:   Procedure Laterality Date    BREAST SURGERY      cyst removal     PARTIAL HYSTERECTOMY  2002       Family History   Problem Relation Age of Onset    Diabetes Mother     Heart disease Mother     Cancer Mother 40        breast    Diabetes Father     Cancer Maternal Grandmother 82        breast       Social History     Socioeconomic History    Marital status: Single     Spouse name: Not on file    Number of children: Not on file    Years of education: Not on file    Highest education level: Not on file   Occupational History     Employer: [employer]   Social Needs    Financial resource strain: Not on file    Food insecurity     Worry: Not on file      Inability: Not on file    Transportation needs     Medical: Not on file     Non-medical: Not on file   Tobacco Use    Smoking status: Never Smoker    Smokeless tobacco: Never Used   Substance and Sexual Activity    Alcohol use: Yes     Frequency: Monthly or less     Comment: seldom    Drug use: No    Sexual activity: Not on file   Lifestyle    Physical activity     Days per week: Not on file     Minutes per session: Not on file    Stress: Not on file   Relationships    Social connections     Talks on phone: Not on file     Gets together: Not on file     Attends [employer] service: Not on file     Active member of club or organization: Not on file     Attends meetings of clubs or organizations: Not on file     Relationship status: Not on file   Other Topics Concern    Not on file   Social History Narrative    Not on file       Current Outpatient Medications   Medication Sig Dispense Refill    AFLURIA QD 2020-21,3YR UP,,PF, 60 mcg (15 mcg x 4)/0.5 mL Syrg ADM 0.5ML IM UTD      blood-glucose meter (RELION ALL-IN-ONE METER) kit Use as instructed 1 each 0    DUREZOL 0.05 % Drop ophthalmic solution INSTILL 1 DROP TO SURGERY EYE 4 TIMES DAILY AFTER SURGERY FOR 30 DAYS      gabapentin (NEURONTIN) 300 MG capsule Take 1 capsule (300 mg total) by mouth 3 (three) times daily. (Patient taking differently: Take 300 mg by mouth 3 (three) times daily. Patient taking 1 tablet in am, 1 tablet at lunch and 2 tablets at bedtime) 90 capsule 11    insulin degludec (TRESIBA FLEXTOUCH U-100) 100 unit/mL (3 mL) InPn Inject 30 Units into the skin every evening.      insulin degludec (TRESIBA FLEXTOUCH U-200) 200 unit/mL (3 mL) InPn Inject 30 Units into the skin once daily. ( must last 60 days ) 9 mL 3    ketorolac 0.5% (ACULAR) 0.5 % Drop       lancets Misc 1 application by Misc.(Non-Drug; Combo Route) route 3 (three) times daily. 100 each 1    liraglutide 0.6 mg/0.1 mL, 18 mg/3 mL, subq PNIJ (VICTOZA 3-SHAW) 0.6 mg/0.1 mL  "(18 mg/3 mL) PnIj pen Inject 0.6 mg into the skin once daily for 7 days, THEN 1.2 mg once daily for 21 days. 3 mL 3    lisinopriL (PRINIVIL,ZESTRIL) 40 MG tablet Take 1 tablet (40 mg total) by mouth once daily. 30 tablet 3    neomycin-polymyxin-dexamethasone (DEXACINE) 3.5 mg/g-10,000 unit/g-0.1 % Oint APPLY 1 4 INCH TO THE LEFT EYE EVERY EVENING      NIFEdipine (PROCARDIA-XL) 90 MG (OSM) 24 hr tablet Take 1 tablet (90 mg total) by mouth once daily. 90 tablet 3    ondansetron (ZOFRAN-ODT) 4 MG TbDL Take 1 tablet (4 mg total) by mouth every 8 (eight) hours as needed. 12 tablet 0    pen needle, diabetic 32 gauge x 1/4" Ndle use everyday with tresiba as directed 100 each 2    blood glucose strip-disp meter Kit 1 application by Misc.(Non-Drug; Combo Route) route 3 (three) times daily. 1 kit 1    levocetirizine (XYZAL) 5 MG tablet Take 1 tablet (5 mg total) by mouth every evening. for 14 days 14 tablet 0     No current facility-administered medications for this visit.        Review of patient's allergies indicates:  No Known Allergies    Vitals:    11/25/20 1017   BP: 118/78   Pulse: 78   Resp: 16   Weight: 76 kg (167 lb 8 oz)   Height: 5' 7" (1.702 m)   PainSc: 0-No pain   PainLoc: Foot       Objective:      Physical Exam  Constitutional:       General: She is not in acute distress.     Appearance: She is well-developed.   HENT:      Nose: Nose normal.   Eyes:      Conjunctiva/sclera: Conjunctivae normal.   Neck:      Musculoskeletal: Normal range of motion.   Pulmonary:      Effort: Pulmonary effort is normal.   Chest:      Chest wall: No tenderness.   Abdominal:      Tenderness: There is no abdominal tenderness.   Neurological:      Mental Status: She is alert and oriented to person, place, and time.   Psychiatric:         Behavior: Behavior normal.         Vascular: Distal DP/PT pulses palpable 2/4. CRT < 3 sec to tips of toes. No vericosities noted to LEs. Hair growth present LE, warm to touch LE, + edema " noted to LE.    Dermatologic: No open lesions, lacerations or wounds. Interdigital spaces clean, dry and intact. No erythema, rubor, calor noted LE    Musculoskeletal: MMT 5/5 in DF/PF/Inv/Ev resistance with no reproduction of pain in any direction. Passive range of motion of ankle and pedal joints is painless. No calf tenderness LE, Compartments soft/compressible.    Neurological: Light touch, proprioception, and sharp/dull sensation are all intact. Protective threshold with the [place]-Wienstein monofilament is intact. Vibratory sensation intact.         Assessment:       Encounter Diagnoses   Name Primary?    Type II diabetes mellitus with neurological manifestations Yes    Edema, unspecified type          Plan:       Bonnie SOLOMON was seen today for diabetic foot exam, foot swelling and dr. goodman 11/11/2020.    Diagnoses and all orders for this visit:    Type II diabetes mellitus with neurological manifestations  -     DIABETIC SHOES FOR HOME USE    Edema, unspecified type      I counseled the patient on her conditions, their implications and medical management.    50 y.o. female with diabetic foot risk assessment.    -Rx diabetic shoe  -recommend continue with compression stocking for bilateral lower extremity edema.     -It was discussed the importance of wearing shoes with adequate room in toe box to accommodate toe deformities. Recommended New Balance/Asics shoe brands with adequate arch supports to alleviate abnormal pressure and improve stability of foot while walking. Avoid flat shoes and barefoot walking as these will exacerbate or worsen symptoms.   -Shoe inspection. General Foot Education. Patient reminded of the importance of good nutrition. Patient instructed on proper foot hygeine. We discussed wearing proper shoe gear, daily foot inspections, never walking without protective shoe gear, caution putting sharp instruments to feet. Discussed general foot care:  Wear comfortable, proper fitting shoes. Wash  feet daily. Dry well. After drying, apply moisturizer to feet (no lotion to webspaces). Inspect feet daily for skin breaks, blisters, swelling, or redness. Wear cotton socks (preferably white)  Change socks every day. Do NOT walk barefoot. Do NOT use heating pads or warm/hot water soaks   -The nature of the condition, options for management, as well as potential risks and complications were discussed in detail with patient. Patient was amenable to my recommendations and left my office fully informed and will follow up as instructed or sooner if necessary.    -Patient was advised of signs and symptoms of infection including redness, drainage, purulence, odor, streaking, fever, chills and I advised patient to seek medical attention (ER or urgent care) if these symptoms arise.   -Advised for optimal glucose control and maintenance per primary care physician. Patient was also educated on healthy diet that is naturally rich in nutrients and low in fat and calories.   -f/u 6 months       Note dictated with voice recognition software, please excuse any grammatical errors.

## 2020-11-30 ENCOUNTER — TELEPHONE (OUTPATIENT)
Dept: FAMILY MEDICINE | Facility: HOSPITAL | Age: 50
End: 2020-11-30

## 2020-11-30 NOTE — TELEPHONE ENCOUNTER
----- Message from Lillie Landin sent at 11/27/2020  2:40 PM CST -----  Contact: [phone]  Pt calling to schedule her colonoscopy. The pt stated that she would like to schedule her appointment around 12/23-1/4. Please call the pt regarding the scheduling of her appointment.

## 2020-12-04 NOTE — TELEPHONE ENCOUNTER
Left message for patient to call GI nurse back about scheduling colonoscopy.     Dorothea Saha MD  PGY-3  [facility]  12/04/2020

## 2020-12-10 ENCOUNTER — TELEPHONE (OUTPATIENT)
Dept: GASTROENTEROLOGY | Facility: CLINIC | Age: 50
End: 2020-12-10

## 2020-12-10 ENCOUNTER — LAB VISIT (OUTPATIENT)
Dept: LAB | Facility: HOSPITAL | Age: 50
End: 2020-12-10
Attending: STUDENT IN AN ORGANIZED HEALTH CARE EDUCATION/TRAINING PROGRAM
Payer: MEDICAID

## 2020-12-10 DIAGNOSIS — Z01.818 PREOP EXAMINATION: Primary | ICD-10-CM

## 2020-12-10 DIAGNOSIS — E11.65 UNCONTROLLED TYPE 2 DIABETES MELLITUS WITH HYPERGLYCEMIA: Chronic | ICD-10-CM

## 2020-12-10 DIAGNOSIS — E11.59 HYPERTENSION ASSOCIATED WITH TYPE 2 DIABETES MELLITUS: Chronic | ICD-10-CM

## 2020-12-10 DIAGNOSIS — E87.5 HYPERKALEMIA: ICD-10-CM

## 2020-12-10 DIAGNOSIS — I15.2 HYPERTENSION ASSOCIATED WITH TYPE 2 DIABETES MELLITUS: Chronic | ICD-10-CM

## 2020-12-10 DIAGNOSIS — Z12.11 SCREEN FOR COLON CANCER: ICD-10-CM

## 2020-12-10 LAB
ANION GAP SERPL CALC-SCNC: 11 MMOL/L (ref 8–16)
BUN SERPL-MCNC: 34 MG/DL (ref 6–20)
CALCIUM SERPL-MCNC: 9.3 MG/DL (ref 8.7–10.5)
CHLORIDE SERPL-SCNC: 109 MMOL/L (ref 95–110)
CO2 SERPL-SCNC: 22 MMOL/L (ref 23–29)
CREAT SERPL-MCNC: 1.7 MG/DL (ref 0.5–1.4)
EST. GFR  (AFRICAN AMERICAN): 39 ML/MIN/1.73 M^2
EST. GFR  (NON AFRICAN AMERICAN): 34 ML/MIN/1.73 M^2
ESTIMATED AVG GLUCOSE: 134 MG/DL (ref 68–131)
GLUCOSE SERPL-MCNC: 99 MG/DL (ref 70–110)
HBA1C MFR BLD HPLC: 6.3 % (ref 4–5.6)
POTASSIUM SERPL-SCNC: 5.4 MMOL/L (ref 3.5–5.1)
SODIUM SERPL-SCNC: 142 MMOL/L (ref 136–145)

## 2020-12-10 PROCEDURE — 80048 BASIC METABOLIC PNL TOTAL CA: CPT

## 2020-12-10 PROCEDURE — 36415 COLL VENOUS BLD VENIPUNCTURE: CPT

## 2020-12-10 PROCEDURE — 83036 HEMOGLOBIN GLYCOSYLATED A1C: CPT

## 2020-12-10 NOTE — TELEPHONE ENCOUNTER
Referring Physician: Dr. Dorothea Saha                             Date: 12/10/20    Reason for Referral: Screening colonopscopy      Family History of:   Colon polyp: No  Relationship/Age of Onset:       Colon cancer: No  Relationship/Age of Onset:       Patient with:   Hemoccults Done:       Iron deficient:   No      On Blood Thinner: No      Valvular heart disease/valve replacement: No      Anemia Present: No      On NSAID: No      Lung disease: No      Kidney disease: No      Hx of polyps:       Hx of colon cancer:       Previous colon evalations: First colonoscopy  When:   Where:   Pertinent symptoms:           Review of patient's allergies indicates: NKDA        Patient was scheduled for colonoscopy on  1/22/2021      with Dr. Fitzgerald at [facility].       instructions were reviewed with patient.        Prep sent to [facility] in [place]        SUPREP Instructions    You are scheduled for a colonoscopy with Dr. Fitzgerald on 1/22/2021 at [facility].  To ensure that your test is accurate and complete, you MUST follow these instructions listed below.  If you have any questions, please call our office at [phone].  Plan on being at the hospital for your procedure for 3-4 hours.    1.  Follow a CLEAR LIQUID DIET for the entire day before your scheduled colonoscopy.  This means no solid food the entire day starting when you wake.  You may have as much of the clear liquids as you want throughout the day.   CLEAR LIQUID DIET:   - Avoid Red, Orange, Purple, and/or Blue food coloring   - NO DAIRY   - You can have:  Coffee with sugar (no creamer), tea, water, soda, apple or white grape juice, chicken or beef broth/bouillon (no meat, noodles, or veggies), green/yellow popsicles, green/yellow Jell-O, lemonade.    2.  AT 5 pm the evening before your colonoscopy, POUR ONE (1) BOTTLE OF SUPREP INTO THE MIXING CONTAINER, PROVIDED INSIDE THE BOX.  ADD WATER TO THE LINE ON THE CONTAINER AND MIX IT WELL.   DRINK THE ENTIRE CONTAINER AND THEN DRINK TWO (2) MORE CONTAINERS OF WATER OVER THE NEXT 1 HOUR.  This is sometimes easier to drink if this solution is cold, so you can mix the solution 20 minutes ahead of time and place in the refrigerator prior to drinking.  You have to drink the solution within 30-45 minutes of mixing it.  Do NOT put this solution over ice.  It IS ok to drink with a straw.    3.  The endoscopy department will call you 1 day before your colonoscopy to tell you the exact time to arrive, AND to tell you the exact time to drink the 2nd portion of your prep (which will be FIVE HOURS BEFORE YOUR ARRIVAL TIME).  At this time given to you, POUR ONE (1) BOTTLE OF SUPREP INTO THE MIXING CONTAINER, PROVIDED INSIDE THE BOX.  ADD WATER TO THE LINE ON THE CONTAINER AND MIX IT WELL.  DRINK THE ENTIRE CONTAINER AND THEN DRINK TWO (2) MORE CONTAINERS OF WATER OVER THE NEXT 1 HOUR.  This is sometimes easier to drink if this solution is cold, so you can mix the solution 20 minutes ahead of time and place in the refrigerator prior to drinking.  You have to drink the solution within 30-45 minutes of mixing it.  Do NOT put this solution over ice.  It IS ok to drink with a straw.  Once this is complete, you may not have ANYTHING else by mouth!    4.  You must have someone with you to DRIVE YOU HOME since you will be receiving IV sedation for the colonoscopy.    5.  It is ok to take MOST of your REGULAR MEDICATIONS  in the morning of your test with a SIP of water.  THE ONLY MEDS YOU NEED TO HOLD ARE YOUR DIABETES MEDICATIONS,  SOME BLOOD PRESSURE MEDS, AND BLOOD THINNERS IF OK'D BY YOUR DOCTOR.  Do NOT have anything else to eat or drink the morning of your colonoscopy.  It is ok to brush your teeth.    6.  If you are on blood thinners THAT YOU HAVE BEEN INSTRUCTED TO HOLD BY YOUR DOCTOR FOR THIS PROCEDURE, then do NOT take this the morning of your colonoscopy.  Do NOT stop these medications on your own, they must be  approved to be held by your doctor.  Your colonoscopy can NOT be done if you are on these medications.  Examples of blood thinners include: Coumadin, Aggrenox, Plavix, Pradaxa, Reapro, Pletal, Xarelto, Ticagrelor, Brilinta, Eliquis, and high dose aspirin (325 mg).  You do not have to stop baby aspirin 81 mg.    7.  IF YOU ARE DIABETIC:  NO INSULIN OR ORAL MEDICATIONS THE MORNING OF THE COLONOSCOPY.  TAKE ONLY HALF THE DOSE OF YOUR INSULIN THE DAY BEFORE THE COLONOSCOPY.  DO NOT TAKE ANY ORAL DIABETIC MEDICATIONS THE DAY BEFORE THE COLONOSCOPY.  IF YOU ARE AN INSULIN DEPENDENT DIABETIC WITH UNSTABLE BLOOD SUGARS, NOTIFY YOUR PRIMARY CARE PHYSICIAN FOR INSTRUCTIONS.

## 2020-12-10 NOTE — LETTER
December 10, 2020    Bonnie Lino  [address]  [facility] [address]             [facility] - [address]   [address], [facility]   [facility] [address]  Phone: [phone]  Fax: [phone] Dear Ms. Lino:    We have attempted to contact you to schedule a screening colonoscopy that was ordered by your doctor. Please contact the office to schedule at [phone].      If you have any questions or concerns, please don't hesitate to call.    Sincerely,        Sapna Fitzgerald MD

## 2020-12-10 NOTE — TELEPHONE ENCOUNTER
----- Message from Maegan Milligan sent at 12/10/2020  3:25 PM CST -----  Regarding: pt retuirning call  Contact: pt  Pt was returning call     Pt can be reached at [phone]

## 2020-12-11 RX ORDER — SODIUM, POTASSIUM,MAG SULFATES 17.5-3.13G
1 SOLUTION, RECONSTITUTED, ORAL ORAL DAILY
Qty: 1 KIT | Refills: 0 | Status: SHIPPED | OUTPATIENT
Start: 2020-12-11 | End: 2020-12-13

## 2020-12-14 DIAGNOSIS — E11.59 HYPERTENSION ASSOCIATED WITH TYPE 2 DIABETES MELLITUS: Chronic | ICD-10-CM

## 2020-12-14 DIAGNOSIS — I15.2 HYPERTENSION ASSOCIATED WITH TYPE 2 DIABETES MELLITUS: Chronic | ICD-10-CM

## 2020-12-14 RX ORDER — GABAPENTIN 300 MG/1
300 CAPSULE ORAL 3 TIMES DAILY
Qty: 90 CAPSULE | Refills: 11 | Status: SHIPPED | OUTPATIENT
Start: 2020-12-14 | End: 2021-06-10 | Stop reason: SDUPTHER

## 2020-12-14 RX ORDER — NIFEDIPINE 90 MG/1
90 TABLET, EXTENDED RELEASE ORAL DAILY
Qty: 90 TABLET | Refills: 3 | Status: SHIPPED | OUTPATIENT
Start: 2020-12-14 | End: 2021-06-10 | Stop reason: SDUPTHER

## 2020-12-14 NOTE — TELEPHONE ENCOUNTER
----- Message from Christie Downs sent at 12/14/2020  1:10 PM CST -----  Pt needs a refill on gabapentin (NEURONTIN) 300 MG capsule, NIFEdipine (PROCARDIA-XL) 90 MG (OSM) 24 hr tablet. Please send to [id number] - [facility], [place] - [id number] AT [facility] [address]

## 2020-12-17 ENCOUNTER — OFFICE VISIT (OUTPATIENT)
Dept: FAMILY MEDICINE | Facility: HOSPITAL | Age: 50
End: 2020-12-17
Attending: FAMILY MEDICINE
Payer: MEDICAID

## 2020-12-17 ENCOUNTER — LAB VISIT (OUTPATIENT)
Dept: LAB | Facility: HOSPITAL | Age: 50
End: 2020-12-17
Attending: FAMILY MEDICINE
Payer: MEDICAID

## 2020-12-17 VITALS
SYSTOLIC BLOOD PRESSURE: 153 MMHG | HEART RATE: 90 BPM | DIASTOLIC BLOOD PRESSURE: 87 MMHG | WEIGHT: 171.06 LBS | BODY MASS INDEX: 26.85 KG/M2 | HEIGHT: 67 IN

## 2020-12-17 DIAGNOSIS — I12.9 BENIGN HYPERTENSIVE KIDNEY DISEASE WITH CHRONIC KIDNEY DISEASE STAGE I THROUGH STAGE IV, OR UNSPECIFIED: ICD-10-CM

## 2020-12-17 DIAGNOSIS — Z11.59 NEED FOR HEPATITIS C SCREENING TEST: ICD-10-CM

## 2020-12-17 DIAGNOSIS — Z11.4 ENCOUNTER FOR SCREENING FOR HIV: ICD-10-CM

## 2020-12-17 DIAGNOSIS — E11.59 HYPERTENSION ASSOCIATED WITH TYPE 2 DIABETES MELLITUS: ICD-10-CM

## 2020-12-17 DIAGNOSIS — N95.1 VAGINAL DRYNESS, MENOPAUSAL: ICD-10-CM

## 2020-12-17 DIAGNOSIS — E11.22 CONTROLLED TYPE 2 DIABETES MELLITUS WITH STAGE 3 CHRONIC KIDNEY DISEASE, WITH LONG-TERM CURRENT USE OF INSULIN: ICD-10-CM

## 2020-12-17 DIAGNOSIS — Z79.4 CONTROLLED TYPE 2 DIABETES MELLITUS WITH STAGE 3 CHRONIC KIDNEY DISEASE, WITH LONG-TERM CURRENT USE OF INSULIN: Primary | ICD-10-CM

## 2020-12-17 DIAGNOSIS — N18.30 CONTROLLED TYPE 2 DIABETES MELLITUS WITH STAGE 3 CHRONIC KIDNEY DISEASE, WITH LONG-TERM CURRENT USE OF INSULIN: Primary | ICD-10-CM

## 2020-12-17 DIAGNOSIS — Z79.4 CONTROLLED TYPE 2 DIABETES MELLITUS WITH STAGE 3 CHRONIC KIDNEY DISEASE, WITH LONG-TERM CURRENT USE OF INSULIN: ICD-10-CM

## 2020-12-17 DIAGNOSIS — N18.30 CONTROLLED TYPE 2 DIABETES MELLITUS WITH STAGE 3 CHRONIC KIDNEY DISEASE, WITH LONG-TERM CURRENT USE OF INSULIN: ICD-10-CM

## 2020-12-17 DIAGNOSIS — I15.2 HYPERTENSION ASSOCIATED WITH TYPE 2 DIABETES MELLITUS: ICD-10-CM

## 2020-12-17 DIAGNOSIS — E11.22 CONTROLLED TYPE 2 DIABETES MELLITUS WITH STAGE 3 CHRONIC KIDNEY DISEASE, WITH LONG-TERM CURRENT USE OF INSULIN: Primary | ICD-10-CM

## 2020-12-17 LAB
25(OH)D3+25(OH)D2 SERPL-MCNC: 32 NG/ML (ref 30–96)
PTH-INTACT SERPL-MCNC: 101.2 PG/ML (ref 9–77)

## 2020-12-17 PROCEDURE — 86803 HEPATITIS C AB TEST: CPT

## 2020-12-17 PROCEDURE — 82306 VITAMIN D 25 HYDROXY: CPT

## 2020-12-17 PROCEDURE — 36415 COLL VENOUS BLD VENIPUNCTURE: CPT

## 2020-12-17 PROCEDURE — 83970 ASSAY OF PARATHORMONE: CPT

## 2020-12-17 PROCEDURE — 99214 OFFICE O/P EST MOD 30 MIN: CPT | Performed by: STUDENT IN AN ORGANIZED HEALTH CARE EDUCATION/TRAINING PROGRAM

## 2020-12-17 PROCEDURE — 86703 HIV-1/HIV-2 1 RESULT ANTBDY: CPT

## 2020-12-17 RX ORDER — GLIPIZIDE 10 MG/1
10 TABLET ORAL DAILY
COMMUNITY
Start: 2020-11-29 | End: 2020-12-17

## 2020-12-17 RX ORDER — LISINOPRIL 40 MG/1
40 TABLET ORAL DAILY
Qty: 30 TABLET | Refills: 3 | Status: SHIPPED | OUTPATIENT
Start: 2020-12-17 | End: 2021-05-06 | Stop reason: SDUPTHER

## 2020-12-17 RX ORDER — NIFEDIPINE 60 MG/1
60 TABLET, EXTENDED RELEASE ORAL DAILY
COMMUNITY
Start: 2020-12-06 | End: 2020-12-17

## 2020-12-17 RX ORDER — PEN NEEDLE, DIABETIC 29 G X1/2"
1 NEEDLE, DISPOSABLE MISCELLANEOUS DAILY
Qty: 100 EACH | Refills: 3 | Status: SHIPPED | OUTPATIENT
Start: 2020-12-17 | End: 2022-02-04 | Stop reason: SDUPTHER

## 2020-12-17 RX ORDER — HYDROCHLOROTHIAZIDE 12.5 MG/1
12.5 TABLET ORAL DAILY
Qty: 30 TABLET | Refills: 11 | Status: SHIPPED | OUTPATIENT
Start: 2020-12-17 | End: 2021-01-14

## 2020-12-17 RX ORDER — CANAGLIFLOZIN 100 MG/1
100 TABLET, FILM COATED ORAL DAILY
Qty: 30 TABLET | Refills: 3 | Status: SHIPPED | OUTPATIENT
Start: 2020-12-17 | End: 2021-05-06 | Stop reason: SDUPTHER

## 2020-12-18 LAB
HCV AB SERPL QL IA: NEGATIVE
HIV 1+2 AB+HIV1 P24 AG SERPL QL IA: NEGATIVE

## 2020-12-18 NOTE — PROGRESS NOTES
Subjective:       Patient ID: Bonnie Lino is a 50 y.o. female.    Chief Complaint: Results and Medication Refill    Patient is a 49yo F with PMHx of DM2 and CKD that presents for CKD3 Management.     DM2: She has been having difficulty with her insulin pens. Last A1c 6.3. She has stopped glipizide. She was not eligible for victoza and needs SGLT due to underlying kidney dysfunction. Denies s/s hypoglycemia.     HTN: Needs refill of lisinopril. Patient with hyperkalemia on labs and mildly uncontrolled BP. Would benefit from addition of HCTZ.     CKD3: Patient with worsening creatine and hyperkalemia. Needs to establish with Nephrology. Denies changes in urination.     Vaginal Dryness: Decrease lubrication when aroused. She has normal libido. No bleeding with intercourse or discomfort. Sexually active with male, uses condoms.     Review of Systems   Constitutional: Negative for activity change, appetite change, fatigue and fever.   HENT: Negative for hearing loss and trouble swallowing.    Eyes: Negative for visual disturbance.   Respiratory: Negative for cough and shortness of breath.    Cardiovascular: Negative for chest pain and leg swelling.   Gastrointestinal: Negative for abdominal pain, constipation, diarrhea, nausea and vomiting.   Endocrine: Negative for polydipsia and polyuria.   Genitourinary: Negative for difficulty urinating.        Vaginal dryness   Musculoskeletal: Negative for arthralgias and myalgias.   Neurological: Negative for speech difficulty, numbness and headaches.   Psychiatric/Behavioral: Negative for dysphoric mood and sleep disturbance. The patient is not nervous/anxious.        Objective:      Vitals:    12/17/20 1442   BP: (!) 153/87   Pulse: 90     Body mass index is 26.79 kg/m².    Physical Exam  Vitals signs reviewed.   Constitutional:       Appearance: Normal appearance. She is well-developed.   Cardiovascular:      Rate and Rhythm: Normal rate and regular rhythm.      Pulses:  "Normal pulses.      Heart sounds: Normal heart sounds.   Pulmonary:      Effort: Pulmonary effort is normal.      Breath sounds: Normal breath sounds.   Genitourinary:     Comments: Deferred  Musculoskeletal: Normal range of motion.   Skin:     General: Skin is warm and dry.      Capillary Refill: Capillary refill takes less than 2 seconds.   Neurological:      Mental Status: She is alert and oriented to person, place, and time.   Psychiatric:         Mood and Affect: Mood normal.         Behavior: Behavior normal.         Thought Content: Thought content normal.         Judgment: Judgment normal.         Assessment:       1. Controlled type 2 diabetes mellitus with stage 3 chronic kidney disease, with long-term current use of insulin    2. Benign hypertensive kidney disease with chronic kidney disease stage I through stage IV, or unspecified    3. Need for hepatitis C screening test    4. Encounter for screening for HIV    5. Hypertension associated with type 2 diabetes mellitus    6. Vaginal dryness, menopausal        Plan:       Controlled type 2 diabetes mellitus with stage 3 chronic kidney disease, with long-term current use of insulin  -     Ambulatory referral/consult to Nephrology; Future; Expected date: 12/24/2020  -     PTH, intact; Future; Expected date: 12/17/2020  -     Vitamin D; Future; Expected date: 12/17/2020  -     Microalbumin/creatinine urine ratio; Future; Expected date: 12/17/2020  -     pen needle, diabetic 31 gauge x 1/4" Ndle; 1 each by Misc.(Non-Drug; Combo Route) route once daily.  Dispense: 100 each; Refill: 3  -     canagliflozin (INVOKANA) 100 mg Tab tablet; Take 1 tablet (100 mg total) by mouth once daily.  Dispense: 30 tablet; Refill: 3  -     Patient well-controlled per A1c. Ordered new pen needles as insulin pens appear to be working in clinic. Patient to start invokana. Labs ordered. Continue current insulin dose.     Benign hypertensive kidney disease with chronic kidney disease " stage I through stage IV, or unspecified  Hypertension associated with type 2 diabetes mellitus        -     lisinopriL (PRINIVIL,ZESTRIL) 40 MG tablet; Take 1 tablet (40 mg total) by mouth once daily.  Dispense: 30 tablet; Refill: 3  -     Ambulatory referral/consult to Nephrology; Future; Expected date: 12/24/2020  -     PTH, intact; Future; Expected date: 12/17/2020  -     Vitamin D; Future; Expected date: 12/17/2020  -     Microalbumin/creatinine urine ratio; Future; Expected date: 12/17/2020  -     hydroCHLOROthiazide (HYDRODIURIL) 12.5 MG Tab; Take 1 tablet (12.5 mg total) by mouth once daily.  Dispense: 30 tablet; Refill: 11  -      Worsening. Needs better control of HTN and DM. Referral to Nephrology placed. Labs ordered. Urine Microalbumin >300.     Need for hepatitis C screening test  -     Hepatitis C Antibody; Future; Expected date: 12/17/2020    Encounter for screening for HIV  -     HIV 1/2 Ag/Ab (4th Gen); Future; Expected date: 12/17/2020    Vaginal dryness, menopausal         -    Discussed OTC management versus premarin. Patient to use personal lubricant and follow-up as needed.     Follow up in about 4 weeks (around 1/14/2021) for DM Management.

## 2021-01-04 ENCOUNTER — TELEPHONE (OUTPATIENT)
Dept: FAMILY MEDICINE | Facility: HOSPITAL | Age: 51
End: 2021-01-04

## 2021-01-14 ENCOUNTER — OFFICE VISIT (OUTPATIENT)
Dept: FAMILY MEDICINE | Facility: HOSPITAL | Age: 51
End: 2021-01-14
Attending: FAMILY MEDICINE
Payer: MEDICAID

## 2021-01-14 DIAGNOSIS — E11.22 CONTROLLED TYPE 2 DIABETES MELLITUS WITH STAGE 3 CHRONIC KIDNEY DISEASE, WITH LONG-TERM CURRENT USE OF INSULIN: Primary | ICD-10-CM

## 2021-01-14 DIAGNOSIS — N18.30 CONTROLLED TYPE 2 DIABETES MELLITUS WITH STAGE 3 CHRONIC KIDNEY DISEASE, WITH LONG-TERM CURRENT USE OF INSULIN: Primary | ICD-10-CM

## 2021-01-14 DIAGNOSIS — Z79.4 CONTROLLED TYPE 2 DIABETES MELLITUS WITH STAGE 3 CHRONIC KIDNEY DISEASE, WITH LONG-TERM CURRENT USE OF INSULIN: Primary | ICD-10-CM

## 2021-01-14 DIAGNOSIS — I12.9 BENIGN HYPERTENSIVE KIDNEY DISEASE WITH CHRONIC KIDNEY DISEASE STAGE I THROUGH STAGE IV, OR UNSPECIFIED: ICD-10-CM

## 2021-01-14 PROCEDURE — 99214 OFFICE O/P EST MOD 30 MIN: CPT | Performed by: STUDENT IN AN ORGANIZED HEALTH CARE EDUCATION/TRAINING PROGRAM

## 2021-01-14 RX ORDER — HYDROCHLOROTHIAZIDE 25 MG/1
25 TABLET ORAL DAILY
Qty: 30 TABLET | Refills: 11 | Status: SHIPPED | OUTPATIENT
Start: 2021-01-14 | End: 2021-04-13

## 2021-01-15 VITALS
BODY MASS INDEX: 26.64 KG/M2 | HEART RATE: 94 BPM | HEIGHT: 67 IN | WEIGHT: 169.75 LBS | SYSTOLIC BLOOD PRESSURE: 144 MMHG | DIASTOLIC BLOOD PRESSURE: 90 MMHG

## 2021-02-22 ENCOUNTER — OFFICE VISIT (OUTPATIENT)
Dept: FAMILY MEDICINE | Facility: HOSPITAL | Age: 51
End: 2021-02-22
Payer: MEDICAID

## 2021-02-22 VITALS
HEIGHT: 68 IN | DIASTOLIC BLOOD PRESSURE: 83 MMHG | WEIGHT: 168.44 LBS | HEART RATE: 84 BPM | SYSTOLIC BLOOD PRESSURE: 124 MMHG | BODY MASS INDEX: 25.53 KG/M2

## 2021-02-22 DIAGNOSIS — I15.2 HYPERTENSION ASSOCIATED WITH TYPE 2 DIABETES MELLITUS: ICD-10-CM

## 2021-02-22 DIAGNOSIS — Z79.4 TYPE 2 DIABETES MELLITUS WITH STAGE 3B CHRONIC KIDNEY DISEASE, WITH LONG-TERM CURRENT USE OF INSULIN: Primary | ICD-10-CM

## 2021-02-22 DIAGNOSIS — E11.59 HYPERTENSION ASSOCIATED WITH TYPE 2 DIABETES MELLITUS: ICD-10-CM

## 2021-02-22 DIAGNOSIS — E11.22 TYPE 2 DIABETES MELLITUS WITH STAGE 3B CHRONIC KIDNEY DISEASE, WITH LONG-TERM CURRENT USE OF INSULIN: Primary | ICD-10-CM

## 2021-02-22 DIAGNOSIS — N18.32 TYPE 2 DIABETES MELLITUS WITH STAGE 3B CHRONIC KIDNEY DISEASE, WITH LONG-TERM CURRENT USE OF INSULIN: Primary | ICD-10-CM

## 2021-02-22 PROCEDURE — 99214 OFFICE O/P EST MOD 30 MIN: CPT | Performed by: STUDENT IN AN ORGANIZED HEALTH CARE EDUCATION/TRAINING PROGRAM

## 2021-03-04 ENCOUNTER — IMMUNIZATION (OUTPATIENT)
Dept: INTERNAL MEDICINE | Facility: CLINIC | Age: 51
End: 2021-03-04
Payer: MEDICAID

## 2021-03-04 DIAGNOSIS — Z23 NEED FOR VACCINATION: Primary | ICD-10-CM

## 2021-03-04 PROCEDURE — 91300 COVID-19, MRNA, LNP-S, PF, 30 MCG/0.3 ML DOSE VACCINE: CPT | Mod: ,,, | Performed by: FAMILY MEDICINE

## 2021-03-04 PROCEDURE — 0001A COVID-19, MRNA, LNP-S, PF, 30 MCG/0.3 ML DOSE VACCINE: CPT | Mod: CV19,,, | Performed by: FAMILY MEDICINE

## 2021-03-04 PROCEDURE — 91300 COVID-19, MRNA, LNP-S, PF, 30 MCG/0.3 ML DOSE VACCINE: ICD-10-PCS | Mod: ,,, | Performed by: FAMILY MEDICINE

## 2021-03-04 PROCEDURE — 0001A COVID-19, MRNA, LNP-S, PF, 30 MCG/0.3 ML DOSE VACCINE: ICD-10-PCS | Mod: CV19,,, | Performed by: FAMILY MEDICINE

## 2021-03-25 ENCOUNTER — IMMUNIZATION (OUTPATIENT)
Dept: INTERNAL MEDICINE | Facility: CLINIC | Age: 51
End: 2021-03-25
Payer: MEDICAID

## 2021-03-25 DIAGNOSIS — Z23 NEED FOR VACCINATION: Primary | ICD-10-CM

## 2021-03-25 PROCEDURE — 0002A COVID-19, MRNA, LNP-S, PF, 30 MCG/0.3 ML DOSE VACCINE: CPT | Mod: PBBFAC | Performed by: FAMILY MEDICINE

## 2021-03-25 PROCEDURE — 91300 COVID-19, MRNA, LNP-S, PF, 30 MCG/0.3 ML DOSE VACCINE: CPT | Mod: PBBFAC | Performed by: FAMILY MEDICINE

## 2021-04-13 ENCOUNTER — OFFICE VISIT (OUTPATIENT)
Dept: FAMILY MEDICINE | Facility: HOSPITAL | Age: 51
End: 2021-04-13
Attending: FAMILY MEDICINE
Payer: MEDICAID

## 2021-04-13 VITALS
HEIGHT: 68 IN | WEIGHT: 172.81 LBS | BODY MASS INDEX: 26.19 KG/M2 | DIASTOLIC BLOOD PRESSURE: 81 MMHG | HEART RATE: 90 BPM | SYSTOLIC BLOOD PRESSURE: 126 MMHG

## 2021-04-13 DIAGNOSIS — Z79.4 TYPE 2 DIABETES MELLITUS WITH STAGE 3B CHRONIC KIDNEY DISEASE, WITH LONG-TERM CURRENT USE OF INSULIN: Primary | ICD-10-CM

## 2021-04-13 DIAGNOSIS — E11.22 TYPE 2 DIABETES MELLITUS WITH STAGE 3B CHRONIC KIDNEY DISEASE, WITH LONG-TERM CURRENT USE OF INSULIN: Primary | ICD-10-CM

## 2021-04-13 DIAGNOSIS — N18.32 TYPE 2 DIABETES MELLITUS WITH STAGE 3B CHRONIC KIDNEY DISEASE, WITH LONG-TERM CURRENT USE OF INSULIN: Primary | ICD-10-CM

## 2021-04-13 PROCEDURE — 99213 OFFICE O/P EST LOW 20 MIN: CPT | Performed by: STUDENT IN AN ORGANIZED HEALTH CARE EDUCATION/TRAINING PROGRAM

## 2021-04-13 RX ORDER — ROSUVASTATIN CALCIUM 5 MG/1
5 TABLET, COATED ORAL DAILY
Qty: 90 TABLET | Refills: 3 | Status: SHIPPED | OUTPATIENT
Start: 2021-04-13 | End: 2021-06-10 | Stop reason: SDUPTHER

## 2021-04-13 RX ORDER — METFORMIN HYDROCHLORIDE 1000 MG/1
TABLET ORAL
COMMUNITY
End: 2021-06-10

## 2021-04-13 RX ORDER — ERGOCALCIFEROL 1.25 MG/1
CAPSULE ORAL
COMMUNITY
Start: 2021-03-11 | End: 2021-04-13

## 2021-04-13 RX ORDER — CANAGLIFLOZIN 100 MG/1
TABLET, FILM COATED ORAL
COMMUNITY
Start: 2021-04-05 | End: 2021-04-14

## 2021-04-23 ENCOUNTER — OFFICE VISIT (OUTPATIENT)
Dept: URGENT CARE | Facility: CLINIC | Age: 51
End: 2021-04-23
Payer: MEDICAID

## 2021-04-23 VITALS
SYSTOLIC BLOOD PRESSURE: 154 MMHG | WEIGHT: 173 LBS | DIASTOLIC BLOOD PRESSURE: 88 MMHG | OXYGEN SATURATION: 98 % | BODY MASS INDEX: 26.22 KG/M2 | HEIGHT: 68 IN | TEMPERATURE: 98 F | RESPIRATION RATE: 16 BRPM | HEART RATE: 92 BPM

## 2021-04-23 DIAGNOSIS — R05.9 COUGH: Primary | ICD-10-CM

## 2021-04-23 DIAGNOSIS — R09.81 NASAL CONGESTION: ICD-10-CM

## 2021-04-23 DIAGNOSIS — Z11.52 ENCOUNTER FOR SCREENING LABORATORY TESTING FOR COVID-19 VIRUS: ICD-10-CM

## 2021-04-23 DIAGNOSIS — H65.193 ACUTE EFFUSION OF BOTH MIDDLE EARS: ICD-10-CM

## 2021-04-23 DIAGNOSIS — J30.1 ALLERGIC RHINITIS DUE TO POLLEN, UNSPECIFIED SEASONALITY: ICD-10-CM

## 2021-04-23 DIAGNOSIS — R51.9 SINUS HEADACHE: ICD-10-CM

## 2021-04-23 DIAGNOSIS — R09.82 POSTNASAL DRIP: ICD-10-CM

## 2021-04-23 LAB
CTP QC/QA: YES
CTP QC/QA: YES
POC MOLECULAR INFLUENZA A AGN: NEGATIVE
POC MOLECULAR INFLUENZA B AGN: NEGATIVE
SARS-COV-2 RDRP RESP QL NAA+PROBE: NEGATIVE

## 2021-04-23 PROCEDURE — 99214 OFFICE O/P EST MOD 30 MIN: CPT | Mod: S$GLB,CS,, | Performed by: PHYSICIAN ASSISTANT

## 2021-04-23 PROCEDURE — 99214 PR OFFICE/OUTPT VISIT, EST, LEVL IV, 30-39 MIN: ICD-10-PCS | Mod: S$GLB,CS,, | Performed by: PHYSICIAN ASSISTANT

## 2021-04-23 RX ORDER — INSULIN DEGLUDEC 200 U/ML
INJECTION, SOLUTION SUBCUTANEOUS
COMMUNITY
Start: 2021-03-25 | End: 2021-07-21 | Stop reason: SDUPTHER

## 2021-04-23 RX ORDER — LORATADINE 10 MG/1
10 TABLET ORAL DAILY
Qty: 30 TABLET | Refills: 0 | Status: SHIPPED | OUTPATIENT
Start: 2021-04-23 | End: 2021-06-10

## 2021-04-23 RX ORDER — FLUTICASONE PROPIONATE 50 MCG
1 SPRAY, SUSPENSION (ML) NASAL DAILY
Qty: 9.9 ML | Refills: 0 | Status: SHIPPED | OUTPATIENT
Start: 2021-04-23 | End: 2021-05-20

## 2021-04-23 RX ORDER — BENZONATATE 100 MG/1
100 CAPSULE ORAL 3 TIMES DAILY PRN
Qty: 21 CAPSULE | Refills: 0 | Status: SHIPPED | OUTPATIENT
Start: 2021-04-23 | End: 2021-04-30

## 2021-04-23 RX ORDER — PROMETHAZINE HYDROCHLORIDE AND DEXTROMETHORPHAN HYDROBROMIDE 6.25; 15 MG/5ML; MG/5ML
5 SYRUP ORAL EVERY 6 HOURS PRN
Qty: 118 ML | Refills: 0 | Status: SHIPPED | OUTPATIENT
Start: 2021-04-23 | End: 2021-05-03

## 2021-05-03 DIAGNOSIS — E11.9 TYPE 2 DIABETES MELLITUS WITHOUT COMPLICATION, WITH LONG-TERM CURRENT USE OF INSULIN: ICD-10-CM

## 2021-05-03 DIAGNOSIS — Z79.4 TYPE 2 DIABETES MELLITUS WITHOUT COMPLICATION, WITH LONG-TERM CURRENT USE OF INSULIN: ICD-10-CM

## 2021-05-03 RX ORDER — INSULIN DEGLUDEC 200 U/ML
30 INJECTION, SOLUTION SUBCUTANEOUS DAILY
Qty: 9 ML | Refills: 3 | Status: SHIPPED | OUTPATIENT
Start: 2021-05-03 | End: 2021-10-11 | Stop reason: SDUPTHER

## 2021-05-06 DIAGNOSIS — E11.22 CONTROLLED TYPE 2 DIABETES MELLITUS WITH STAGE 3 CHRONIC KIDNEY DISEASE, WITH LONG-TERM CURRENT USE OF INSULIN: ICD-10-CM

## 2021-05-06 DIAGNOSIS — N18.30 CONTROLLED TYPE 2 DIABETES MELLITUS WITH STAGE 3 CHRONIC KIDNEY DISEASE, WITH LONG-TERM CURRENT USE OF INSULIN: ICD-10-CM

## 2021-05-06 DIAGNOSIS — E11.59 HYPERTENSION ASSOCIATED WITH TYPE 2 DIABETES MELLITUS: ICD-10-CM

## 2021-05-06 DIAGNOSIS — I15.2 HYPERTENSION ASSOCIATED WITH TYPE 2 DIABETES MELLITUS: ICD-10-CM

## 2021-05-06 DIAGNOSIS — Z79.4 CONTROLLED TYPE 2 DIABETES MELLITUS WITH STAGE 3 CHRONIC KIDNEY DISEASE, WITH LONG-TERM CURRENT USE OF INSULIN: ICD-10-CM

## 2021-05-06 RX ORDER — CANAGLIFLOZIN 100 MG/1
100 TABLET, FILM COATED ORAL DAILY
Qty: 30 TABLET | Refills: 11 | Status: SHIPPED | OUTPATIENT
Start: 2021-05-06 | End: 2021-06-10 | Stop reason: SDUPTHER

## 2021-05-06 RX ORDER — LISINOPRIL 40 MG/1
40 TABLET ORAL DAILY
Qty: 30 TABLET | Refills: 3 | Status: SHIPPED | OUTPATIENT
Start: 2021-05-06 | End: 2021-06-10 | Stop reason: SDUPTHER

## 2021-05-06 RX ORDER — CANAGLIFLOZIN 100 MG/1
100 TABLET, FILM COATED ORAL DAILY
Qty: 30 TABLET | Refills: 11 | Status: SHIPPED | OUTPATIENT
Start: 2021-05-06 | End: 2021-05-06 | Stop reason: SDUPTHER

## 2021-05-20 DIAGNOSIS — R09.81 NASAL CONGESTION: ICD-10-CM

## 2021-05-20 DIAGNOSIS — J30.1 ALLERGIC RHINITIS DUE TO POLLEN, UNSPECIFIED SEASONALITY: ICD-10-CM

## 2021-05-20 RX ORDER — FLUTICASONE PROPIONATE 50 MCG
1 SPRAY, SUSPENSION (ML) NASAL ONCE
Qty: 9.9 ML | Refills: 0 | Status: SHIPPED | OUTPATIENT
Start: 2021-05-20 | End: 2021-05-20

## 2021-06-10 ENCOUNTER — OFFICE VISIT (OUTPATIENT)
Dept: FAMILY MEDICINE | Facility: HOSPITAL | Age: 51
End: 2021-06-10
Payer: MEDICAID

## 2021-06-10 ENCOUNTER — TELEPHONE (OUTPATIENT)
Dept: GASTROENTEROLOGY | Facility: CLINIC | Age: 51
End: 2021-06-10

## 2021-06-10 VITALS
DIASTOLIC BLOOD PRESSURE: 83 MMHG | HEART RATE: 86 BPM | BODY MASS INDEX: 25.76 KG/M2 | HEIGHT: 68 IN | WEIGHT: 170 LBS | SYSTOLIC BLOOD PRESSURE: 140 MMHG

## 2021-06-10 DIAGNOSIS — E11.22 TYPE 2 DIABETES MELLITUS WITH STAGE 3B CHRONIC KIDNEY DISEASE, WITH LONG-TERM CURRENT USE OF INSULIN: ICD-10-CM

## 2021-06-10 DIAGNOSIS — N18.32 TYPE 2 DIABETES MELLITUS WITH STAGE 3B CHRONIC KIDNEY DISEASE, WITH LONG-TERM CURRENT USE OF INSULIN: ICD-10-CM

## 2021-06-10 DIAGNOSIS — E11.59 HYPERTENSION ASSOCIATED WITH TYPE 2 DIABETES MELLITUS: ICD-10-CM

## 2021-06-10 DIAGNOSIS — E11.22 CONTROLLED TYPE 2 DIABETES MELLITUS WITH STAGE 3 CHRONIC KIDNEY DISEASE, WITH LONG-TERM CURRENT USE OF INSULIN: Primary | ICD-10-CM

## 2021-06-10 DIAGNOSIS — Z79.4 CONTROLLED TYPE 2 DIABETES MELLITUS WITH STAGE 3 CHRONIC KIDNEY DISEASE, WITH LONG-TERM CURRENT USE OF INSULIN: Primary | ICD-10-CM

## 2021-06-10 DIAGNOSIS — Z79.4 TYPE 2 DIABETES MELLITUS WITH STAGE 3B CHRONIC KIDNEY DISEASE, WITH LONG-TERM CURRENT USE OF INSULIN: ICD-10-CM

## 2021-06-10 DIAGNOSIS — N18.30 CONTROLLED TYPE 2 DIABETES MELLITUS WITH STAGE 3 CHRONIC KIDNEY DISEASE, WITH LONG-TERM CURRENT USE OF INSULIN: Primary | ICD-10-CM

## 2021-06-10 DIAGNOSIS — I15.2 HYPERTENSION ASSOCIATED WITH TYPE 2 DIABETES MELLITUS: ICD-10-CM

## 2021-06-10 PROCEDURE — 99213 OFFICE O/P EST LOW 20 MIN: CPT | Performed by: STUDENT IN AN ORGANIZED HEALTH CARE EDUCATION/TRAINING PROGRAM

## 2021-06-10 RX ORDER — CANAGLIFLOZIN 100 MG/1
100 TABLET, FILM COATED ORAL DAILY
Qty: 30 TABLET | Refills: 11 | Status: SHIPPED | OUTPATIENT
Start: 2021-06-10 | End: 2022-06-11 | Stop reason: SDUPTHER

## 2021-06-10 RX ORDER — GABAPENTIN 300 MG/1
300 CAPSULE ORAL 3 TIMES DAILY
Qty: 90 CAPSULE | Refills: 11 | Status: SHIPPED | OUTPATIENT
Start: 2021-06-10 | End: 2021-08-05 | Stop reason: SDUPTHER

## 2021-06-10 RX ORDER — LISINOPRIL 40 MG/1
40 TABLET ORAL DAILY
Qty: 30 TABLET | Refills: 11 | Status: ON HOLD | OUTPATIENT
Start: 2021-06-10 | End: 2022-04-15 | Stop reason: HOSPADM

## 2021-06-10 RX ORDER — NIFEDIPINE 90 MG/1
90 TABLET, EXTENDED RELEASE ORAL DAILY
Qty: 90 TABLET | Refills: 3 | Status: SHIPPED | OUTPATIENT
Start: 2021-06-10 | End: 2022-06-08 | Stop reason: SDUPTHER

## 2021-06-10 RX ORDER — ROSUVASTATIN CALCIUM 5 MG/1
5 TABLET, COATED ORAL DAILY
Qty: 90 TABLET | Refills: 3 | Status: SHIPPED | OUTPATIENT
Start: 2021-06-10 | End: 2022-04-13

## 2021-06-10 RX ORDER — LISINOPRIL 40 MG/1
40 TABLET ORAL DAILY
Qty: 30 TABLET | Refills: 3 | Status: SHIPPED | OUTPATIENT
Start: 2021-06-10 | End: 2021-06-10

## 2021-06-14 ENCOUNTER — TELEPHONE (OUTPATIENT)
Dept: PHARMACY | Facility: CLINIC | Age: 51
End: 2021-06-14

## 2021-07-02 ENCOUNTER — TELEPHONE (OUTPATIENT)
Dept: GASTROENTEROLOGY | Facility: CLINIC | Age: 51
End: 2021-07-02

## 2021-07-02 DIAGNOSIS — Z12.11 SCREEN FOR COLON CANCER: Primary | ICD-10-CM

## 2021-07-06 RX ORDER — POLYETHYLENE GLYCOL 3350, SODIUM SULFATE, SODIUM CHLORIDE, POTASSIUM CHLORIDE, SODIUM ASCORBATE, AND ASCORBIC ACID 7.5-2.691G
2000 KIT ORAL ONCE
Qty: 2000 ML | Refills: 0 | Status: SHIPPED | OUTPATIENT
Start: 2021-07-06 | End: 2021-07-20

## 2021-07-12 ENCOUNTER — TELEPHONE (OUTPATIENT)
Dept: PODIATRY | Facility: CLINIC | Age: 51
End: 2021-07-12

## 2021-07-19 ENCOUNTER — TELEPHONE (OUTPATIENT)
Dept: GASTROENTEROLOGY | Facility: CLINIC | Age: 51
End: 2021-07-19

## 2021-07-23 PROBLEM — Z12.11 SCREEN FOR COLON CANCER: Status: ACTIVE | Noted: 2021-07-23

## 2021-08-05 ENCOUNTER — LAB VISIT (OUTPATIENT)
Dept: LAB | Facility: HOSPITAL | Age: 51
End: 2021-08-05
Attending: STUDENT IN AN ORGANIZED HEALTH CARE EDUCATION/TRAINING PROGRAM
Payer: MEDICAID

## 2021-08-05 ENCOUNTER — OFFICE VISIT (OUTPATIENT)
Dept: FAMILY MEDICINE | Facility: HOSPITAL | Age: 51
End: 2021-08-05
Payer: MEDICAID

## 2021-08-05 VITALS
HEIGHT: 68 IN | HEART RATE: 94 BPM | SYSTOLIC BLOOD PRESSURE: 165 MMHG | BODY MASS INDEX: 26.47 KG/M2 | DIASTOLIC BLOOD PRESSURE: 89 MMHG | WEIGHT: 174.63 LBS

## 2021-08-05 DIAGNOSIS — E11.22 CONTROLLED TYPE 2 DIABETES MELLITUS WITH STAGE 3 CHRONIC KIDNEY DISEASE, WITH LONG-TERM CURRENT USE OF INSULIN: ICD-10-CM

## 2021-08-05 DIAGNOSIS — Z79.4 CONTROLLED TYPE 2 DIABETES MELLITUS WITH STAGE 3 CHRONIC KIDNEY DISEASE, WITH LONG-TERM CURRENT USE OF INSULIN: ICD-10-CM

## 2021-08-05 DIAGNOSIS — N18.30 CONTROLLED TYPE 2 DIABETES MELLITUS WITH STAGE 3 CHRONIC KIDNEY DISEASE, WITH LONG-TERM CURRENT USE OF INSULIN: ICD-10-CM

## 2021-08-05 DIAGNOSIS — H61.91 LESION OF RIGHT EXTERNAL EAR: ICD-10-CM

## 2021-08-05 DIAGNOSIS — E11.42 DIABETIC POLYNEUROPATHY ASSOCIATED WITH TYPE 2 DIABETES MELLITUS: Primary | ICD-10-CM

## 2021-08-05 DIAGNOSIS — Z12.31 ENCOUNTER FOR SCREENING MAMMOGRAM FOR MALIGNANT NEOPLASM OF BREAST: ICD-10-CM

## 2021-08-05 DIAGNOSIS — I87.2 VENOUS STASIS DERMATITIS OF BOTH LOWER EXTREMITIES: ICD-10-CM

## 2021-08-05 LAB
ESTIMATED AVG GLUCOSE: 163 MG/DL (ref 68–131)
HBA1C MFR BLD: 7.3 % (ref 4–5.6)

## 2021-08-05 PROCEDURE — 99214 OFFICE O/P EST MOD 30 MIN: CPT | Performed by: STUDENT IN AN ORGANIZED HEALTH CARE EDUCATION/TRAINING PROGRAM

## 2021-08-05 PROCEDURE — 83036 HEMOGLOBIN GLYCOSYLATED A1C: CPT | Performed by: STUDENT IN AN ORGANIZED HEALTH CARE EDUCATION/TRAINING PROGRAM

## 2021-08-05 PROCEDURE — 36415 COLL VENOUS BLD VENIPUNCTURE: CPT | Performed by: STUDENT IN AN ORGANIZED HEALTH CARE EDUCATION/TRAINING PROGRAM

## 2021-08-05 RX ORDER — GABAPENTIN 300 MG/1
600 CAPSULE ORAL 3 TIMES DAILY
Qty: 180 CAPSULE | Refills: 11 | Status: SHIPPED | OUTPATIENT
Start: 2021-08-05 | End: 2022-04-13

## 2021-08-06 PROBLEM — N18.30 CONTROLLED TYPE 2 DIABETES MELLITUS WITH STAGE 3 CHRONIC KIDNEY DISEASE, WITH LONG-TERM CURRENT USE OF INSULIN: Status: ACTIVE | Noted: 2021-08-06

## 2021-08-06 PROBLEM — Z79.4 CONTROLLED TYPE 2 DIABETES MELLITUS WITH STAGE 3 CHRONIC KIDNEY DISEASE, WITH LONG-TERM CURRENT USE OF INSULIN: Status: ACTIVE | Noted: 2021-08-06

## 2021-08-06 PROBLEM — E87.5 HYPERKALEMIA: Status: RESOLVED | Noted: 2019-09-06 | Resolved: 2021-08-06

## 2021-08-06 PROBLEM — H61.91 LESION OF RIGHT EXTERNAL EAR: Status: ACTIVE | Noted: 2021-08-06

## 2021-08-06 PROBLEM — Z12.11 SCREEN FOR COLON CANCER: Status: RESOLVED | Noted: 2021-07-23 | Resolved: 2021-08-06

## 2021-08-06 PROBLEM — E11.22 CONTROLLED TYPE 2 DIABETES MELLITUS WITH STAGE 3 CHRONIC KIDNEY DISEASE, WITH LONG-TERM CURRENT USE OF INSULIN: Status: ACTIVE | Noted: 2021-08-06

## 2021-08-06 PROBLEM — I87.2 VENOUS STASIS DERMATITIS OF BOTH LOWER EXTREMITIES: Status: ACTIVE | Noted: 2021-08-06

## 2021-08-06 PROBLEM — N17.9 AKI (ACUTE KIDNEY INJURY): Status: RESOLVED | Noted: 2020-01-02 | Resolved: 2021-08-06

## 2021-08-06 PROBLEM — E11.42 DIABETIC POLYNEUROPATHY ASSOCIATED WITH TYPE 2 DIABETES MELLITUS: Status: ACTIVE | Noted: 2021-08-06

## 2021-08-09 ENCOUNTER — TELEPHONE (OUTPATIENT)
Dept: FAMILY MEDICINE | Facility: HOSPITAL | Age: 51
End: 2021-08-09

## 2021-08-12 ENCOUNTER — TELEPHONE (OUTPATIENT)
Dept: FAMILY MEDICINE | Facility: HOSPITAL | Age: 51
End: 2021-08-12

## 2021-08-19 ENCOUNTER — HOSPITAL ENCOUNTER (OUTPATIENT)
Dept: RADIOLOGY | Facility: HOSPITAL | Age: 51
Discharge: HOME | End: 2021-08-19
Attending: STUDENT IN AN ORGANIZED HEALTH CARE EDUCATION/TRAINING PROGRAM
Payer: MEDICAID

## 2021-08-19 DIAGNOSIS — Z12.31 ENCOUNTER FOR SCREENING MAMMOGRAM FOR MALIGNANT NEOPLASM OF BREAST: ICD-10-CM

## 2021-08-19 PROCEDURE — 77067 SCR MAMMO BI INCL CAD: CPT | Mod: TC

## 2021-08-19 PROCEDURE — 77067 MAMMO DIGITAL SCREENING BILAT WITH TOMO: ICD-10-PCS | Mod: 26,,, | Performed by: RADIOLOGY

## 2021-08-19 PROCEDURE — 77067 SCR MAMMO BI INCL CAD: CPT | Mod: 26,,, | Performed by: RADIOLOGY

## 2021-08-19 PROCEDURE — 77063 BREAST TOMOSYNTHESIS BI: CPT | Mod: 26,,, | Performed by: RADIOLOGY

## 2021-08-19 PROCEDURE — 77063 MAMMO DIGITAL SCREENING BILAT WITH TOMO: ICD-10-PCS | Mod: 26,,, | Performed by: RADIOLOGY

## 2021-09-17 ENCOUNTER — HOSPITAL ENCOUNTER (OUTPATIENT)
Dept: RADIOLOGY | Facility: HOSPITAL | Age: 51
Discharge: HOME | End: 2021-09-17
Attending: STUDENT IN AN ORGANIZED HEALTH CARE EDUCATION/TRAINING PROGRAM
Payer: MEDICAID

## 2021-09-17 DIAGNOSIS — R92.8 ABNORMAL MAMMOGRAM: ICD-10-CM

## 2021-09-17 PROCEDURE — 77065 DX MAMMO INCL CAD UNI: CPT | Mod: 26,RT,, | Performed by: RADIOLOGY

## 2021-09-17 PROCEDURE — 77065 MAMMO DIGITAL DIAGNOSTIC RIGHT WITH TOMO: ICD-10-PCS | Mod: 26,RT,, | Performed by: RADIOLOGY

## 2021-09-17 PROCEDURE — 77061 MAMMO DIGITAL DIAGNOSTIC RIGHT WITH TOMO: ICD-10-PCS | Mod: 26,RT,, | Performed by: RADIOLOGY

## 2021-09-17 PROCEDURE — 77061 BREAST TOMOSYNTHESIS UNI: CPT | Mod: 26,RT,, | Performed by: RADIOLOGY

## 2021-09-17 PROCEDURE — 77061 BREAST TOMOSYNTHESIS UNI: CPT | Mod: TC,RT

## 2021-10-11 DIAGNOSIS — Z79.4 TYPE 2 DIABETES MELLITUS WITHOUT COMPLICATION, WITH LONG-TERM CURRENT USE OF INSULIN: ICD-10-CM

## 2021-10-11 DIAGNOSIS — E11.9 TYPE 2 DIABETES MELLITUS WITHOUT COMPLICATION, WITH LONG-TERM CURRENT USE OF INSULIN: ICD-10-CM

## 2021-10-11 RX ORDER — INSULIN DEGLUDEC 200 U/ML
30 INJECTION, SOLUTION SUBCUTANEOUS DAILY
Qty: 3 PEN | Refills: 2 | Status: SHIPPED | OUTPATIENT
Start: 2021-10-11 | End: 2021-11-17

## 2021-11-17 ENCOUNTER — OFFICE VISIT (OUTPATIENT)
Dept: FAMILY MEDICINE | Facility: HOSPITAL | Age: 51
End: 2021-11-17
Attending: FAMILY MEDICINE
Payer: MEDICAID

## 2021-11-17 VITALS
HEIGHT: 68 IN | BODY MASS INDEX: 26.49 KG/M2 | SYSTOLIC BLOOD PRESSURE: 133 MMHG | HEART RATE: 83 BPM | WEIGHT: 174.81 LBS | DIASTOLIC BLOOD PRESSURE: 76 MMHG

## 2021-11-17 DIAGNOSIS — Z79.4 TYPE 2 DIABETES MELLITUS WITHOUT COMPLICATION, WITH LONG-TERM CURRENT USE OF INSULIN: ICD-10-CM

## 2021-11-17 DIAGNOSIS — R82.90 ABNORMAL URINE ODOR: Primary | ICD-10-CM

## 2021-11-17 DIAGNOSIS — E11.9 TYPE 2 DIABETES MELLITUS WITHOUT COMPLICATION, WITH LONG-TERM CURRENT USE OF INSULIN: ICD-10-CM

## 2021-11-17 PROCEDURE — 99213 OFFICE O/P EST LOW 20 MIN: CPT | Performed by: STUDENT IN AN ORGANIZED HEALTH CARE EDUCATION/TRAINING PROGRAM

## 2021-11-17 RX ORDER — INSULIN DEGLUDEC 200 U/ML
46 INJECTION, SOLUTION SUBCUTANEOUS DAILY
Qty: 3 PEN | Refills: 2 | Status: SHIPPED | OUTPATIENT
Start: 2021-11-17 | End: 2021-11-23 | Stop reason: ALTCHOICE

## 2021-11-23 ENCOUNTER — TELEPHONE (OUTPATIENT)
Dept: FAMILY MEDICINE | Facility: HOSPITAL | Age: 51
End: 2021-11-23
Payer: MEDICAID

## 2021-11-23 DIAGNOSIS — E11.22 CONTROLLED TYPE 2 DIABETES MELLITUS WITH STAGE 3 CHRONIC KIDNEY DISEASE, WITH LONG-TERM CURRENT USE OF INSULIN: Primary | ICD-10-CM

## 2021-11-23 DIAGNOSIS — N18.30 CONTROLLED TYPE 2 DIABETES MELLITUS WITH STAGE 3 CHRONIC KIDNEY DISEASE, WITH LONG-TERM CURRENT USE OF INSULIN: Primary | ICD-10-CM

## 2021-11-23 DIAGNOSIS — Z79.4 CONTROLLED TYPE 2 DIABETES MELLITUS WITH STAGE 3 CHRONIC KIDNEY DISEASE, WITH LONG-TERM CURRENT USE OF INSULIN: Primary | ICD-10-CM

## 2021-11-23 RX ORDER — INSULIN GLARGINE 100 [IU]/ML
46 INJECTION, SOLUTION SUBCUTANEOUS NIGHTLY
Qty: 13.8 ML | Refills: 11 | Status: ON HOLD | OUTPATIENT
Start: 2021-11-23 | End: 2022-04-15 | Stop reason: HOSPADM

## 2021-12-08 DIAGNOSIS — Z79.4 TYPE 2 DIABETES MELLITUS WITHOUT COMPLICATION, WITH LONG-TERM CURRENT USE OF INSULIN: ICD-10-CM

## 2021-12-08 DIAGNOSIS — E11.9 TYPE 2 DIABETES MELLITUS WITHOUT COMPLICATION, WITH LONG-TERM CURRENT USE OF INSULIN: ICD-10-CM

## 2021-12-08 RX ORDER — INSULIN DEGLUDEC 200 U/ML
30 INJECTION, SOLUTION SUBCUTANEOUS DAILY
Qty: 3 PEN | Refills: 2 | Status: CANCELLED | OUTPATIENT
Start: 2021-12-08 | End: 2022-03-08

## 2021-12-13 ENCOUNTER — OFFICE VISIT (OUTPATIENT)
Dept: FAMILY MEDICINE | Facility: HOSPITAL | Age: 51
End: 2021-12-13
Attending: SPECIALIST
Payer: MEDICAID

## 2021-12-13 VITALS
SYSTOLIC BLOOD PRESSURE: 138 MMHG | BODY MASS INDEX: 26.06 KG/M2 | HEIGHT: 68 IN | DIASTOLIC BLOOD PRESSURE: 85 MMHG | HEART RATE: 97 BPM | WEIGHT: 171.94 LBS

## 2021-12-13 DIAGNOSIS — J06.9 UPPER RESPIRATORY TRACT INFECTION, UNSPECIFIED TYPE: Primary | ICD-10-CM

## 2021-12-13 DIAGNOSIS — Z79.4 CONTROLLED TYPE 2 DIABETES MELLITUS WITH STAGE 3 CHRONIC KIDNEY DISEASE, WITH LONG-TERM CURRENT USE OF INSULIN: ICD-10-CM

## 2021-12-13 DIAGNOSIS — N18.30 CONTROLLED TYPE 2 DIABETES MELLITUS WITH STAGE 3 CHRONIC KIDNEY DISEASE, WITH LONG-TERM CURRENT USE OF INSULIN: ICD-10-CM

## 2021-12-13 DIAGNOSIS — B34.9 VIRAL ILLNESS: ICD-10-CM

## 2021-12-13 DIAGNOSIS — I12.9 BENIGN HYPERTENSIVE KIDNEY DISEASE WITH CHRONIC KIDNEY DISEASE STAGE I THROUGH STAGE IV, OR UNSPECIFIED: ICD-10-CM

## 2021-12-13 DIAGNOSIS — E11.22 CONTROLLED TYPE 2 DIABETES MELLITUS WITH STAGE 3 CHRONIC KIDNEY DISEASE, WITH LONG-TERM CURRENT USE OF INSULIN: ICD-10-CM

## 2021-12-13 PROCEDURE — 99214 OFFICE O/P EST MOD 30 MIN: CPT | Performed by: STUDENT IN AN ORGANIZED HEALTH CARE EDUCATION/TRAINING PROGRAM

## 2021-12-13 RX ORDER — ONDANSETRON 4 MG/1
4 TABLET, FILM COATED ORAL 2 TIMES DAILY PRN
Qty: 10 TABLET | Refills: 0 | Status: SHIPPED | OUTPATIENT
Start: 2021-12-13 | End: 2021-12-18

## 2022-02-04 DIAGNOSIS — Z79.4 CONTROLLED TYPE 2 DIABETES MELLITUS WITH STAGE 3 CHRONIC KIDNEY DISEASE, WITH LONG-TERM CURRENT USE OF INSULIN: ICD-10-CM

## 2022-02-04 DIAGNOSIS — N18.30 CONTROLLED TYPE 2 DIABETES MELLITUS WITH STAGE 3 CHRONIC KIDNEY DISEASE, WITH LONG-TERM CURRENT USE OF INSULIN: ICD-10-CM

## 2022-02-04 DIAGNOSIS — E11.22 CONTROLLED TYPE 2 DIABETES MELLITUS WITH STAGE 3 CHRONIC KIDNEY DISEASE, WITH LONG-TERM CURRENT USE OF INSULIN: ICD-10-CM

## 2022-02-04 RX ORDER — PEN NEEDLE, DIABETIC 29 G X1/2"
1 NEEDLE, DISPOSABLE MISCELLANEOUS DAILY
Qty: 100 EACH | Refills: 3 | Status: SHIPPED | OUTPATIENT
Start: 2022-02-04 | End: 2022-02-24 | Stop reason: SDUPTHER

## 2022-02-04 NOTE — TELEPHONE ENCOUNTER
"----- Message from Jillian Matute sent at 2/4/2022  1:51 PM CST -----  Pt need a refill she totally out   pen needle, diabetic 31 gauge x 1/4" Jaylene    "

## 2022-02-24 ENCOUNTER — OFFICE VISIT (OUTPATIENT)
Dept: FAMILY MEDICINE | Facility: HOSPITAL | Age: 52
End: 2022-02-24
Attending: FAMILY MEDICINE
Payer: MEDICAID

## 2022-02-24 VITALS
SYSTOLIC BLOOD PRESSURE: 131 MMHG | WEIGHT: 169.75 LBS | HEART RATE: 85 BPM | HEIGHT: 67 IN | DIASTOLIC BLOOD PRESSURE: 85 MMHG | BODY MASS INDEX: 26.64 KG/M2

## 2022-02-24 DIAGNOSIS — F32.0 CURRENT MILD EPISODE OF MAJOR DEPRESSIVE DISORDER WITHOUT PRIOR EPISODE: Primary | ICD-10-CM

## 2022-02-24 DIAGNOSIS — Z79.4 CONTROLLED TYPE 2 DIABETES MELLITUS WITH STAGE 3 CHRONIC KIDNEY DISEASE, WITH LONG-TERM CURRENT USE OF INSULIN: ICD-10-CM

## 2022-02-24 DIAGNOSIS — N18.30 CONTROLLED TYPE 2 DIABETES MELLITUS WITH STAGE 3 CHRONIC KIDNEY DISEASE, WITH LONG-TERM CURRENT USE OF INSULIN: ICD-10-CM

## 2022-02-24 DIAGNOSIS — E11.22 CONTROLLED TYPE 2 DIABETES MELLITUS WITH STAGE 3 CHRONIC KIDNEY DISEASE, WITH LONG-TERM CURRENT USE OF INSULIN: ICD-10-CM

## 2022-02-24 PROCEDURE — 99214 OFFICE O/P EST MOD 30 MIN: CPT | Performed by: STUDENT IN AN ORGANIZED HEALTH CARE EDUCATION/TRAINING PROGRAM

## 2022-02-24 RX ORDER — SYRINGE AND NEEDLE,INSULIN,1ML 25GX1"
1 SYRINGE, EMPTY DISPOSABLE MISCELLANEOUS NIGHTLY
Qty: 100 EACH | Refills: 3 | Status: SHIPPED | OUTPATIENT
Start: 2022-02-24 | End: 2022-06-29 | Stop reason: SDUPTHER

## 2022-02-24 RX ORDER — FLUOXETINE 10 MG/1
10 CAPSULE ORAL DAILY
Qty: 30 CAPSULE | Refills: 11 | Status: SHIPPED | OUTPATIENT
Start: 2022-02-24 | End: 2022-03-09 | Stop reason: ALTCHOICE

## 2022-02-25 PROBLEM — F32.0 CURRENT MILD EPISODE OF MAJOR DEPRESSIVE DISORDER WITHOUT PRIOR EPISODE: Status: ACTIVE | Noted: 2022-02-25

## 2022-02-25 NOTE — PROGRESS NOTES
"Progress Note  [facility]    Subjective:     Bonnie Lino is a 52 y.o. year old female who presents to clinic for depression.     For the last 2 months patient has been struggling with her mood. She is able to go to work and enjoys being there but when she is alone in her car or at home she will be very sad and often tearful. Pt notes passive suicidality but no plan or active SI/HI. Pt has not told her family as she does not want to be a burden. Pt lives at home alone. Her children will call and visit but per the pt "they have their own lives" and she does not want to burden them. Pt previously involved in [employer] but stopped going consistently with COVID-19. Pt relies on her patrick as a support to help her get out of the negative head space when it occurs. No identifiable trigger 2 months ago to start mood change. Pt is interested in medication to try to help.     Patient Active Problem List    Diagnosis Date Noted    Controlled type 2 diabetes mellitus with stage 3 chronic kidney disease, with long-term current use of insulin 08/06/2021    Lesion of right external ear 08/06/2021    Diabetic polyneuropathy associated with type 2 diabetes mellitus 08/06/2021    Venous stasis dermatitis of both lower extremities 08/06/2021    Hypertension associated with type 2 diabetes mellitus     HLD (hyperlipidemia) 09/23/2014    Vitamin D deficiency 09/23/2014    Aneurysm of cardiac wall, congenital         Review of patient's allergies indicates:  No Known Allergies     Past Medical History:   Diagnosis Date    Aneurysm of cardiac wall, congenital     Bilateral lower extremity edema     2/2 calcium channel blocker    Diabetes type 2, uncontrolled     HLD (hyperlipidemia)     Hypertension     Vitamin D deficiency       Past Surgical History:   Procedure Laterality Date    BREAST CYST EXCISION  2003    BREAST SURGERY      cyst removal     CATARACT EXTRACTION W/  INTRAOCULAR LENS IMPLANT      " "COLONOSCOPY N/A 7/23/2021    Procedure: COLONOSCOPY;  Surgeon: Sapna Fitzgerald MD;  [facility];  Service: Endoscopy;  Laterality: N/A;    HYSTERECTOMY  2002    PARTIAL HYSTERECTOMY  2002      Family History   Problem Relation Age of Onset    Diabetes Mother     Heart disease Mother     Cancer Mother 40        breast    Breast cancer Mother     Diabetes Father     Cancer Maternal Grandmother 82        breast    Breast cancer Maternal Grandmother       Social History     Tobacco Use    Smoking status: Never Smoker    Smokeless tobacco: Never Used   Substance Use Topics    Alcohol use: Yes     Comment: seldom        Objective:     Vitals:    02/24/22 1519   BP: 131/85   BP Location: Left arm   Patient Position: Sitting   BP Method: Medium (Automatic)   Pulse: 85   Weight: 77 kg (169 lb 12.1 oz)   Height: 5' 7" (1.702 m)     BMI: 26.59    Gen: No apparent distress, well nourished and developed, appears stated age  CV: RRR, S1 and S2 present, no LE edema  Resp: CTAB, normal respiratory effort  Psych: Tearful throughout interview. Logical thought process. Good insight and eye contact.     Assessment/Plan:     Bonnie Lino is a 52 y.o. year old female who presents to clinic for depression.     1. Current mild episode of major depressive disorder without prior episode  Discussed safety plan and when to go to the hospital. Will start prozac, medication and side effects also discussed. Pt agreeable to trying therapy as well. Majority of encounter spent providing support and reassurance. Pt verbalized understanding and was in agreement with the plan.    - Ambulatory referral/consult to Psychology; Future  - FLUoxetine 10 MG capsule; Take 1 capsule (10 mg total) by mouth once daily.  Dispense: 30 capsule; Refill: 11    2. Controlled type 2 diabetes mellitus with stage 3 chronic kidney disease, with long-term current use of insulin  Pt will need further assessment of T2DM. Discussed with patient that " "we will address this once her mood is more stable.   - insulin syringe-needle U-100 (BD INSULIN SYRINGE) 1 mL 25 gauge x 5/8" Syrg; 1 Units by Misc.(Non-Drug; Combo Route) route every evening.  Dispense: 100 each; Refill: 3      Follow-up: 2 weeks    A total of 40 minutes was spent on patient care during this encounter which included chart review, examining the patient, formulating a treatment plan and documentation.     Medical decision making straight forward and not complex during this visit.     Case discussed with staff: Dr. Holland Culver, DO  [facility], PGY-2          "

## 2022-03-09 ENCOUNTER — OFFICE VISIT (OUTPATIENT)
Dept: FAMILY MEDICINE | Facility: HOSPITAL | Age: 52
End: 2022-03-09
Attending: FAMILY MEDICINE
Payer: MEDICAID

## 2022-03-09 VITALS
HEIGHT: 67 IN | WEIGHT: 171.94 LBS | SYSTOLIC BLOOD PRESSURE: 129 MMHG | BODY MASS INDEX: 26.99 KG/M2 | DIASTOLIC BLOOD PRESSURE: 78 MMHG | HEART RATE: 85 BPM

## 2022-03-09 DIAGNOSIS — F32.0 CURRENT MILD EPISODE OF MAJOR DEPRESSIVE DISORDER WITHOUT PRIOR EPISODE: Primary | ICD-10-CM

## 2022-03-09 PROCEDURE — 99213 OFFICE O/P EST LOW 20 MIN: CPT | Performed by: STUDENT IN AN ORGANIZED HEALTH CARE EDUCATION/TRAINING PROGRAM

## 2022-03-09 RX ORDER — FLUOXETINE HYDROCHLORIDE 20 MG/1
20 CAPSULE ORAL NIGHTLY
Qty: 30 CAPSULE | Refills: 11 | Status: ON HOLD | OUTPATIENT
Start: 2022-03-09 | End: 2022-04-15 | Stop reason: HOSPADM

## 2022-03-09 NOTE — PROGRESS NOTES
Progress Note  [facility]    Subjective:     Bonnie Lino is a 52 y.o. year old female with PMHx of T2DM, MDD who presents to clinic for mood follow-up.    Pt started on prozac last visit. Tolerating well. No recurrence of passive SI since last visit. Has an appointment with Dr. Horne at the end of the month. Has started to reach out to friends for support with mood. Continues to rely on patrick as a support. Overall since last appointment feels about the same.     Patient Active Problem List    Diagnosis Date Noted    Current mild episode of major depressive disorder without prior episode 02/25/2022    Controlled type 2 diabetes mellitus with stage 3 chronic kidney disease, with long-term current use of insulin 08/06/2021    Lesion of right external ear 08/06/2021    Diabetic polyneuropathy associated with type 2 diabetes mellitus 08/06/2021    Venous stasis dermatitis of both lower extremities 08/06/2021    Hypertension associated with type 2 diabetes mellitus     HLD (hyperlipidemia) 09/23/2014    Vitamin D deficiency 09/23/2014    Aneurysm of cardiac wall, congenital         Review of patient's allergies indicates:  No Known Allergies     Past Medical History:   Diagnosis Date    Aneurysm of cardiac wall, congenital     Bilateral lower extremity edema     2/2 calcium channel blocker    Diabetes type 2, uncontrolled     HLD (hyperlipidemia)     Hypertension     Vitamin D deficiency       Past Surgical History:   Procedure Laterality Date    BREAST CYST EXCISION  2003    BREAST SURGERY      cyst removal     CATARACT EXTRACTION W/  INTRAOCULAR LENS IMPLANT      COLONOSCOPY N/A 7/23/2021    Procedure: COLONOSCOPY;  Surgeon: Sapna Fitzgerald MD;  Location: [facility];  Service: Endoscopy;  Laterality: N/A;    HYSTERECTOMY  2002    PARTIAL HYSTERECTOMY  2002      Family History   Problem Relation Age of Onset    Diabetes Mother     Heart disease Mother     Cancer Mother 40         "breast    Breast cancer Mother     Diabetes Father     Cancer Maternal Grandmother 82        breast    Breast cancer Maternal Grandmother       Social History     Tobacco Use    Smoking status: Never Smoker    Smokeless tobacco: Never Used   Substance Use Topics    Alcohol use: Yes     Comment: seldom        Objective:     Vitals:    03/09/22 1338   BP: 129/78   BP Location: Left arm   Pulse: 85   Weight: 78 kg (171 lb 15.3 oz)   Height: 5' 7" (1.702 m)     BMI: 26.93    Gen: No apparent distress, well nourished and developed, appears stated age  CV: RRR, S1 and S2 present, no LE edema  Resp: CTAB, normal respiratory effort  Psych: Appropriate affect, logical thought process    PHQ-9: 5    GT-7: 5    Assessment/Plan:     Bonnie Lino is a 52 y.o. year old female who presents to clinic for mood follow-up.     1. Current mild episode of major depressive disorder without prior episode  Will increase prozac dosing. Reassurance and support given during visit.   - FLUoxetine 20 MG capsule; Take 1 capsule (20 mg total) by mouth every evening.  Dispense: 30 capsule; Refill: 11      Follow-up: 1 month for mood and type 2 diabetes    A total of 35 minutes was spent on patient care during this encounter which included chart review, examining the patient, formulating a treatment plan and documentation.     Medical decision making straight forward and not complex during this visit.     Case discussed with staff: Dr. Holland Culver, DO  [facility], PGY-2          "

## 2022-03-17 ENCOUNTER — HOSPITAL ENCOUNTER (OUTPATIENT)
Facility: HOSPITAL | Age: 52
Discharge: NURSING, REHAB OR LONG-TERM CARE | End: 2022-03-19
Attending: EMERGENCY MEDICINE | Admitting: STUDENT IN AN ORGANIZED HEALTH CARE EDUCATION/TRAINING PROGRAM
Payer: MEDICAID

## 2022-03-17 ENCOUNTER — OFFICE VISIT (OUTPATIENT)
Dept: FAMILY MEDICINE | Facility: HOSPITAL | Age: 52
End: 2022-03-17
Attending: STUDENT IN AN ORGANIZED HEALTH CARE EDUCATION/TRAINING PROGRAM
Payer: MEDICAID

## 2022-03-17 VITALS
SYSTOLIC BLOOD PRESSURE: 151 MMHG | HEIGHT: 67 IN | WEIGHT: 163.56 LBS | BODY MASS INDEX: 25.67 KG/M2 | DIASTOLIC BLOOD PRESSURE: 94 MMHG | HEART RATE: 90 BPM

## 2022-03-17 DIAGNOSIS — R05.9 COUGH: ICD-10-CM

## 2022-03-17 DIAGNOSIS — R29.898 RIGHT LEG WEAKNESS: Primary | ICD-10-CM

## 2022-03-17 DIAGNOSIS — R29.898 WEAKNESS OF RIGHT LEG: Primary | ICD-10-CM

## 2022-03-17 DIAGNOSIS — M25.561 RIGHT KNEE PAIN: ICD-10-CM

## 2022-03-17 DIAGNOSIS — R07.9 CHEST PAIN: ICD-10-CM

## 2022-03-17 DIAGNOSIS — R53.1 WEAKNESS: ICD-10-CM

## 2022-03-17 PROBLEM — N39.0 UTI (URINARY TRACT INFECTION): Status: ACTIVE | Noted: 2022-03-17

## 2022-03-17 LAB
ALBUMIN SERPL BCP-MCNC: 4.3 G/DL (ref 3.5–5.2)
ALP SERPL-CCNC: 77 U/L (ref 55–135)
ALT SERPL W/O P-5'-P-CCNC: 8 U/L (ref 10–44)
ANION GAP SERPL CALC-SCNC: 12 MMOL/L (ref 8–16)
AST SERPL-CCNC: 14 U/L (ref 10–40)
BACTERIA #/AREA URNS HPF: ABNORMAL /HPF
BASOPHILS # BLD AUTO: 0.03 K/UL (ref 0–0.2)
BASOPHILS NFR BLD: 0.5 % (ref 0–1.9)
BILIRUB SERPL-MCNC: 0.5 MG/DL (ref 0.1–1)
BILIRUB UR QL STRIP: NEGATIVE
BUN SERPL-MCNC: 30 MG/DL (ref 6–20)
CALCIUM SERPL-MCNC: 9.2 MG/DL (ref 8.7–10.5)
CHLORIDE SERPL-SCNC: 108 MMOL/L (ref 95–110)
CK SERPL-CCNC: 94 U/L (ref 20–180)
CLARITY UR: CLEAR
CO2 SERPL-SCNC: 21 MMOL/L (ref 23–29)
COLOR UR: YELLOW
CREAT SERPL-MCNC: 1.7 MG/DL (ref 0.5–1.4)
CTP QC/QA: YES
DIFFERENTIAL METHOD: ABNORMAL
EOSINOPHIL # BLD AUTO: 0.2 K/UL (ref 0–0.5)
EOSINOPHIL NFR BLD: 3.6 % (ref 0–8)
ERYTHROCYTE [DISTWIDTH] IN BLOOD BY AUTOMATED COUNT: 13.3 % (ref 11.5–14.5)
EST. GFR  (AFRICAN AMERICAN): 39 ML/MIN/1.73 M^2
EST. GFR  (NON AFRICAN AMERICAN): 34 ML/MIN/1.73 M^2
GLUCOSE SERPL-MCNC: 177 MG/DL (ref 70–110)
GLUCOSE UR QL STRIP: ABNORMAL
HCT VFR BLD AUTO: 40.8 % (ref 37–48.5)
HGB BLD-MCNC: 12.7 G/DL (ref 12–16)
HGB UR QL STRIP: NEGATIVE
HYALINE CASTS #/AREA URNS LPF: 0 /LPF
IMM GRANULOCYTES # BLD AUTO: 0.01 K/UL (ref 0–0.04)
IMM GRANULOCYTES NFR BLD AUTO: 0.2 % (ref 0–0.5)
KETONES UR QL STRIP: NEGATIVE
LEUKOCYTE ESTERASE UR QL STRIP: NEGATIVE
LYMPHOCYTES # BLD AUTO: 1.4 K/UL (ref 1–4.8)
LYMPHOCYTES NFR BLD: 25.5 % (ref 18–48)
MCH RBC QN AUTO: 25.9 PG (ref 27–31)
MCHC RBC AUTO-ENTMCNC: 31.1 G/DL (ref 32–36)
MCV RBC AUTO: 83 FL (ref 82–98)
MICROSCOPIC COMMENT: ABNORMAL
MONOCYTES # BLD AUTO: 0.3 K/UL (ref 0.3–1)
MONOCYTES NFR BLD: 5.7 % (ref 4–15)
NEUTROPHILS # BLD AUTO: 3.6 K/UL (ref 1.8–7.7)
NEUTROPHILS NFR BLD: 64.5 % (ref 38–73)
NITRITE UR QL STRIP: POSITIVE
NON-SQ EPI CELLS #/AREA URNS HPF: 0 /HPF
NRBC BLD-RTO: 0 /100 WBC
PH UR STRIP: 6 [PH] (ref 5–8)
PLATELET # BLD AUTO: 199 K/UL (ref 150–450)
PMV BLD AUTO: 11.4 FL (ref 9.2–12.9)
POTASSIUM SERPL-SCNC: 4.7 MMOL/L (ref 3.5–5.1)
PROT SERPL-MCNC: 7.9 G/DL (ref 6–8.4)
PROT UR QL STRIP: ABNORMAL
RBC # BLD AUTO: 4.91 M/UL (ref 4–5.4)
RBC #/AREA URNS HPF: 1 /HPF (ref 0–4)
SARS-COV-2 RDRP RESP QL NAA+PROBE: NEGATIVE
SODIUM SERPL-SCNC: 141 MMOL/L (ref 136–145)
SP GR UR STRIP: 1.02 (ref 1–1.03)
SQUAMOUS #/AREA URNS HPF: 2 /HPF
TSH SERPL DL<=0.005 MIU/L-ACNC: 1.94 UIU/ML (ref 0.4–4)
URN SPEC COLLECT METH UR: ABNORMAL
UROBILINOGEN UR STRIP-ACNC: NEGATIVE EU/DL
WBC # BLD AUTO: 5.61 K/UL (ref 3.9–12.7)
WBC #/AREA URNS HPF: 4 /HPF (ref 0–5)
YEAST URNS QL MICRO: ABNORMAL

## 2022-03-17 PROCEDURE — U0002 COVID-19 LAB TEST NON-CDC: HCPCS | Performed by: NURSE PRACTITIONER

## 2022-03-17 PROCEDURE — 99285 EMERGENCY DEPT VISIT HI MDM: CPT | Mod: 25,27

## 2022-03-17 PROCEDURE — 84443 ASSAY THYROID STIM HORMONE: CPT | Performed by: EMERGENCY MEDICINE

## 2022-03-17 PROCEDURE — 81000 URINALYSIS NONAUTO W/SCOPE: CPT | Performed by: EMERGENCY MEDICINE

## 2022-03-17 PROCEDURE — 96375 TX/PRO/DX INJ NEW DRUG ADDON: CPT

## 2022-03-17 PROCEDURE — 80053 COMPREHEN METABOLIC PANEL: CPT | Performed by: EMERGENCY MEDICINE

## 2022-03-17 PROCEDURE — 63600175 PHARM REV CODE 636 W HCPCS: Performed by: EMERGENCY MEDICINE

## 2022-03-17 PROCEDURE — 36415 COLL VENOUS BLD VENIPUNCTURE: CPT | Performed by: STUDENT IN AN ORGANIZED HEALTH CARE EDUCATION/TRAINING PROGRAM

## 2022-03-17 PROCEDURE — G0378 HOSPITAL OBSERVATION PER HR: HCPCS

## 2022-03-17 PROCEDURE — 25000003 PHARM REV CODE 250: Performed by: STUDENT IN AN ORGANIZED HEALTH CARE EDUCATION/TRAINING PROGRAM

## 2022-03-17 PROCEDURE — 63600175 PHARM REV CODE 636 W HCPCS: Performed by: STUDENT IN AN ORGANIZED HEALTH CARE EDUCATION/TRAINING PROGRAM

## 2022-03-17 PROCEDURE — 82550 ASSAY OF CK (CPK): CPT | Performed by: EMERGENCY MEDICINE

## 2022-03-17 PROCEDURE — 96365 THER/PROPH/DIAG IV INF INIT: CPT

## 2022-03-17 PROCEDURE — 85025 COMPLETE CBC W/AUTO DIFF WBC: CPT | Performed by: EMERGENCY MEDICINE

## 2022-03-17 PROCEDURE — 99214 OFFICE O/P EST MOD 30 MIN: CPT | Performed by: STUDENT IN AN ORGANIZED HEALTH CARE EDUCATION/TRAINING PROGRAM

## 2022-03-17 PROCEDURE — 86592 SYPHILIS TEST NON-TREP QUAL: CPT | Performed by: STUDENT IN AN ORGANIZED HEALTH CARE EDUCATION/TRAINING PROGRAM

## 2022-03-17 PROCEDURE — 87389 HIV-1 AG W/HIV-1&-2 AB AG IA: CPT | Performed by: STUDENT IN AN ORGANIZED HEALTH CARE EDUCATION/TRAINING PROGRAM

## 2022-03-17 PROCEDURE — 82085 ASSAY OF ALDOLASE: CPT | Performed by: EMERGENCY MEDICINE

## 2022-03-17 RX ORDER — CLOPIDOGREL BISULFATE 75 MG/1
75 TABLET ORAL DAILY
Status: DISCONTINUED | OUTPATIENT
Start: 2022-03-18 | End: 2022-03-18

## 2022-03-17 RX ORDER — ASPIRIN 325 MG
325 TABLET ORAL DAILY
Status: DISCONTINUED | OUTPATIENT
Start: 2022-03-18 | End: 2022-03-17

## 2022-03-17 RX ORDER — NIFEDIPINE 30 MG/1
90 TABLET, EXTENDED RELEASE ORAL DAILY
Status: DISCONTINUED | OUTPATIENT
Start: 2022-03-18 | End: 2022-03-19 | Stop reason: HOSPADM

## 2022-03-17 RX ORDER — SODIUM CHLORIDE 0.9 % (FLUSH) 0.9 %
5 SYRINGE (ML) INJECTION
Status: DISCONTINUED | OUTPATIENT
Start: 2022-03-17 | End: 2022-03-19 | Stop reason: HOSPADM

## 2022-03-17 RX ORDER — CLOPIDOGREL BISULFATE 75 MG/1
300 TABLET ORAL ONCE
Status: COMPLETED | OUTPATIENT
Start: 2022-03-17 | End: 2022-03-17

## 2022-03-17 RX ORDER — FLUOXETINE HYDROCHLORIDE 20 MG/1
20 CAPSULE ORAL NIGHTLY
Status: DISCONTINUED | OUTPATIENT
Start: 2022-03-17 | End: 2022-03-19 | Stop reason: HOSPADM

## 2022-03-17 RX ORDER — ATORVASTATIN CALCIUM 40 MG/1
80 TABLET, FILM COATED ORAL NIGHTLY
Status: DISCONTINUED | OUTPATIENT
Start: 2022-03-17 | End: 2022-03-19 | Stop reason: HOSPADM

## 2022-03-17 RX ORDER — NALOXONE HCL 0.4 MG/ML
0.02 VIAL (ML) INJECTION
Status: DISCONTINUED | OUTPATIENT
Start: 2022-03-17 | End: 2022-03-19 | Stop reason: HOSPADM

## 2022-03-17 RX ORDER — INSULIN ASPART 100 [IU]/ML
1-10 INJECTION, SOLUTION INTRAVENOUS; SUBCUTANEOUS
Status: DISCONTINUED | OUTPATIENT
Start: 2022-03-17 | End: 2022-03-19 | Stop reason: HOSPADM

## 2022-03-17 RX ORDER — IBUPROFEN 200 MG
24 TABLET ORAL
Status: DISCONTINUED | OUTPATIENT
Start: 2022-03-17 | End: 2022-03-19 | Stop reason: HOSPADM

## 2022-03-17 RX ORDER — ONDANSETRON 2 MG/ML
4 INJECTION INTRAMUSCULAR; INTRAVENOUS EVERY 8 HOURS PRN
Status: DISCONTINUED | OUTPATIENT
Start: 2022-03-17 | End: 2022-03-19 | Stop reason: HOSPADM

## 2022-03-17 RX ORDER — ACETAMINOPHEN 325 MG/1
650 TABLET ORAL EVERY 6 HOURS PRN
Status: DISCONTINUED | OUTPATIENT
Start: 2022-03-17 | End: 2022-03-19 | Stop reason: HOSPADM

## 2022-03-17 RX ORDER — ASPIRIN 81 MG/1
81 TABLET ORAL DAILY
Status: DISCONTINUED | OUTPATIENT
Start: 2022-03-18 | End: 2022-03-18

## 2022-03-17 RX ORDER — GABAPENTIN 300 MG/1
300 CAPSULE ORAL 3 TIMES DAILY
Status: DISCONTINUED | OUTPATIENT
Start: 2022-03-17 | End: 2022-03-19 | Stop reason: HOSPADM

## 2022-03-17 RX ORDER — LISINOPRIL 20 MG/1
40 TABLET ORAL DAILY
Status: DISCONTINUED | OUTPATIENT
Start: 2022-03-18 | End: 2022-03-19 | Stop reason: HOSPADM

## 2022-03-17 RX ORDER — ASPIRIN 325 MG
325 TABLET ORAL ONCE
Status: COMPLETED | OUTPATIENT
Start: 2022-03-17 | End: 2022-03-17

## 2022-03-17 RX ORDER — GLUCAGON 1 MG
1 KIT INJECTION
Status: DISCONTINUED | OUTPATIENT
Start: 2022-03-17 | End: 2022-03-19 | Stop reason: HOSPADM

## 2022-03-17 RX ORDER — TALC
9 POWDER (GRAM) TOPICAL NIGHTLY PRN
Status: DISCONTINUED | OUTPATIENT
Start: 2022-03-17 | End: 2022-03-19 | Stop reason: HOSPADM

## 2022-03-17 RX ORDER — AMOXICILLIN 250 MG
1 CAPSULE ORAL 2 TIMES DAILY PRN
Status: DISCONTINUED | OUTPATIENT
Start: 2022-03-17 | End: 2022-03-19 | Stop reason: HOSPADM

## 2022-03-17 RX ORDER — IBUPROFEN 200 MG
16 TABLET ORAL
Status: DISCONTINUED | OUTPATIENT
Start: 2022-03-17 | End: 2022-03-19 | Stop reason: HOSPADM

## 2022-03-17 RX ADMIN — LORAZEPAM 2 MG: 2 INJECTION INTRAMUSCULAR at 09:03

## 2022-03-17 RX ADMIN — ASPIRIN 325 MG ORAL TABLET 325 MG: 325 PILL ORAL at 07:03

## 2022-03-17 RX ADMIN — CLOPIDOGREL 300 MG: 75 TABLET, FILM COATED ORAL at 07:03

## 2022-03-17 RX ADMIN — CEFTRIAXONE 1 G: 1 INJECTION, SOLUTION INTRAVENOUS at 07:03

## 2022-03-17 NOTE — ED NOTES
"Pt. Reports cough, congestion, post-nasal drip, nausea X4 days. Generalized weakness. Pt. States starting a week ago her rt. Leg has been "giving out" on her when she walks or stands. Denies any numbness/altered sensation. Denies pain in leg. States she is ambulating normally until leg gives out.   "

## 2022-03-17 NOTE — ED NOTES
Pt here with right leg weakness. Pt states onset 4 days ago. Pt also has cough. Skin warm and dry. Sensation intact. 2+ bilat pedal pulses. Pt gait weak due to right leg. Pt denies trauma

## 2022-03-17 NOTE — FIRST PROVIDER EVALUATION
Emergency Department TeleTriage Encounter Note      CHIEF COMPLAINT    Chief Complaint   Patient presents with    Cough     Pt states woke up on sun with cough and post nasal drip, with and emesis, denies fever, denies pain with urination, and generalized weakness, +sob/ denies CP        VITAL SIGNS   Initial Vitals [03/17/22 1603]   BP Pulse Resp Temp SpO2   (!) 128/92 93 18 98.5 °F (36.9 °C) 100 %      MAP       --            ALLERGIES    Review of patient's allergies indicates:  No Known Allergies    PROVIDER TRIAGE NOTE  Onset 3d, using mucinex.  PND present.  Nausea present.  Decreased appetite.  Neg for fever.  covid risk score 3    Addendum at 1718 was contacted by secure chat by Dr. Lino who informed me and Dr. Watts that pts real reason for being here is she was sent for right leg weakness.  Offered Dr. RICO if I could add a ct head or ct head/neck.  Awaiting response.       ORDERS  Labs Reviewed - No data to display    ED Orders (720h ago, onward)    Start Ordered     Status Ordering Provider    03/17/22 1618 03/17/22 1617  POCT COVID-19 Rapid Screening  Once         Ordered MAGALIS CASAS            Virtual Visit Note: The provider triage portion of this emergency department evaluation and documentation was performed via [employer], a HIPAA-compliant telemedicine application, in concert with a tele-presenter in the room. A face to face patient evaluation with one of my colleagues will occur once the patient is placed in an emergency department room.      DISCLAIMER: This note was prepared with [employer] voice recognition transcription software. Garbled syntax, mangled pronouns, and other bizarre constructions may be attributed to that software system.

## 2022-03-17 NOTE — ED PROVIDER NOTES
"Encounter Date: 3/17/2022       History     Chief Complaint   Patient presents with    Cough     Pt states woke up on sun with cough and post nasal drip, with and emesis, denies fever, denies pain with urination, and generalized weakness, +sob/ denies CP         Patient is a 51 yo AAF with pmhx of DM2 with CKD, depression, HTN who presents to the ED with the complaint of R leg weakness that began yesterday. Patient states that she was walking when her RLE "gave out." She states that this was a transient episode that resolved after several hours.  She states that she had similar symptoms last week. She denies any recent trauma, numbness, tingling, vision change, headache, paresthesias or other complaints. She denies any rashes, tick bites, excessive alcohol use, recent travel, new medications or environmental exposure. Pt was evaluated in family medicine clinic for evaluation and subsequently sent to the ED for evaluation.    Patient also reports feeling generally ill since 3/14 with occasional nausea without vomiting. She does report cough and post nasal drip with associated generalized fatigue. She denies any overt CP, SOB, fever or chills.         Review of patient's allergies indicates:  No Known Allergies  Past Medical History:   Diagnosis Date    Aneurysm of cardiac wall, congenital     Bilateral lower extremity edema     2/2 calcium channel blocker    Diabetes type 2, uncontrolled     HLD (hyperlipidemia)     Hypertension     Vitamin D deficiency      Past Surgical History:   Procedure Laterality Date    BREAST CYST EXCISION  2003    BREAST SURGERY      cyst removal     CATARACT EXTRACTION W/  INTRAOCULAR LENS IMPLANT      COLONOSCOPY N/A 7/23/2021    Procedure: COLONOSCOPY;  Surgeon: Sapna Fitzgerald MD;  Location: [facility];  Service: Endoscopy;  Laterality: N/A;    HYSTERECTOMY  2002    PARTIAL HYSTERECTOMY  2002     Family History   Problem Relation Age of Onset    Diabetes Mother     " Heart disease Mother     Cancer Mother 40        breast    Breast cancer Mother     Diabetes Father     Cancer Maternal Grandmother 82        breast    Breast cancer Maternal Grandmother      Social History     Tobacco Use    Smoking status: Never Smoker    Smokeless tobacco: Never Used   Substance Use Topics    Alcohol use: Yes     Comment: seldom    Drug use: No     Review of Systems   Constitutional: Negative for fatigue and fever.   HENT: Positive for congestion.    Respiratory: Positive for cough. Negative for shortness of breath.    Cardiovascular: Negative for chest pain, palpitations and leg swelling.   Gastrointestinal: Negative for abdominal pain, nausea and vomiting.   Musculoskeletal: Positive for gait problem. Negative for arthralgias, back pain, joint swelling, myalgias, neck pain and neck stiffness.   Neurological: Positive for weakness. Negative for dizziness, tremors, seizures, syncope, facial asymmetry, speech difficulty, light-headedness, numbness and headaches.   Psychiatric/Behavioral: Negative for agitation and confusion.       Physical Exam     Initial Vitals [03/17/22 1603]   BP Pulse Resp Temp SpO2   (!) 128/92 93 18 98.5 °F (36.9 °C) 100 %      MAP       --         Physical Exam    Nursing note and vitals reviewed.  Constitutional: She appears well-developed and well-nourished. She appears lethargic.   Anxious, tearful    HENT:   Head: Normocephalic and atraumatic.   Right Ear: External ear normal.   Left Ear: External ear normal.   Eyes: Conjunctivae and EOM are normal. Pupils are equal, round, and reactive to light.   Neck: Neck supple.   Normal range of motion.  Cardiovascular: Normal rate, regular rhythm, normal heart sounds and intact distal pulses.   Pulmonary/Chest: Breath sounds normal.   Abdominal: Bowel sounds are normal.   Musculoskeletal:      Cervical back: Normal range of motion and neck supple.     Neurological: She is oriented to person, place, and time. She appears  lethargic. No cranial nerve deficit or sensory deficit. GCS eye subscore is 4. GCS verbal subscore is 5. GCS motor subscore is 6.   Reflex Scores:       Patellar reflexes are 3+ on the right side.  Hyper-reflexive R patellar reflex  Some difficulty with R HTS      Skin: Capillary refill takes less than 2 seconds.         ED Course   Procedures  Labs Reviewed   CBC W/ AUTO DIFFERENTIAL - Abnormal; Notable for the following components:       Result Value    MCH 25.9 (*)     MCHC 31.1 (*)     All other components within normal limits   COMPREHENSIVE METABOLIC PANEL - Abnormal; Notable for the following components:    CO2 21 (*)     Glucose 177 (*)     BUN 30 (*)     Creatinine 1.7 (*)     ALT 8 (*)     eGFR if  39 (*)     eGFR if non  34 (*)     All other components within normal limits   URINALYSIS, REFLEX TO URINE CULTURE - Abnormal; Notable for the following components:    Protein, UA 1+ (*)     Glucose, UA 4+ (*)     Nitrite, UA Positive (*)     All other components within normal limits    Narrative:     Specimen Source->Urine   URINALYSIS MICROSCOPIC - Abnormal; Notable for the following components:    Bacteria Many (*)     All other components within normal limits    Narrative:     Specimen Source->Urine   CK   TSH   TSH   SARS-COV-2 RDRP GENE   POCT GLUCOSE, HAND-HELD DEVICE          Imaging Results          MRA Neck without contrast (Final result)  Result time 03/17/22 22:18:31    Final result by Sal Wilson MD (03/17/22 22:18:31)                 Impression:      MRI brain:    No MR evidence of acute infarction.    Mild cerebellar tonsillar ectopia.    Paranasal sinus disease.    MRA brain:    Unremarkable MRA of the intracranial circulation.    MRA neck:    Limited MRA of the neck examination secondary to motion.  No definitive stenosis.  Carotid ultrasound may be attempted for further evaluation.      Electronically signed by: Sal Wilson  MD  Date:    03/17/2022  Time:    22:18             Narrative:    EXAMINATION:  MRI BRAIN WITHOUT CONTRAST; MRA BRAIN WITHOUT CONTRAST; MRA NECK WITHOUT CONTRAST    CLINICAL HISTORY:  New neurological deficits;; Neuro deficit, acute, stroke suspected;    TECHNIQUE:  Per separate acquisition multiplanar multisequence MR imaging of the brain was performed without contrast.    3D time-of-flight MRA of the intracranial circulation was performed without contrast. MIP reconstructions were obtained and reviewed.    3D time-of-flight MRA of the neck was performed without intravenous contrast.  MIP reconstructions were obtained and reviewed.    COMPARISON:  CT head dated 03/17/2021.    FINDINGS:  MRI brain:    There is mild cerebellar tonsillar ectopia.  The sellar and parasellar structures are within normal limits.  The midline structures are intact.  The intracranial flow voids are within normal limits.    No diffusion-weighted signal abnormality is present.  There is no focal parenchymal signal abnormality.  The ventricles and sulci are within normal limits.  There are no extra-axial fluid collections.  There is no evidence of intracranial hemorrhage.  There is no evidence of mass effect.    There are postoperative changes in the globes.  There is mucosal thickening within the maxillary sinuses.  There is fluid within the right sphenoid sinus.  The mastoid air cells are clear.  The calvarium is intact.    MRA brain:    The intracranial segments of the ICAs are within normal limits.  The anterior cerebral arteries and anterior communicating complex are within normal limits.  The middle cerebral arteries are unremarkable.    The vertebral arteries are within normal limits.  The basilar is unremarkable.  The posterior cerebral arteries are within normal limits.    There is no evidence of aneurysm or stenosis of the intracranial vessels.    MRA neck:    The MRA neck examination is significantly degraded by motion.    The  origins of the carotid arteries are significantly limited in evaluation given extensive motion.  The internal and external carotid arteries appear unremarkable.  The vertebral arteries appear grossly unremarkable.    There is no hemodynamically significant stenosis of the neck vessels.                               MRI Brain Without Contrast (Final result)  Result time 03/17/22 22:18:31    Final result by Sal Wilson MD (03/17/22 22:18:31)                 Impression:      MRI brain:    No MR evidence of acute infarction.    Mild cerebellar tonsillar ectopia.    Paranasal sinus disease.    MRA brain:    Unremarkable MRA of the intracranial circulation.    MRA neck:    Limited MRA of the neck examination secondary to motion.  No definitive stenosis.  Carotid ultrasound may be attempted for further evaluation.      Electronically signed by: Sal Wilson MD  Date:    03/17/2022  Time:    22:18             Narrative:    EXAMINATION:  MRI BRAIN WITHOUT CONTRAST; MRA BRAIN WITHOUT CONTRAST; MRA NECK WITHOUT CONTRAST    CLINICAL HISTORY:  New neurological deficits;; Neuro deficit, acute, stroke suspected;    TECHNIQUE:  Per separate acquisition multiplanar multisequence MR imaging of the brain was performed without contrast.    3D time-of-flight MRA of the intracranial circulation was performed without contrast. MIP reconstructions were obtained and reviewed.    3D time-of-flight MRA of the neck was performed without intravenous contrast.  MIP reconstructions were obtained and reviewed.    COMPARISON:  CT head dated 03/17/2021.    FINDINGS:  MRI brain:    There is mild cerebellar tonsillar ectopia.  The sellar and parasellar structures are within normal limits.  The midline structures are intact.  The intracranial flow voids are within normal limits.    No diffusion-weighted signal abnormality is present.  There is no focal parenchymal signal abnormality.  The ventricles and sulci are within normal limits.  There are no  extra-axial fluid collections.  There is no evidence of intracranial hemorrhage.  There is no evidence of mass effect.    There are postoperative changes in the globes.  There is mucosal thickening within the maxillary sinuses.  There is fluid within the right sphenoid sinus.  The mastoid air cells are clear.  The calvarium is intact.    MRA brain:    The intracranial segments of the ICAs are within normal limits.  The anterior cerebral arteries and anterior communicating complex are within normal limits.  The middle cerebral arteries are unremarkable.    The vertebral arteries are within normal limits.  The basilar is unremarkable.  The posterior cerebral arteries are within normal limits.    There is no evidence of aneurysm or stenosis of the intracranial vessels.    MRA neck:    The MRA neck examination is significantly degraded by motion.    The origins of the carotid arteries are significantly limited in evaluation given extensive motion.  The internal and external carotid arteries appear unremarkable.  The vertebral arteries appear grossly unremarkable.    There is no hemodynamically significant stenosis of the neck vessels.                               MRA Brain without contrast (Final result)  Result time 03/17/22 22:18:31    Final result by Sal Wilson MD (03/17/22 22:18:31)                 Impression:      MRI brain:    No MR evidence of acute infarction.    Mild cerebellar tonsillar ectopia.    Paranasal sinus disease.    MRA brain:    Unremarkable MRA of the intracranial circulation.    MRA neck:    Limited MRA of the neck examination secondary to motion.  No definitive stenosis.  Carotid ultrasound may be attempted for further evaluation.      Electronically signed by: Sal Wilson MD  Date:    03/17/2022  Time:    22:18             Narrative:    EXAMINATION:  MRI BRAIN WITHOUT CONTRAST; MRA BRAIN WITHOUT CONTRAST; MRA NECK WITHOUT CONTRAST    CLINICAL HISTORY:  New neurological deficits;; Neuro  deficit, acute, stroke suspected;    TECHNIQUE:  Per separate acquisition multiplanar multisequence MR imaging of the brain was performed without contrast.    3D time-of-flight MRA of the intracranial circulation was performed without contrast. MIP reconstructions were obtained and reviewed.    3D time-of-flight MRA of the neck was performed without intravenous contrast.  MIP reconstructions were obtained and reviewed.    COMPARISON:  CT head dated 03/17/2021.    FINDINGS:  MRI brain:    There is mild cerebellar tonsillar ectopia.  The sellar and parasellar structures are within normal limits.  The midline structures are intact.  The intracranial flow voids are within normal limits.    No diffusion-weighted signal abnormality is present.  There is no focal parenchymal signal abnormality.  The ventricles and sulci are within normal limits.  There are no extra-axial fluid collections.  There is no evidence of intracranial hemorrhage.  There is no evidence of mass effect.    There are postoperative changes in the globes.  There is mucosal thickening within the maxillary sinuses.  There is fluid within the right sphenoid sinus.  The mastoid air cells are clear.  The calvarium is intact.    MRA brain:    The intracranial segments of the ICAs are within normal limits.  The anterior cerebral arteries and anterior communicating complex are within normal limits.  The middle cerebral arteries are unremarkable.    The vertebral arteries are within normal limits.  The basilar is unremarkable.  The posterior cerebral arteries are within normal limits.    There is no evidence of aneurysm or stenosis of the intracranial vessels.    MRA neck:    The MRA neck examination is significantly degraded by motion.    The origins of the carotid arteries are significantly limited in evaluation given extensive motion.  The internal and external carotid arteries appear unremarkable.  The vertebral arteries appear grossly unremarkable.    There  is no hemodynamically significant stenosis of the neck vessels.                               CT Head Without Contrast (Final result)  Result time 03/17/22 18:19:49    Final result by Pierce Brennan MD (03/17/22 18:19:49)                 Impression:      1. No acute intracranial abnormalities.  2. Sinus disease and minimal left mastoid effusion as above.      Electronically signed by: Pierce Brennan MD  Date:    03/17/2022  Time:    18:19             Narrative:    EXAMINATION:  CT HEAD WITHOUT CONTRAST    CLINICAL HISTORY:  Motor neuron disease suspected;    TECHNIQUE:  Low dose axial images were obtained through the head.  Coronal and sagittal reformations were also performed. Contrast was not administered.    COMPARISON:  10/02/2016    FINDINGS:  There is no evidence of acute major vascular territory infarct, hemorrhage, or mass.  There is no hydrocephalus.  There are no abnormal extra-axial fluid collections.  There are mucous retention cysts or polyps within the bilateral maxillary sinuses as well as within the right sphenoid sinus.  There is minimal opacification of the inferior most left mastoid air cells, otherwise the visualized paranasal sinuses and mastoid air cells are clear, and there is no evidence of calvarial fracture.  The visualized soft tissues are unremarkable.                               X-Ray Knee 1 or 2 View Right (Final result)  Result time 03/17/22 17:58:35    Final result by Pierce Brennan MD (03/17/22 17:58:35)                 Impression:      1. No acute displaced fracture or dislocation of the knee.      Electronically signed by: Pierce Brennan MD  Date:    03/17/2022  Time:    17:58             Narrative:    EXAMINATION:  XR KNEE 1 OR 2 VIEW RIGHT    CLINICAL HISTORY:  Pain in right knee    TECHNIQUE:  AP and lateral views of the right knee were performed.    COMPARISON:  None    FINDINGS:  Two views right knee.    No acute displaced fracture or dislocation of the knee.  No  radiopaque foreign body.  No large knee joint effusion.                               X-Ray Chest 1 View (Final result)  Result time 03/17/22 17:32:48   Procedure changed from X-Ray Chest PA And Lateral     Final result by Sal Wilson MD (03/17/22 17:32:48)                 Impression:      No acute process.      Electronically signed by: Sal Wilson MD  Date:    03/17/2022  Time:    17:32             Narrative:    EXAMINATION:  XR CHEST 1 VIEW    CLINICAL HISTORY:  Cough, unspecified    TECHNIQUE:  Single frontal view of the chest was performed.    COMPARISON:  03/17/2020.    FINDINGS:  There are stable postop changes in the chest.  The trachea is unremarkable.  The cardiomediastinal silhouette is within normal limits.  The hemidiaphragms are unremarkable.  There are no pleural effusions.  There is no evidence of a pneumothorax.  There is no evidence of pneumomediastinum.  No airspace opacity is present.  The osseous structures are unremarkable.                                 Medications   atorvastatin tablet 80 mg (80 mg Oral Given 3/18/22 0027)   aspirin EC tablet 81 mg (81 mg Oral Given 3/18/22 0807)   clopidogreL tablet 75 mg (75 mg Oral Given 3/18/22 0808)   gabapentin capsule 300 mg (300 mg Oral Given 3/18/22 0808)   lisinopriL tablet 40 mg (40 mg Oral Given 3/18/22 0808)   NIFEdipine 24 hr tablet 90 mg (90 mg Oral Given 3/18/22 0808)   sodium chloride 0.9% flush 5 mL (has no administration in time range)   melatonin tablet 9 mg (has no administration in time range)   senna-docusate 8.6-50 mg per tablet 1 tablet (has no administration in time range)   acetaminophen tablet 650 mg (has no administration in time range)   naloxone 0.4 mg/mL injection 0.02 mg (has no administration in time range)   insulin aspart U-100 pen 1-10 Units (4 Units Subcutaneous Given 3/18/22 0031)   glucose chewable tablet 16 g (has no administration in time range)   glucose chewable tablet 24 g (has no administration in time range)    glucagon (human recombinant) injection 1 mg (has no administration in time range)   ondansetron injection 4 mg (has no administration in time range)   cefTRIAXone (ROCEPHIN) 1 g/50 mL D5W IVPB (has no administration in time range)   insulin detemir U-100 pen 23 Units (23 Units Subcutaneous Given 3/18/22 0027)   dextrose 10% bolus 125 mL (has no administration in time range)   dextrose 10% bolus 250 mL (has no administration in time range)   FLUoxetine capsule 20 mg (20 mg Oral Given 3/18/22 0027)   cefTRIAXone (ROCEPHIN) 1 g/50 mL D5W IVPB (0 g Intravenous Stopped 3/17/22 1952)   clopidogreL tablet 300 mg (300 mg Oral Given 3/17/22 1913)   aspirin tablet 325 mg (325 mg Oral Given 3/17/22 1957)   lorazepam (ATIVAN) injection 2 mg (2 mg Intravenous Given 3/17/22 2100)     Medical Decision Making:   Clinical Tests:   Lab Tests: Ordered  Radiological Study: Ordered  ED Management:  - will obtain basic labs, CXR, plain radiograph of the R knee, CT head; pt may benefit from more advanced imaging, but that decision is to be made by the on-coming ED physician who will assume care of Ms. Lino  - care of patient assumed by on-coming ED physician, Dr. Jessica Paulino, as of shift change. Dr. Paulino was extensively updated regarding the patient's presentation and emergency department work up. Dr. Paulino will ultimately be responsible for final plan and disposition of this patient. Please see Dr. Paulino' notes/updates for further details/plan of care                         Clinical Impression:   Final diagnoses:  [R05.9] Cough  [M25.561] Right knee pain  [R29.898] Right leg weakness (Primary)          ED Disposition Condition    Observation               Michael Watts MD  03/18/22 9995

## 2022-03-17 NOTE — PROGRESS NOTES
"Clinic Note  [facility]    Subjective:      Bonnie Lino is a 52 y.o. female DM2 with CKD, HTN, depression. Patient presented to clinic today due to right leg weakness since yesterday. Patient says that the week before last, there were a couple of brief resolved moments where she lost control of her right leg, but then function came back to normal right away. Since yesterday, her right leg has been giving out constantly. Patient also reports feeling generally sick since 3/14 with occasional nausea without vomiting, "pulling feeling" in her head. Patient denies injury or trauma, back pain, urine/bowel incontinence, numbness/tingling. No fever, no chest pain, no SOB. Patient reports adherence to her home DM and HTN medication regiment. Patient was admitted for similar presentation in 1/2020, but did not undergo imaging, was treated for uncontrolled DM and hyperkalemia. No history of CVA or MI.      Review of Systems   Constitutional: Negative for chills and fever.   Eyes: Negative for blurred vision.   Respiratory: Negative for cough and shortness of breath.    Cardiovascular: Negative for chest pain and palpitations.   Gastrointestinal: Positive for nausea. Negative for abdominal pain, diarrhea, heartburn and vomiting.   Genitourinary: Negative for dysuria and frequency.   Musculoskeletal: Negative for joint pain and myalgias.   Neurological: Positive for focal weakness (Right LE) and headaches. Negative for dizziness, sensory change, speech change, seizures and loss of consciousness.   Psychiatric/Behavioral: Positive for depression. The patient is nervous/anxious.          Objective:      Vitals:    03/17/22 1504   BP: (!) 151/94   Pulse: 90     Body mass index is 25.62 kg/m².    Physical Exam  Constitutional:       General: She is not in acute distress.     Appearance: Normal appearance. She is not ill-appearing.   Eyes:      Extraocular Movements: Extraocular movements intact.      Pupils: Pupils are " equal, round, and reactive to light.   Musculoskeletal:      Right lower leg: No edema.      Left lower leg: No edema.   Skin:     General: Skin is warm and dry.      Capillary Refill: Capillary refill takes less than 2 seconds.   Neurological:      Mental Status: She is alert and oriented to person, place, and time.      Cranial Nerves: No cranial nerve deficit.      Sensory: No sensory deficit.      Motor: Weakness (R leg - global) present.      Coordination: Coordination abnormal (Abnormal R heel to L shin).      Gait: Gait abnormal.      Deep Tendon Reflexes: Reflexes abnormal (R leg hyper-reflexive patellar reflex).   Psychiatric:      Comments: Depressed, nervous affect          Assessment:       1. Weakness of right leg      In light of symptoms which appear to have largely appeared yesterday and focal findings on neuro exam with pronounced R LE weakness (UE weakness intact), patient will be referred to ED for evaluation.      Plan:       Bonnie SOLOMON was seen today for extremity weakness and anorexia.    Diagnoses and all orders for this visit:    Weakness of right leg       - ED Evaluation      Al Ingram MD  [facility] PGY-1  3/17/2022

## 2022-03-17 NOTE — PROVIDER PROGRESS NOTES - EMERGENCY DEPT.
Encounter Date: 3/17/2022    ED Physician Progress Notes             **This is an assumption of care note**    Case accepted from Dr. Garg at shift change, pending completion of workup.    Pt has been having right sided leg weakness for some time. Got worse yesterday. Was seen in Family Medicine clinic today and sent to the ED for further evaluation.     Her physical exam was pertinent for some difficulty with heel to shin on the right side.     Her CT scan was unremarkable.     Imaging Results          CT Head Without Contrast (Final result)  Result time 03/17/22 18:19:49    Final result by Pierce Brennan MD (03/17/22 18:19:49)                 Impression:      1. No acute intracranial abnormalities.  2. Sinus disease and minimal left mastoid effusion as above.      Electronically signed by: Pierce Brennan MD  Date:    03/17/2022  Time:    18:19             Narrative:    EXAMINATION:  CT HEAD WITHOUT CONTRAST    CLINICAL HISTORY:  Motor neuron disease suspected;    TECHNIQUE:  Low dose axial images were obtained through the head.  Coronal and sagittal reformations were also performed. Contrast was not administered.    COMPARISON:  10/02/2016    FINDINGS:  There is no evidence of acute major vascular territory infarct, hemorrhage, or mass.  There is no hydrocephalus.  There are no abnormal extra-axial fluid collections.  There are mucous retention cysts or polyps within the bilateral maxillary sinuses as well as within the right sphenoid sinus.  There is minimal opacification of the inferior most left mastoid air cells, otherwise the visualized paranasal sinuses and mastoid air cells are clear, and there is no evidence of calvarial fracture.  The visualized soft tissues are unremarkable.                               X-Ray Knee 1 or 2 View Right (Final result)  Result time 03/17/22 17:58:35    Final result by Pierce Brennan MD (03/17/22 17:58:35)                 Impression:      1. No acute displaced  fracture or dislocation of the knee.      Electronically signed by: Pierce Brennan MD  Date:    03/17/2022  Time:    17:58             Narrative:    EXAMINATION:  XR KNEE 1 OR 2 VIEW RIGHT    CLINICAL HISTORY:  Pain in right knee    TECHNIQUE:  AP and lateral views of the right knee were performed.    COMPARISON:  None    FINDINGS:  Two views right knee.    No acute displaced fracture or dislocation of the knee.  No radiopaque foreign body.  No large knee joint effusion.                               X-Ray Chest 1 View (Final result)  Result time 03/17/22 17:32:48   Procedure changed from X-Ray Chest PA And Lateral     Final result by Sal Wilson MD (03/17/22 17:32:48)                 Impression:      No acute process.      Electronically signed by: Sal Wilson MD  Date:    03/17/2022  Time:    17:32             Narrative:    EXAMINATION:  XR CHEST 1 VIEW    CLINICAL HISTORY:  Cough, unspecified    TECHNIQUE:  Single frontal view of the chest was performed.    COMPARISON:  03/17/2020.    FINDINGS:  There are stable postop changes in the chest.  The trachea is unremarkable.  The cardiomediastinal silhouette is within normal limits.  The hemidiaphragms are unremarkable.  There are no pleural effusions.  There is no evidence of a pneumothorax.  There is no evidence of pneumomediastinum.  No airspace opacity is present.  The osseous structures are unremarkable.                                  Labs significant for UTI for which she received ABX. Baseline CKD     Labs Reviewed   CBC W/ AUTO DIFFERENTIAL - Abnormal; Notable for the following components:       Result Value    MCH 25.9 (*)     MCHC 31.1 (*)     All other components within normal limits   COMPREHENSIVE METABOLIC PANEL - Abnormal; Notable for the following components:    CO2 21 (*)     Glucose 177 (*)     BUN 30 (*)     Creatinine 1.7 (*)     ALT 8 (*)     eGFR if  39 (*)     eGFR if non  34 (*)     All other components  within normal limits   URINALYSIS, REFLEX TO URINE CULTURE - Abnormal; Notable for the following components:    Protein, UA 1+ (*)     Glucose, UA 4+ (*)     Nitrite, UA Positive (*)     All other components within normal limits    Narrative:     Specimen Source->Urine   URINALYSIS MICROSCOPIC - Abnormal; Notable for the following components:    Bacteria Many (*)     All other components within normal limits    Narrative:     Specimen Source->Urine   CK   TSH   TSH   ALDOLASE   SARS-COV-2 RDRP GENE          I spoke with [facility] who accepts for admission    The primary encounter diagnosis was Right leg weakness.

## 2022-03-18 LAB
AORTIC ROOT ANNULUS: 2.56 CM
AV INDEX (PROSTH): 1.03
AV MEAN GRADIENT: 4 MMHG
AV PEAK GRADIENT: 7 MMHG
AV VALVE AREA: 2.71 CM2
AV VELOCITY RATIO: 1.11
BSA FOR ECHO PROCEDURE: 1.86 M2
CHOLEST SERPL-MCNC: 144 MG/DL (ref 120–199)
CHOLEST/HDLC SERPL: 3.1 {RATIO} (ref 2–5)
CV ECHO LV RWT: 0.57 CM
DOP CALC AO PEAK VEL: 1.29 M/S
DOP CALC AO VTI: 24.63 CM
DOP CALC LVOT AREA: 2.6 CM2
DOP CALC LVOT DIAMETER: 1.83 CM
DOP CALC LVOT PEAK VEL: 1.43 M/S
DOP CALC LVOT STROKE VOLUME: 66.75 CM3
DOP CALC MV VTI: 19.11 CM
DOP CALCLVOT PEAK VEL VTI: 25.39 CM
E WAVE DECELERATION TIME: 164.1 MSEC
E/A RATIO: 1.15
E/E' RATIO: 9.63 M/S
ECHO LV POSTERIOR WALL: 0.97 CM (ref 0.6–1.1)
EJECTION FRACTION: 70 %
ERYTHROCYTE [SEDIMENTATION RATE] IN BLOOD BY WESTERGREN METHOD: 32 MM/HR (ref 0–20)
ESTIMATED AVG GLUCOSE: 186 MG/DL (ref 68–131)
FOLATE SERPL-MCNC: 7.6 NG/ML (ref 4–24)
FRACTIONAL SHORTENING: 44 % (ref 28–44)
HBA1C MFR BLD: 8.1 % (ref 4–5.6)
HDLC SERPL-MCNC: 46 MG/DL (ref 40–75)
HDLC SERPL: 31.9 % (ref 20–50)
HIV 1+2 AB+HIV1 P24 AG SERPL QL IA: NEGATIVE
INTERVENTRICULAR SEPTUM: 1.17 CM (ref 0.6–1.1)
LA MAJOR: 4.42 CM
LA MINOR: 4 CM
LA WIDTH: 2.39 CM
LDLC SERPL CALC-MCNC: 71.4 MG/DL (ref 63–159)
LEFT ATRIUM SIZE: 2.88 CM
LEFT ATRIUM VOLUME INDEX MOD: 9.4 ML/M2
LEFT ATRIUM VOLUME INDEX: 13.4 ML/M2
LEFT ATRIUM VOLUME MOD: 17.29 CM3
LEFT ATRIUM VOLUME: 24.57 CM3
LEFT INTERNAL DIMENSION IN SYSTOLE: 1.93 CM (ref 2.1–4)
LEFT VENTRICLE DIASTOLIC VOLUME INDEX: 26.18 ML/M2
LEFT VENTRICLE DIASTOLIC VOLUME: 48.18 ML
LEFT VENTRICLE MASS INDEX: 60 G/M2
LEFT VENTRICLE SYSTOLIC VOLUME INDEX: 6.3 ML/M2
LEFT VENTRICLE SYSTOLIC VOLUME: 11.62 ML
LEFT VENTRICULAR INTERNAL DIMENSION IN DIASTOLE: 3.42 CM (ref 3.5–6)
LEFT VENTRICULAR MASS: 110.32 G
LV LATERAL E/E' RATIO: 8.56 M/S
LV SEPTAL E/E' RATIO: 11 M/S
MV MEAN GRADIENT: 1 MMHG
MV PEAK A VEL: 0.67 M/S
MV PEAK E VEL: 0.77 M/S
MV PEAK GRADIENT: 2 MMHG
MV STENOSIS PRESSURE HALF TIME: 47.59 MS
MV VALVE AREA BY CONTINUITY EQUATION: 3.49 CM2
MV VALVE AREA P 1/2 METHOD: 4.62 CM2
NONHDLC SERPL-MCNC: 98 MG/DL
PISA TR MAX VEL: 2.42 M/S
POCT GLUCOSE: 172 MG/DL (ref 70–110)
POCT GLUCOSE: 190 MG/DL (ref 70–110)
POCT GLUCOSE: 251 MG/DL (ref 70–110)
POCT GLUCOSE: 324 MG/DL (ref 70–110)
POCT GLUCOSE: 342 MG/DL (ref 70–110)
PV PEAK VELOCITY: 1.04 CM/S
RA MAJOR: 4.08 CM
RA PRESSURE: 3 MMHG
RA WIDTH: 2.38 CM
RIGHT VENTRICULAR END-DIASTOLIC DIMENSION: 2.84 CM
RPR SER QL: NORMAL
RV TISSUE DOPPLER FREE WALL SYSTOLIC VELOCITY 1 (APICAL 4 CHAMBER VIEW): 12.79 CM/S
TDI LATERAL: 0.09 M/S
TDI SEPTAL: 0.07 M/S
TDI: 0.08 M/S
TR MAX PG: 23 MMHG
TRIGL SERPL-MCNC: 133 MG/DL (ref 30–150)
TV REST PULMONARY ARTERY PRESSURE: 26 MMHG
VIT B12 SERPL-MCNC: 534 PG/ML (ref 210–950)

## 2022-03-18 PROCEDURE — 63600175 PHARM REV CODE 636 W HCPCS: Performed by: STUDENT IN AN ORGANIZED HEALTH CARE EDUCATION/TRAINING PROGRAM

## 2022-03-18 PROCEDURE — 97165 OT EVAL LOW COMPLEX 30 MIN: CPT

## 2022-03-18 PROCEDURE — G0378 HOSPITAL OBSERVATION PER HR: HCPCS

## 2022-03-18 PROCEDURE — C9399 UNCLASSIFIED DRUGS OR BIOLOG: HCPCS | Performed by: STUDENT IN AN ORGANIZED HEALTH CARE EDUCATION/TRAINING PROGRAM

## 2022-03-18 PROCEDURE — 85652 RBC SED RATE AUTOMATED: CPT | Performed by: STUDENT IN AN ORGANIZED HEALTH CARE EDUCATION/TRAINING PROGRAM

## 2022-03-18 PROCEDURE — 82607 VITAMIN B-12: CPT | Performed by: STUDENT IN AN ORGANIZED HEALTH CARE EDUCATION/TRAINING PROGRAM

## 2022-03-18 PROCEDURE — 36415 COLL VENOUS BLD VENIPUNCTURE: CPT | Performed by: STUDENT IN AN ORGANIZED HEALTH CARE EDUCATION/TRAINING PROGRAM

## 2022-03-18 PROCEDURE — 96372 THER/PROPH/DIAG INJ SC/IM: CPT | Performed by: STUDENT IN AN ORGANIZED HEALTH CARE EDUCATION/TRAINING PROGRAM

## 2022-03-18 PROCEDURE — 25000003 PHARM REV CODE 250: Performed by: STUDENT IN AN ORGANIZED HEALTH CARE EDUCATION/TRAINING PROGRAM

## 2022-03-18 PROCEDURE — 80061 LIPID PANEL: CPT | Performed by: STUDENT IN AN ORGANIZED HEALTH CARE EDUCATION/TRAINING PROGRAM

## 2022-03-18 PROCEDURE — 97161 PT EVAL LOW COMPLEX 20 MIN: CPT

## 2022-03-18 PROCEDURE — 97116 GAIT TRAINING THERAPY: CPT

## 2022-03-18 PROCEDURE — 96366 THER/PROPH/DIAG IV INF ADDON: CPT

## 2022-03-18 PROCEDURE — 96376 TX/PRO/DX INJ SAME DRUG ADON: CPT

## 2022-03-18 PROCEDURE — 82746 ASSAY OF FOLIC ACID SERUM: CPT | Performed by: STUDENT IN AN ORGANIZED HEALTH CARE EDUCATION/TRAINING PROGRAM

## 2022-03-18 PROCEDURE — 83036 HEMOGLOBIN GLYCOSYLATED A1C: CPT | Performed by: STUDENT IN AN ORGANIZED HEALTH CARE EDUCATION/TRAINING PROGRAM

## 2022-03-18 RX ORDER — ENOXAPARIN SODIUM 100 MG/ML
40 INJECTION SUBCUTANEOUS EVERY 24 HOURS
Status: DISCONTINUED | OUTPATIENT
Start: 2022-03-18 | End: 2022-03-19 | Stop reason: HOSPADM

## 2022-03-18 RX ADMIN — ENOXAPARIN SODIUM 40 MG: 100 INJECTION SUBCUTANEOUS at 05:03

## 2022-03-18 RX ADMIN — LISINOPRIL 40 MG: 20 TABLET ORAL at 08:03

## 2022-03-18 RX ADMIN — ATORVASTATIN CALCIUM 80 MG: 40 TABLET, FILM COATED ORAL at 12:03

## 2022-03-18 RX ADMIN — NIFEDIPINE 90 MG: 30 TABLET, FILM COATED, EXTENDED RELEASE ORAL at 08:03

## 2022-03-18 RX ADMIN — FLUOXETINE 20 MG: 20 CAPSULE ORAL at 08:03

## 2022-03-18 RX ADMIN — INSULIN DETEMIR 23 UNITS: 100 INJECTION, SOLUTION SUBCUTANEOUS at 12:03

## 2022-03-18 RX ADMIN — INSULIN ASPART 4 UNITS: 100 INJECTION, SOLUTION INTRAVENOUS; SUBCUTANEOUS at 12:03

## 2022-03-18 RX ADMIN — ASPIRIN 81 MG: 81 TABLET, COATED ORAL at 08:03

## 2022-03-18 RX ADMIN — GABAPENTIN 300 MG: 300 CAPSULE ORAL at 02:03

## 2022-03-18 RX ADMIN — CEFTRIAXONE 1 G: 1 INJECTION, SOLUTION INTRAVENOUS at 05:03

## 2022-03-18 RX ADMIN — INSULIN ASPART 2 UNITS: 100 INJECTION, SOLUTION INTRAVENOUS; SUBCUTANEOUS at 11:03

## 2022-03-18 RX ADMIN — INSULIN DETEMIR 23 UNITS: 100 INJECTION, SOLUTION SUBCUTANEOUS at 08:03

## 2022-03-18 RX ADMIN — LORAZEPAM 2 MG: 2 INJECTION INTRAMUSCULAR; INTRAVENOUS at 12:03

## 2022-03-18 RX ADMIN — INSULIN ASPART 8 UNITS: 100 INJECTION, SOLUTION INTRAVENOUS; SUBCUTANEOUS at 05:03

## 2022-03-18 RX ADMIN — CLOPIDOGREL 75 MG: 75 TABLET, FILM COATED ORAL at 08:03

## 2022-03-18 RX ADMIN — INSULIN ASPART 3 UNITS: 100 INJECTION, SOLUTION INTRAVENOUS; SUBCUTANEOUS at 08:03

## 2022-03-18 RX ADMIN — ATORVASTATIN CALCIUM 80 MG: 40 TABLET, FILM COATED ORAL at 08:03

## 2022-03-18 RX ADMIN — FLUOXETINE 20 MG: 20 CAPSULE ORAL at 12:03

## 2022-03-18 RX ADMIN — GABAPENTIN 300 MG: 300 CAPSULE ORAL at 08:03

## 2022-03-18 RX ADMIN — Medication 9 MG: at 08:03

## 2022-03-18 RX ADMIN — GABAPENTIN 300 MG: 300 CAPSULE ORAL at 12:03

## 2022-03-18 NOTE — PLAN OF CARE
Problem: Occupational Therapy Goal  Goal: Occupational Therapy Goal  Description: Goals to be met by: 4/18/22     Patient will increase functional independence with ADLs by performing:    LE Dressing with Modified [place].  Grooming while standing with Modified [place].  Toileting from toilet with Modified [place] for hygiene and clothing management.   Supine to sit with Modified [place].  Step transfer with Modified [place]  Toilet transfer to toilet with Modified [place].  Upper extremity exercise program x10 reps per handout, with independence.    Outcome: Ongoing, Progressing       Patient was living in community setting functioning at independent level for ADLs, and mobility prior to this event; pt works as a  and drives as well.  There is an expectation of returning to prior level of function to maintain independence thus avoiding readmission.  Patient's clinical condition meets full Inpatient Rehab (IPR) criteria; including the ability to actively participate in 3 hours of therapy.  A lower level of care(SNF) cannot provide the interdisciplinary treatment approach needed.   Pt currently demonstrates impairments in balance causing deficits in performance of ADLs. Motivated to return to independent PLOF

## 2022-03-18 NOTE — ASSESSMENT & PLAN NOTE
R leg wkness worsening fo 1 day PTA  No syncope, trauma, or other signs of stroke  CT Head w/o acute intracranial abnormality   NIHSS 1 on admit from decreased sensation to pin prick on R leg   Abnormal gait w/ fall onto R leg with each step  TSH wnl  Asa and Plavix loaded in ED    Plan:  Neuro consulted, recs appreciated  Neuro checks q4  MRI brain w/o contrast unremarkable  MRA head and neck unremarkable  ECHO bubble study wnl  PT/OT eval and tx- recommending inpatient rehab  Given no concern for CVA, will discontinue Asa and Plavix daily  Plan to obtain MRI lumbar spine

## 2022-03-18 NOTE — PROGRESS NOTES
03/18/22 0018   Admission   Initial VN Admission Questions Complete   Communication Issues? Technical Issue  (no vidyo monitor)   Safety/Activity   Safety Promotion/Fall Prevention Fall Risk reviewed with patient/family   Pain/Comfort/Sleep   Comfort/Acceptable Pain Level 6   No vidyo monitor. Ipad unavailable.VN called pt via room telephone. Admission questions completed over telephone with pt's permission. Plan of care reviewed with pt. Pt denies any questions or concerns at this time.  Instructed to call for needs/assist oob.

## 2022-03-18 NOTE — PLAN OF CARE
Drea met with pt at bedside to complete assessment. Pt reported having only SC and Glucometer at home. Pt will have Uncle Gilberto [phone] or rosalie Mayorga [phone] help with transportation. PT/OT are recommending IPR sw sent to [facility] [phone]. SW will request f/u apts. White board updated with CM name and contact information.  Discharge brochure provided.  Pt encouraged to call with any questions or concerns.  Cm will continue to follow pt through transitions of care and assist with any discharge needs.    SW also sent to [facility] [phone]. DREA spoke with Lorraine [phone] she will review pt and present pt if they can accept during 1pm meeting    DREA sent referral to   [facility]  [phone]    Future Appointments   Date Time Provider Department Center   3/24/2022  2:30 PM Raquel Horne PsyD [facility]        03/18/22 1101   Discharge Assessment   Assessment Type Discharge Planning Assessment   Confirmed/corrected address, phone number and insurance Yes   Confirmed Demographics Correct on Facesheet   Source of Information patient   When was your last doctors appointment? 03/07/22   Does patient/caregiver understand observation status Yes   Reason For Admission Weakness   Lives With alone   Do you expect to return to your current living situation? Yes  (after ipr)   Do you have help at home or someone to help you manage your care at home? Yes   Who are your caregiver(s) and their phone number(s)? Uncle Gilberto [phone] or rosalie Mayorga [phone]   Prior to hospitilization cognitive status: Alert/Oriented   Current cognitive status: Alert/Oriented   Walking or Climbing Stairs Difficulty none   Dressing/Bathing Difficulty bathing difficulty, requires equipment   Home Layout Bathroom on 2nd floor;Bedroom on 2nd floor   Equipment Currently Used at Home  glucometer;shower chair   Readmission within 30 days? No   Patient currently being followed by outpatient case management? No   Do you currently have service(s) that help you manage your care at home? No   Do you take prescription medications? Yes   Do you have prescription coverage? Yes   Coverage Medicaid   Do you have any problems affording any of your prescribed medications? No   Is the patient taking medications as prescribed? yes   Who is going to help you get home at discharge? Uncle Gilberto [phone] or son Mukesh [phone]   How do you get to doctors appointments? car, drives self   Are you on dialysis? No   Do you take coumadin? No   Discharge Plan A Rehab   DME Needed Upon Discharge  other (see comments)  (TBD)   Discharge Plan discussed with: Patient   Discharge Barriers Identified None

## 2022-03-18 NOTE — HPI
"53 yo F w/ PMHx of T2DM, CKD, HTN, and Depression who presented to the ED from the  clinic 2/2 concerns of CVA w/ R LE weakness. She states that she first had several short intermitent episodes of feeling "drunk and wobbly" 1 wk prior but did not think anything of it. She then developed a cough, postnasal drip, and more frequent but not loose stools over the weekend, and yesterday began to notice that her R leg would give out on her when she tried to walk on it. She denies any syncopal events and has had no trauma or falls. She denies any numbness/tingling as well as bowel/bladder incontinence or facial droop and difficulty speaking. She also denies any fevers during this time. The only change she endorses during this time is that she had an increase in her Prozac from 10mg to 20mg and has been compliant with all her home medications. She denies any sick contacts or further complaints at this time.    In the ED vitals were stable. CBC and CMP unremarkable w/ CKD at baseline. CPK and TSH wnl. COVID neg. UA w/ many bacteria and 1+ protein 4+ glucose and + nitrites. CT Head w/o any acute intracranial abnormalities. She was admitted to the  service for CVA r/o w/ Neurology consult.   "

## 2022-03-18 NOTE — HOSPITAL COURSE
MRI brain and MRA head and neck without acute process. Echo with EF 75% and no intracardiac shunting. Labs unremarkable. On re-evaluation by neurology and primary team neurologic exam wnl. MRI lumbar spine without contrast obtained with signs of mild degenerative changes of the lumbar spineL4-L5 and L5-S1. Given no signs of CVA and pt remained stable, she was deemed medically stable for discharge to in patient rehab to for physical therapy. Patient will have follow-up neurology appointment for further work-up of acute leg weakness. Follow-up with PCP.

## 2022-03-18 NOTE — ASSESSMENT & PLAN NOTE
R leg wkness worsening fo 1 day PTA  No syncope, trauma, or other signs of stroke  CT Head w/o acute intracranial abnormality   NIHSS 1 on admit from decreased sensation to pin prick on R leg   Abnormal gait w/ fall onto R leg with each step  TSH wnl  Asa and Plavix loaded in ED    Plan:  Neuro consulted, recs appreciated  Neuro checks q4  A1C, RPR, HIV, B12, Folate, and Lipids pending  MRI brain w/o pending  MRA head and neck pending  ECHO bubble study pending  PT/OT eval and tx   Cnt Asa and Plavix daily

## 2022-03-18 NOTE — PLAN OF CARE
[facility] Plan of Care Note:    Discussed and reviewed imaging with Ms. Lino at bedside including most recently acquired MRI of the Lumbar spine. There are no acute findings or concerning features to suggest disc herniation or osteophyte complex causing canal or neuro foraminal stenosis resulting in weakness/paresthesias/gait impairment or radicular symptoms. Discussed that the work up thus far has been able to rule out the most acute neurologic diagnoses. Based on clinical history, examination and diagnostic work up, from a neurologic perspective, functional recovery is anticipated.     There is no further neuro-diagnostic tests required while inpatient. If questions remain regarding the care of this patient, please reach out to this service.     Neurology will sign off at this time.     Michael May MD  [facility] PGY IV

## 2022-03-18 NOTE — SUBJECTIVE & OBJECTIVE
"Past Medical History:   Diagnosis Date    Aneurysm of cardiac wall, congenital     Bilateral lower extremity edema     2/2 calcium channel blocker    Diabetes type 2, uncontrolled     HLD (hyperlipidemia)     Hypertension     Vitamin D deficiency        Past Surgical History:   Procedure Laterality Date    BREAST CYST EXCISION  2003    BREAST SURGERY      cyst removal     CATARACT EXTRACTION W/  INTRAOCULAR LENS IMPLANT      COLONOSCOPY N/A 7/23/2021    Procedure: COLONOSCOPY;  Surgeon: Sapna Fitzgerald MD;  [facility]: [facility];  Service: Endoscopy;  Laterality: N/A;    HYSTERECTOMY  2002    PARTIAL HYSTERECTOMY  2002       Review of patient's allergies indicates:  No Known Allergies    No current facility-administered medications on file prior to encounter.     Current Outpatient Medications on File Prior to Encounter   Medication Sig    blood glucose strip-disp meter Kit 1 application by Misc.(Non-Drug; Combo Route) route 3 (three) times daily.    blood-glucose meter (RELION ALL-IN-ONE METER) kit Use as instructed    canagliflozin (INVOKANA) 100 mg Tab tablet Take 1 tablet (100 mg total) by mouth once daily.    DUREZOL 0.05 % Drop ophthalmic solution INSTILL 1 DROP TO SURGERY EYE 4 TIMES DAILY AFTER SURGERY FOR 30 DAYS    FLUoxetine 20 MG capsule Take 1 capsule (20 mg total) by mouth every evening. (Patient not taking: Reported on 3/17/2022)    gabapentin (NEURONTIN) 300 MG capsule Take 2 capsules (600 mg total) by mouth 3 (three) times daily. Patient taking 1 tablet in am, 1 tablet at lunch and 2 tablets at bedtime    insulin (LANTUS SOLOSTAR U-100 INSULIN) glargine 100 units/mL (3mL) SubQ pen Inject 46 Units into the skin every evening.    insulin syringe-needle U-100 (BD INSULIN SYRINGE) 1 mL 25 gauge x 5/8" Syrg 1 Units by Misc.(Non-Drug; Combo Route) route every evening.    ketorolac 0.5% (ACULAR) 0.5 % Drop     lancets Misc 1 application by Misc.(Non-Drug; Combo Route) route 3 (three) times daily.    " lisinopriL (PRINIVIL,ZESTRIL) 40 MG tablet Take 1 tablet (40 mg total) by mouth once daily.    NIFEdipine (PROCARDIA-XL) 90 MG (OSM) 24 hr tablet Take 1 tablet (90 mg total) by mouth once daily.    rosuvastatin (CRESTOR) 5 MG tablet Take 1 tablet (5 mg total) by mouth once daily.     Family History       Problem Relation (Age of Onset)    Breast cancer Mother, Maternal Grandmother    Cancer Mother (40), Maternal Grandmother (82)    Diabetes Mother, Father    Heart disease Mother          Tobacco Use    Smoking status: Never Smoker    Smokeless tobacco: Never Used   Substance and Sexual Activity    Alcohol use: Yes     Comment: seldom    Drug use: No    Sexual activity: Not on file     Review of Systems   Constitutional:  Negative for activity change, appetite change, chills and fever.   HENT:  Positive for congestion, postnasal drip and sinus pressure. Negative for sore throat, trouble swallowing and voice change.    Eyes:  Negative for photophobia and visual disturbance.   Respiratory:  Negative for choking, shortness of breath and wheezing.    Cardiovascular:  Negative for chest pain, palpitations and leg swelling.   Gastrointestinal:  Negative for abdominal pain, constipation, diarrhea, nausea and vomiting.   Genitourinary:  Negative for dysuria, frequency and hematuria.   Musculoskeletal:  Positive for gait problem. Negative for back pain, joint swelling, myalgias and neck pain.   Skin:  Negative for pallor, rash and wound.   Neurological:  Positive for weakness. Negative for dizziness, syncope, facial asymmetry, speech difficulty, light-headedness, numbness and headaches.   Objective:     Vital Signs (Most Recent):  Temp: 97.6 °F (36.4 °C) (03/17/22 1952)  Pulse: 83 (03/17/22 1952)  Resp: 18 (03/17/22 1952)  BP: (!) 145/94 (03/17/22 1952)  SpO2: 100 % (03/17/22 1952)   Vital Signs (24h Range):  Temp:  [97.6 °F (36.4 °C)-98.5 °F (36.9 °C)] 97.6 °F (36.4 °C)  Pulse:  [75-93] 83  Resp:  [18] 18  SpO2:  [100 %] 100  %  BP: (128-154)/(72-94) 145/94     Weight: 73 kg (161 lb)  Body mass index is 25.22 kg/m².    Physical Exam  Vitals and nursing note reviewed.   Constitutional:       General: She is not in acute distress.     Appearance: Normal appearance. She is not ill-appearing, toxic-appearing or diaphoretic.   HENT:      Head: Normocephalic and atraumatic.      Right Ear: External ear normal.      Left Ear: External ear normal.      Nose: Nose normal. No congestion or rhinorrhea.      Mouth/Throat:      Mouth: Mucous membranes are moist.      Pharynx: Oropharynx is clear. No oropharyngeal exudate.   Eyes:      Extraocular Movements: Extraocular movements intact.      Conjunctiva/sclera: Conjunctivae normal.      Pupils: Pupils are equal, round, and reactive to light.   Cardiovascular:      Rate and Rhythm: Normal rate and regular rhythm.      Pulses: Normal pulses.      Heart sounds: Normal heart sounds. No murmur heard.    No gallop.   Pulmonary:      Effort: Pulmonary effort is normal.      Breath sounds: Normal breath sounds. No wheezing, rhonchi or rales.   Abdominal:      General: Bowel sounds are normal.      Palpations: Abdomen is soft. There is no mass.      Tenderness: There is no abdominal tenderness. There is no rebound.   Musculoskeletal:         General: No swelling, tenderness or signs of injury.      Cervical back: Normal range of motion and neck supple. No rigidity or tenderness.      Right lower leg: No edema.      Left lower leg: No edema.   Lymphadenopathy:      Cervical: No cervical adenopathy.   Skin:     General: Skin is warm and dry.      Capillary Refill: Capillary refill takes less than 2 seconds.      Findings: No erythema, lesion or rash.   Neurological:      Mental Status: She is alert.      Cranial Nerves: No cranial nerve deficit.      Sensory: Sensory deficit (decreased sensation to sharp touch over R shin) present.      Motor: No weakness (5/5n strength in BL U and LE).      Coordination:  Coordination normal.      Gait: Gait abnormal (Pt falls onto R leg with each step though able to catch herself).      Deep Tendon Reflexes: Reflexes abnormal (1+ R patellar all others 2+).     Recent Labs   Lab 03/17/22  1720   WBC 5.61   HGB 12.7   HCT 40.8   MCV 83   RBC 4.91   MCH 25.9*   MCHC 31.1*   RDW 13.3      MPV 11.4   GRAN 64.5  3.6   LYMPH 25.5  1.4   MONO 5.7  0.3   EOSINOPHIL 3.6   BASOPHIL 0.5     Recent Labs   Lab 03/17/22  1720      K 4.7      CO2 21*   ANIONGAP 12   BUN 30*   CREATININE 1.7*   *   CALCIUM 9.2   PROT 7.9   ALBUMIN 4.3   ALKPHOS 77   BILITOT 0.5   ALT 8*   AST 14   ESTGFRAFRICA 39*   EGFRNONAA 34*     Recent Labs   Lab 03/17/22  1745   COLORU Yellow   APPEARANCEUA Clear   PHUR 6.0   SPECGRAV 1.020   PROTEINUA 1+*   GLUCUA 4+*   KETONESU Negative   BILIRUBINUA Negative   OCCULTUA Negative   UROBILINOGEN Negative   NITRITE Positive*   LEUKOCYTESUR Negative   RBCUA 1   WBCUA 4   BACTERIA Many*   SQUAMEPITHEL 2   HYALINECASTS 0   MICROCMT SEE COMMENT     Recent Labs   Lab 03/17/22  1720   CPK 94     No results for input(s): PT, INR, APTT in the last 168 hours.  Recent Labs   Lab 03/17/22  1734   TSH 1.936     X-Ray Chest 1 View    Result Date: 3/17/2022  EXAMINATION: XR CHEST 1 VIEW CLINICAL HISTORY: Cough, unspecified TECHNIQUE: Single frontal view of the chest was performed. COMPARISON: 03/17/2020. FINDINGS: There are stable postop changes in the chest.  The trachea is unremarkable.  The cardiomediastinal silhouette is within normal limits.  The hemidiaphragms are unremarkable.  There are no pleural effusions.  There is no evidence of a pneumothorax.  There is no evidence of pneumomediastinum.  No airspace opacity is present.  The osseous structures are unremarkable.     No acute process. Electronically signed by: Sal Wilson MD Date:    03/17/2022 Time:    17:32    X-Ray Knee 1 or 2 View Right    Result Date: 3/17/2022  EXAMINATION: XR KNEE 1 OR 2 VIEW  RIGHT CLINICAL HISTORY: Pain in right knee TECHNIQUE: AP and lateral views of the right knee were performed. COMPARISON: None FINDINGS: Two views right knee. No acute displaced fracture or dislocation of the knee.  No radiopaque foreign body.  No large knee joint effusion.     1. No acute displaced fracture or dislocation of the knee. Electronically signed by: Pierce Brennan MD Date:    03/17/2022 Time:    17:58    CT Head Without Contrast    Result Date: 3/17/2022  EXAMINATION: CT HEAD WITHOUT CONTRAST CLINICAL HISTORY: Motor neuron disease suspected; TECHNIQUE: Low dose axial images were obtained through the head.  Coronal and sagittal reformations were also performed. Contrast was not administered. COMPARISON: 10/02/2016 FINDINGS: There is no evidence of acute major vascular territory infarct, hemorrhage, or mass.  There is no hydrocephalus.  There are no abnormal extra-axial fluid collections.  There are mucous retention cysts or polyps within the bilateral maxillary sinuses as well as within the right sphenoid sinus.  There is minimal opacification of the inferior most left mastoid air cells, otherwise the visualized paranasal sinuses and mastoid air cells are clear, and there is no evidence of calvarial fracture.  The visualized soft tissues are unremarkable.     1. No acute intracranial abnormalities. 2. Sinus disease and minimal left mastoid effusion as above. Electronically signed by: Pierce Brennan MD Date:    03/17/2022 Time:    18:19    Microbiology Results (last 7 days)       ** No results found for the last 168 hours. **

## 2022-03-18 NOTE — PROGRESS NOTES
"[facility] Medicine  Progress Note    Patient Name: Bonnie Lino  MRN: [id number]  Patient Class: OP- Observation   Admission Date: 3/17/2022  Length of Stay: 0 days  Attending Physician: Dorothea Saha MD  Primary Care Provider: Robson Culver DO        Subjective:     Principal Problem:Right leg weakness        HPI:  51 yo F w/ PMHx of T2DM, CKD, HTN, and Depression who presented to the ED from the  clinic 2/2 concerns of CVA w/ R LE weakness. She states that she first had several short intermitent episodes of feeling "drunk and wobbly" 1 wk prior but did not think anything of it. She then developed a cough, postnasal drip, and more frequent but not loose stools over the weekend, and yesterday began to notice that her R leg would give out on her when she tried to walk on it. She denies any syncopal events and has had no trauma or falls. She denies any numbness/tingling as well as bowel/bladder incontinence or facial droop and difficulty speaking. She also denies any fevers during this time. The only change she endorses during this time is that she had an increase in her Prozac from 10mg to 20mg and has been compliant with all her home medications. She denies any sick contacts or further complaints at this time.    In the ED vitals were stable. CBC and CMP unremarkable w/ CKD at baseline. CPK and TSH wnl. COVID neg. UA w/ many bacteria and 1+ protein 4+ glucose and + nitrites. CT Head w/o any acute intracranial abnormalities. She was admitted to the  service for CVA r/o w/ Neurology consult.           Interval History: NAEON. No acute complaints or concerns this morning.     Review of Systems   Constitutional:  Negative for activity change, appetite change, chills and fever.   HENT:  Positive for congestion, postnasal drip and sinus pressure. Negative for sore throat, trouble swallowing and voice change.    Eyes:  Negative for photophobia and visual disturbance.   Respiratory:  " Negative for choking, shortness of breath and wheezing.    Cardiovascular:  Negative for chest pain, palpitations and leg swelling.   Gastrointestinal:  Negative for abdominal pain, constipation, diarrhea, nausea and vomiting.   Genitourinary:  Negative for dysuria, frequency and hematuria.   Musculoskeletal:  Positive for gait problem. Negative for back pain, joint swelling, myalgias and neck pain.   Skin:  Negative for pallor, rash and wound.   Neurological:  Positive for weakness. Negative for dizziness, syncope, facial asymmetry, speech difficulty, light-headedness, numbness and headaches.   Objective:     Vital Signs (Most Recent):  Temp: 96.3 °F (35.7 °C) (03/18/22 1114)  Pulse: 85 (03/18/22 1114)  Resp: 20 (03/18/22 1114)  BP: (!) 141/80 (03/18/22 1114)  SpO2: 99 % (03/18/22 1114)   Vital Signs (24h Range):  Temp:  [96.2 °F (35.7 °C)-98.5 °F (36.9 °C)] 96.3 °F (35.7 °C)  Pulse:  [68-93] 85  Resp:  [18-20] 20  SpO2:  [97 %-100 %] 99 %  BP: (128-154)/(72-94) 141/80     Weight: 73 kg (161 lb)  Body mass index is 25.22 kg/m².    Intake/Output Summary (Last 24 hours) at 3/18/2022 1405  Last data filed at 3/18/2022 0810  Gross per 24 hour   Intake 387 ml   Output 750 ml   Net -363 ml      Physical Exam  Vitals and nursing note reviewed.   Constitutional:       General: She is not in acute distress.     Appearance: Normal appearance. She is not ill-appearing, toxic-appearing or diaphoretic.   HENT:      Head: Normocephalic and atraumatic.      Right Ear: External ear normal.      Left Ear: External ear normal.      Nose: Nose normal. No congestion or rhinorrhea.      Mouth/Throat:      Mouth: Mucous membranes are moist.      Pharynx: Oropharynx is clear. No oropharyngeal exudate.   Eyes:      Extraocular Movements: Extraocular movements intact.      Conjunctiva/sclera: Conjunctivae normal.      Pupils: Pupils are equal, round, and reactive to light.   Cardiovascular:      Rate and Rhythm: Normal rate and regular  rhythm.      Pulses: Normal pulses.      Heart sounds: Normal heart sounds. No murmur heard.    No gallop.   Pulmonary:      Effort: Pulmonary effort is normal.      Breath sounds: Normal breath sounds. No wheezing or rales.   Abdominal:      General: Bowel sounds are normal.      Palpations: Abdomen is soft. There is no mass.      Tenderness: There is no abdominal tenderness. There is no rebound.   Musculoskeletal:         General: No swelling, tenderness or signs of injury.      Cervical back: Normal range of motion and neck supple. No rigidity or tenderness.      Right lower leg: No edema.      Left lower leg: No edema.   Lymphadenopathy:      Cervical: No cervical adenopathy.   Skin:     General: Skin is warm and dry.      Capillary Refill: Capillary refill takes less than 2 seconds.      Findings: No erythema, lesion or rash.   Neurological:      Mental Status: She is alert and oriented to person, place, and time.      Cranial Nerves: No cranial nerve deficit.      Sensory: No sensory deficit.      Motor: No weakness (Muscle strength 5/5 in UE and LE bilaterally).      Coordination: Coordination normal.      Gait: Gait abnormal (Pt falls onto R leg with each step though able to catch herself).      Deep Tendon Reflexes: Reflexes normal (2+ in LE bilaterally).   Psychiatric:      Comments: Anxious mood       Significant Labs: All pertinent labs within the past 24 hours have been reviewed.    Significant Imaging: I have reviewed all pertinent imaging results/findings within the past 24 hours.      Assessment/Plan:      * Right leg weakness  R leg wkness worsening fo 1 day PTA  No syncope, trauma, or other signs of stroke  CT Head w/o acute intracranial abnormality   NIHSS 1 on admit from decreased sensation to pin prick on R leg   Abnormal gait w/ fall onto R leg with each step  TSH wnl  Asa and Plavix loaded in ED    Plan:  Neuro consulted, recs appreciated  Neuro checks q4  MRI brain w/o contrast  unremarkable  MRA head and neck unremarkable  ECHO bubble study wnl  PT/OT eval and tx- recommending inpatient rehab  Given no concern for CVA, will discontinue Asa and Plavix daily  Plan to obtain MRI lumbar spine        UTI (urinary tract infection)  Pt w/ no dysuria or change in frequency  U/A on admit w/ many bacteria as well as 1+ protein, 4+ glucose, and positive for nitrites  S/p rocephin in ED    Plan:  Cnt rocephin        Current mild episode of major depressive disorder without prior episode  Cnt home prozac        Controlled type 2 diabetes mellitus with stage 3 chronic kidney disease, with long-term current use of insulin  Last A1c 7.3  Home Invokana and Glargine 46u QHS    Plan:  Hold oral medications  Insulin Detemir 23u QHS  MDSSI   Glucose checks q6hr w/ goal 140-180       Hypertension associated with type 2 diabetes mellitus  Cnt home lisinopril and procardia      HLD (hyperlipidemia)  Cnt home crestor        VTE Risk Mitigation (From admission, onward)         Ordered     IP VTE LOW RISK PATIENT  Once         03/17/22 1856     Place sequential compression device  Until discontinued         03/17/22 1856                Discharge Planning   ROE:      Code Status: Full Code   Is the patient medically ready for discharge?:     Reason for patient still in hospital (select all that apply): Consult recommendations and PT / OT recommendations  Discharge Plan A: Rehab          Robson Culver DO  [facility], PGY-2

## 2022-03-18 NOTE — PROGRESS NOTES
[facility]           Pharmacy        Current Drug Shortage     Due to national backorder and [facility] is critically low on inventory of Dextrose 50% (D50) Syringes and Vials, pharmacy has automatically switched from D50% to D10% IVPB at the equivalent dose until resolution of the shortage per P&T approved protocol.               Ambar Quezada, PharmD  [phone]

## 2022-03-18 NOTE — ASSESSMENT & PLAN NOTE
Last A1c 7.3  Home Invokana and Glargine 46u QHS    Plan:  Hold oral medications  Insulin Detemir 23u QHS  MDSSI   Glucose checks q6hr w/ goal 140-180

## 2022-03-18 NOTE — PROGRESS NOTES
"[facility] - Telemetry  Adult Nutrition  Progress Note    SUMMARY       Recommendations    Recommendation:   1. Encourage intake at meals as tolerated.   2. Monitor weight/labs   3. RD to follow to monitor po intake    Goals:   Pt will tolerate diet with at least 50-75% intake at meals by RD follow up  Nutrition Goal Status: new  Communication of RD Recs: reviewed with RN    Assessment and Plan  No nutrition dx at this time     Malnutrition Assessment  Weight Loss (Malnutrition):  (8% x 4 months)       Reason for Assessment  Reason For Assessment: identified at risk by screening criteria ([facility])  Diagnosis:  (R leg weakness)  Relevant Medical History: HTN, DM, hysterectomy, HLD  General Information Comments: Pt on  ADA diet with 100% intake of breakfast today. Lucas 18-skin intact. Unable to assess NFPE 2/2 pt off unit at visit. Noted 13lb weight loss x 4 months.  Nutrition Discharge Planning: pt to d/c on ADA low Na diet    Nutrition Risk Screen  Nutrition Risk Screen: no indicators present    Nutrition/Diet History  Food Preferences: no Holiness or cultural food prefs identified  Spiritual, Cultural Beliefs, Anabaptism Practices, Values that Affect Care: no  Factors Affecting Nutritional Intake: None identified at this time    Anthropometrics  Temp: 96.3 °F (35.7 °C)  Height Method: Stated  Height: 5' 7" (170.2 cm)  Height (inches): 67 in  Weight Method: Bed Scale  Weight: 73 kg (160 lb 15 oz)  Weight (lb): 160.94 lb  Ideal Body Weight (IBW), Female: 135 lb  % Ideal Body Weight, Female (lb): 119.21 %  BMI (Calculated): 25.2  BMI Grade: 25 - 29.9 - overweight  Usual Body Weight (UBW), k.3 kg ()  % Usual Body Weight: 92.25  % Weight Change From Usual Weight: -7.95 %     Lab/Procedures/Meds  Pertinent Labs Reviewed: reviewed  Pertinent Labs Comments: A1C 8.1H, BUN 30H, Crea 1.7H, Glu 177H  Pertinent Medications Reviewed: reviewed  Pertinent Medications Comments: aspirin, rocephin, insulin, lisinopril, " gabapentin    Estimated/Assessed Needs  Weight Used For Calorie Calculations: 73 kg (160 lb 15 oz)  Energy Calorie Requirements (kcal): 1825 (25 kcal/kg)  Energy Need Method: Kcal/kg  Protein Requirements: 73g (1.0g/kg)  Weight Used For Protein Calculations: 73 kg (160 lb 15 oz)  Estimated Fluid Requirement Method: RDA Method  RDA Method (mL): 1825     Nutrition Prescription Ordered  Current Diet Order: 2000 ADA    Evaluation of Received Nutrient/Fluid Intake  I/O: 337/0  Energy Calories Required: meeting needs  Protein Required: meeting needs  Fluid Required: meeting needs  Comments: LBM 3/18  % Intake of Estimated Energy Needs: 75 - 100 %  % Meal Intake: 75 - 100 %    Nutrition Risk  Level of Risk/Frequency of Follow-up:  (2xweekly)     Monitor and Evaluation  Food and Nutrient Intake: food and beverage intake  Food and Nutrient Adminstration: diet order  Physical Activity and Function: nutrition-related ADLs and IADLs  Anthropometric Measurements: weight  Biochemical Data, Medical Tests and Procedures: electrolyte and renal panel  Nutrition-Focused Physical Findings: overall appearance     Nutrition Follow-Up  RD Follow-up?: Yes

## 2022-03-18 NOTE — PT/OT/SLP EVAL
Physical Therapy Evaluation    Patient Name:  Bonnie Lino   MRN:  [id number]    Recommendations:     Discharge Recommendations:  rehabilitation facility   Discharge Equipment Recommendations:  (TBD)   Barriers to Discharge: Inaccessible home and Decreased caregiver support    Assessment:     Bonnie Lino is a 52 y.o. female admitted with a medical diagnosis of Right leg weakness.  She presents with the following impairments/functional limitations:  weakness, impaired endurance, impaired self care skills, impaired functional mobilty, gait instability, impaired balance, decreased lower extremity function, impaired coordination. Pt presenting with RLE weakness during ambulation leading to R knee hyperextending or buckling during stance phase of ambulation. Pt requires min A to ambulate with RW and with increased instability with continued ambulation 2/2 LE fatigue.   Despite patient's co-morbidities, including DM 2, CKD, HTN, patient was living in community setting functioning at independent level for ADLs, and mobility prior to this event.  There is an expectation of returning to prior level of function to maintain independence thus avoiding readmission.  Patient's clinical condition meets full Inpatient Rehab (IPR) criteria; including the ability to actively participate in 3 hours of therapy.      Rehab Prognosis: Good; patient would benefit from acute skilled PT services to address these deficits and reach maximum level of function.    Recent Surgery: * No surgery found *      Plan:     During this hospitalization, patient to be seen 5 x/week to address the identified rehab impairments via gait training, therapeutic activities, therapeutic exercises, neuromuscular re-education and progress toward the following goals:    · Plan of Care Expires:  04/18/22    Subjective     Chief Complaint: R sided instability/weakness with ambulation  Patient/Family Comments/goals: very pleasant and agreeable to participate in  therapy session  Pain/Comfort:  · Pain Rating 1: 0/10  · Pain Rating Post-Intervention 1: 0/10    Patients cultural, spiritual, Christianity conflicts given the current situation: no    Living Environment:  Pt lives alone in a 2nd floor apt with 1 flight of ILIANA with B rails and tub/shower combo with shower chair.  Prior to admission, patients level of function was independent without AD for mobility and ADLs, drives, cooks, cleans, and works as a pre-.  Equipment used at home: shower chair.  DME owned (not currently used): shower chair.  Upon discharge, patient will have assistance from family can provide intermittent support but pt lives alone.    Objective:     Communicated with nurse prior to session.  Patient found HOB elevated with bed alarm, telemetry  upon PT entry to room.    General Precautions: Standard, fall   Orthopedic Precautions:N/A   Braces: N/A  Respiratory Status: Room air    Exams:  · Fine Motor Coordination:    · -       Intact  · Gross Motor Coordination:  R knee hyperextension or buckling during ambulation  · Postural Exam:  Patient presented with the following abnormalities:    · -       Rounded shoulders  · -       Forward head  · Sensation:    · -       Intact  · Skin Integrity/Edema:      · -       Skin integrity: Visible skin intact  · RLE ROM: WFL  · RLE Strength: ankle DF 4/5, knee ext 5/5, hip flexion 3+/5  · LLE ROM: WFL  · LLE Strength: ankle DF 4/5, knee/hip 5/5    Functional Mobility:  · Bed Mobility:   · Sit to Supine: stand by assistance  · Transfers:     · Sit to Stand:  minimum assistance with no AD  · Bed to Chair: minimum assistance with  rolling walker  using  Step Transfer  · Gait: 25 ft with RW and min A - initial several steps with no AD and pt with significant instability over RLE therefore provided pt with RW and educated on use of RW. Educated on UE WB through RW, RLE activation to avoid hyperextension, and upright posture with forward gaze as pt ambulated  with flexed posture. With continued ambulation pt with continued RLE fatigue leading to further instability and R knee buckling.    Therapeutic Activities and Exercises:  Educated pt on role of PT and pt agreeable to participate in therapy session.  Pt sitting EOB when PT/OT entered.  Completed stand and noted pt with significant R knee buckling or hyperextension during stepping in place requiring mod A for stability therefore provided pt with RW.   Educated on use of RW and pt ambulated in halls as reported above.  Pt ambulated to bed. Returned to supine and educated pt on RLE exercises to complete throughout the days to improve coordination/strength including heel to shin, bridging using BLEs, and bridging with single RLE.  Educated pt on recommendation for IPR at this time and pt interested and agreeable.    AM-PAC 6 CLICK MOBILITY  Total Score:16     Patient left HOB elevated with all lines intact, call button in reach, bed alarm on and pt's uncle and cousin present.    GOALS:   Multidisciplinary Problems     Physical Therapy Goals        Problem: Physical Therapy Goal    Goal Priority Disciplines Outcome Goal Variances Interventions   Physical Therapy Goal     PT, PT/OT Ongoing, Progressing     Description: Goals to be met by: 22     Patient will increase functional independence with mobility by performin. Supine <> sit with Modified [place]  2. Sit to stand transfer with Modified [place]  3. Bed to chair transfer with Modified [place] using appropriate AD  4. Gait  x 150 feet with Supervision   5. Assess step negotiation as able - has 1 flight of ILIANA with B rails                     History:     Past Medical History:   Diagnosis Date    Aneurysm of cardiac wall, congenital     Bilateral lower extremity edema     2/2 calcium channel blocker    Diabetes type 2, uncontrolled     HLD (hyperlipidemia)     Hypertension     Vitamin D deficiency        Past Surgical History:    Procedure Laterality Date    BREAST CYST EXCISION  2003    BREAST SURGERY      cyst removal     CATARACT EXTRACTION W/  INTRAOCULAR LENS IMPLANT      COLONOSCOPY N/A 7/23/2021    Procedure: COLONOSCOPY;  Surgeon: Sapna Fitzgerald MD;  Location: [facility];  Service: Endoscopy;  Laterality: N/A;    HYSTERECTOMY  2002    PARTIAL HYSTERECTOMY  2002       Time Tracking:     PT Received On: 03/18/22  PT Start Time: 1028     PT Stop Time: 1050  PT Total Time (min): 22 min     Billable Minutes: Evaluation 10 and Gait Training 12      03/18/2022

## 2022-03-18 NOTE — PLAN OF CARE
Care plan explained to patient, verbalized understanding.   Patient is AAOx4. Afebrile.   -140s  NSR on telemetry, HR 60-70s.  Purewick placed.   Neurochecks q4, moves extremities spontaneously.   Still complains of right leg weakness.

## 2022-03-18 NOTE — PLAN OF CARE
Problem: Physical Therapy Goal  Goal: Physical Therapy Goal  Description: Goals to be met by: 22     Patient will increase functional independence with mobility by performin. Supine <> sit with Modified [place]  2. Sit to stand transfer with Modified [place]  3. Bed to chair transfer with Modified [place] using appropriate AD  4. Gait  x 150 feet with Supervision   5. Assess step negotiation as able - has 1 flight of ILIANA with B rails    Outcome: Ongoing, Progressing     PT evaluation completed, note to follow. Pt presenting with RLE weakness during ambulation leading to R knee hyperextending or buckling during stance phase of ambulation. Pt requires min A to ambulate with RW and with increased instability with continued ambulation 2/2 LE fatigue.   Despite patient's co-morbidities, including DM 2, CKD, HTN, patient was living in community setting functioning at independent level for ADLs, and mobility prior to this event.  There is an expectation of returning to prior level of function to maintain independence thus avoiding readmission.  Patient's clinical condition meets full Inpatient Rehab (IPR) criteria; including the ability to actively participate in 3 hours of therapy.

## 2022-03-18 NOTE — PT/OT/SLP EVAL
Occupational Therapy   Evaluation    Name: Bonnie Lino  MRN: [id number]  Admitting Diagnosis:  Right leg weakness  Recent Surgery: * No surgery found *      Recommendations:     Discharge Recommendations: rehabilitation facility  Discharge Equipment Recommendations:   (TBD)  Barriers to discharge:  Decreased caregiver support, Inaccessible home environment    Assessment:     Bonnie Lino is a 52 y.o. female with a medical diagnosis of Right leg weakness.  She presents with The primary encounter diagnosis was Right leg weakness. Diagnoses of Cough, Right knee pain, Weakness, and Chest pain were also pertinent to this visit.  . Performance deficits affecting function: weakness, gait instability, impaired balance, impaired endurance, decreased lower extremity function, impaired functional mobilty, impaired self care skills, decreased coordination.      Patient was living in community setting functioning at independent level for ADLs, and mobility prior to this event; pt works as a  and drives as well.  There is an expectation of returning to prior level of function to maintain independence thus avoiding readmission.  Patient's clinical condition meets full Inpatient Rehab (IPR) criteria; including the ability to actively participate in 3 hours of therapy.  A lower level of care(SNF) cannot provide the interdisciplinary treatment approach needed.   Pt currently demonstrates impairments in balance causing deficits in performance of ADLs. Motivated to return to independent PLOF     Rehab Prognosis: Good; patient would benefit from acute skilled OT services to address these deficits and reach maximum level of function.       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    General Precautions: Standard, fall   Orthopedic Precautions:N/A   Braces: N/A      Occupational Performance:    Bed Mobility:    · Patient completed Sit to Supine with stand by assistance    Functional Mobility/Transfers:  · Patient completed Sit <> Stand Transfer with minimum assistance  with  rolling walker   · Functional Mobility: Min A with RW; see PT note; R LE buckling throughout gait; cues for 2 pt gait and RW management     Activities of Daily Living:  · Lower Body Dressing: modified independence nader socks while seated EOB fig 4 tech     Cognitive/Visual Perceptual:  WFL     Physical Exam:  Balance:    -       WFL seated; impaired standing static and dynamic   Dominant hand:    -       right  Upper Extremity Range of Motion:   BUE WFL   Upper Extremity Strength:  BUE WFL    Strength:  B WFL   Fine Motor Coordination:  Intact   Gross motor coordination:  Impaired RLE     AMPAC 6 Click ADL:  AMPAC Total Score: 20    Treatment & Education:  Pt found seated EOB   Educated on CVA signs/symptoms   Stood x 1 trial from EOB with Min A; pt forward flexed over RW and impaired use of BUE to assist in balance and performance of axs 2/2 RLE weakness   Pt buckling throughout time in stance and/or hyperextending RLE   Requires increased verbal cues for gait, use of BUEs to decrease buckling and loss of balance; proximity of RW   Increased fatigue  following gait   Returned to supine   Performance of bridging while supine with BLE support and unilateral RLE support   Educated on LE therex to perform while supine as well     Education:    Patient left supine with all lines intact, call button in reach, bed alarm on, nsg notified and family  present    GOALS:   Multidisciplinary Problems     Occupational Therapy Goals        Problem: Occupational Therapy Goal    Goal Priority Disciplines Outcome Interventions   Occupational Therapy Goal     OT, PT/OT Ongoing, Progressing    Description: Goals to be met by: 4/18/22     Patient will increase functional independence with ADLs by performing:    LE Dressing with Modified [place].  Grooming while standing with Modified [place].  Toileting from toilet with Modified [place] for hygiene and clothing management.   Supine to sit with Modified [place].  Step transfer with Modified [place]  Toilet transfer to toilet with Modified [place].  Upper extremity exercise program x10 reps per handout, with independence.                     History:     Past Medical History:   Diagnosis Date    Aneurysm of cardiac wall, congenital     Bilateral lower extremity edema     2/2 calcium channel blocker    Diabetes type 2, uncontrolled     HLD (hyperlipidemia)     Hypertension     Vitamin D deficiency        Past Surgical History:   Procedure Laterality Date    BREAST CYST EXCISION  2003    BREAST SURGERY      cyst removal     CATARACT EXTRACTION W/  INTRAOCULAR LENS IMPLANT      COLONOSCOPY N/A 7/23/2021    Procedure: COLONOSCOPY;  Surgeon: Sapna Fitzgerald MD;  Location: [facility];  Service: Endoscopy;  Laterality: N/A;    HYSTERECTOMY  2002    PARTIAL HYSTERECTOMY  2002       Time Tracking:     OT Date of Treatment: 03/18/22  OT Start Time: 1028  OT Stop Time: 1050  OT Total Time (min): 22 min    Billable Minutes:Evaluation 10 cotx with PT     3/18/2022

## 2022-03-18 NOTE — PLAN OF CARE
Recommendation:   1. Encourage intake at meals as tolerated.   2. Monitor weight/labs   3. RD to follow to monitor po intake    Goals:   Pt will tolerate diet with at least 50-75% intake at meals by RD follow up  Nutrition Goal Status: new

## 2022-03-18 NOTE — SUBJECTIVE & OBJECTIVE
Interval History: NAEON. No acute complaints or concerns this morning.     Review of Systems   Constitutional:  Negative for activity change, appetite change, chills and fever.   HENT:  Positive for congestion, postnasal drip and sinus pressure. Negative for sore throat, trouble swallowing and voice change.    Eyes:  Negative for photophobia and visual disturbance.   Respiratory:  Negative for choking, shortness of breath and wheezing.    Cardiovascular:  Negative for chest pain, palpitations and leg swelling.   Gastrointestinal:  Negative for abdominal pain, constipation, diarrhea, nausea and vomiting.   Genitourinary:  Negative for dysuria, frequency and hematuria.   Musculoskeletal:  Positive for gait problem. Negative for back pain, joint swelling, myalgias and neck pain.   Skin:  Negative for pallor, rash and wound.   Neurological:  Positive for weakness. Negative for dizziness, syncope, facial asymmetry, speech difficulty, light-headedness, numbness and headaches.   Objective:     Vital Signs (Most Recent):  Temp: 96.3 °F (35.7 °C) (03/18/22 1114)  Pulse: 85 (03/18/22 1114)  Resp: 20 (03/18/22 1114)  BP: (!) 141/80 (03/18/22 1114)  SpO2: 99 % (03/18/22 1114)   Vital Signs (24h Range):  Temp:  [96.2 °F (35.7 °C)-98.5 °F (36.9 °C)] 96.3 °F (35.7 °C)  Pulse:  [68-93] 85  Resp:  [18-20] 20  SpO2:  [97 %-100 %] 99 %  BP: (128-154)/(72-94) 141/80     Weight: 73 kg (161 lb)  Body mass index is 25.22 kg/m².    Intake/Output Summary (Last 24 hours) at 3/18/2022 1405  Last data filed at 3/18/2022 0810  Gross per 24 hour   Intake 387 ml   Output 750 ml   Net -363 ml      Physical Exam  Vitals and nursing note reviewed.   Constitutional:       General: She is not in acute distress.     Appearance: Normal appearance. She is not ill-appearing, toxic-appearing or diaphoretic.   HENT:      Head: Normocephalic and atraumatic.      Right Ear: External ear normal.      Left Ear: External ear normal.      Nose: Nose normal. No  congestion or rhinorrhea.      Mouth/Throat:      Mouth: Mucous membranes are moist.      Pharynx: Oropharynx is clear. No oropharyngeal exudate.   Eyes:      Extraocular Movements: Extraocular movements intact.      Conjunctiva/sclera: Conjunctivae normal.      Pupils: Pupils are equal, round, and reactive to light.   Cardiovascular:      Rate and Rhythm: Normal rate and regular rhythm.      Pulses: Normal pulses.      Heart sounds: Normal heart sounds. No murmur heard.    No gallop.   Pulmonary:      Effort: Pulmonary effort is normal.      Breath sounds: Normal breath sounds. No wheezing or rales.   Abdominal:      General: Bowel sounds are normal.      Palpations: Abdomen is soft. There is no mass.      Tenderness: There is no abdominal tenderness. There is no rebound.   Musculoskeletal:         General: No swelling, tenderness or signs of injury.      Cervical back: Normal range of motion and neck supple. No rigidity or tenderness.      Right lower leg: No edema.      Left lower leg: No edema.   Lymphadenopathy:      Cervical: No cervical adenopathy.   Skin:     General: Skin is warm and dry.      Capillary Refill: Capillary refill takes less than 2 seconds.      Findings: No erythema, lesion or rash.   Neurological:      Mental Status: She is alert and oriented to person, place, and time.      Cranial Nerves: No cranial nerve deficit.      Sensory: No sensory deficit.      Motor: No weakness (Muscle strength 5/5 in UE and LE bilaterally).      Coordination: Coordination normal.      Gait: Gait abnormal (Pt falls onto R leg with each step though able to catch herself).      Deep Tendon Reflexes: Reflexes normal (2+ in LE bilaterally).   Psychiatric:      Comments: Anxious mood       Significant Labs: All pertinent labs within the past 24 hours have been reviewed.    Significant Imaging: I have reviewed all pertinent imaging results/findings within the past 24 hours.

## 2022-03-18 NOTE — PROGRESS NOTES
I assume primary medical responsibility for this patient. I have reviewed the history, physical, and assessement & treatment plan with the resident and agree that the care is reasonable and necessary. This service has been performed by a resident with the presence of a teaching physician under the primary care exception. If necessary, an addendum of additional findings or evaluation beyond the resident documentation will be noted below.    Patient sent to the ER emergently for CVA work-up. Patient admitted.      Dorothea Saha MD    [facility]

## 2022-03-18 NOTE — H&P
"[facility] Medicine  History & Physical    Patient Name: Bonnie Lino  MRN: [id number]  Patient Class: OP- Observation  Admission Date: 3/17/2022  Attending Physician: Dorothea Saha MD   Primary Care Provider: Robson Culver DO         Patient information was obtained from patient, relative(s) and ER records.     Subjective:     Principal Problem:Right leg weakness    Chief Complaint:   Chief Complaint   Patient presents with    Cough     Pt states woke up on sun with cough and post nasal drip, with and emesis, denies fever, denies pain with urination, and generalized weakness, +sob/ denies CP         HPI: 51 yo F w/ PMHx of T2DM, CKD, HTN, and Depression who presented to the ED from the  clinic 2/2 concerns of CVA w/ R LE weakness. She states that she first had several short intermitent episodes of feeling "drunk and wobbly" 1 wk prior but did not think anything of it. She then developed a cough, postnasal drip, and more frequent but not loose stools over the weekend, and yesterday began to notice that her R leg would give out on her when she tried to walk on it. She denies any syncopal events and has had no trauma or falls. She denies any numbness/tingling as well as bowel/bladder incontinence or facial droop and difficulty speaking. She also denies any fevers during this time. The only change she endorses during this time is that she had an increase in her Prozac from 10mg to 20mg and has been compliant with all her home medications. She denies any sick contacts or further complaints at this time.    In the ED vitals were stable. CBC and CMP unremarkable w/ CKD at baseline. CPK and TSH wnl. COVID neg. UA w/ many bacteria and 1+ protein 4+ glucose and + nitrites. CT Head w/o any acute intracranial abnormalities. She was admitted to the  service for CVA r/o w/ Neurology consult.       Past Medical History:   Diagnosis Date    Aneurysm of cardiac wall, congenital     Bilateral " "lower extremity edema     2/2 calcium channel blocker    Diabetes type 2, uncontrolled     HLD (hyperlipidemia)     Hypertension     Vitamin D deficiency        Past Surgical History:   Procedure Laterality Date    BREAST CYST EXCISION  2003    BREAST SURGERY      cyst removal     CATARACT EXTRACTION W/  INTRAOCULAR LENS IMPLANT      COLONOSCOPY N/A 7/23/2021    Procedure: COLONOSCOPY;  Surgeon: Sapna Fitzgerald MD;  Location: [facility];  Service: Endoscopy;  Laterality: N/A;    HYSTERECTOMY  2002    PARTIAL HYSTERECTOMY  2002       Review of patient's allergies indicates:  No Known Allergies    No current facility-administered medications on file prior to encounter.     Current Outpatient Medications on File Prior to Encounter   Medication Sig    blood glucose strip-disp meter Kit 1 application by Misc.(Non-Drug; Combo Route) route 3 (three) times daily.    blood-glucose meter (RELION ALL-IN-ONE METER) kit Use as instructed    canagliflozin (INVOKANA) 100 mg Tab tablet Take 1 tablet (100 mg total) by mouth once daily.    DUREZOL 0.05 % Drop ophthalmic solution INSTILL 1 DROP TO SURGERY EYE 4 TIMES DAILY AFTER SURGERY FOR 30 DAYS    FLUoxetine 20 MG capsule Take 1 capsule (20 mg total) by mouth every evening. (Patient not taking: Reported on 3/17/2022)    gabapentin (NEURONTIN) 300 MG capsule Take 2 capsules (600 mg total) by mouth 3 (three) times daily. Patient taking 1 tablet in am, 1 tablet at lunch and 2 tablets at bedtime    insulin (LANTUS SOLOSTAR U-100 INSULIN) glargine 100 units/mL (3mL) SubQ pen Inject 46 Units into the skin every evening.    insulin syringe-needle U-100 (BD INSULIN SYRINGE) 1 mL 25 gauge x 5/8" Syrg 1 Units by Misc.(Non-Drug; Combo Route) route every evening.    ketorolac 0.5% (ACULAR) 0.5 % Drop     lancets Misc 1 application by Misc.(Non-Drug; Combo Route) route 3 (three) times daily.    lisinopriL (PRINIVIL,ZESTRIL) 40 MG tablet Take 1 tablet (40 mg total) by " mouth once daily.    NIFEdipine (PROCARDIA-XL) 90 MG (OSM) 24 hr tablet Take 1 tablet (90 mg total) by mouth once daily.    rosuvastatin (CRESTOR) 5 MG tablet Take 1 tablet (5 mg total) by mouth once daily.     Family History       Problem Relation (Age of Onset)    Breast cancer Mother, Maternal Grandmother    Cancer Mother (40), Maternal Grandmother (82)    Diabetes Mother, Father    Heart disease Mother          Tobacco Use    Smoking status: Never Smoker    Smokeless tobacco: Never Used   Substance and Sexual Activity    Alcohol use: Yes     Comment: seldom    Drug use: No    Sexual activity: Not on file     Review of Systems   Constitutional:  Negative for activity change, appetite change, chills and fever.   HENT:  Positive for congestion, postnasal drip and sinus pressure. Negative for sore throat, trouble swallowing and voice change.    Eyes:  Negative for photophobia and visual disturbance.   Respiratory:  Negative for choking, shortness of breath and wheezing.    Cardiovascular:  Negative for chest pain, palpitations and leg swelling.   Gastrointestinal:  Negative for abdominal pain, constipation, diarrhea, nausea and vomiting.   Genitourinary:  Negative for dysuria, frequency and hematuria.   Musculoskeletal:  Positive for gait problem. Negative for back pain, joint swelling, myalgias and neck pain.   Skin:  Negative for pallor, rash and wound.   Neurological:  Positive for weakness. Negative for dizziness, syncope, facial asymmetry, speech difficulty, light-headedness, numbness and headaches.   Objective:     Vital Signs (Most Recent):  Temp: 97.6 °F (36.4 °C) (03/17/22 1952)  Pulse: 83 (03/17/22 1952)  Resp: 18 (03/17/22 1952)  BP: (!) 145/94 (03/17/22 1952)  SpO2: 100 % (03/17/22 1952)   Vital Signs (24h Range):  Temp:  [97.6 °F (36.4 °C)-98.5 °F (36.9 °C)] 97.6 °F (36.4 °C)  Pulse:  [75-93] 83  Resp:  [18] 18  SpO2:  [100 %] 100 %  BP: (128-154)/(72-94) 145/94     Weight: 73 kg (161 lb)  Body  mass index is 25.22 kg/m².    Physical Exam  Vitals and nursing note reviewed.   Constitutional:       General: She is not in acute distress.     Appearance: Normal appearance. She is not ill-appearing, toxic-appearing or diaphoretic.   HENT:      Head: Normocephalic and atraumatic.      Right Ear: External ear normal.      Left Ear: External ear normal.      Nose: Nose normal. No congestion or rhinorrhea.      Mouth/Throat:      Mouth: Mucous membranes are moist.      Pharynx: Oropharynx is clear. No oropharyngeal exudate.   Eyes:      Extraocular Movements: Extraocular movements intact.      Conjunctiva/sclera: Conjunctivae normal.      Pupils: Pupils are equal, round, and reactive to light.   Cardiovascular:      Rate and Rhythm: Normal rate and regular rhythm.      Pulses: Normal pulses.      Heart sounds: Normal heart sounds. No murmur heard.    No gallop.   Pulmonary:      Effort: Pulmonary effort is normal.      Breath sounds: Normal breath sounds. No wheezing, rhonchi or rales.   Abdominal:      General: Bowel sounds are normal.      Palpations: Abdomen is soft. There is no mass.      Tenderness: There is no abdominal tenderness. There is no rebound.   Musculoskeletal:         General: No swelling, tenderness or signs of injury.      Cervical back: Normal range of motion and neck supple. No rigidity or tenderness.      Right lower leg: No edema.      Left lower leg: No edema.   Lymphadenopathy:      Cervical: No cervical adenopathy.   Skin:     General: Skin is warm and dry.      Capillary Refill: Capillary refill takes less than 2 seconds.      Findings: No erythema, lesion or rash.   Neurological:      Mental Status: She is alert.      Cranial Nerves: No cranial nerve deficit.      Sensory: Sensory deficit (decreased sensation to sharp touch over R shin) present.      Motor: No weakness (5/5n strength in BL U and LE).      Coordination: Coordination normal.      Gait: Gait abnormal (Pt falls onto R leg  with each step though able to catch herself).      Deep Tendon Reflexes: Reflexes abnormal (1+ R patellar all others 2+).     Recent Labs   Lab 03/17/22  1720   WBC 5.61   HGB 12.7   HCT 40.8   MCV 83   RBC 4.91   MCH 25.9*   MCHC 31.1*   RDW 13.3      MPV 11.4   GRAN 64.5  3.6   LYMPH 25.5  1.4   MONO 5.7  0.3   EOSINOPHIL 3.6   BASOPHIL 0.5     Recent Labs   Lab 03/17/22  1720      K 4.7      CO2 21*   ANIONGAP 12   BUN 30*   CREATININE 1.7*   *   CALCIUM 9.2   PROT 7.9   ALBUMIN 4.3   ALKPHOS 77   BILITOT 0.5   ALT 8*   AST 14   ESTGFRAFRICA 39*   EGFRNONAA 34*     Recent Labs   Lab 03/17/22  1745   COLORU Yellow   APPEARANCEUA Clear   PHUR 6.0   SPECGRAV 1.020   PROTEINUA 1+*   GLUCUA 4+*   KETONESU Negative   BILIRUBINUA Negative   OCCULTUA Negative   UROBILINOGEN Negative   NITRITE Positive*   LEUKOCYTESUR Negative   RBCUA 1   WBCUA 4   BACTERIA Many*   SQUAMEPITHEL 2   HYALINECASTS 0   MICROCMT SEE COMMENT     Recent Labs   Lab 03/17/22  1720   CPK 94     No results for input(s): PT, INR, APTT in the last 168 hours.  Recent Labs   Lab 03/17/22  1734   TSH 1.936     X-Ray Chest 1 View    Result Date: 3/17/2022  EXAMINATION: XR CHEST 1 VIEW CLINICAL HISTORY: Cough, unspecified TECHNIQUE: Single frontal view of the chest was performed. COMPARISON: 03/17/2020. FINDINGS: There are stable postop changes in the chest.  The trachea is unremarkable.  The cardiomediastinal silhouette is within normal limits.  The hemidiaphragms are unremarkable.  There are no pleural effusions.  There is no evidence of a pneumothorax.  There is no evidence of pneumomediastinum.  No airspace opacity is present.  The osseous structures are unremarkable.     No acute process. Electronically signed by: Sal Wilson MD Date:    03/17/2022 Time:    17:32    X-Ray Knee 1 or 2 View Right    Result Date: 3/17/2022  EXAMINATION: XR KNEE 1 OR 2 VIEW RIGHT CLINICAL HISTORY: Pain in right knee TECHNIQUE: AP and lateral  views of the right knee were performed. COMPARISON: None FINDINGS: Two views right knee. No acute displaced fracture or dislocation of the knee.  No radiopaque foreign body.  No large knee joint effusion.     1. No acute displaced fracture or dislocation of the knee. Electronically signed by: Pierce Brennan MD Date:    03/17/2022 Time:    17:58    CT Head Without Contrast    Result Date: 3/17/2022  EXAMINATION: CT HEAD WITHOUT CONTRAST CLINICAL HISTORY: Motor neuron disease suspected; TECHNIQUE: Low dose axial images were obtained through the head.  Coronal and sagittal reformations were also performed. Contrast was not administered. COMPARISON: 10/02/2016 FINDINGS: There is no evidence of acute major vascular territory infarct, hemorrhage, or mass.  There is no hydrocephalus.  There are no abnormal extra-axial fluid collections.  There are mucous retention cysts or polyps within the bilateral maxillary sinuses as well as within the right sphenoid sinus.  There is minimal opacification of the inferior most left mastoid air cells, otherwise the visualized paranasal sinuses and mastoid air cells are clear, and there is no evidence of calvarial fracture.  The visualized soft tissues are unremarkable.     1. No acute intracranial abnormalities. 2. Sinus disease and minimal left mastoid effusion as above. Electronically signed by: Pierce Brennan MD Date:    03/17/2022 Time:    18:19    Microbiology Results (last 7 days)       ** No results found for the last 168 hours. **              Assessment/Plan:     * Right leg weakness  R leg wkness worsening fo 1 day PTA  No syncope, trauma, or other signs of stroke  CT Head w/o acute intracranial abnormality   NIHSS 1 on admit from decreased sensation to pin prick on R leg   Abnormal gait w/ fall onto R leg with each step  TSH wnl  Asa and Plavix loaded in ED    Plan:  Neuro consulted, recs appreciated  Neuro checks q4  A1C, RPR, HIV, B12, Folate, and Lipids pending  MRI brain  w/o pending  MRA head and neck pending  ECHO bubble study pending  PT/OT eval and tx   Cnt Asa and Plavix daily        UTI (urinary tract infection)  Pt w/ no dysuria or change in frequency  U/A on admit w/ many bacteria as well as 1+ protein, 4+ glucose, and positive for nitrites  S/p rocephin in ED    Plan:  Cnt rocephin        Current mild episode of major depressive disorder without prior episode  Cnt home prozac        Controlled type 2 diabetes mellitus with stage 3 chronic kidney disease, with long-term current use of insulin  Last A1c 7.3  Home Invokana and Glargine 46u QHS    Plan:  Hold oral medications  Insulin Detemir 23u QHS  MDSSI   Glucose checks q6hr w/ goal 140-180       Hypertension associated with type 2 diabetes mellitus  Cnt home lisinopril and procardia      HLD (hyperlipidemia)  Cnt home crestor        VTE Risk Mitigation (From admission, onward)         Ordered     IP VTE LOW RISK PATIENT  Once         03/17/22 1856     Place sequential compression device  Until discontinued         03/17/22 1856                   Ron Em MD  Department of Hospital Medicine   [place] - Telemetry

## 2022-03-18 NOTE — CONSULTS
[facility] Consult Note    Consultant Attending: Dr. Strong  Consultant Resident: Michael May MD     Reason for consult: RLE weakness  Informant: Family medicine       Other sources of information: patient and chart review    Chief Complaint and Duration     RLE weakness with associated acute illness    Subjective:      History of Present Illness:  Bonnie Lino is a 51 y/o F with DM2, CKD3, HTN, and MDD/Anxiety who presented to the ED after referral from family medicine clinic with initial episodic RLE weakness followed by persistent deficit 1 day prior to arrival.    Ms. Mccloud reports 1 week prior to arrival having 2 incidents while working of having a sensation that her balance was off, feeling like she was unable to stand without assistance. She denies falling from these two separate events as she was able to stabilize herself on a countertop. These events passed and over the next week she did not have recurrence of symptoms. She describes developing sinus congestion with a cough over last weekend, progressing through the viral illness. On Wednesday morning, she attempted to get out of bed, but felt like she couldn't stand and decided to schedule an appointment at her PCP for evaluation. Thursday she was brought by her Uncle to her scheduled family medicine clinic appointment, where given acute onset of symptoms, was sent to the ER for further evaluation.     In the ED, the patient was admitted to observation with c/f acute CVA evaluation. Initial work up with MRI/MRA head and neck has been unremarkable for an etiology. The patient on examination from previous providers was documented as having intact strength that was symmetric in bilateral LE's. Her major impairment has seemed to be with her gait and a dropping sensation/giving out appearance of her RLE while ambulating.     She denies prior issues similar to this, denies any recent injury or trauma to her back or lower extremity, denies any prior  sports injuries to the knee/hip or ankle, denies any radicular pain including shooting/radiating sensations down her extremities from the back, she denies any bowel/bladder incontinence, or perineal paresthesia. She denies any pain associated with her LE.     Of note, the patient has a past psychiatric history of mood disorder, was recently uptitrated on her Prozac for passive SI and depression for the past 2-3 months.     ROS: Pertinent items are noted in HPI.    Allergies:  Patient has no known allergies.    Home Medications:    Prior to Admission medications    Medication Sig Start Date End Date Taking? Authorizing Provider   canagliflozin (INVOKANA) 100 mg Tab tablet Take 1 tablet (100 mg total) by mouth once daily.  Patient taking differently: Take 100 mg by mouth nightly. 6/10/21  Yes Dorothea Saha MD   FLUoxetine 20 MG capsule Take 1 capsule (20 mg total) by mouth every evening. 3/9/22 3/9/23 Yes Robson Culver DO   gabapentin (NEURONTIN) 300 MG capsule Take 2 capsules (600 mg total) by mouth 3 (three) times daily. Patient taking 1 tablet in am, 1 tablet at lunch and 2 tablets at bedtime  Patient taking differently: Take 600 mg by mouth 2 (two) times daily. Patient taking 1 tablet in am and 2 tablets at bedtime 8/5/21 8/5/22 Yes Robson Culver DO   insulin (LANTUS SOLOSTAR U-100 INSULIN) glargine 100 units/mL (3mL) SubQ pen Inject 46 Units into the skin every evening. 11/23/21 11/23/22 Yes Robson Culver DO   lisinopriL (PRINIVIL,ZESTRIL) 40 MG tablet Take 1 tablet (40 mg total) by mouth once daily.  Patient taking differently: Take 40 mg by mouth nightly. 6/10/21 6/10/22 Yes Dorothea Saha MD   blood glucose strip-disp meter Kit 1 application by Misc.(Non-Drug; Combo Route) route 3 (three) times daily. 2/17/16 4/23/21  Suly Deleon MD   blood-glucose meter (RELION ALL-IN-ONE METER) kit Use as instructed 11/26/19 4/23/21  Fatmata Lemus MD   DUREZOL 0.05 % Drop ophthalmic  "solution INSTILL 1 DROP TO SURGERY EYE 4 TIMES DAILY AFTER SURGERY FOR 30 DAYS 6/4/20   Historical Provider   insulin syringe-needle U-100 (BD INSULIN SYRINGE) 1 mL 25 gauge x 5/8" Syrg 1 Units by Misc.(Non-Drug; Combo Route) route every evening. 2/24/22   Robson Culver DO   ketorolac 0.5% (ACULAR) 0.5 % Drop  8/29/20   Historical Provider   lancets Misc 1 application by Misc.(Non-Drug; Combo Route) route 3 (three) times daily. 2/17/16   Suly Deleon MD   NIFEdipine (PROCARDIA-XL) 90 MG (OSM) 24 hr tablet Take 1 tablet (90 mg total) by mouth once daily.  Patient taking differently: Take 90 mg by mouth nightly. 6/10/21 6/10/22  Dorothea Saha MD   rosuvastatin (CRESTOR) 5 MG tablet Take 1 tablet (5 mg total) by mouth once daily.  Patient taking differently: Take 5 mg by mouth every evening. 6/10/21 6/10/22  Dorothea Saha MD     Past Medical/Surgical/Family History:  PMHx:   Past Medical History:   Diagnosis Date    Aneurysm of cardiac wall, congenital     Bilateral lower extremity edema     2/2 calcium channel blocker    Diabetes type 2, uncontrolled     HLD (hyperlipidemia)     Hypertension     Vitamin D deficiency       Surgeries:   Past Surgical History:   Procedure Laterality Date    BREAST CYST EXCISION  2003    BREAST SURGERY      cyst removal     CATARACT EXTRACTION W/  INTRAOCULAR LENS IMPLANT      COLONOSCOPY N/A 7/23/2021    Procedure: COLONOSCOPY;  Surgeon: Sapna Fitzgerald MD;  [facility];  Service: Endoscopy;  Laterality: N/A;    HYSTERECTOMY  2002    PARTIAL HYSTERECTOMY  2002      Family  Hx:   Family History   Problem Relation Age of Onset    Diabetes Mother     Heart disease Mother     Cancer Mother 40        breast    Breast cancer Mother     Diabetes Father     Cancer Maternal Grandmother 82        breast    Breast cancer Maternal Grandmother      Social History:  Substance Abuse/Dependence History  Tobacco: denied  EtOH: social drinker  Illicit " "drugs: denies    Objective:     Temp BP HR Resp Rate O2 Sat   96.2 °F (35.7 °C) (!) 144/80  76 18  98 %    Height: 5' 7" (170.2 cm)  Weight: 73 kg (161 lb)  BMI (Calculated): 25.2    Last 24 Hour Vital Signs:  BP  Min: 128/92  Max: 154/72  Temp  Av.5 °F (36.4 °C)  Min: 96.2 °F (35.7 °C)  Max: 98.5 °F (36.9 °C)  Pulse  Av.1  Min: 68  Max: 93  Resp  Av  Min: 18  Max: 18  SpO2  Av %  Min: 97 %  Max: 100 %  Height  Av' 7" (170.2 cm)  Min: 5' 7" (170.2 cm)  Max: 5' 7" (170.2 cm)  Weight  Av.4 kg (161 lb 13.8 oz)  Min: 73 kg (161 lb)  Max: 74.2 kg (163 lb 9.3 oz)  Body mass index is 25.22 kg/m².  I/O last 3 completed shifts:  In: 50 [IV Piggyback:50]  Out: 750 [Urine:750]    NEUROLOGIC EXAMINATION:  Orientation: Person, place, year, month, date, and situation  Memory: recent and remote memory intact  Language: Intact comprehension, verbal fluency, and repetition  Cranial Nerves: PERRL, EOMI w/o end gaze nystagmus, face symmetric, V1-V3 light touch intact bilaterally, tongue midline, symmetric palatal elevation, 5/5 TPZ/SCM  Motor:  Pronator drift: absent  5/5 strength throughout with best effort (give away weakness present initially in RLE hip flexion and knee extension/flexion)  Shoulder abduction, elbow flexion/extension, wrist flexion/extension, finger abduction/flexion/extension, hip flexion/extension/abduction/adduction, knee flexion/extension, and plantar/dorsiflexion.   Tone: normal  DTR'S:  2+ throughout including bilateral biceps, brachioradialis, patellar  Absent achilles bilaterally   Negative Bahena, down going Babinski  Sensory:  Intact light touch, vibration and temp/pain in distal lower extremities  Subjective decrement in pain/temp in RLE but grossly intact  Cerebellar/Gait:  Finger to nose: normal  Heel to shin: normal  Gait: narrow base (not compensating as would be expected), short stride with knee/hip drop with each R step forward, ? Of drop with L steps on " occasion    LABORATORY STUDIES:  Trended Lab Data:  Recent Labs   Lab 03/17/22  1720   WBC 5.61   HGB 12.7   HCT 40.8      MCV 83   RDW 13.3      K 4.7      CO2 21*   BUN 30*   *   CALCIUM 9.2   PROT 7.9   ALBUMIN 4.3   AST 14   ALKPHOS 77   ALT 8*     RADIOLOGY STUDIES:  I have personally reviewed the images performed.     MRI brain w/o contrast, MRA Head and Neck - 3/18/22  Impression:  MRI brain:  No MR evidence of acute infarction.  Mild cerebellar tonsillar ectopia.  Paranasal sinus disease.     MRA brain:  Unremarkable MRA of the intracranial circulation.     MRA neck:  Limited MRA of the neck examination secondary to motion.  No definitive stenosis.  Carotid ultrasound may be attempted for further evaluation.      Assessment:     Bonnie Lino is a 52 y.o. female with DM2, CKD3, HTN, and MDD who is hospitalized with concern for subacute RLE weakness with impaired ambulation, initial evaluation was aimed at CVA rule out which has been unremarkable for etiology. Rapidity of symptom onset and progression without preceding injury/trauma and w/o vascular insult raises consideration for a psychogenic etiology. Additionally, examination remains inconsistent given intact motor exam and reflexes however, with significant degree of gait impairment. Gait is narrow based, short step, and inorganic appearing, overall not well compensated. Will evaluate for structural etiology with MRI Lumbar spine, however if unremarkable, would consider further consideration for underlying orthopedic or psychologic/functional component to symptom presentation.      Recommendations:     -MRI Lumbar spine w/o contrast   -PT/OT evaluation and treatment  -Do not recommend antiplatelet or statin therapy unless deemed necessary outside of a neurovascular indication as Antiplatelets are not used in primary prevention of CVA  -Comorbid psychiatric issues per primary service    Differential diagnosis was explained to  the patient. All questions were answered. Patient understood and agreed to adhere to plan.   Recommendations discussed with staff, Dr. Strong.    Thank you for allowing us to participate in the care of this patient. Please call us if you have any questions regarding this consult.    Michael May MD   [facility], PGY-IV

## 2022-03-18 NOTE — ASSESSMENT & PLAN NOTE
Pt w/ no dysuria or change in frequency  U/A on admit w/ many bacteria as well as 1+ protein, 4+ glucose, and positive for nitrites  S/p rocephin in ED    Plan:  [facility] rocephin

## 2022-03-19 VITALS
OXYGEN SATURATION: 98 % | HEIGHT: 67 IN | HEART RATE: 78 BPM | BODY MASS INDEX: 25.26 KG/M2 | WEIGHT: 160.94 LBS | DIASTOLIC BLOOD PRESSURE: 65 MMHG | SYSTOLIC BLOOD PRESSURE: 132 MMHG | TEMPERATURE: 98 F | RESPIRATION RATE: 14 BRPM

## 2022-03-19 LAB
ALDOLASE SERPL-CCNC: 4.4 U/L (ref 1.2–7.6)
POCT GLUCOSE: 200 MG/DL (ref 70–110)
POCT GLUCOSE: 266 MG/DL (ref 70–110)
POCT GLUCOSE: 275 MG/DL (ref 70–110)

## 2022-03-19 PROCEDURE — 96372 THER/PROPH/DIAG INJ SC/IM: CPT | Performed by: STUDENT IN AN ORGANIZED HEALTH CARE EDUCATION/TRAINING PROGRAM

## 2022-03-19 PROCEDURE — 97116 GAIT TRAINING THERAPY: CPT | Mod: CQ

## 2022-03-19 PROCEDURE — 25000003 PHARM REV CODE 250: Performed by: STUDENT IN AN ORGANIZED HEALTH CARE EDUCATION/TRAINING PROGRAM

## 2022-03-19 PROCEDURE — G0378 HOSPITAL OBSERVATION PER HR: HCPCS

## 2022-03-19 PROCEDURE — 63600175 PHARM REV CODE 636 W HCPCS: Performed by: STUDENT IN AN ORGANIZED HEALTH CARE EDUCATION/TRAINING PROGRAM

## 2022-03-19 PROCEDURE — 97110 THERAPEUTIC EXERCISES: CPT | Mod: CQ

## 2022-03-19 RX ADMIN — INSULIN ASPART 2 UNITS: 100 INJECTION, SOLUTION INTRAVENOUS; SUBCUTANEOUS at 11:03

## 2022-03-19 RX ADMIN — NIFEDIPINE 90 MG: 30 TABLET, FILM COATED, EXTENDED RELEASE ORAL at 09:03

## 2022-03-19 RX ADMIN — LISINOPRIL 40 MG: 20 TABLET ORAL at 09:03

## 2022-03-19 RX ADMIN — INSULIN ASPART 6 UNITS: 100 INJECTION, SOLUTION INTRAVENOUS; SUBCUTANEOUS at 09:03

## 2022-03-19 RX ADMIN — GABAPENTIN 300 MG: 300 CAPSULE ORAL at 09:03

## 2022-03-19 RX ADMIN — GABAPENTIN 300 MG: 300 CAPSULE ORAL at 03:03

## 2022-03-19 NOTE — PT/OT/SLP PROGRESS
"Physical Therapy Treatment    Patient Name:  Bonnie Lino   MRN:  [id number]    Recommendations:     Discharge Recommendations:  rehabilitation facility   Discharge Equipment Recommendations:  (TBD)   Barriers to discharge: Inaccessible home and Decreased caregiver support    Assessment:     Bonnie Lino is a 52 y.o. female admitted with a medical diagnosis of Right leg weakness.  She presents with the following impairments/functional limitations:  weakness, impaired endurance, impaired self care skills, impaired functional mobilty, gait instability, impaired balance, decreased coordination, decreased upper extremity function, decreased lower extremity function, decreased safety awareness, decreased ROM, impaired coordination. Pt able to perform 2 ambulation trials ~20-25 ft each with RW requiring min A. Pt with R knee buckling throughout. Recommending rehabilitation facility upon discharge.    Rehab Prognosis: Good; patient would benefit from acute skilled PT services to address these deficits and reach maximum level of function.    Recent Surgery: * No surgery found *      Plan:     During this hospitalization, patient to be seen 5 x/week to address the identified rehab impairments via gait training, therapeutic activities, therapeutic exercises, neuromuscular re-education and progress toward the following goals:    · Plan of Care Expires:  04/18/22    Subjective     Chief Complaint: None Expressed  Patient/Family Comments/goals: "I just feel like I have no strength in my legs".  Pain/Comfort:  · Pain Rating 1: 0/10  · Pain Rating Post-Intervention 1: 0/10 (Did not complain of pain)      Objective:     Communicated with nurse prior to session.  Patient found  In bathroom with bed alarm, telemetry upon PT entry to room.     General Precautions: Standard, fall   Orthopedic Precautions:N/A   Braces: N/A  Respiratory Status: Room air     Functional Mobility:  · Bed Mobility:     · Sit to Supine: stand by " assistance  · Transfers:     · Sit to Stand:  stand by assistance, contact guard assistance and minimum assistance with rolling walker and  Initially pt required min A to perform sit to stand then gradually required SBA with increased trials.  · Gait:  2 trials ~20-25 ft each with RW requiring min A      AM-PAC 6 CLICK MOBILITY  Turning over in bed (including adjusting bedclothes, sheets and blankets)?: 4  Sitting down on and standing up from a chair with arms (e.g., wheelchair, bedside commode, etc.): 3  Moving from lying on back to sitting on the side of the bed?: 3  Moving to and from a bed to a chair (including a wheelchair)?: 3  Need to walk in hospital room?: 3  Climbing 3-5 steps with a railing?: 2  Basic Mobility Total Score: 18       Therapeutic Activities and Exercises:   Pt out of bed in bathroom upon entering room. Able to perform self hygiene. Requested to walk to sink and brush teeth. ~6-8 ft to sink with RW requiring min A. Pt R knee buckling throughout. Seated rest break needed. Pt then instructed in and performed 1 x 10 reps of glut isometrics and hip abd/add. 2 ambulation trials ~20-25 ft with RW requiring min A. Again pt's R knee buckling throughout. Demonstrates an increased trunk flexion, decreased step length, narrow base of support, and decreased chandan. With verbal cueing pt does demonstrate an ability to increase trunk extension and increase her base of support. However, needed verbal cueing reminders throughout. Performed 1 x 10 sit to stand transfer trials for exercise requiring less assistance with increased reps from min-SBA. Pt is very motivated and compliant with all therapy requests.    Patient left supine with all lines intact, call button in reach, bed alarm on and  nurse notified..    GOALS:   Multidisciplinary Problems     Physical Therapy Goals        Problem: Physical Therapy Goal    Goal Priority Disciplines Outcome Goal Variances Interventions   Physical Therapy Goal     PT,  PT/OT Ongoing, Progressing     Description: Goals to be met by: 22     Patient will increase functional independence with mobility by performin. Supine <> sit with Modified Bland  2. Sit to stand transfer with Modified Bland  3. Bed to chair transfer with Modified Bland using appropriate AD  4. Gait  x 150 feet with Supervision   5. Assess step negotiation as able - has 1 flight of ILIANA with B rails                     Time Tracking:     PT Received On: 22  PT Start Time: 939     PT Stop Time: 1035  PT Total Time (min): 56 min  -15 minutes as doctors in room observing then talking with pt. Total billable time 41 minutes.    Billable Minutes: Gait Training  23 and Therapeutic Exercise  18       PT/PTA: PTA     PTA Visit Number: 1     2022

## 2022-03-19 NOTE — PLAN OF CARE
VN note: VN completed AVS and attachments and notified bedside nurseNicole. Will cont to be available and intervene prn.

## 2022-03-19 NOTE — PLAN OF CARE
SW met with pt at bedside for final assessment. Pt was confused because she thought she was going to different. SW explained that [facility] did not get insurance approval and that [facility] has availability for today. Pt understands that she will be able to get rehab with [facility]. SW explained to pt and pt's friend again about pts obs status. Pt agrees to get IPR from [facility] but was disappointed she could not get [facility]. Pt spoke with scotty with [facility] yesterday in pt's room and recalls liking her. Baltazar gave pt number for report [phone] and will be going to room 238. Rounds completed on pt.  All questions addressed.  Bedside nurse to discuss d/c medications.  Discussed importance to attend all f/u appts and take medications as prescribed.  Verbalized understanding.      DREA sent Facility transfer orders to [facility] and spoke with braden. Scotty will call sw back with number for report.     Keanu Soto, MSW  [phone]    Future Appointments   Date Time Provider Department Center   3/24/2022  2:30 PM [facility]   3/30/2022  1:00 PM Jeff Garcia [facility]-C [facility]        03/19/22 1353   Final Note   Assessment Type Final Discharge Note   Anticipated Discharge Disposition Rehab   What phone number can be called within the next 1-3 days to see how you are doing after discharge? [phone]   Hospital Resources/Appts/Education Provided Appointments scheduled and added to AVS   Post-Acute Status   Coverage MEDICAID   Discharge Delays (!) Ambulance Transport/Facility Transport

## 2022-03-19 NOTE — SUBJECTIVE & OBJECTIVE
Interval History: NAEON. Pt seen at bedside and has no complaints this morning. Denies any numbness/tingling. Pending inpatient rehab    Review of Systems   Constitutional:  Negative for activity change, appetite change, chills and fever.   HENT:  Positive for congestion, postnasal drip and sinus pressure. Negative for sore throat, trouble swallowing and voice change.    Eyes:  Negative for photophobia and visual disturbance.   Respiratory:  Negative for choking, shortness of breath and wheezing.    Cardiovascular:  Negative for chest pain, palpitations and leg swelling.   Gastrointestinal:  Negative for abdominal pain, constipation, diarrhea, nausea and vomiting.   Genitourinary:  Negative for dysuria, frequency and hematuria.   Musculoskeletal:  Positive for gait problem. Negative for back pain, joint swelling, myalgias and neck pain.   Skin:  Negative for pallor, rash and wound.   Neurological:  Negative for dizziness, syncope, facial asymmetry, speech difficulty, weakness, light-headedness, numbness and headaches.   Objective:     Vital Signs (Most Recent):  Temp: 98 °F (36.7 °C) (03/19/22 0717)  Pulse: 78 (03/19/22 0717)  Resp: 16 (03/19/22 0717)  BP: 129/62 (03/19/22 0717)  SpO2: 96 % (03/19/22 0717) Vital Signs (24h Range):  Temp:  [96.3 °F (35.7 °C)-98 °F (36.7 °C)] 98 °F (36.7 °C)  Pulse:  [74-93] 78  Resp:  [16-20] 16  SpO2:  [96 %-99 %] 96 %  BP: (105-141)/(55-80) 129/62     Weight: 73 kg (160 lb 15 oz)  Body mass index is 25.21 kg/m².    Intake/Output Summary (Last 24 hours) at 3/19/2022 0823  Last data filed at 3/18/2022 1400  Gross per 24 hour   Intake --   Output 600 ml   Net -600 ml      Physical Exam  Vitals and nursing note reviewed.   Constitutional:       General: She is not in acute distress.     Appearance: Normal appearance. She is not ill-appearing, toxic-appearing or diaphoretic.   HENT:      Head: Normocephalic and atraumatic.      Right Ear: External ear normal.      Left Ear: External ear  normal.      Nose: Nose normal. No congestion or rhinorrhea.      Mouth/Throat:      Mouth: Mucous membranes are moist.      Pharynx: Oropharynx is clear. No oropharyngeal exudate.   Eyes:      Extraocular Movements: Extraocular movements intact.      Conjunctiva/sclera: Conjunctivae normal.      Pupils: Pupils are equal, round, and reactive to light.   Cardiovascular:      Rate and Rhythm: Normal rate and regular rhythm.      Pulses: Normal pulses.      Heart sounds: Normal heart sounds. No murmur heard.    No gallop.   Pulmonary:      Effort: Pulmonary effort is normal.      Breath sounds: Normal breath sounds. No wheezing or rales.   Abdominal:      General: Bowel sounds are normal.      Palpations: Abdomen is soft. There is no mass.      Tenderness: There is no abdominal tenderness. There is no rebound.   Musculoskeletal:         General: No swelling, tenderness or signs of injury.      Cervical back: Normal range of motion and neck supple. No rigidity or tenderness.      Right lower leg: No edema.      Left lower leg: No edema.   Lymphadenopathy:      Cervical: No cervical adenopathy.   Skin:     General: Skin is warm and dry.      Capillary Refill: Capillary refill takes less than 2 seconds.      Findings: No erythema, lesion or rash.   Neurological:      Mental Status: She is alert and oriented to person, place, and time.      Cranial Nerves: No cranial nerve deficit.      Sensory: No sensory deficit.      Motor: No weakness (Muscle strength 5/5 in UE and LE bilaterally).      Coordination: Coordination normal.      Gait: Gait abnormal (Pt falls onto R leg with each step though able to catch herself).      Deep Tendon Reflexes: Reflexes normal (2+ in LE bilaterally).   Psychiatric:      Comments: Anxious mood       Significant Labs:   Recent Results (from the past 24 hour(s))   Echo Saline Bubble? Yes    Collection Time: 03/18/22  9:06 AM   Result Value Ref Range    BSA 1.86 m2    TDI SEPTAL 0.07 m/s    LV  LATERAL E/E' RATIO 8.56 m/s    LV SEPTAL E/E' RATIO 11.00 m/s    LA WIDTH 2.39 cm    TDI LATERAL 0.09 m/s    PV PEAK VELOCITY 1.04 cm/s    LVIDd 3.42 (A) 3.5 - 6.0 cm    IVS 1.17 (A) 0.6 - 1.1 cm    Posterior Wall 0.97 0.6 - 1.1 cm    Ao root annulus 2.56 cm    LVIDs 1.93 (A) 2.1 - 4.0 cm    FS 44 28 - 44 %    LA volume 24.57 cm3    LV mass 110.32 g    LA size 2.88 cm    RVDD 2.84 cm    RV S' 12.79 cm/s    Left Ventricle Relative Wall Thickness 0.57 cm    AV mean gradient 4 mmHg    AV valve area 2.71 cm2    AV Velocity Ratio 1.11     AV index (prosthetic) 1.03     MV mean gradient 1 mmHg    MV valve area p 1/2 method 4.62 cm2    MV valve area by continuity eq 3.49 cm2    E/A ratio 1.15     Mean e' 0.08 m/s    E wave deceleration time 164.10 msec    LVOT diameter 1.83 cm    LVOT area 2.6 cm2    LVOT peak shimon 1.43 m/s    LVOT peak VTI 25.39 cm    Ao peak shimon 1.29 m/s    Ao VTI 24.63 cm    LVOT stroke volume 66.75 cm3    AV peak gradient 7 mmHg    MV peak gradient 2 mmHg    E/E' ratio 9.63 m/s    MV Peak E Shimon 0.77 m/s    TR Max Shimon 2.42 m/s    MV VTI 19.11 cm    MV stenosis pressure 1/2 time 47.59 ms    MV Peak A Shimon 0.67 m/s    LV Systolic Volume 11.62 mL    LV Systolic Volume Index 6.3 mL/m2    LV Diastolic Volume 48.18 mL    LV Diastolic Volume Index 26.18 mL/m2    LA Volume Index 13.4 mL/m2    LV Mass Index 60 g/m2    RA Major Axis 4.08 cm    Left Atrium Minor Axis 4.00 cm    Left Atrium Major Axis 4.42 cm    Triscuspid Valve Regurgitation Peak Gradient 23 mmHg    LA Volume Index (Mod) 9.4 mL/m2    LA volume (mod) 17.29 cm3    RA Width 2.38 cm    Right Atrial Pressure (from IVC) 3 mmHg    EF 70 %    TV rest pulmonary artery pressure 26 mmHg   POCT glucose    Collection Time: 03/18/22 11:16 AM   Result Value Ref Range    POCT Glucose 190 (H) 70 - 110 mg/dL   POCT glucose    Collection Time: 03/18/22  4:57 PM   Result Value Ref Range    POCT Glucose 324 (H) 70 - 110 mg/dL   POCT glucose    Collection Time: 03/18/22   8:39 PM   Result Value Ref Range    POCT Glucose 251 (H) 70 - 110 mg/dL   POCT glucose    Collection Time: 03/19/22  4:31 AM   Result Value Ref Range    POCT Glucose 266 (H) 70 - 110 mg/dL       Significant Imaging:   Imaging Results              MRA Neck without contrast (Final result)  Result time 03/17/22 22:18:31      Final result by Sal Wilson MD (03/17/22 22:18:31)                   Impression:      MRI brain:    No MR evidence of acute infarction.    Mild cerebellar tonsillar ectopia.    Paranasal sinus disease.    MRA brain:    Unremarkable MRA of the intracranial circulation.    MRA neck:    Limited MRA of the neck examination secondary to motion.  No definitive stenosis.  Carotid ultrasound may be attempted for further evaluation.      Electronically signed by: Sal Wilson MD  Date:    03/17/2022  Time:    22:18               Narrative:    EXAMINATION:  MRI BRAIN WITHOUT CONTRAST; MRA BRAIN WITHOUT CONTRAST; MRA NECK WITHOUT CONTRAST    CLINICAL HISTORY:  New neurological deficits;; Neuro deficit, acute, stroke suspected;    TECHNIQUE:  Per separate acquisition multiplanar multisequence MR imaging of the brain was performed without contrast.    3D time-of-flight MRA of the intracranial circulation was performed without contrast. MIP reconstructions were obtained and reviewed.    3D time-of-flight MRA of the neck was performed without intravenous contrast.  MIP reconstructions were obtained and reviewed.    COMPARISON:  CT head dated 03/17/2021.    FINDINGS:  MRI brain:    There is mild cerebellar tonsillar ectopia.  The sellar and parasellar structures are within normal limits.  The midline structures are intact.  The intracranial flow voids are within normal limits.    No diffusion-weighted signal abnormality is present.  There is no focal parenchymal signal abnormality.  The ventricles and sulci are within normal limits.  There are no extra-axial fluid collections.  There is no evidence of  intracranial hemorrhage.  There is no evidence of mass effect.    There are postoperative changes in the globes.  There is mucosal thickening within the maxillary sinuses.  There is fluid within the right sphenoid sinus.  The mastoid air cells are clear.  The calvarium is intact.    MRA brain:    The intracranial segments of the ICAs are within normal limits.  The anterior cerebral arteries and anterior communicating complex are within normal limits.  The middle cerebral arteries are unremarkable.    The vertebral arteries are within normal limits.  The basilar is unremarkable.  The posterior cerebral arteries are within normal limits.    There is no evidence of aneurysm or stenosis of the intracranial vessels.    MRA neck:    The MRA neck examination is significantly degraded by motion.    The origins of the carotid arteries are significantly limited in evaluation given extensive motion.  The internal and external carotid arteries appear unremarkable.  The vertebral arteries appear grossly unremarkable.    There is no hemodynamically significant stenosis of the neck vessels.                                       MRI Brain Without Contrast (Final result)  Result time 03/17/22 22:18:31      Final result by Sal Wilson MD (03/17/22 22:18:31)                   Impression:      MRI brain:    No MR evidence of acute infarction.    Mild cerebellar tonsillar ectopia.    Paranasal sinus disease.    MRA brain:    Unremarkable MRA of the intracranial circulation.    MRA neck:    Limited MRA of the neck examination secondary to motion.  No definitive stenosis.  Carotid ultrasound may be attempted for further evaluation.      Electronically signed by: Sal Wilson MD  Date:    03/17/2022  Time:    22:18               Narrative:    EXAMINATION:  MRI BRAIN WITHOUT CONTRAST; MRA BRAIN WITHOUT CONTRAST; MRA NECK WITHOUT CONTRAST    CLINICAL HISTORY:  New neurological deficits;; Neuro deficit, acute, stroke  suspected;    TECHNIQUE:  Per separate acquisition multiplanar multisequence MR imaging of the brain was performed without contrast.    3D time-of-flight MRA of the intracranial circulation was performed without contrast. MIP reconstructions were obtained and reviewed.    3D time-of-flight MRA of the neck was performed without intravenous contrast.  MIP reconstructions were obtained and reviewed.    COMPARISON:  CT head dated 03/17/2021.    FINDINGS:  MRI brain:    There is mild cerebellar tonsillar ectopia.  The sellar and parasellar structures are within normal limits.  The midline structures are intact.  The intracranial flow voids are within normal limits.    No diffusion-weighted signal abnormality is present.  There is no focal parenchymal signal abnormality.  The ventricles and sulci are within normal limits.  There are no extra-axial fluid collections.  There is no evidence of intracranial hemorrhage.  There is no evidence of mass effect.    There are postoperative changes in the globes.  There is mucosal thickening within the maxillary sinuses.  There is fluid within the right sphenoid sinus.  The mastoid air cells are clear.  The calvarium is intact.    MRA brain:    The intracranial segments of the ICAs are within normal limits.  The anterior cerebral arteries and anterior communicating complex are within normal limits.  The middle cerebral arteries are unremarkable.    The vertebral arteries are within normal limits.  The basilar is unremarkable.  The posterior cerebral arteries are within normal limits.    There is no evidence of aneurysm or stenosis of the intracranial vessels.    MRA neck:    The MRA neck examination is significantly degraded by motion.    The origins of the carotid arteries are significantly limited in evaluation given extensive motion.  The internal and external carotid arteries appear unremarkable.  The vertebral arteries appear grossly unremarkable.    There is no hemodynamically  significant stenosis of the neck vessels.                                       MRA Brain without contrast (Final result)  Result time 03/17/22 22:18:31      Final result by Sal Wilson MD (03/17/22 22:18:31)                   Impression:      MRI brain:    No MR evidence of acute infarction.    Mild cerebellar tonsillar ectopia.    Paranasal sinus disease.    MRA brain:    Unremarkable MRA of the intracranial circulation.    MRA neck:    Limited MRA of the neck examination secondary to motion.  No definitive stenosis.  Carotid ultrasound may be attempted for further evaluation.      Electronically signed by: Sal Wilson MD  Date:    03/17/2022  Time:    22:18               Narrative:    EXAMINATION:  MRI BRAIN WITHOUT CONTRAST; MRA BRAIN WITHOUT CONTRAST; MRA NECK WITHOUT CONTRAST    CLINICAL HISTORY:  New neurological deficits;; Neuro deficit, acute, stroke suspected;    TECHNIQUE:  Per separate acquisition multiplanar multisequence MR imaging of the brain was performed without contrast.    3D time-of-flight MRA of the intracranial circulation was performed without contrast. MIP reconstructions were obtained and reviewed.    3D time-of-flight MRA of the neck was performed without intravenous contrast.  MIP reconstructions were obtained and reviewed.    COMPARISON:  CT head dated 03/17/2021.    FINDINGS:  MRI brain:    There is mild cerebellar tonsillar ectopia.  The sellar and parasellar structures are within normal limits.  The midline structures are intact.  The intracranial flow voids are within normal limits.    No diffusion-weighted signal abnormality is present.  There is no focal parenchymal signal abnormality.  The ventricles and sulci are within normal limits.  There are no extra-axial fluid collections.  There is no evidence of intracranial hemorrhage.  There is no evidence of mass effect.    There are postoperative changes in the globes.  There is mucosal thickening within the maxillary sinuses.   There is fluid within the right sphenoid sinus.  The mastoid air cells are clear.  The calvarium is intact.    MRA brain:    The intracranial segments of the ICAs are within normal limits.  The anterior cerebral arteries and anterior communicating complex are within normal limits.  The middle cerebral arteries are unremarkable.    The vertebral arteries are within normal limits.  The basilar is unremarkable.  The posterior cerebral arteries are within normal limits.    There is no evidence of aneurysm or stenosis of the intracranial vessels.    MRA neck:    The MRA neck examination is significantly degraded by motion.    The origins of the carotid arteries are significantly limited in evaluation given extensive motion.  The internal and external carotid arteries appear unremarkable.  The vertebral arteries appear grossly unremarkable.    There is no hemodynamically significant stenosis of the neck vessels.                                       CT Head Without Contrast (Final result)  Result time 03/17/22 18:19:49      Final result by Pierce Brennan MD (03/17/22 18:19:49)                   Impression:      1. No acute intracranial abnormalities.  2. Sinus disease and minimal left mastoid effusion as above.      Electronically signed by: Pierce Brennan MD  Date:    03/17/2022  Time:    18:19               Narrative:    EXAMINATION:  CT HEAD WITHOUT CONTRAST    CLINICAL HISTORY:  Motor neuron disease suspected;    TECHNIQUE:  Low dose axial images were obtained through the head.  Coronal and sagittal reformations were also performed. Contrast was not administered.    COMPARISON:  10/02/2016    FINDINGS:  There is no evidence of acute major vascular territory infarct, hemorrhage, or mass.  There is no hydrocephalus.  There are no abnormal extra-axial fluid collections.  There are mucous retention cysts or polyps within the bilateral maxillary sinuses as well as within the right sphenoid sinus.  There is minimal  opacification of the inferior most left mastoid air cells, otherwise the visualized paranasal sinuses and mastoid air cells are clear, and there is no evidence of calvarial fracture.  The visualized soft tissues are unremarkable.                                       X-Ray Knee 1 or 2 View Right (Final result)  Result time 03/17/22 17:58:35      Final result by Pierce Brennan MD (03/17/22 17:58:35)                   Impression:      1. No acute displaced fracture or dislocation of the knee.      Electronically signed by: Pierce Brennan MD  Date:    03/17/2022  Time:    17:58               Narrative:    EXAMINATION:  XR KNEE 1 OR 2 VIEW RIGHT    CLINICAL HISTORY:  Pain in right knee    TECHNIQUE:  AP and lateral views of the right knee were performed.    COMPARISON:  None    FINDINGS:  Two views right knee.    No acute displaced fracture or dislocation of the knee.  No radiopaque foreign body.  No large knee joint effusion.                                       X-Ray Chest 1 View (Final result)  Result time 03/17/22 17:32:48   Procedure changed from X-Ray Chest PA And Lateral     Final result by Sal Wilson MD (03/17/22 17:32:48)                   Impression:      No acute process.      Electronically signed by: Sal Wilson MD  Date:    03/17/2022  Time:    17:32               Narrative:    EXAMINATION:  XR CHEST 1 VIEW    CLINICAL HISTORY:  Cough, unspecified    TECHNIQUE:  Single frontal view of the chest was performed.    COMPARISON:  03/17/2020.    FINDINGS:  There are stable postop changes in the chest.  The trachea is unremarkable.  The cardiomediastinal silhouette is within normal limits.  The hemidiaphragms are unremarkable.  There are no pleural effusions.  There is no evidence of a pneumothorax.  There is no evidence of pneumomediastinum.  No airspace opacity is present.  The osseous structures are unremarkable.

## 2022-03-19 NOTE — PLAN OF CARE
Problem: Adult Inpatient Plan of Care  Goal: Plan of Care Review  Outcome: Ongoing, Progressing   Patient is alert, oriented X4. Care plan explained to patient, she verbalized understanding.     On room air, O2 sat maintain 97%, no respiratory distress noted. On cardiac monitor, running normal sinus rhythm.     Deny nausea/vomiting/diarrhea. Other than left side weakness, no others sign and symptoms of stroke noted. Educated patient to call if there are numbness tinglings/worsening body weakness, headache, facial droop, drooling. Patient verbalized understanding. When pt up to bathroom. Gait disturbance noted. Insomnia managed with PRN melatonin. Due medications given.     Maintain fall risk precaution, bed in lowest position, bed alarm on. Call light/personal items in reach. Purewick in place. Instructed patient call for help as needed. Will continue to monitor.

## 2022-03-19 NOTE — PROGRESS NOTES
"[facility] Medicine  Progress Note    Patient Name: Bonnie Lino  MRN: [id number]  Patient Class: OP- Observation   Admission Date: 3/17/2022  Length of Stay: 0 days  Attending Physician: Dorothea Saha MD  Primary Care Provider: Robson Culver DO        Subjective:     Principal Problem:Right leg weakness    HPI:  51 yo F w/ PMHx of T2DM, CKD, HTN, and Depression who presented to the ED from the  clinic 2/2 concerns of CVA w/ R LE weakness. She states that she first had several short intermitent episodes of feeling "drunk and wobbly" 1 wk prior but did not think anything of it. She then developed a cough, postnasal drip, and more frequent but not loose stools over the weekend, and yesterday began to notice that her R leg would give out on her when she tried to walk on it. She denies any syncopal events and has had no trauma or falls. She denies any numbness/tingling as well as bowel/bladder incontinence or facial droop and difficulty speaking. She also denies any fevers during this time. The only change she endorses during this time is that she had an increase in her Prozac from 10mg to 20mg and has been compliant with all her home medications. She denies any sick contacts or further complaints at this time.    In the ED vitals were stable. CBC and CMP unremarkable w/ CKD at baseline. CPK and TSH wnl. COVID neg. UA w/ many bacteria and 1+ protein 4+ glucose and + nitrites. CT Head w/o any acute intracranial abnormalities. She was admitted to the  service for CVA r/o w/ Neurology consult.       Interval History: NAEON. Pt seen at bedside and has no complaints this morning. Denies any numbness/tingling. Pending inpatient rehab    Review of Systems   Constitutional:  Negative for activity change, appetite change, chills and fever.   HENT:  Positive for congestion, postnasal drip and sinus pressure. Negative for sore throat, trouble swallowing and voice change.    Eyes:  Negative for " photophobia and visual disturbance.   Respiratory:  Negative for choking, shortness of breath and wheezing.    Cardiovascular:  Negative for chest pain, palpitations and leg swelling.   Gastrointestinal:  Negative for abdominal pain, constipation, diarrhea, nausea and vomiting.   Genitourinary:  Negative for dysuria, frequency and hematuria.   Musculoskeletal:  Positive for gait problem. Negative for back pain, joint swelling, myalgias and neck pain.   Skin:  Negative for pallor, rash and wound.   Neurological:  Negative for dizziness, syncope, facial asymmetry, speech difficulty, weakness, light-headedness, numbness and headaches.   Objective:     Vital Signs (Most Recent):  Temp: 98 °F (36.7 °C) (03/19/22 0717)  Pulse: 78 (03/19/22 0717)  Resp: 16 (03/19/22 0717)  BP: 129/62 (03/19/22 0717)  SpO2: 96 % (03/19/22 0717) Vital Signs (24h Range):  Temp:  [96.3 °F (35.7 °C)-98 °F (36.7 °C)] 98 °F (36.7 °C)  Pulse:  [74-93] 78  Resp:  [16-20] 16  SpO2:  [96 %-99 %] 96 %  BP: (105-141)/(55-80) 129/62     Weight: 73 kg (160 lb 15 oz)  Body mass index is 25.21 kg/m².    Intake/Output Summary (Last 24 hours) at 3/19/2022 0823  Last data filed at 3/18/2022 1400  Gross per 24 hour   Intake --   Output 600 ml   Net -600 ml      Physical Exam  Vitals and nursing note reviewed.   Constitutional:       General: She is not in acute distress.     Appearance: Normal appearance. She is not ill-appearing, toxic-appearing or diaphoretic.   HENT:      Head: Normocephalic and atraumatic.      Right Ear: External ear normal.      Left Ear: External ear normal.      Nose: Nose normal. No congestion or rhinorrhea.      Mouth/Throat:      Mouth: Mucous membranes are moist.      Pharynx: Oropharynx is clear. No oropharyngeal exudate.   Eyes:      Extraocular Movements: Extraocular movements intact.      Conjunctiva/sclera: Conjunctivae normal.      Pupils: Pupils are equal, round, and reactive to light.   Cardiovascular:      Rate and Rhythm:  Normal rate and regular rhythm.      Pulses: Normal pulses.      Heart sounds: Normal heart sounds. No murmur heard.    No gallop.   Pulmonary:      Effort: Pulmonary effort is normal.      Breath sounds: Normal breath sounds. No wheezing or rales.   Abdominal:      General: Bowel sounds are normal.      Palpations: Abdomen is soft. There is no mass.      Tenderness: There is no abdominal tenderness. There is no rebound.   Musculoskeletal:         General: No swelling, tenderness or signs of injury.      Cervical back: Normal range of motion and neck supple. No rigidity or tenderness.      Right lower leg: No edema.      Left lower leg: No edema.   Lymphadenopathy:      Cervical: No cervical adenopathy.   Skin:     General: Skin is warm and dry.      Capillary Refill: Capillary refill takes less than 2 seconds.      Findings: No erythema, lesion or rash.   Neurological:      Mental Status: She is alert and oriented to person, place, and time.      Cranial Nerves: No cranial nerve deficit.      Sensory: No sensory deficit.      Motor: No weakness (Muscle strength 5/5 in UE and LE bilaterally).      Coordination: Coordination normal.      Gait: Gait abnormal (Pt falls onto R leg with each step though able to catch herself).      Deep Tendon Reflexes: Reflexes normal (2+ in LE bilaterally).   Psychiatric:      Comments: Anxious mood       Significant Labs:   Recent Results (from the past 24 hour(s))   Echo Saline Bubble? Yes    Collection Time: 03/18/22  9:06 AM   Result Value Ref Range    BSA 1.86 m2    TDI SEPTAL 0.07 m/s    LV LATERAL E/E' RATIO 8.56 m/s    LV SEPTAL E/E' RATIO 11.00 m/s    LA WIDTH 2.39 cm    TDI LATERAL 0.09 m/s    PV PEAK VELOCITY 1.04 cm/s    LVIDd 3.42 (A) 3.5 - 6.0 cm    IVS 1.17 (A) 0.6 - 1.1 cm    Posterior Wall 0.97 0.6 - 1.1 cm    Ao root annulus 2.56 cm    LVIDs 1.93 (A) 2.1 - 4.0 cm    FS 44 28 - 44 %    LA volume 24.57 cm3    LV mass 110.32 g    LA size 2.88 cm    RVDD 2.84 cm    RV S'  12.79 cm/s    Left Ventricle Relative Wall Thickness 0.57 cm    AV mean gradient 4 mmHg    AV valve area 2.71 cm2    AV Velocity Ratio 1.11     AV index (prosthetic) 1.03     MV mean gradient 1 mmHg    MV valve area p 1/2 method 4.62 cm2    MV valve area by continuity eq 3.49 cm2    E/A ratio 1.15     Mean e' 0.08 m/s    E wave deceleration time 164.10 msec    LVOT diameter 1.83 cm    LVOT area 2.6 cm2    LVOT peak shimon 1.43 m/s    LVOT peak VTI 25.39 cm    Ao peak shimon 1.29 m/s    Ao VTI 24.63 cm    LVOT stroke volume 66.75 cm3    AV peak gradient 7 mmHg    MV peak gradient 2 mmHg    E/E' ratio 9.63 m/s    MV Peak E Shimon 0.77 m/s    TR Max Shimon 2.42 m/s    MV VTI 19.11 cm    MV stenosis pressure 1/2 time 47.59 ms    MV Peak A Shimon 0.67 m/s    LV Systolic Volume 11.62 mL    LV Systolic Volume Index 6.3 mL/m2    LV Diastolic Volume 48.18 mL    LV Diastolic Volume Index 26.18 mL/m2    LA Volume Index 13.4 mL/m2    LV Mass Index 60 g/m2    RA Major Axis 4.08 cm    Left Atrium Minor Axis 4.00 cm    Left Atrium Major Axis 4.42 cm    Triscuspid Valve Regurgitation Peak Gradient 23 mmHg    LA Volume Index (Mod) 9.4 mL/m2    LA volume (mod) 17.29 cm3    RA Width 2.38 cm    Right Atrial Pressure (from IVC) 3 mmHg    EF 70 %    TV rest pulmonary artery pressure 26 mmHg   POCT glucose    Collection Time: 03/18/22 11:16 AM   Result Value Ref Range    POCT Glucose 190 (H) 70 - 110 mg/dL   POCT glucose    Collection Time: 03/18/22  4:57 PM   Result Value Ref Range    POCT Glucose 324 (H) 70 - 110 mg/dL   POCT glucose    Collection Time: 03/18/22  8:39 PM   Result Value Ref Range    POCT Glucose 251 (H) 70 - 110 mg/dL   POCT glucose    Collection Time: 03/19/22  4:31 AM   Result Value Ref Range    POCT Glucose 266 (H) 70 - 110 mg/dL       Significant Imaging:   Imaging Results              MRA Neck without contrast (Final result)  Result time 03/17/22 22:18:31      Final result by Sal Wilson MD (03/17/22 22:18:31)                    Impression:      MRI brain:    No MR evidence of acute infarction.    Mild cerebellar tonsillar ectopia.    Paranasal sinus disease.    MRA brain:    Unremarkable MRA of the intracranial circulation.    MRA neck:    Limited MRA of the neck examination secondary to motion.  No definitive stenosis.  Carotid ultrasound may be attempted for further evaluation.      Electronically signed by: Sal Wilson MD  Date:    03/17/2022  Time:    22:18               Narrative:    EXAMINATION:  MRI BRAIN WITHOUT CONTRAST; MRA BRAIN WITHOUT CONTRAST; MRA NECK WITHOUT CONTRAST    CLINICAL HISTORY:  New neurological deficits;; Neuro deficit, acute, stroke suspected;    TECHNIQUE:  Per separate acquisition multiplanar multisequence MR imaging of the brain was performed without contrast.    3D time-of-flight MRA of the intracranial circulation was performed without contrast. MIP reconstructions were obtained and reviewed.    3D time-of-flight MRA of the neck was performed without intravenous contrast.  MIP reconstructions were obtained and reviewed.    COMPARISON:  CT head dated 03/17/2021.    FINDINGS:  MRI brain:    There is mild cerebellar tonsillar ectopia.  The sellar and parasellar structures are within normal limits.  The midline structures are intact.  The intracranial flow voids are within normal limits.    No diffusion-weighted signal abnormality is present.  There is no focal parenchymal signal abnormality.  The ventricles and sulci are within normal limits.  There are no extra-axial fluid collections.  There is no evidence of intracranial hemorrhage.  There is no evidence of mass effect.    There are postoperative changes in the globes.  There is mucosal thickening within the maxillary sinuses.  There is fluid within the right sphenoid sinus.  The mastoid air cells are clear.  The calvarium is intact.    MRA brain:    The intracranial segments of the ICAs are within normal limits.  The anterior cerebral arteries and  anterior communicating complex are within normal limits.  The middle cerebral arteries are unremarkable.    The vertebral arteries are within normal limits.  The basilar is unremarkable.  The posterior cerebral arteries are within normal limits.    There is no evidence of aneurysm or stenosis of the intracranial vessels.    MRA neck:    The MRA neck examination is significantly degraded by motion.    The origins of the carotid arteries are significantly limited in evaluation given extensive motion.  The internal and external carotid arteries appear unremarkable.  The vertebral arteries appear grossly unremarkable.    There is no hemodynamically significant stenosis of the neck vessels.                                       MRI Brain Without Contrast (Final result)  Result time 03/17/22 22:18:31      Final result by Sal Wilson MD (03/17/22 22:18:31)                   Impression:      MRI brain:    No MR evidence of acute infarction.    Mild cerebellar tonsillar ectopia.    Paranasal sinus disease.    MRA brain:    Unremarkable MRA of the intracranial circulation.    MRA neck:    Limited MRA of the neck examination secondary to motion.  No definitive stenosis.  Carotid ultrasound may be attempted for further evaluation.      Electronically signed by: Sal Wilson MD  Date:    03/17/2022  Time:    22:18               Narrative:    EXAMINATION:  MRI BRAIN WITHOUT CONTRAST; MRA BRAIN WITHOUT CONTRAST; MRA NECK WITHOUT CONTRAST    CLINICAL HISTORY:  New neurological deficits;; Neuro deficit, acute, stroke suspected;    TECHNIQUE:  Per separate acquisition multiplanar multisequence MR imaging of the brain was performed without contrast.    3D time-of-flight MRA of the intracranial circulation was performed without contrast. MIP reconstructions were obtained and reviewed.    3D time-of-flight MRA of the neck was performed without intravenous contrast.  MIP reconstructions were obtained and reviewed.    COMPARISON:  CT  head dated 03/17/2021.    FINDINGS:  MRI brain:    There is mild cerebellar tonsillar ectopia.  The sellar and parasellar structures are within normal limits.  The midline structures are intact.  The intracranial flow voids are within normal limits.    No diffusion-weighted signal abnormality is present.  There is no focal parenchymal signal abnormality.  The ventricles and sulci are within normal limits.  There are no extra-axial fluid collections.  There is no evidence of intracranial hemorrhage.  There is no evidence of mass effect.    There are postoperative changes in the globes.  There is mucosal thickening within the maxillary sinuses.  There is fluid within the right sphenoid sinus.  The mastoid air cells are clear.  The calvarium is intact.    MRA brain:    The intracranial segments of the ICAs are within normal limits.  The anterior cerebral arteries and anterior communicating complex are within normal limits.  The middle cerebral arteries are unremarkable.    The vertebral arteries are within normal limits.  The basilar is unremarkable.  The posterior cerebral arteries are within normal limits.    There is no evidence of aneurysm or stenosis of the intracranial vessels.    MRA neck:    The MRA neck examination is significantly degraded by motion.    The origins of the carotid arteries are significantly limited in evaluation given extensive motion.  The internal and external carotid arteries appear unremarkable.  The vertebral arteries appear grossly unremarkable.    There is no hemodynamically significant stenosis of the neck vessels.                                       MRA Brain without contrast (Final result)  Result time 03/17/22 22:18:31      Final result by Sal Wilson MD (03/17/22 22:18:31)                   Impression:      MRI brain:    No MR evidence of acute infarction.    Mild cerebellar tonsillar ectopia.    Paranasal sinus disease.    MRA brain:    Unremarkable MRA of the intracranial  circulation.    MRA neck:    Limited MRA of the neck examination secondary to motion.  No definitive stenosis.  Carotid ultrasound may be attempted for further evaluation.      Electronically signed by: Sal Wilson MD  Date:    03/17/2022  Time:    22:18               Narrative:    EXAMINATION:  MRI BRAIN WITHOUT CONTRAST; MRA BRAIN WITHOUT CONTRAST; MRA NECK WITHOUT CONTRAST    CLINICAL HISTORY:  New neurological deficits;; Neuro deficit, acute, stroke suspected;    TECHNIQUE:  Per separate acquisition multiplanar multisequence MR imaging of the brain was performed without contrast.    3D time-of-flight MRA of the intracranial circulation was performed without contrast. MIP reconstructions were obtained and reviewed.    3D time-of-flight MRA of the neck was performed without intravenous contrast.  MIP reconstructions were obtained and reviewed.    COMPARISON:  CT head dated 03/17/2021.    FINDINGS:  MRI brain:    There is mild cerebellar tonsillar ectopia.  The sellar and parasellar structures are within normal limits.  The midline structures are intact.  The intracranial flow voids are within normal limits.    No diffusion-weighted signal abnormality is present.  There is no focal parenchymal signal abnormality.  The ventricles and sulci are within normal limits.  There are no extra-axial fluid collections.  There is no evidence of intracranial hemorrhage.  There is no evidence of mass effect.    There are postoperative changes in the globes.  There is mucosal thickening within the maxillary sinuses.  There is fluid within the right sphenoid sinus.  The mastoid air cells are clear.  The calvarium is intact.    MRA brain:    The intracranial segments of the ICAs are within normal limits.  The anterior cerebral arteries and anterior communicating complex are within normal limits.  The middle cerebral arteries are unremarkable.    The vertebral arteries are within normal limits.  The basilar is unremarkable.  The  posterior cerebral arteries are within normal limits.    There is no evidence of aneurysm or stenosis of the intracranial vessels.    MRA neck:    The MRA neck examination is significantly degraded by motion.    The origins of the carotid arteries are significantly limited in evaluation given extensive motion.  The internal and external carotid arteries appear unremarkable.  The vertebral arteries appear grossly unremarkable.    There is no hemodynamically significant stenosis of the neck vessels.                                       CT Head Without Contrast (Final result)  Result time 03/17/22 18:19:49      Final result by Pierce Brennan MD (03/17/22 18:19:49)                   Impression:      1. No acute intracranial abnormalities.  2. Sinus disease and minimal left mastoid effusion as above.      Electronically signed by: Pierce Brennan MD  Date:    03/17/2022  Time:    18:19               Narrative:    EXAMINATION:  CT HEAD WITHOUT CONTRAST    CLINICAL HISTORY:  Motor neuron disease suspected;    TECHNIQUE:  Low dose axial images were obtained through the head.  Coronal and sagittal reformations were also performed. Contrast was not administered.    COMPARISON:  10/02/2016    FINDINGS:  There is no evidence of acute major vascular territory infarct, hemorrhage, or mass.  There is no hydrocephalus.  There are no abnormal extra-axial fluid collections.  There are mucous retention cysts or polyps within the bilateral maxillary sinuses as well as within the right sphenoid sinus.  There is minimal opacification of the inferior most left mastoid air cells, otherwise the visualized paranasal sinuses and mastoid air cells are clear, and there is no evidence of calvarial fracture.  The visualized soft tissues are unremarkable.                                       X-Ray Knee 1 or 2 View Right (Final result)  Result time 03/17/22 17:58:35      Final result by Pierce Brennan MD (03/17/22 17:58:35)                    Impression:      1. No acute displaced fracture or dislocation of the knee.      Electronically signed by: Pierce Brennan MD  Date:    03/17/2022  Time:    17:58               Narrative:    EXAMINATION:  XR KNEE 1 OR 2 VIEW RIGHT    CLINICAL HISTORY:  Pain in right knee    TECHNIQUE:  AP and lateral views of the right knee were performed.    COMPARISON:  None    FINDINGS:  Two views right knee.    No acute displaced fracture or dislocation of the knee.  No radiopaque foreign body.  No large knee joint effusion.                                       X-Ray Chest 1 View (Final result)  Result time 03/17/22 17:32:48   Procedure changed from X-Ray Chest PA And Lateral     Final result by Sal Wilson MD (03/17/22 17:32:48)                   Impression:      No acute process.      Electronically signed by: Sal Wilson MD  Date:    03/17/2022  Time:    17:32               Narrative:    EXAMINATION:  XR CHEST 1 VIEW    CLINICAL HISTORY:  Cough, unspecified    TECHNIQUE:  Single frontal view of the chest was performed.    COMPARISON:  03/17/2020.    FINDINGS:  There are stable postop changes in the chest.  The trachea is unremarkable.  The cardiomediastinal silhouette is within normal limits.  The hemidiaphragms are unremarkable.  There are no pleural effusions.  There is no evidence of a pneumothorax.  There is no evidence of pneumomediastinum.  No airspace opacity is present.  The osseous structures are unremarkable.                                          Assessment/Plan:      * Right leg weakness  R leg wkness worsening fo 1 day PTA  No syncope, trauma, or other signs of stroke  CT Head w/o acute intracranial abnormality   NIHSS 1 on admit from decreased sensation to pin prick on R leg   Abnormal gait w/ fall onto R leg with each step  TSH wnl  Asa and Plavix loaded in ED    Plan:  Neuro consulted, recs appreciated  Neuro checks q4  MRI brain w/o contrast unremarkable  MRA head and neck unremarkable  ECHO bubble  study wnl  PT/OT eval and tx- recommending inpatient rehab  Given no concern for CVA, will discontinue Asa and Plavix daily  MRI lumbar spine-mild degenerative changes in L4-L5 and L5-S1, no spinal canal stenosis or neural foraminal narrowing.          UTI (urinary tract infection)  Pt w/ no dysuria or change in frequency  U/A on admit w/ many bacteria as well as 1+ protein, 4+ glucose, and positive for nitrites  S/p rocephin in ED    Plan:  Cnt rocephin        Current mild episode of major depressive disorder without prior episode  Cnt home prozac        Controlled type 2 diabetes mellitus with stage 3 chronic kidney disease, with long-term current use of insulin  Last A1c 7.3  Home Invokana and Glargine 46u QHS    Plan:  Hold oral medications  Insulin Detemir 23u QHS  MDSSI   Glucose checks q6hr w/ goal 140-180       Hypertension associated with type 2 diabetes mellitus  Cnt home lisinopril and procardia      HLD (hyperlipidemia)  Cnt home crestor        VTE Risk Mitigation (From admission, onward)         Ordered     enoxaparin injection 40 mg  Daily         03/18/22 1623     IP VTE LOW RISK PATIENT  Once         03/17/22 1856     Place sequential compression device  Until discontinued         03/17/22 1856                Discharge Planning   ROE:      Code Status: Full Code   Is the patient medically ready for discharge?:     Reason for patient still in hospital (select all that apply): Pending disposition  Discharge Plan A: Rehab        Que Summers MD, PGY-2  Department of [facility] Medicine   [facility]

## 2022-03-19 NOTE — PLAN OF CARE
Problem: Physical Therapy Goal  Goal: Physical Therapy Goal  Description: Goals to be met by: 22     Patient will increase functional independence with mobility by performin. Supine <> sit with Modified [place]  2. Sit to stand transfer with Modified [place]  3. Bed to chair transfer with Modified [place] using appropriate AD  4. Gait  x 150 feet with Supervision   5. Assess step negotiation as able - has 1 flight of ILIANA with B rails    Outcome: Ongoing, Progressing   Pt continues to work toward all goals. Able to perform 2 ambulation trails ~20-25 ft with RW requiring min A. Pt with right knee buckling throughout. But does not express pain.

## 2022-03-19 NOTE — PLAN OF CARE
[facility]    FACILITY TRANSFER ORDERS      Patient Name: Bonnie Lino  YOB: 1970    PCP: Robson Culver DO   PCP Address: [address] [place] [id number]  PCP Phone Number: [phone]  PCP Fax: [phone]    Encounter Date: 03/19/2022     Admit to: [facility] and [facility]        Vital Signs:  Routine    Diagnoses:   Active Hospital Problems    Diagnosis  POA    *Right leg weakness [R29.898]  Yes    UTI (urinary tract infection) [N39.0]  Yes    Current mild episode of major depressive disorder without prior episode [F32.0]  Yes    Controlled type 2 diabetes mellitus with stage 3 chronic kidney disease, with long-term current use of insulin [E11.22, N18.30, Z79.4]  Not Applicable    Hypertension associated with type 2 diabetes mellitus [E11.59, I15.2]  Yes     Chronic    HLD (hyperlipidemia) [E78.5]  Yes     Chronic      Resolved Hospital Problems   No resolved problems to display.       Allergies:Review of patient's allergies indicates:  No Known Allergies    Diet: diabetic diet: 2000 calorie    Activities: Activity as tolerated and Weight bearing as tolerated    Nursing: Nursing care as needed    Labs: NA      CONSULTS:    Physical Therapy to evaluate and treat.  and Occupational Therapy to evaluate and treat.    Establish Outpatient Neurology follow-up evaluation    MISCELLANEOUS CARE:       WOUND CARE ORDERS  None    Medications: Review discharge medications with patient and family and provide education.      Current Discharge Medication List      CONTINUE these medications which have NOT CHANGED    Details   canagliflozin (INVOKANA) 100 mg Tab tablet Take 1 tablet (100 mg total) by mouth once daily.  Qty: 30 tablet, Refills: 11    Associated Diagnoses: Controlled type 2 diabetes mellitus with stage 3 chronic kidney disease, with long-term current use of insulin      FLUoxetine 20 MG capsule Take 1 capsule (20 mg  "total) by mouth every evening.  Qty: 30 capsule, Refills: 11    Associated Diagnoses: Current mild episode of major depressive disorder without prior episode      gabapentin (NEURONTIN) 300 MG capsule Take 2 capsules (600 mg total) by mouth 3 (three) times daily. Patient taking 1 tablet in am, 1 tablet at lunch and 2 tablets at bedtime  Qty: 180 capsule, Refills: 11    Associated Diagnoses: Controlled type 2 diabetes mellitus with stage 3 chronic kidney disease, with long-term current use of insulin; Diabetic polyneuropathy associated with type 2 diabetes mellitus      insulin (LANTUS SOLOSTAR U-100 INSULIN) glargine 100 units/mL (3mL) SubQ pen Inject 46 Units into the skin every evening.  Qty: 13.8 mL, Refills: 11    Associated Diagnoses: Controlled type 2 diabetes mellitus with stage 3 chronic kidney disease, with long-term current use of insulin      lisinopriL (PRINIVIL,ZESTRIL) 40 MG tablet Take 1 tablet (40 mg total) by mouth once daily.  Qty: 30 tablet, Refills: 11    Comments: .  Associated Diagnoses: Hypertension associated with type 2 diabetes mellitus      blood glucose strip-disp meter Kit 1 application by Misc.(Non-Drug; Combo Route) route 3 (three) times daily.  Qty: 1 kit, Refills: 1    Associated Diagnoses: Type 2 diabetes mellitus without complication      blood-glucose meter (RELION ALL-IN-ONE METER) kit Use as instructed  Qty: 1 each, Refills: 0      DUREZOL 0.05 % Drop ophthalmic solution INSTILL 1 DROP TO SURGERY EYE 4 TIMES DAILY AFTER SURGERY FOR 30 DAYS      insulin syringe-needle U-100 (BD INSULIN SYRINGE) 1 mL 25 gauge x 5/8" Syrg 1 Units by Misc.(Non-Drug; Combo Route) route every evening.  Qty: 100 each, Refills: 3    Comments: Can replace with alternative covered by insurance/better compatible with her insulin pen  Associated Diagnoses: Controlled type 2 diabetes mellitus with stage 3 chronic kidney disease, with long-term current use of insulin      ketorolac 0.5% (ACULAR) 0.5 % Drop  "      lancets Misc 1 application by Misc.(Non-Drug; Combo Route) route 3 (three) times daily.  Qty: 100 each, Refills: 1    Associated Diagnoses: Type 2 diabetes mellitus without complication      NIFEdipine (PROCARDIA-XL) 90 MG (OSM) 24 hr tablet Take 1 tablet (90 mg total) by mouth once daily.  Qty: 90 tablet, Refills: 3    Comments: .  Associated Diagnoses: Hypertension associated with type 2 diabetes mellitus      rosuvastatin (CRESTOR) 5 MG tablet Take 1 tablet (5 mg total) by mouth once daily.  Qty: 90 tablet, Refills: 3    Associated Diagnoses: Type 2 diabetes mellitus with stage 3b chronic kidney disease, with long-term current use of insulin                Immunizations Administered as of 3/19/2022     Name Date Dose VIS Date Route Exp Date    COVID-19, MRNA, LN-S, PF (Pfizer) (Purple Cap) 3/25/2021  1:10 PM 0.3 mL 12/12/2020 Intramuscular 6/30/2021    Site: Right deltoid     Given By: Lily Navarrete LPN     : [employer]     Lot: [id number]     COVID-19, MRNA, LN-S, PF (Pfizer) (Purple Cap) 3/4/2021  2:02 PM 0.3 mL 12/12/2020 Intramuscular 6/30/2021    Site: Right deltoid     Given By: Saundra Li     : [employer]     Lot: [id number]           This patient has had both covid vaccinations    Some patients may experience side effects after vaccination.  These may include fever, headache, muscle or joint aches.  Most symptoms resolve with 24-48 hours and do not require urgent medical evaluation unless they persist for more than 72 hours or symptoms are concerning for an unrelated medical condition.          _________________________________  Thomas Jones MD  03/19/2022

## 2022-03-19 NOTE — ASSESSMENT & PLAN NOTE
R leg wkness worsening fo 1 day PTA  No syncope, trauma, or other signs of stroke  CT Head w/o acute intracranial abnormality   NIHSS 1 on admit from decreased sensation to pin prick on R leg   Abnormal gait w/ fall onto R leg with each step  TSH wnl  Asa and Plavix loaded in ED    Plan:  Neuro consulted, recs appreciated  Neuro checks q4  MRI brain w/o contrast unremarkable  MRA head and neck unremarkable  ECHO bubble study wnl  PT/OT eval and tx- recommending inpatient rehab  Given no concern for CVA, will discontinue Asa and Plavix daily  MRI lumbar spine-mild degenerative changes in L4-L5 and L5-S1, no spinal canal stenosis or neural foraminal narrowing.

## 2022-03-19 NOTE — DISCHARGE SUMMARY
"[facility] Medicine  Discharge Summary      Patient Name: Bonnie Lino  MRN: [id number]  Patient Class: OP- Observation  Admission Date: 3/17/2022  Hospital Length of Stay: 0 days  Discharge Date and Time:  03/19/2022 3:14 PM  Attending Physician: Dorothea Saha MD   Discharging Provider: Thomas Jones MD  Primary Care Provider: Robson Culver,       HPI:   53 yo F w/ PMHx of T2DM, CKD, HTN, and Depression who presented to the ED from the  clinic 2/2 concerns of CVA w/ R LE weakness. She states that she first had several short intermitent episodes of feeling "drunk and wobbly" 1 wk prior but did not think anything of it. She then developed a cough, postnasal drip, and more frequent but not loose stools over the weekend, and yesterday began to notice that her R leg would give out on her when she tried to walk on it. She denies any syncopal events and has had no trauma or falls. She denies any numbness/tingling as well as bowel/bladder incontinence or facial droop and difficulty speaking. She also denies any fevers during this time. The only change she endorses during this time is that she had an increase in her Prozac from 10mg to 20mg and has been compliant with all her home medications. She denies any sick contacts or further complaints at this time.    In the ED vitals were stable. CBC and CMP unremarkable w/ CKD at baseline. CPK and TSH wnl. COVID neg. UA w/ many bacteria and 1+ protein 4+ glucose and + nitrites. CT Head w/o any acute intracranial abnormalities. She was admitted to the  service for CVA r/o w/ Neurology consult.       * No surgery found *      Hospital Course:   MRI brain and MRA head and neck without acute process. Echo with EF 75% and no intracardiac shunting. Labs unremarkable. On re-evaluation by neurology and primary team neurologic exam wnl. MRI lumbar spine without contrast obtained with signs of mild degenerative changes of the lumbar spineL4-L5 and L5-S1. " Given no signs of CVA and pt remained stable, she was deemed medically stable for discharge to in patient rehab to for physical therapy. Patient will have follow-up neurology appointment for further work-up of acute leg weakness. Follow-up with PCP.       Goals of Care Treatment Preferences:  Code Status: Full Code      Consults:   Consults (From admission, onward)        Status Ordering Provider     Inpatient consult to Social Work/Case Management  Once        Provider:  (Not yet assigned)    Acknowledged ALEXSANDRA CHAN     Inpatient consult to Neurology  Once        Provider:  (Not yet assigned)    Completed MAGALIS BETANCOURT          No new Assessment & Plan notes have been filed under this hospital service since the last note was generated.  Service: Hospital Medicine    Final Active Diagnoses:    Diagnosis Date Noted POA    PRINCIPAL PROBLEM:  Right leg weakness [R29.898] 03/17/2022 Yes    UTI (urinary tract infection) [N39.0] 03/17/2022 Yes    Current mild episode of major depressive disorder without prior episode [F32.0] 02/25/2022 Yes    Controlled type 2 diabetes mellitus with stage 3 chronic kidney disease, with long-term current use of insulin [E11.22, N18.30, Z79.4] 08/06/2021 Not Applicable    Hypertension associated with type 2 diabetes mellitus [E11.59, I15.2]  Yes     Chronic    HLD (hyperlipidemia) [E78.5] 09/23/2014 Yes     Chronic      Problems Resolved During this Admission:       Discharged Condition: stable    Disposition: Rehab Facility    Follow Up:    Patient Instructions:      Ambulatory referral/consult to Physical/Occupational Therapy   Standing Status: Future   Referral Priority: Routine Referral Type: Physical Medicine   Referral Reason: Specialty Services Required   Requested Specialty: Physical Therapy   Number of Visits Requested: 1       Significant Diagnostic Studies: Labs:   BMP:   Recent Labs   Lab 03/17/22  1720   *      K 4.7      CO2 21*   BUN 30*  "  CREATININE 1.7*   CALCIUM 9.2   , CMP   Recent Labs   Lab 03/17/22  1720      K 4.7      CO2 21*   *   BUN 30*   CREATININE 1.7*   CALCIUM 9.2   PROT 7.9   ALBUMIN 4.3   BILITOT 0.5   ALKPHOS 77   AST 14   ALT 8*   ANIONGAP 12   ESTGFRAFRICA 39*   EGFRNONAA 34*    and CBC   Recent Labs   Lab 03/17/22  1720   WBC 5.61   HGB 12.7   HCT 40.8          Pending Diagnostic Studies:     None         Medications:  Reconciled Home Medications:      Medication List      CHANGE how you take these medications    gabapentin 300 MG capsule  Commonly known as: NEURONTIN  Take 2 capsules (600 mg total) by mouth 3 (three) times daily. Patient taking 1 tablet in am, 1 tablet at lunch and 2 tablets at bedtime  What changed:   · when to take this  · additional instructions     INVOKANA 100 mg Tab tablet  Generic drug: canagliflozin  Take 1 tablet (100 mg total) by mouth once daily.  What changed: when to take this     lisinopriL 40 MG tablet  Commonly known as: PRINIVIL,ZESTRIL  Take 1 tablet (40 mg total) by mouth once daily.  What changed: when to take this     NIFEdipine 90 MG (OSM) 24 hr tablet  Commonly known as: PROCARDIA-XL  Take 1 tablet (90 mg total) by mouth once daily.  What changed: when to take this     rosuvastatin 5 MG tablet  Commonly known as: CRESTOR  Take 1 tablet (5 mg total) by mouth once daily.  What changed: when to take this        CONTINUE taking these medications    BD INSULIN SYRINGE 1 mL 25 gauge x 5/8" Syrg  Generic drug: insulin syringe-needle U-100  1 Units by Misc.(Non-Drug; Combo Route) route every evening.     blood glucose strip-disp meter Kit  1 application by Misc.(Non-Drug; Combo Route) route 3 (three) times daily.     blood-glucose meter kit  Commonly known as: RELION ALL-IN-ONE METER  Use as instructed     DurezoL 0.05 % Drop ophthalmic solution  Generic drug: difluprednate  INSTILL 1 DROP TO SURGERY EYE 4 TIMES DAILY AFTER SURGERY FOR 30 DAYS     FLUoxetine 20 MG " capsule  Take 1 capsule (20 mg total) by mouth every evening.     ketorolac 0.5% 0.5 % Drop  Commonly known as: ACULAR     lancets Misc  1 application by Misc.(Non-Drug; Combo Route) route 3 (three) times daily.     LANTUS SOLOSTAR U-100 INSULIN glargine 100 units/mL (3mL) SubQ pen  Generic drug: insulin  Inject 46 Units into the skin every evening.            Indwelling Lines/Drains at time of discharge:   Lines/Drains/Airways     Drain  Duration           Female External Urinary Catheter 03/18/22 0050 1 day                Time spent on the discharge of patient: 30 minutes       Thomas Jones MD  [employer] [facility] PGY-1  03/19/2022

## 2022-03-31 ENCOUNTER — OFFICE VISIT (OUTPATIENT)
Dept: FAMILY MEDICINE | Facility: HOSPITAL | Age: 52
End: 2022-03-31
Attending: FAMILY MEDICINE
Payer: MEDICAID

## 2022-03-31 VITALS
DIASTOLIC BLOOD PRESSURE: 90 MMHG | HEART RATE: 97 BPM | SYSTOLIC BLOOD PRESSURE: 142 MMHG | WEIGHT: 168.88 LBS | BODY MASS INDEX: 26.51 KG/M2 | HEIGHT: 67 IN

## 2022-03-31 DIAGNOSIS — R26.9 FUNCTIONAL GAIT DISORDER: Primary | ICD-10-CM

## 2022-03-31 PROCEDURE — 99214 OFFICE O/P EST MOD 30 MIN: CPT | Performed by: STUDENT IN AN ORGANIZED HEALTH CARE EDUCATION/TRAINING PROGRAM

## 2022-03-31 RX ORDER — ATORVASTATIN CALCIUM 20 MG/1
20 TABLET, FILM COATED ORAL DAILY
COMMUNITY
Start: 2022-03-29 | End: 2022-05-21 | Stop reason: SDUPTHER

## 2022-03-31 RX ORDER — ASPIRIN 81 MG/1
81 TABLET ORAL DAILY
COMMUNITY
Start: 2022-03-29

## 2022-03-31 NOTE — PROGRESS NOTES
Got home Tuesday this week from rehab - rehab suspects TIA    No driving, no work - will come back to work for work letter    Gait more stable, left leg still bucking - neuro consult, not taking aspirin    A1C -eatingn worse since edwige, gonna pay attention to carbs

## 2022-04-04 NOTE — PROGRESS NOTES
Clinic Note  [facility]    Subjective:      Bonnie Lino is a 52 y.o. female w/ PMHx of T2DM, CKD, HTN, and Depression who came for follow-up after she was sent by our clinic to ED and was hospitalized recently due to sudden onset right leg weakness and instability. Patient underwent neurological workup which was negative for stroke/TIA and for abnormal spine MRI. Patient was discharged to rehab which she completed on 3/29/22. Today, she says her gait is more stable, is able to ambulate better, but still has some weakness. During hospitalization, neurology suspected a psychogenic cause, rehab facility suggested to her to TIA but this would not explain why gait problem has persisted. Patient concerned about lack of diagnosis, wishes to follow-up as an outpatient with neurology.     Review of Systems   Constitutional: Negative for chills and fever.   Respiratory: Negative for cough and shortness of breath.    Cardiovascular: Negative for chest pain.   Gastrointestinal: Negative for abdominal pain, diarrhea, nausea and vomiting.   Genitourinary: Negative for dysuria.   Musculoskeletal: Negative for back pain, falls and joint pain.   Neurological: Positive for focal weakness (Right leg). Negative for dizziness and headaches.   Psychiatric/Behavioral: Negative for depression. The patient is not nervous/anxious.          Objective:      Vitals:    03/31/22 1536   BP: (!) 142/90   Pulse: 97     Body mass index is 26.45 kg/m².    Physical Exam  Cardiovascular:      Rate and Rhythm: Normal rate and regular rhythm.      Pulses: Normal pulses.      Heart sounds: Normal heart sounds.   Pulmonary:      Effort: Pulmonary effort is normal.      Breath sounds: Normal breath sounds.   Abdominal:      General: Abdomen is flat. Bowel sounds are normal.      Palpations: Abdomen is soft.   Musculoskeletal:         General: No tenderness or deformity. Normal range of motion.      Cervical back: Normal range of motion.    Skin:     General: Skin is warm and dry.   Neurological:      Mental Status: She is oriented to person, place, and time.      Gait: Gait abnormal.   Psychiatric:         Mood and Affect: Mood normal.         Behavior: Behavior normal.        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 Holland Ingram MD  [facility] PGY-1  04/03/2022

## 2022-04-12 ENCOUNTER — HOSPITAL ENCOUNTER (INPATIENT)
Facility: HOSPITAL | Age: 52
LOS: 4 days | Discharge: HOME | DRG: 880 | End: 2022-04-16
Attending: EMERGENCY MEDICINE | Admitting: HOSPITALIST
Payer: MEDICAID

## 2022-04-12 DIAGNOSIS — R47.01 APHASIA: ICD-10-CM

## 2022-04-12 DIAGNOSIS — R07.9 CHEST PAIN: ICD-10-CM

## 2022-04-12 DIAGNOSIS — R47.9 SPEECH DISTURBANCE, UNSPECIFIED TYPE: ICD-10-CM

## 2022-04-12 DIAGNOSIS — R56.9 SEIZURE-LIKE ACTIVITY: ICD-10-CM

## 2022-04-12 DIAGNOSIS — N18.30 CONTROLLED TYPE 2 DIABETES MELLITUS WITH STAGE 3 CHRONIC KIDNEY DISEASE, WITH LONG-TERM CURRENT USE OF INSULIN: ICD-10-CM

## 2022-04-12 DIAGNOSIS — R29.90 EPISODE OF TRANSIENT NEUROLOGIC SYMPTOMS: ICD-10-CM

## 2022-04-12 DIAGNOSIS — E11.22 CONTROLLED TYPE 2 DIABETES MELLITUS WITH STAGE 3 CHRONIC KIDNEY DISEASE, WITH LONG-TERM CURRENT USE OF INSULIN: ICD-10-CM

## 2022-04-12 DIAGNOSIS — Z79.4 CONTROLLED TYPE 2 DIABETES MELLITUS WITH STAGE 3 CHRONIC KIDNEY DISEASE, WITH LONG-TERM CURRENT USE OF INSULIN: ICD-10-CM

## 2022-04-12 DIAGNOSIS — I63.9 STROKE: ICD-10-CM

## 2022-04-12 DIAGNOSIS — F44.4 CONVERSION DISORDER WITH ABNORMAL MOVEMENT: Primary | ICD-10-CM

## 2022-04-12 LAB
ALBUMIN SERPL BCP-MCNC: 3.9 G/DL (ref 3.5–5.2)
ALLENS TEST: ABNORMAL
ALP SERPL-CCNC: 75 U/L (ref 55–135)
ALT SERPL W/O P-5'-P-CCNC: 14 U/L (ref 10–44)
ANION GAP SERPL CALC-SCNC: 8 MMOL/L (ref 8–16)
AST SERPL-CCNC: 15 U/L (ref 10–40)
BASOPHILS # BLD AUTO: 0.03 K/UL (ref 0–0.2)
BASOPHILS NFR BLD: 0.5 % (ref 0–1.9)
BILIRUB SERPL-MCNC: 0.4 MG/DL (ref 0.1–1)
BUN SERPL-MCNC: 28 MG/DL (ref 6–20)
CALCIUM SERPL-MCNC: 9.3 MG/DL (ref 8.7–10.5)
CHLORIDE SERPL-SCNC: 103 MMOL/L (ref 95–110)
CO2 SERPL-SCNC: 26 MMOL/L (ref 23–29)
CREAT SERPL-MCNC: 1.6 MG/DL (ref 0.5–1.4)
CREAT SERPL-MCNC: 1.8 MG/DL (ref 0.5–1.4)
DELSYS: ABNORMAL
DIFFERENTIAL METHOD: ABNORMAL
EOSINOPHIL # BLD AUTO: 0.2 K/UL (ref 0–0.5)
EOSINOPHIL NFR BLD: 3.7 % (ref 0–8)
ERYTHROCYTE [DISTWIDTH] IN BLOOD BY AUTOMATED COUNT: 13.5 % (ref 11.5–14.5)
EST. GFR  (AFRICAN AMERICAN): 41.8 ML/MIN/1.73 M^2
EST. GFR  (NON AFRICAN AMERICAN): 36.3 ML/MIN/1.73 M^2
GLUCOSE SERPL-MCNC: 112 MG/DL (ref 70–110)
HCO3 UR-SCNC: 28.6 MMOL/L (ref 24–28)
HCT VFR BLD AUTO: 39.6 % (ref 37–48.5)
HGB BLD-MCNC: 12 G/DL (ref 12–16)
IMM GRANULOCYTES # BLD AUTO: 0.02 K/UL (ref 0–0.04)
IMM GRANULOCYTES NFR BLD AUTO: 0.4 % (ref 0–0.5)
INR PPP: 1 (ref 0.8–1.2)
LYMPHOCYTES # BLD AUTO: 1.2 K/UL (ref 1–4.8)
LYMPHOCYTES NFR BLD: 20.2 % (ref 18–48)
MAGNESIUM SERPL-MCNC: 2.4 MG/DL (ref 1.6–2.6)
MCH RBC QN AUTO: 25.6 PG (ref 27–31)
MCHC RBC AUTO-ENTMCNC: 30.3 G/DL (ref 32–36)
MCV RBC AUTO: 85 FL (ref 82–98)
MODE: ABNORMAL
MONOCYTES # BLD AUTO: 0.4 K/UL (ref 0.3–1)
MONOCYTES NFR BLD: 7 % (ref 4–15)
NEUTROPHILS # BLD AUTO: 3.9 K/UL (ref 1.8–7.7)
NEUTROPHILS NFR BLD: 68.2 % (ref 38–73)
NRBC BLD-RTO: 0 /100 WBC
PCO2 BLDA: 54.3 MMHG (ref 35–45)
PH SMN: 7.33 [PH] (ref 7.35–7.45)
PLATELET # BLD AUTO: 209 K/UL (ref 150–450)
PMV BLD AUTO: 11.6 FL (ref 9.2–12.9)
PO2 BLDA: 24 MMHG (ref 40–60)
POC BE: 3 MMOL/L
POC PTINR: 1.1 (ref 0.9–1.2)
POC PTWBT: 13.3 SEC (ref 9.7–14.3)
POC SATURATED O2: 38 % (ref 95–100)
POC TCO2: 30 MMOL/L (ref 24–29)
POTASSIUM SERPL-SCNC: 4.5 MMOL/L (ref 3.5–5.1)
PROT SERPL-MCNC: 7.6 G/DL (ref 6–8.4)
PROTHROMBIN TIME: 10.3 SEC (ref 9–12.5)
RBC # BLD AUTO: 4.68 M/UL (ref 4–5.4)
SAMPLE: ABNORMAL
SAMPLE: ABNORMAL
SAMPLE: NORMAL
SITE: ABNORMAL
SODIUM SERPL-SCNC: 137 MMOL/L (ref 136–145)
TROPONIN I SERPL DL<=0.01 NG/ML-MCNC: <0.006 NG/ML (ref 0–0.03)
TSH SERPL DL<=0.005 MIU/L-ACNC: 2.35 UIU/ML (ref 0.4–4)
WBC # BLD AUTO: 5.69 K/UL (ref 3.9–12.7)

## 2022-04-12 PROCEDURE — 63600175 PHARM REV CODE 636 W HCPCS: Performed by: STUDENT IN AN ORGANIZED HEALTH CARE EDUCATION/TRAINING PROGRAM

## 2022-04-12 PROCEDURE — 85610 PROTHROMBIN TIME: CPT | Performed by: STUDENT IN AN ORGANIZED HEALTH CARE EDUCATION/TRAINING PROGRAM

## 2022-04-12 PROCEDURE — 80053 COMPREHEN METABOLIC PANEL: CPT | Performed by: STUDENT IN AN ORGANIZED HEALTH CARE EDUCATION/TRAINING PROGRAM

## 2022-04-12 PROCEDURE — 96374 THER/PROPH/DIAG INJ IV PUSH: CPT

## 2022-04-12 PROCEDURE — 93010 ELECTROCARDIOGRAM REPORT: CPT | Mod: ,,, | Performed by: INTERNAL MEDICINE

## 2022-04-12 PROCEDURE — 93005 ELECTROCARDIOGRAM TRACING: CPT

## 2022-04-12 PROCEDURE — 84443 ASSAY THYROID STIM HORMONE: CPT | Performed by: STUDENT IN AN ORGANIZED HEALTH CARE EDUCATION/TRAINING PROGRAM

## 2022-04-12 PROCEDURE — 82803 BLOOD GASES ANY COMBINATION: CPT

## 2022-04-12 PROCEDURE — 93010 EKG 12-LEAD: ICD-10-PCS | Mod: ,,, | Performed by: INTERNAL MEDICINE

## 2022-04-12 PROCEDURE — 83735 ASSAY OF MAGNESIUM: CPT | Performed by: STUDENT IN AN ORGANIZED HEALTH CARE EDUCATION/TRAINING PROGRAM

## 2022-04-12 PROCEDURE — 99291 PR CRITICAL CARE, E/M 30-74 MINUTES: ICD-10-PCS | Mod: CS,,, | Performed by: EMERGENCY MEDICINE

## 2022-04-12 PROCEDURE — 82565 ASSAY OF CREATININE: CPT

## 2022-04-12 PROCEDURE — 84484 ASSAY OF TROPONIN QUANT: CPT | Performed by: STUDENT IN AN ORGANIZED HEALTH CARE EDUCATION/TRAINING PROGRAM

## 2022-04-12 PROCEDURE — 25500020 PHARM REV CODE 255: Performed by: STUDENT IN AN ORGANIZED HEALTH CARE EDUCATION/TRAINING PROGRAM

## 2022-04-12 PROCEDURE — 12000002 HC ACUTE/MED SURGE SEMI-PRIVATE ROOM

## 2022-04-12 PROCEDURE — 99291 CRITICAL CARE FIRST HOUR: CPT | Mod: CS,,, | Performed by: EMERGENCY MEDICINE

## 2022-04-12 PROCEDURE — 85610 PROTHROMBIN TIME: CPT

## 2022-04-12 PROCEDURE — 99900035 HC TECH TIME PER 15 MIN (STAT)

## 2022-04-12 PROCEDURE — 85025 COMPLETE CBC W/AUTO DIFF WBC: CPT | Performed by: STUDENT IN AN ORGANIZED HEALTH CARE EDUCATION/TRAINING PROGRAM

## 2022-04-12 RX ORDER — LORAZEPAM 2 MG/ML
1 INJECTION INTRAMUSCULAR ONCE
Status: COMPLETED | OUTPATIENT
Start: 2022-04-12 | End: 2022-04-12

## 2022-04-12 RX ORDER — LORAZEPAM 2 MG/ML
0.5 INJECTION INTRAMUSCULAR ONCE
Status: DISCONTINUED | OUTPATIENT
Start: 2022-04-12 | End: 2022-04-12

## 2022-04-12 RX ADMIN — IOHEXOL 100 ML: 350 INJECTION, SOLUTION INTRAVENOUS at 10:04

## 2022-04-12 RX ADMIN — LORAZEPAM 1 MG: 2 INJECTION INTRAMUSCULAR; INTRAVENOUS at 10:04

## 2022-04-13 PROBLEM — R29.90 EPISODE OF TRANSIENT NEUROLOGIC SYMPTOMS: Status: ACTIVE | Noted: 2022-04-13

## 2022-04-13 PROBLEM — F80.81 STUTTERING: Status: ACTIVE | Noted: 2022-04-13

## 2022-04-13 PROBLEM — R47.01 APHASIA: Status: ACTIVE | Noted: 2022-04-13

## 2022-04-13 PROBLEM — N18.32 CHRONIC KIDNEY DISEASE, STAGE 3B: Status: ACTIVE | Noted: 2021-04-06

## 2022-04-13 LAB
AMORPH CRY UR QL COMP ASSIST: NORMAL
ANION GAP SERPL CALC-SCNC: 10 MMOL/L (ref 8–16)
BACTERIA #/AREA URNS AUTO: NORMAL /HPF
BASOPHILS # BLD AUTO: 0.03 K/UL (ref 0–0.2)
BASOPHILS NFR BLD: 0.5 % (ref 0–1.9)
BILIRUB UR QL STRIP: NEGATIVE
BUN SERPL-MCNC: 29 MG/DL (ref 6–20)
CALCIUM SERPL-MCNC: 9.4 MG/DL (ref 8.7–10.5)
CHLORIDE SERPL-SCNC: 106 MMOL/L (ref 95–110)
CLARITY UR REFRACT.AUTO: ABNORMAL
CO2 SERPL-SCNC: 23 MMOL/L (ref 23–29)
COLOR UR AUTO: YELLOW
CREAT SERPL-MCNC: 1.7 MG/DL (ref 0.5–1.4)
CTP QC/QA: YES
DIFFERENTIAL METHOD: ABNORMAL
EOSINOPHIL # BLD AUTO: 0.2 K/UL (ref 0–0.5)
EOSINOPHIL NFR BLD: 3.5 % (ref 0–8)
ERYTHROCYTE [DISTWIDTH] IN BLOOD BY AUTOMATED COUNT: 13.6 % (ref 11.5–14.5)
EST. GFR  (AFRICAN AMERICAN): 38.9 ML/MIN/1.73 M^2
EST. GFR  (NON AFRICAN AMERICAN): 33.7 ML/MIN/1.73 M^2
GLUCOSE SERPL-MCNC: 134 MG/DL (ref 70–110)
GLUCOSE UR QL STRIP: ABNORMAL
HCT VFR BLD AUTO: 39 % (ref 37–48.5)
HGB BLD-MCNC: 11.5 G/DL (ref 12–16)
HGB UR QL STRIP: NEGATIVE
IMM GRANULOCYTES # BLD AUTO: 0.03 K/UL (ref 0–0.04)
IMM GRANULOCYTES NFR BLD AUTO: 0.5 % (ref 0–0.5)
KETONES UR QL STRIP: NEGATIVE
LEUKOCYTE ESTERASE UR QL STRIP: NEGATIVE
LYMPHOCYTES # BLD AUTO: 1.7 K/UL (ref 1–4.8)
LYMPHOCYTES NFR BLD: 30.1 % (ref 18–48)
MAGNESIUM SERPL-MCNC: 2.6 MG/DL (ref 1.6–2.6)
MCH RBC QN AUTO: 24.9 PG (ref 27–31)
MCHC RBC AUTO-ENTMCNC: 29.5 G/DL (ref 32–36)
MCV RBC AUTO: 85 FL (ref 82–98)
MICROSCOPIC COMMENT: NORMAL
MONOCYTES # BLD AUTO: 0.4 K/UL (ref 0.3–1)
MONOCYTES NFR BLD: 7.7 % (ref 4–15)
NEUTROPHILS # BLD AUTO: 3.3 K/UL (ref 1.8–7.7)
NEUTROPHILS NFR BLD: 57.7 % (ref 38–73)
NITRITE UR QL STRIP: NEGATIVE
NRBC BLD-RTO: 0 /100 WBC
PH UR STRIP: 8 [PH] (ref 5–8)
PHOSPHATE SERPL-MCNC: 4.6 MG/DL (ref 2.7–4.5)
PLATELET # BLD AUTO: 220 K/UL (ref 150–450)
PMV BLD AUTO: 11.1 FL (ref 9.2–12.9)
POTASSIUM SERPL-SCNC: 4.7 MMOL/L (ref 3.5–5.1)
PROT UR QL STRIP: NEGATIVE
RBC # BLD AUTO: 4.61 M/UL (ref 4–5.4)
RBC #/AREA URNS AUTO: 2 /HPF (ref 0–4)
SARS-COV-2 RDRP RESP QL NAA+PROBE: NEGATIVE
SODIUM SERPL-SCNC: 139 MMOL/L (ref 136–145)
SP GR UR STRIP: 1.02 (ref 1–1.03)
SQUAMOUS #/AREA URNS AUTO: 1 /HPF
URN SPEC COLLECT METH UR: ABNORMAL
WBC # BLD AUTO: 5.68 K/UL (ref 3.9–12.7)
WBC #/AREA URNS AUTO: 3 /HPF (ref 0–5)
YEAST UR QL AUTO: NORMAL

## 2022-04-13 PROCEDURE — 25000003 PHARM REV CODE 250: Performed by: HOSPITALIST

## 2022-04-13 PROCEDURE — 99223 1ST HOSP IP/OBS HIGH 75: CPT | Mod: ,,, | Performed by: PSYCHIATRY & NEUROLOGY

## 2022-04-13 PROCEDURE — 99222 PR INITIAL HOSPITAL CARE,LEVL II: ICD-10-PCS | Mod: ,,, | Performed by: STUDENT IN AN ORGANIZED HEALTH CARE EDUCATION/TRAINING PROGRAM

## 2022-04-13 PROCEDURE — 99220 PR INITIAL OBSERVATION CARE,LEVL III: CPT | Mod: ,,, | Performed by: PHYSICIAN ASSISTANT

## 2022-04-13 PROCEDURE — 81001 URINALYSIS AUTO W/SCOPE: CPT | Performed by: HOSPITALIST

## 2022-04-13 PROCEDURE — 99223 PR INITIAL HOSPITAL CARE,LEVL III: ICD-10-PCS | Mod: ,,, | Performed by: PSYCHIATRY & NEUROLOGY

## 2022-04-13 PROCEDURE — 96361 HYDRATE IV INFUSION ADD-ON: CPT

## 2022-04-13 PROCEDURE — 11000001 HC ACUTE MED/SURG PRIVATE ROOM

## 2022-04-13 PROCEDURE — 80048 BASIC METABOLIC PNL TOTAL CA: CPT | Performed by: PHYSICIAN ASSISTANT

## 2022-04-13 PROCEDURE — 99219 PR INITIAL OBSERVATION CARE,LEVL II: ICD-10-PCS | Mod: ,,, | Performed by: PSYCHIATRY & NEUROLOGY

## 2022-04-13 PROCEDURE — 82565 ASSAY OF CREATININE: CPT

## 2022-04-13 PROCEDURE — U0002 COVID-19 LAB TEST NON-CDC: HCPCS | Performed by: STUDENT IN AN ORGANIZED HEALTH CARE EDUCATION/TRAINING PROGRAM

## 2022-04-13 PROCEDURE — 25000003 PHARM REV CODE 250: Performed by: PHYSICIAN ASSISTANT

## 2022-04-13 PROCEDURE — 63600175 PHARM REV CODE 636 W HCPCS: Performed by: INTERNAL MEDICINE

## 2022-04-13 PROCEDURE — 83735 ASSAY OF MAGNESIUM: CPT | Performed by: PHYSICIAN ASSISTANT

## 2022-04-13 PROCEDURE — 99219 PR INITIAL OBSERVATION CARE,LEVL II: CPT | Mod: ,,, | Performed by: PSYCHIATRY & NEUROLOGY

## 2022-04-13 PROCEDURE — 99291 CRITICAL CARE FIRST HOUR: CPT | Mod: 25

## 2022-04-13 PROCEDURE — 96376 TX/PRO/DX INJ SAME DRUG ADON: CPT

## 2022-04-13 PROCEDURE — G0378 HOSPITAL OBSERVATION PER HR: HCPCS

## 2022-04-13 PROCEDURE — 84100 ASSAY OF PHOSPHORUS: CPT | Performed by: PHYSICIAN ASSISTANT

## 2022-04-13 PROCEDURE — 99220 PR INITIAL OBSERVATION CARE,LEVL III: ICD-10-PCS | Mod: ,,, | Performed by: PHYSICIAN ASSISTANT

## 2022-04-13 PROCEDURE — 63600175 PHARM REV CODE 636 W HCPCS: Performed by: PHYSICIAN ASSISTANT

## 2022-04-13 PROCEDURE — 99222 1ST HOSP IP/OBS MODERATE 55: CPT | Mod: ,,, | Performed by: STUDENT IN AN ORGANIZED HEALTH CARE EDUCATION/TRAINING PROGRAM

## 2022-04-13 PROCEDURE — 92523 SPEECH SOUND LANG COMPREHEN: CPT

## 2022-04-13 PROCEDURE — 85025 COMPLETE CBC W/AUTO DIFF WBC: CPT | Performed by: PHYSICIAN ASSISTANT

## 2022-04-13 RX ORDER — LISINOPRIL 20 MG/1
40 TABLET ORAL NIGHTLY
Status: DISCONTINUED | OUTPATIENT
Start: 2022-04-13 | End: 2022-04-13

## 2022-04-13 RX ORDER — ATORVASTATIN CALCIUM 20 MG/1
20 TABLET, FILM COATED ORAL NIGHTLY
Status: DISCONTINUED | OUTPATIENT
Start: 2022-04-13 | End: 2022-04-16 | Stop reason: HOSPADM

## 2022-04-13 RX ORDER — ASPIRIN 81 MG/1
81 TABLET ORAL DAILY
Status: DISCONTINUED | OUTPATIENT
Start: 2022-04-13 | End: 2022-04-16 | Stop reason: HOSPADM

## 2022-04-13 RX ORDER — HYDROXYZINE PAMOATE 25 MG/1
25 CAPSULE ORAL EVERY 6 HOURS PRN
Status: DISCONTINUED | OUTPATIENT
Start: 2022-04-13 | End: 2022-04-16 | Stop reason: HOSPADM

## 2022-04-13 RX ORDER — FLUOXETINE HYDROCHLORIDE 20 MG/1
40 CAPSULE ORAL NIGHTLY
Status: DISCONTINUED | OUTPATIENT
Start: 2022-04-13 | End: 2022-04-14

## 2022-04-13 RX ORDER — ACETAMINOPHEN 500 MG
500 TABLET ORAL DAILY PRN
COMMUNITY

## 2022-04-13 RX ORDER — SODIUM CHLORIDE, SODIUM LACTATE, POTASSIUM CHLORIDE, CALCIUM CHLORIDE 600; 310; 30; 20 MG/100ML; MG/100ML; MG/100ML; MG/100ML
INJECTION, SOLUTION INTRAVENOUS CONTINUOUS
Status: ACTIVE | OUTPATIENT
Start: 2022-04-13 | End: 2022-04-13

## 2022-04-13 RX ORDER — ONDANSETRON 8 MG/1
8 TABLET, ORALLY DISINTEGRATING ORAL EVERY 8 HOURS PRN
Status: DISCONTINUED | OUTPATIENT
Start: 2022-04-13 | End: 2022-04-16 | Stop reason: HOSPADM

## 2022-04-13 RX ORDER — POLYETHYLENE GLYCOL 3350 17 G/17G
17 POWDER, FOR SOLUTION ORAL DAILY PRN
Status: DISCONTINUED | OUTPATIENT
Start: 2022-04-13 | End: 2022-04-16 | Stop reason: HOSPADM

## 2022-04-13 RX ORDER — LORAZEPAM 2 MG/ML
1 INJECTION INTRAMUSCULAR ONCE
Status: COMPLETED | OUTPATIENT
Start: 2022-04-13 | End: 2022-04-13

## 2022-04-13 RX ORDER — GABAPENTIN 300 MG/1
600 CAPSULE ORAL 2 TIMES DAILY
Status: DISCONTINUED | OUTPATIENT
Start: 2022-04-13 | End: 2022-04-14

## 2022-04-13 RX ORDER — MULTIVITAMIN
1 TABLET ORAL DAILY
COMMUNITY

## 2022-04-13 RX ORDER — ACETAMINOPHEN 325 MG/1
650 TABLET ORAL EVERY 4 HOURS PRN
Status: DISCONTINUED | OUTPATIENT
Start: 2022-04-13 | End: 2022-04-16 | Stop reason: HOSPADM

## 2022-04-13 RX ORDER — INSULIN ASPART 100 [IU]/ML
0-5 INJECTION, SOLUTION INTRAVENOUS; SUBCUTANEOUS
Status: DISCONTINUED | OUTPATIENT
Start: 2022-04-13 | End: 2022-04-16 | Stop reason: HOSPADM

## 2022-04-13 RX ORDER — SODIUM CHLORIDE 0.9 % (FLUSH) 0.9 %
10 SYRINGE (ML) INJECTION EVERY 8 HOURS PRN
Status: DISCONTINUED | OUTPATIENT
Start: 2022-04-13 | End: 2022-04-16 | Stop reason: HOSPADM

## 2022-04-13 RX ORDER — LORAZEPAM 2 MG/ML
0.5 INJECTION INTRAMUSCULAR ONCE
Status: DISCONTINUED | OUTPATIENT
Start: 2022-04-13 | End: 2022-04-16 | Stop reason: HOSPADM

## 2022-04-13 RX ORDER — TALC
6 POWDER (GRAM) TOPICAL NIGHTLY PRN
Status: DISCONTINUED | OUTPATIENT
Start: 2022-04-13 | End: 2022-04-16 | Stop reason: HOSPADM

## 2022-04-13 RX ORDER — MAG HYDROX/ALUMINUM HYD/SIMETH 200-200-20
30 SUSPENSION, ORAL (FINAL DOSE FORM) ORAL 4 TIMES DAILY PRN
Status: DISCONTINUED | OUTPATIENT
Start: 2022-04-13 | End: 2022-04-16 | Stop reason: HOSPADM

## 2022-04-13 RX ORDER — GABAPENTIN 600 MG/1
600 TABLET ORAL 3 TIMES DAILY
Status: ON HOLD | COMMUNITY
Start: 2022-03-29 | End: 2022-04-15 | Stop reason: HOSPADM

## 2022-04-13 RX ORDER — IBUPROFEN 200 MG
24 TABLET ORAL
Status: DISCONTINUED | OUTPATIENT
Start: 2022-04-13 | End: 2022-04-16 | Stop reason: HOSPADM

## 2022-04-13 RX ORDER — IPRATROPIUM BROMIDE AND ALBUTEROL SULFATE 2.5; .5 MG/3ML; MG/3ML
3 SOLUTION RESPIRATORY (INHALATION) EVERY 4 HOURS PRN
Status: DISCONTINUED | OUTPATIENT
Start: 2022-04-13 | End: 2022-04-16 | Stop reason: HOSPADM

## 2022-04-13 RX ORDER — FLUOXETINE HYDROCHLORIDE 20 MG/1
20 CAPSULE ORAL NIGHTLY
Status: DISCONTINUED | OUTPATIENT
Start: 2022-04-13 | End: 2022-04-13

## 2022-04-13 RX ORDER — NIFEDIPINE 30 MG/1
90 TABLET, EXTENDED RELEASE ORAL NIGHTLY
Status: DISCONTINUED | OUTPATIENT
Start: 2022-04-13 | End: 2022-04-13

## 2022-04-13 RX ORDER — IBUPROFEN 200 MG
16 TABLET ORAL
Status: DISCONTINUED | OUTPATIENT
Start: 2022-04-13 | End: 2022-04-16 | Stop reason: HOSPADM

## 2022-04-13 RX ORDER — GLUCAGON 1 MG
1 KIT INJECTION
Status: DISCONTINUED | OUTPATIENT
Start: 2022-04-13 | End: 2022-04-16 | Stop reason: HOSPADM

## 2022-04-13 RX ORDER — NALOXONE HCL 0.4 MG/ML
0.02 VIAL (ML) INJECTION
Status: DISCONTINUED | OUTPATIENT
Start: 2022-04-13 | End: 2022-04-16 | Stop reason: HOSPADM

## 2022-04-13 RX ORDER — PROCHLORPERAZINE EDISYLATE 5 MG/ML
5 INJECTION INTRAMUSCULAR; INTRAVENOUS EVERY 6 HOURS PRN
Status: DISCONTINUED | OUTPATIENT
Start: 2022-04-13 | End: 2022-04-16 | Stop reason: HOSPADM

## 2022-04-13 RX ORDER — SIMETHICONE 80 MG
1 TABLET,CHEWABLE ORAL 4 TIMES DAILY PRN
Status: DISCONTINUED | OUTPATIENT
Start: 2022-04-13 | End: 2022-04-16 | Stop reason: HOSPADM

## 2022-04-13 RX ADMIN — GABAPENTIN 600 MG: 300 CAPSULE ORAL at 08:04

## 2022-04-13 RX ADMIN — ACETAMINOPHEN 650 MG: 325 TABLET ORAL at 11:04

## 2022-04-13 RX ADMIN — ASPIRIN 81 MG: 81 TABLET, COATED ORAL at 08:04

## 2022-04-13 RX ADMIN — FLUOXETINE 20 MG: 20 CAPSULE ORAL at 02:04

## 2022-04-13 RX ADMIN — ATORVASTATIN CALCIUM 20 MG: 20 TABLET, FILM COATED ORAL at 08:04

## 2022-04-13 RX ADMIN — HYDROXYZINE PAMOATE 25 MG: 25 CAPSULE ORAL at 08:04

## 2022-04-13 RX ADMIN — Medication 24 G: at 08:04

## 2022-04-13 RX ADMIN — SODIUM CHLORIDE, SODIUM LACTATE, POTASSIUM CHLORIDE, AND CALCIUM CHLORIDE: .6; .31; .03; .02 INJECTION, SOLUTION INTRAVENOUS at 03:04

## 2022-04-13 RX ADMIN — FLUOXETINE 40 MG: 20 CAPSULE ORAL at 08:04

## 2022-04-13 RX ADMIN — MELATONIN TAB 3 MG 6 MG: 3 TAB at 08:04

## 2022-04-13 RX ADMIN — LORAZEPAM 1 MG: 2 INJECTION INTRAMUSCULAR; INTRAVENOUS at 05:04

## 2022-04-13 RX ADMIN — ATORVASTATIN CALCIUM 20 MG: 20 TABLET, FILM COATED ORAL at 02:04

## 2022-04-13 NOTE — ASSESSMENT & PLAN NOTE
Aphasia    Patient was with friends when she developed sudden hand shaking that progressed into full body shaking with grunting noises. Episode resolved with residual aphasia and dysarthria. No tongue biting or bowel/bladder incontinence.    - VSSAF  - TSH, troponin WNL  - stroke activated and vascular neuro evaluated patient  - CTA head and neck without acute findings  - patient had recent admission at [facility] for RLE weakness/ stroke like symptoms with negative MRI brain and MRA head/neck. Thought 2/2 TIA vs anxiety  - differential includes TIA vs seizure vs pseudoseizure  - neurology consulted, appreciate recs  - EEG ordered  - SLP consulted for dysarthria  - seizure, aspiration precautions

## 2022-04-13 NOTE — HPI
51 y/o F w PMH HLD, HTN, DM type 2, and CKD stage 3 who presented to the ED with speech difficulties for which stroke activated. EMS refers that 1 hour prior to arrival she was at her normal state of health but then developed episode of shackiness with word finding difficulty and what is described as a panic attack. Patient is able to nod yes or no and denies any inciting events to provoke these symptoms or prodromal symptoms. On arrival stroke code called and patient is stuttering the first letter of the answers to question asks with appropriate response, including her name, the hospital as well as when reading words and imagine on stroke cards. She requests pen and paper and is able to express her self with writing. No focal neurological deficits, NIHSS 2.    CTA h/n ordered and no acute ischemia, no LVO. No tpa recommended given low NIHSS and presentation unlikely to be vascular etiology as it does not correlate with any territory or pattern. Patient had recent MRI brain for admission for lower extremity weakness and no acute infarct noted.

## 2022-04-13 NOTE — ASSESSMENT & PLAN NOTE
- home meds: canagliflozin, lantus 46 units nightly  - weight based insulin regimen while in hospital  - diabetic diet  - [facility]

## 2022-04-13 NOTE — ED NOTES
Pt is able to speak following administration of ativan. Pt is AAOx4, GCS of 15 at this time. Pt has returned to baseline mentation per self and her .

## 2022-04-13 NOTE — RESPIRATORY THERAPY
RAPID RESPONSE RESPIRATORY THERAPY NOTE             Code Status: Full Code   : 1970  Bed: DANIELLE/EDOU09:   MRN: [id number]  Time page Received: 1200  Time Rapid Response RT at Bedside: 1201  Time Rapid Response RT left Bedside: 1215    SITUATION    Evaluated patient for: AMS    BACKGROUND    Why is the patient in the hospital?: Episode of transient neurologic symptoms    Patient has a past medical history of Aneurysm of cardiac wall, congenital, Bilateral lower extremity edema, Diabetes type 2, uncontrolled, HLD (hyperlipidemia), Hypertension, and Vitamin D deficiency.    24 Hours Vitals Range:  Temp:  [97.6 °F (36.4 °C)-98.5 °F (36.9 °C)]   Pulse:  []   Resp:  [13-20]   BP: ()/()   SpO2:  [95 %-100 %]     Labs:    Recent Labs     22  2222 22  0415    139   K 4.5 4.7    106   CO2 26 23   CREATININE 1.6* 1.7*   * 134*   PHOS  --  4.6*   MG 2.4 2.6        Recent Labs     22  2227   PH 7.329*   PCO2 54.3*   PO2 24*   HCO3 28.6*   POCSATURATED 38*   BE 3       ASSESSMENT/INTERVENTIONS    Called to bedside to evaluate pt for seizure like activity. Upon arrival vital signs were being obtained. Vitals are stable. Pt began stuttering, shaking and moving all extremities. Pt was able to follow all commands during this event. MD at bedside shortly after the event. Admission to the hospital and a psych consult was ordered. No other interventions performed.      Last Vitals: Temp: 97.9 °F (36.6 °C) (1600)  Pulse: 105 (1600)  Resp: 18 (1600)  BP: 111/70 (1600)  SpO2: 96 % (1600)  Level of Consciousness: Level of Consciousness (AVPU): alert  Respiratory Effort:    Expansion/Accessory Muscle Usage: Expansion/Accessory Muscles/Retractions: expansion symmetric, no retractions, no use of accessory muscles  All Lung Field Breath Sounds:    O2 Device/Concentration:  ,  , O2 Device (Oxygen Therapy): room air  NIPPV: No Surgical airway: No Vent:  No  ETCO2 monitored:    Ambu at bedside:      Active Orders   Respiratory Care    Inhalation Treatment Q4H PRN     Frequency: Q4H PRN     Number of Occurrences: Until Specified    Oxygen Continuous     Frequency: Continuous     Number of Occurrences: Until Specified     Order Comments: FiO2 30%, Stroke       Order Questions:      Device type: Low flow      Device: Nasal Cannula (1- 5 Liters)      LPM: 3      Titrate O2 per Oxygen Titration Protocol: Yes      Notify MD of: Inability to achieve desired SpO2    Pulse Oximetry Continuous     Frequency: Continuous     Number of Occurrences: Until Specified       RECOMMENDATIONS  ?  We recommend: RRT Recs: Continue POC per primary team.    ESCALATION    Orders received and case discussed with Dr. Hawthorne.    FOLLOW-UP    Disposition: Remain in room EDOU 09.    Please call back the Rapid Response RT, Zaida Mosher, RRT at x [phone] for any questions or concerns.

## 2022-04-13 NOTE — ASSESSMENT & PLAN NOTE
51 y/o F w PMH HLD, HTN, DM type 2, and CKD stage 3 who presented to the ED with speech difficulties for which stroke activated. Symptom onset 1 hour prior to arrival where patient was at her normal state of health and developed acute onset of stuttering, shacking and panic attack. On arrival no focal neurological deficits, patient is mentioning the first letter in stuttering format of appropriate answers to questions as well as reading and objects in stroke card. Able to write correctly, answer y/no and comprehension is intact.   CTA h/n w no signs of acute ischemia or LVO. She had recent MRI last month as well for RLE weakness and normal.     Recommendations   -- no tpa is indicated as low NIHSS and high supiscioun of stroke mimicker such as panic attack, conversion disorder   -- No need for other imaging or stroke workup as patient had recent admission where stroke workup completed and normal   -- Complete workup for underlying etiology of current presentation   -- Case and imaging reviewed with Vascular Neurology fellow and staff, Dr. Quintero and Dr Abdullahi and no further recommendations.   -- Dispo per ED     Thank you for the consult. Vascular Neurology will sign off at this time. Please call or consult for any questions or concerns.

## 2022-04-13 NOTE — ED TRIAGE NOTES
"Bonnie Lino, an 52 y.o. female presents to the ED for expressive aphasia that started about an hour ago. Family attributes it to a panic attack. Pt also states they believe she had a TIA 2 weeks ago. No deficits noted to extremities.       Review of patient's allergies indicates:  No Known Allergies  Chief Complaint   Patient presents with    Aphasia     From home, LNK 1 hour ago, EMS called for "shaking". Upon EMS arrival, states patient appeared to be having panic attack with shaking. Pt having difficulty getting her words out     Past Medical History:   Diagnosis Date    Aneurysm of cardiac wall, congenital     Bilateral lower extremity edema     2/2 calcium channel blocker    Diabetes type 2, uncontrolled     HLD (hyperlipidemia)     Hypertension     Vitamin D deficiency      "

## 2022-04-13 NOTE — ED PROVIDER NOTES
"Encounter Date: 4/12/2022       History     Chief Complaint   Patient presents with    Aphasia     From home, LNK 1 hour ago, EMS called for "shaking". Upon EMS arrival, states patient appeared to be having panic attack with shaking. Pt having difficulty getting her words out     The history is provided by the EMS personnel and medical records.      This is a 52-year-old female with past medical history of possible strokes, hypertension, hyperlipidemia, diabetes who presents by EMS after starting to have a shaking episode, possible panic attack, and difficulty with words.    Symptoms started approximately 1 hour prior to arrival.  Glucose with EMS was 146. Patient is alert, following commands, moving extremities equally.  She is having difficulty with word finding/expressive aphasia.    Stroke called upon arrival.  Neurologically quickly at bedside.        Review of patient's allergies indicates:  No Known Allergies  Past Medical History:   Diagnosis Date    Aneurysm of cardiac wall, congenital     Bilateral lower extremity edema     2/2 calcium channel blocker    Diabetes type 2, uncontrolled     HLD (hyperlipidemia)     Hypertension     Vitamin D deficiency      Past Surgical History:   Procedure Laterality Date    BREAST CYST EXCISION  2003    BREAST SURGERY      cyst removal     CATARACT EXTRACTION W/  INTRAOCULAR LENS IMPLANT      COLONOSCOPY N/A 7/23/2021    Procedure: COLONOSCOPY;  Surgeon: Sapna Fitzgerald MD;  Location: [facility];  Service: Endoscopy;  Laterality: N/A;    HYSTERECTOMY  2002    PARTIAL HYSTERECTOMY  2002     Family History   Problem Relation Age of Onset    Diabetes Mother     Heart disease Mother     Cancer Mother 40        breast    Breast cancer Mother     Diabetes Father     Cancer Maternal Grandmother 82        breast    Breast cancer Maternal Grandmother      Social History     Tobacco Use    Smoking status: Never Smoker    Smokeless tobacco: Never Used "   Substance Use Topics    Alcohol use: Yes     Comment: seldom    Drug use: No     Review of Systems   Unable to perform ROS: Acuity of condition   Respiratory: Negative for shortness of breath.    Cardiovascular: Negative for chest pain.   Neurological: Positive for speech difficulty. Negative for seizures, facial asymmetry, weakness and numbness.       Physical Exam     Initial Vitals   BP Pulse Resp Temp SpO2   04/12/22 2136 04/12/22 2136 04/12/22 2136 04/12/22 2211 04/12/22 2136   (!) 160/90 93 18 97.6 °F (36.4 °C) 100 %      MAP       --                Physical Exam    Nursing note and vitals reviewed.  Constitutional: She appears well-developed. She is not diaphoretic.   HENT:   Head: Normocephalic and atraumatic.   Mouth/Throat: Oropharynx is clear and moist.   Eyes: EOM are normal. Pupils are equal, round, and reactive to light.   Neck: No JVD present.   Normal range of motion.  Cardiovascular: Normal rate, regular rhythm, normal heart sounds and intact distal pulses.   No murmur heard.  Pulmonary/Chest: Breath sounds normal. No respiratory distress. She has no wheezes. She has no rales.   Abdominal: Abdomen is soft. She exhibits no distension. There is no abdominal tenderness.   Musculoskeletal:         General: No edema. Normal range of motion.      Cervical back: Normal range of motion.     Neurological: She is alert.   Moving all extremities equally. Has foot drop brace on right lower leg.  Patient following commands.  No arm drift.  No leg drift.  Is having difficulty finding her words and having a stuttering speech.  Extraocular is intact.  No nystagmus.  Visual fields intact.  no facial asymmetry.   Skin: Skin is warm and dry.   Psychiatric: She has a normal mood and affect.         ED Course   Procedures  Labs Reviewed   CBC W/ AUTO DIFFERENTIAL - Abnormal; Notable for the following components:       Result Value    MCH 25.6 (*)     MCHC 30.3 (*)     All other components within normal limits    COMPREHENSIVE METABOLIC PANEL - Abnormal; Notable for the following components:    Glucose 112 (*)     BUN 28 (*)     Creatinine 1.6 (*)     eGFR if  41.8 (*)     eGFR if non  36.3 (*)     All other components within normal limits   ISTAT CREATININE - Abnormal; Notable for the following components:    POC Creatinine 1.8 (*)     All other components within normal limits   ISTAT PROCEDURE - Abnormal; Notable for the following components:    POC PH 7.329 (*)     POC PCO2 54.3 (*)     POC PO2 24 (*)     POC HCO3 28.6 (*)     POC SATURATED O2 38 (*)     POC TCO2 30 (*)     All other components within normal limits   PROTIME-INR   TSH    Narrative:     add on mag per dr cherelle dixon/order#[id number] @22:57   04/12/2022   TROPONIN I   MAGNESIUM   MAGNESIUM    Narrative:     add on mag per dr cherelle dixon/order#[id number] @22:57   04/12/2022   URINALYSIS, REFLEX TO URINE CULTURE   DRUG SCREEN PANEL, URINE EMERGENCY   POCT GLUCOSE, HAND-HELD DEVICE   SARS-COV-2 RDRP GENE   ISTAT PROCEDURE          Imaging Results          X-Ray Chest AP Portable (Final result)  Result time 04/12/22 23:34:25    Final result by Dale Liu MD (04/12/22 23:34:25)                 Impression:      No acute findings in the chest.    Electronically signed by resident: Navneet Wheeler  Date:    04/12/2022  Time:    23:23    Electronically signed by: Dale Liu  Date:    04/12/2022  Time:    23:34             Narrative:    EXAMINATION:  XR CHEST AP PORTABLE    CLINICAL HISTORY:  Stroke;    TECHNIQUE:  Single frontal view of the chest was performed.    COMPARISON:  Chest radiograph 03/17/2022    FINDINGS:  Multiple surgical clips overlie the chest.  Cardiac silhouette stable.    Lungs clear without consolidation.  No pleural fluid or pneumothorax.    No significant osseous abnormality.                               CTA Head and Neck (xpd) (Final result)  Result time 04/12/22 22:38:43    Final  result by Sal Wilson MD (04/12/22 22:38:43)                 Impression:      1. No acute intracranial finding.  Additional evaluation, as clinically warranted.  2. CTA head and neck without large vessel occlusion or high-grade stenosis.  3. Paranasal sinus disease.  4. 0.5 cm solid nodule in the right upper lobe.  For a solid nodule <6 mm, Fleischner Society 2017 guidelines recommend no routine follow up for a low risk patient, or follow-up with non-contrast chest CT at 12 months in a high risk patient.  5. Multinodular thyroid gland with the largest nodule measuring 1.1 cm.  Consider non urgent thyroid ultrasound.    Electronically signed by resident: Navneet Wheeler  Date:    04/12/2022  Time:    22:03    Electronically signed by: Sal Wilson MD  Date:    04/12/2022  Time:    22:38             Narrative:    EXAMINATION:  CTA HEAD AND NECK (XPD)    CLINICAL HISTORY:  Stroke/TIA, determine embolic source;    TECHNIQUE:  Axial CT images obtained throughout the region of the head before and after the administration of intravenous contrast.  CT angiogram was performed from through the cervical and intracranial vasculature during the IV bolus administration of 100mL of Omnipaque 350.  Multiplanar MPR and MIP reformats were performed.    CT source data was analyzed using artificial intelligence software for detection of a large vessel occlusion (LVO) or hemorrhage in order to enable computer assisted triage notification and aid clinical stroke decision making.    COMPARISON:  MRI brain 03/17/2022    MRA brain/neck 03/17/2022    FINDINGS:  Ventricles stable in size without evidence of hydrocephalus.    The brain parenchyma appears unchanged.  Mild cerebellar ectopia, similar to prior.  No new parenchymal mass, hemorrhage, edema or major vascular distribution infarct.    No extra-axial blood or fluid collections.    No acute displaced calvarial fracture.  Near complete opacification of the right sphenoid sinus.  Mucous  retention cyst in the bilateral maxillary antra.  Mastoid air cells clear.      CTA:    The aortic arch maintains a 2 vessel branching pattern with common origin of the brachiocephalic and left common carotid arteries.    The common and internal carotid arteries are normal in course and caliber. No calcific atherosclerosis or significant stenosis in either carotid bifurcation.    The vertebral origins are patent. The cervical vertebral arteries are normal in course and caliber. Vertebrobasilar system is within normal limits without focal abnormality.    The anterior, middle, and posterior cerebral arteries are within normal limits, without evidence of significant stenosis, focal occlusion, or intracranial aneurysm formation.    Additional findings:    0.5 cm solid nodule in the right upper lobe (axial series 5, image 38).  Multinodular thyroid gland with the largest nodule measuring 1.1 cm.  Parotid and submandibular glands unremarkable.  Degenerative change of the cervical spine without acute fracture.                                 Medications   iohexoL (OMNIPAQUE 350) injection 100 mL (100 mLs Intravenous Given 4/12/22 2202)   lorazepam injection 1 mg (1 mg Intravenous Given 4/12/22 2230)     Medical Decision Making:   History:   I obtained history from: EMS provider.       <> Summary of History: Patient with recent admission at outside hospital for possible stroke.  She had normal MRI and MRA of brain, head, neck.  EF 75%.  Was discharged to physical therapy.  Old Medical Records: I decided to obtain old medical records.  Old Records Summarized: records from previous admission(s).  Initial Assessment:   52-year-old female with past medical history of possible strokes, hypertension, hyperlipidemia, diabetes who presents by EMS after starting to have a shaking episode, possible panic attack, and difficulty with words.  Differential Diagnosis:   CVA, seizure, electrolyte abnormality, panic attack.   Clinical  "Tests:   Lab Tests: Ordered and Reviewed  Radiological Study: Ordered and Reviewed  Medical Tests: Ordered and Reviewed  ED Management:  Stroke active initiated. CT imaging obtained. Neurology states low concern for stroke and does not need MRI at this time.  The re-evaluation family at bedside.  They state that patient appeared to have potential anxiety attack then shaking and then have the difficulty with speech.  Patient is still having this stuttering speech but able to pronounce "okay" and "hi" after stuttering. She appeared to be getting a little more anxious and then having increased stuttering and jaw stiffness. Patient able to write thank you during this episode. Ativan 1 mg ordered. Family and record also notes that patient started fluoxetine within the past 3 weeks. She has been compliant with the medication. Patient with mild improvement but not significantly improved. Labs WNL. Feel that she would benefit from further monitoring and resolution of symptoms. Hospitalist medicine consulted for admission.   Other:   I have discussed this case with another health care provider.                      Clinical Impression:   Final diagnoses:  [I63.9] Stroke  [R47.01] Aphasia (Primary)  [R47.9] Speech disturbance, unspecified type          ED Disposition Condition    Observation               Ruben Ribeiro MD  Resident  04/13/22 0008    "

## 2022-04-13 NOTE — PLAN OF CARE
Ms. Bonnie Lino is a 52 y.o. female with past medical history of with Hx of possible TIA, HLD, HTN, DM type 2, and CKD stage 3 presented with  acute onset of stuttering, shacking and panic attack. no focal neurological deficits on arrival. CTA -No acute intracranial finding. CTA head and neck without large vessel occlusion or high-grade stenosis.  MRI Brain 3/17  WNL  when she presented with RLE weakness . s/p vascular neurology eval -  No further imaging or stroke workup.  episode of anxiety, jerking movements, and incomprehensible speech resolved with Ativan 1mg  IV.  blood sugar of 43 improved with glucose . levemir discontinued. bedside swallow and resume diet . Patient had  multiple episodes of shaking and mouth twisting. Neurology consulted. symptoms not consistent with seizures . psychiatry consulted for anxiety. Increased Prozac to 40 mg daily and Vistaril 25 mg q6h prn for anxiety and panic attacks . Continue ASA and Lipitor.        Herb Hawthorne MD  Attending Staff Physician  [facility] Medicine  pager- [phone]  [facility] - [phone]

## 2022-04-13 NOTE — NURSING
Received this patient drowsy but easily aroused and oriented x 4 this am. Patient was assessed and found to be hypoglycemic. This was treated and resolved promptly. Patient then proceeded to have another episode of shaking and mouth twisting, MD and [facility] team were made aware and came to the bedside. Patient was later assessed by psych and speech. Patient and family reported several episodes throughout this shift. With each episode patient maintained stable vital signs and awareness. Report given to neuro floor nurse. Nursing care aand observation comntinues

## 2022-04-13 NOTE — CONSULTS
[facility] - Emergency Dept  Psychiatry  Consult Note    Patient Name: Bonnie Lino  MRN: [id number]   Code Status: Full Code  Admission Date: 4/12/2022  Hospital Length of Stay: 0 days  Attending Physician: Herb Hawthorne MD  Primary Care Provider: Robson Culver DO    Current Legal Status: Uncontested    Patient information was obtained from patient, past medical records and ER records.   Consults  Subjective:     Principal Problem:Episode of transient neurologic symptoms    Chief Complaint:  conversion d/o     HPI: Bonnie Lino is a 52 y.o. F with past psychiatric history of major depressive disorder presents to the emergency room with changes in her speech and tremulousness.  Patient was worked up for stroke and Neurology was consulted who do not believe any acute neurological concerns and potential diagnosis of functional neurological disorder   Psychiatry was consulted for this and medication management  On exam today patient was laying in bed with blanket up to her chin.  Patient states that last night she had some high school friends over and was having a good time and they were praying together and laughing had some pizza than before she knew it she describes it as having a seizure-like episode.  Patient reports that this was very concerning for her and is scared about having another episode like it.  Patient reports that she has been having decreased mood and depression problems for the past couple of months in about 3 months ago she was started on Prozac 10 mg by her primary care physician at that time patient's depression symptomatology were moderate to severe.  At that time she endorse having hopelessness anhedonia would not leave the house or hang out with her friends was having a lot of crying episodes and also having passive suicidal ideation.  However since increasing the Prozac to 20 mg patient reports that she is no longer experiencing anhedonia is able to enjoy things with her family  and friends and no longer having the crying episodes or suicidal ideation.  Patient currently endorsing some mild symptoms of depression.  Patient states that she has been having some increased anxiety since she has been out of work for the last month.  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  Patient states on March 16th she woke and my right leg was buckling patient states that she went to her primary care doctor who thought she was having a stroke but this was ruled out and patient was sent to rehab facility from March 19th to 29th to get the strength back in her leg.  Patient states that she does not have panic attacks currently or in the past and does not know what they are.  Patient denied having episodes as such in the past and believes that she is having seizures.  Patient is denying any symptoms of PTSD previous history of traumas or any symptoms of evelia or psychosis either.  Psychosocial stressors  Patient currently reports that she is having financial difficulties since she has been  from her  couple years ago and that is pressing concern for her.  Patient also claims that she had lost her mother 3 years ago and that also has been very traumatic for her.  Patient denied any acute recent traumas that might have triggered her current conversion episode.    PSYCH HISTORY  Psychiatric hospitalizations-none  Previous trials of psychiatric medications-none  Previous history of suicidal ideation homicidal ideation or suicide attempts and or self-harm-none  History of previous psychiatric diagnosis-major depressive disorder  Current or previous mental health care-denied currently being managed by PCP for her depression    SUBSTANCE USE HISTORY  Patient denied any illicit substances and states that she rarely drinks any alcohol typically on special occasions      FAMILY PSYCHIATRIC HISTORY  Not but she knows of any family members  with this    SOCIAL HISTORY    Patient had normal childhood development and highest level education is teaching degree.  Patient is  but is on leave.  Patient lives alone is not in a relationship has 2 children.  Patient has no  history no access to guns.  Patient is Jehovah's witness and is Restorationist.  Patient's recreational activities include spending time with friends.        Hospital Course: No notes on file         Patient History               Medical as of 4/13/2022       Past Medical History       Diagnosis Date Comments Source    Aneurysm of cardiac wall, congenital -- -- Provider    Bilateral lower extremity edema -- 2/2 calcium channel blocker Provider    Diabetes type 2, uncontrolled -- -- Provider    HLD (hyperlipidemia) -- -- Provider    Hypertension -- -- Provider    Vitamin D deficiency -- -- Provider              Pertinent Negatives       Diagnosis Date Noted Comments Source    Atypical hyperplasia of breast 08/19/2021 -- Provider    BRCA1 negative 08/19/2021 -- Provider    BRCA1 positive 08/19/2021 -- Provider    BRCA2 negative 08/19/2021 -- Provider    BRCA2 positive 08/19/2021 -- Provider    Breast cancer 08/19/2021 -- Provider    Colon cancer 08/19/2021 -- Provider    Endometrial cancer 08/19/2021 -- Provider    Lobular carcinoma in situ 08/19/2021 -- Provider    Ovarian cancer 08/19/2021 -- Provider    Usual hyperplasia of lactiferous duct 08/19/2021 -- Provider                          Surgical as of 4/13/2022       Past Surgical History       Procedure Laterality Date Comments Source    PARTIAL HYSTERECTOMY -- 2002 -- Provider    BREAST SURGERY -- -- cyst removal  Provider    CATARACT EXTRACTION W/  INTRAOCULAR LENS IMPLANT -- -- -- Provider    COLONOSCOPY N/A 7/23/2021 Procedure: COLONOSCOPY;  Surgeon: Sapna Fitzgerald MD;  Location: [facility];  Service: Endoscopy;  Laterality: N/A; Provider    BREAST CYST EXCISION -- 2003 -- Provider    HYSTERECTOMY -- 2002 -- Provider                           Family as of 4/13/2022       Problem Relation Name Age of Onset Comments Source    Diabetes Mother -- -- -- Provider    Heart disease Mother -- -- -- Provider    Cancer Mother -- 40 breast Provider    Breast cancer Mother -- -- -- Provider    Diabetes Father -- -- -- Provider    Cancer Maternal Grandmother -- 82 breast Provider    Breast cancer Maternal Grandmother -- -- -- Provider                  Tobacco Use as of 4/13/2022       Smoking Status Smoking Start Date Smoking Quit Date Packs/Day Years Used    Never Smoker -- -- -- --      Types Comments Smokeless Tobacco Status Smokeless Tobacco Quit Date Source    -- -- Never Used -- Provider                  Alcohol Use as of 4/13/2022       Alcohol Use Drinks/Week Alcohol/Week Comments Source    Yes   -- seldom Provider                  Drug Use as of 4/13/2022       Drug Use Types Frequency Comments Source    No -- -- -- Provider                  Sexual Activity as of 4/13/2022       Sexually Active Birth Control Partners Comments Source    -- -- -- -- Provider                  Activities of Daily Living as of 4/13/2022    None               Social Documentation as of 4/13/2022    None               Occupational as of 4/13/2022       Occupation Employer Comments Source    -- [employer] -- Provider                  Socioeconomic as of 4/13/2022       Marital Status Spouse Name Number of Children Years Education Education Level Preferred Language Ethnicity Race Source    Single -- -- -- -- English Not  or /a Black or  Provider                  Pertinent History       Question Response Comments    Lives with -- --    Place in Birth Order -- --    Lives in -- --    Number of Siblings -- --    Raised by -- --    Legal Involvement -- --    Childhood Trauma -- --    Criminal History of -- --    Financial Status -- --    Highest Level of Education -- --    Does patient have access to a firearm? -- --      Service -- --    Primary Leisure Activity -- --    Spirituality -- --          Past Medical History:   Diagnosis Date    Aneurysm of cardiac wall, congenital     Bilateral lower extremity edema     2/2 calcium channel blocker    Diabetes type 2, uncontrolled     HLD (hyperlipidemia)     Hypertension     Vitamin D deficiency      Past Surgical History:   Procedure Laterality Date    BREAST CYST EXCISION  2003    BREAST SURGERY      cyst removal     CATARACT EXTRACTION W/  INTRAOCULAR LENS IMPLANT      COLONOSCOPY N/A 7/23/2021    Procedure: COLONOSCOPY;  Surgeon: Sapna Fitzgerald MD;  Location: [facility];  Service: Endoscopy;  Laterality: N/A;    HYSTERECTOMY  2002    PARTIAL HYSTERECTOMY  2002     Family History       Problem Relation (Age of Onset)    Breast cancer Mother, Maternal Grandmother    Cancer Mother (40), Maternal Grandmother (82)    Diabetes Mother, Father    Heart disease Mother          Tobacco Use    Smoking status: Never Smoker    Smokeless tobacco: Never Used   Substance and Sexual Activity    Alcohol use: Yes     Comment: seldom    Drug use: No    Sexual activity: Not on file     Review of patient's allergies indicates:  No Known Allergies    No current facility-administered medications on file prior to encounter.     Current Outpatient Medications on File Prior to Encounter   Medication Sig    acetaminophen (TYLENOL) 500 MG tablet Take 500 mg by mouth daily as needed for Pain.    aspirin (ECOTRIN) 81 MG EC tablet Take 81 mg by mouth once daily.    atorvastatin (LIPITOR) 20 MG tablet Take 20 mg by mouth once daily.    canagliflozin (INVOKANA) 100 mg Tab tablet Take 1 tablet (100 mg total) by mouth once daily. (Patient taking differently: Take 100 mg by mouth nightly.)    FLUoxetine 20 MG capsule Take 1 capsule (20 mg total) by mouth every evening.    gabapentin (NEURONTIN) 600 MG tablet Take 600 mg by mouth 3 (three) times daily.    insulin (LANTUS SOLOSTAR  "U-100 INSULIN) glargine 100 units/mL (3mL) SubQ pen Inject 46 Units into the skin every evening.    lisinopriL (PRINIVIL,ZESTRIL) 40 MG tablet Take 1 tablet (40 mg total) by mouth once daily. (Patient taking differently: Take 40 mg by mouth nightly.)    multivitamin (ONE DAILY MULTIVITAMIN) per tablet Take 1 tablet by mouth once daily.    NIFEdipine (PROCARDIA-XL) 90 MG (OSM) 24 hr tablet Take 1 tablet (90 mg total) by mouth once daily. (Patient taking differently: Take 90 mg by mouth nightly.)    blood glucose strip-disp meter Kit 1 application by Misc.(Non-Drug; Combo Route) route 3 (three) times daily.    blood-glucose meter (RELION ALL-IN-ONE METER) kit Use as instructed    insulin syringe-needle U-100 (BD INSULIN SYRINGE) 1 mL 25 gauge x 5/8" Syrg 1 Units by Misc.(Non-Drug; Combo Route) route every evening. (Patient not taking: Reported on 3/31/2022)    lancets Misc 1 application by Misc.(Non-Drug; Combo Route) route 3 (three) times daily. (Patient not taking: Reported on 3/31/2022)    [DISCONTINUED] DUREZOL 0.05 % Drop ophthalmic solution INSTILL 1 DROP TO SURGERY EYE 4 TIMES DAILY AFTER SURGERY FOR 30 DAYS    [DISCONTINUED] gabapentin (NEURONTIN) 300 MG capsule Take 2 capsules (600 mg total) by mouth 3 (three) times daily. Patient taking 1 tablet in am, 1 tablet at lunch and 2 tablets at bedtime (Patient taking differently: Take 600 mg by mouth 3 (three) times daily.)    [DISCONTINUED] ketorolac 0.5% (ACULAR) 0.5 % Drop     [DISCONTINUED] rosuvastatin (CRESTOR) 5 MG tablet Take 1 tablet (5 mg total) by mouth once daily. (Patient taking differently: Take 5 mg by mouth every evening.)     Psychotherapeutics (From admission, onward)                Start     Stop Route Frequency Ordered    04/13/22 1315  lorazepam injection 0.5 mg         -- IV Once 04/13/22 1206    04/13/22 0300  FLUoxetine capsule 20 mg         -- Oral Nightly 04/13/22 0150          Review of Systems  Strengths and Liabilities: " Strength: Patient accepts guidance/feedback, Strength: Patient is expressive/articulate., Strength: Patient is intelligent., Strength: Patient is motivated for change., Strength: Patient has positive support network., Strength: Patient has reasonable judgment., Strength: Patient is stable.    Objective:     Vital Signs (Most Recent):  Temp: 98.5 °F (36.9 °C) (04/13/22 1130)  Pulse: 77 (04/13/22 1130)  Resp: 16 (04/13/22 1130)  BP: (!) 156/77 (04/13/22 1130)  SpO2: 97 % (04/13/22 1130)   Vital Signs (24h Range):  Temp:  [97.6 °F (36.4 °C)-98.5 °F (36.9 °C)] 98.5 °F (36.9 °C)  Pulse:  [75-95] 77  Resp:  [13-20] 16  SpO2:  [95 %-100 %] 97 %  BP: ()/() 156/77        Weight: 77.1 kg (170 lb)  Body mass index is 26.63 kg/m².    No intake or output data in the 24 hours ending 04/13/22 1616    Physical Exam     Significant Labs: Last 24 Hours:   Recent Lab Results  (Last 5 results in the past 24 hours)        04/13/22  1416   04/13/22  0415   04/13/22  0019   04/12/22  2227   04/12/22  2224        Allens Test       N/A         Amorphous, UA Few               Anion Gap   10             Appearance, UA Hazy               Bacteria, UA None               Baso #   0.03             Basophil %   0.5             Bilirubin (UA) Negative               Site       Other         BUN   29             Calcium   9.4             Chloride   106             CO2   23             Color, UA Yellow               Creatinine   1.7             DelSys       Room Air         Differential Method   Automated             eGFR if    38.9             eGFR if non    33.7  Comment: Calculation used to obtain the estimated glomerular filtration  rate (eGFR) is the CKD-EPI equation.                Eos #   0.2             Eosinophil %   3.5             Glucose   134             Glucose, UA 3+               Gran # (ANC)   3.3             Gran %   57.7             Hematocrit   39.0             Hemoglobin   11.5              Immature Grans (Abs)   0.03  Comment: Mild elevation in immature granulocytes is non specific and   can be seen in a variety of conditions including stress response,   acute inflammation, trauma and pregnancy. Correlation with other   laboratory and clinical findings is essential.               Immature Granulocytes   0.5             Ketones, UA Negative               Leukocytes, UA Negative               Lymph #   1.7             Lymph %   30.1             Magnesium   2.6             MCH   24.9             MCHC   29.5             MCV   85             Microscopic Comment SEE COMMENT  Comment: Other formed elements not mentioned in the report are not   present in the microscopic examination.                  Mode       SPONT         Mono #   0.4             Mono %   7.7             MPV   11.1             NITRITE UA Negative               nRBC   0             Occult Blood UA Negative               pH, UA 8.0               Phosphorus   4.6             Platelets   220             POC BE       3         POC Creatinine         1.8       POC HCO3       28.6         POC PCO2       54.3         POC PH       7.329         POC PO2       24         POC SATURATED O2       38         POC TCO2       30         Potassium   4.7             Protein, UA Negative  Comment: Recommend a 24 hour urine protein or a urine   protein/creatinine ratio if globulin induced proteinuria is  clinically suspected.                  Acceptable     Yes           RBC   4.61             RBC, UA 2               RDW   13.6             Sample       VENOUS   unknown       SARS-CoV-2 RNA, Amplification, Qual     Negative           Sodium   139             Specific [place], UA 1.025               Specimen UA Urine, Clean Catch               Squam Epithel, UA 1               WBC, UA 3               WBC   5.68             Yeast, UA None                                    Blood Alcohol Level: No results for input(s): ALCOHOLMEDIC in the last 48  hours.  CBC:   Recent Labs   Lab 04/12/22 2222 04/13/22 0415   WBC 5.69 5.68   HGB 12.0 11.5*   HCT 39.6 39.0    220     CMP:   Recent Labs   Lab 04/12/22 2222 04/13/22 0415    139   K 4.5 4.7    106   CO2 26 23   * 134*   BUN 28* 29*   CREATININE 1.6* 1.7*   CALCIUM 9.3 9.4   PROT 7.6  --    ALBUMIN 3.9  --    BILITOT 0.4  --    ALKPHOS 75  --    AST 15  --    ALT 14  --    ANIONGAP 8 10   EGFRNONAA 36.3* 33.7*     Thyroid:   Recent Labs   Lab 04/12/22 2222   TSH 2.352     Urine Toxicology: No results for input(s): ETHANOLUR, LABBENZ, LABMETH, COCAINEURINE, OPIATESCREEN, BARBITURATES, AMPHETAMINES, MARIJUANATHC, PHENCYCLIDIN, POCCREAT, TOXINFO in the last 48 hours.    Significant Imaging: CT: I have reviewed all pertinent results/findings within the past 24 hours.  No abnormal findings that would explain patient's current symptomatology  EKG: I have reviewed all pertinent results/findings within the past 24 hours.  QTC within normal limits    MENTAL STATUS EXAM  Appearance: unremarkable, age appropriate, casually dressed  Behavior/Cooperation:  Patient is cooperative but a bit guarded initially  Speech:  Abnormal initially patient's speech was normal volume rate and flow.  However when discussing about anxiety and situations on that she describes as seizure-like activity patient's speech profoundly changes where she has increased paucity and bizarrely talking very slowly with long stretched pauses patient also noted to have some increased hesitation and stuttering.  Language:   abnormal  Mood:  scared  Affect:  Patient seems anxious however patient has flat blunted affect  Thought Process: normal and logical  Thought Content: normal, no suicidality, no homicidality, delusions, or paranoia  Sensorium:  Awake  Alert and Oriented: x3 grossly intact  Memory: Intact to conversation both recent and remote  Attention/concentration: appropriate for age/education. w-[id number]-r-l-d; d-l-r-o-w    Insight:  Limited  Judgment:Intact     Assessment/Plan:     Current mild episode of major depressive disorder without prior episode  Bonnie Lino is a 52-year-old female with past psychiatric history of depression presents to the emergency room for changes in her speech and tremulousness.    Impression  MDD recurrent mild episode  Rule out functional neurological disorder  Rule out anxiety disorder    Recommendations  Psych meds  · Increase Prozac to 40 mg q.day unsure if 20 mg has been adequately helpful  · May use p.r.n. Vistaril 25 mg q.6 for any beds said episodes in anxiety and panic symptoms.    Other  · Discussed with patient that it is imperative she follow-up with outpatient psychiatry, please consult case management to assist with outpatient mental health clinics in the area that take her insurance  · Also advised patient to follow up for outpatient talk therapy  · Discussed with the patient regarding possibility of functional neurological disorder patient accepting of this however did report that she still things they are seizures discussed with patient typical clinical situations were seizures would occur and patient's current symptomatology and clinical picture lab work examination do not reflect a seizure disorder patient also accepting of this and is slightly relieved has not had any serious medical issues.  · Please consider speech therapy moving forward and PT OT as needed for her FMD.  Please print out resources for patient that are in thedischarge instructions before patient is discharged.    Patient stable from acute psych standpoint but will need outpatient psych follow-up  -Please contact ON CALL psychiatry service for any acute issues that may arise.    RACHEL JEWELL MD   Department of Psychiatry   [facility]  4/13/2022 4:27 PM      DISCLAIMER: This note was prepared with Preo voice recognition transcription software. Garbled syntax, mangled pronouns, and other  bizarre constructions may be attributed to that software system            Total Time:  60 minutes

## 2022-04-13 NOTE — DISCHARGE INSTRUCTIONS
0.5 cm solid nodule in the right upper lobe.  For a solid nodule <6 mm, Fleischner Society 2017 guidelines recommend no routine follow up for a low risk patient, or follow-up with non-contrast chest CT at 12 months in a high risk patient. Please follow up with PCP       Multinodular thyroid gland with the largest nodule measuring 1.1 cm.  Consider non urgent thyroid ultrasound. Please follow up with PCP      --------------------------------------------------------------------------------------------------------------------------------------------------------------------------------------------------------------------------------------    FUNCTIONAL NEUROLOGICAL DISORDERS RESOURCES FOR PTS         Your Lantus was stopped due to low sugar levels. If your sugar levels go back up, please resume your Lantus at 20 units and then back to regular dose as your blood sugar allows.     [email or web address]    [email or web address]

## 2022-04-13 NOTE — CONSULTS
[facility]  Neurology  Consult Note    Patient Name: Bonnie Lino  MRN: [id number]  Admission Date: 4/12/2022  Hospital Length of Stay: 0 days  Code Status: Full Code   Attending Provider: Herb Hawthorne MD   Consulting Provider: Ivan Blanco MD  Primary Care Physician: Robson Culver DO  Principal Problem:Episode of transient neurologic symptoms    Inpatient consult to Neurology  Consult performed by: Ivan Blanco MD  Consult ordered by: Pat Cabrera PA-C         Subjective:     Chief Complaint:  Tremors and stuttering     HPI:   Patient is a 51 y/o F w PMHx T2DM, HLD, HTN and CKD-3 who presented to the ED with speech difficulties and tremors. One hour prior to arrival patient developed acute onset of stuttering, shacking and panic attack. Reported since episode resolved she is still having stuttering but improving. Denied any similar episode in the past. Denied any CP, cough, HA, fever, chills, edema, nausea, vomiting, urinary changes, diarrhea, constipation, appetite change, weight change, lightheadedness, dizziness, visual changes, sick contacts or recent travel.       Past Medical History:   Diagnosis Date    Aneurysm of cardiac wall, congenital     Bilateral lower extremity edema     2/2 calcium channel blocker    Diabetes type 2, uncontrolled     HLD (hyperlipidemia)     Hypertension     Vitamin D deficiency        Past Surgical History:   Procedure Laterality Date    BREAST CYST EXCISION  2003    BREAST SURGERY      cyst removal     CATARACT EXTRACTION W/  INTRAOCULAR LENS IMPLANT      COLONOSCOPY N/A 7/23/2021    Procedure: COLONOSCOPY;  Surgeon: Sapna Fitzgerald MD;  Location: [facility];  Service: Endoscopy;  Laterality: N/A;    HYSTERECTOMY  2002    PARTIAL HYSTERECTOMY  2002       Review of patient's allergies indicates:  No Known Allergies    Current Neurological Medications: Gabapentin    No current facility-administered medications on file prior to  "encounter.     Current Outpatient Medications on File Prior to Encounter   Medication Sig    acetaminophen (TYLENOL) 500 MG tablet Take 500 mg by mouth daily as needed for Pain.    aspirin (ECOTRIN) 81 MG EC tablet Take 81 mg by mouth once daily.    atorvastatin (LIPITOR) 20 MG tablet Take 20 mg by mouth once daily.    canagliflozin (INVOKANA) 100 mg Tab tablet Take 1 tablet (100 mg total) by mouth once daily. (Patient taking differently: Take 100 mg by mouth nightly.)    FLUoxetine 20 MG capsule Take 1 capsule (20 mg total) by mouth every evening.    gabapentin (NEURONTIN) 600 MG tablet Take 600 mg by mouth 3 (three) times daily.    insulin (LANTUS SOLOSTAR U-100 INSULIN) glargine 100 units/mL (3mL) SubQ pen Inject 46 Units into the skin every evening.    lisinopriL (PRINIVIL,ZESTRIL) 40 MG tablet Take 1 tablet (40 mg total) by mouth once daily. (Patient taking differently: Take 40 mg by mouth nightly.)    multivitamin (ONE DAILY MULTIVITAMIN) per tablet Take 1 tablet by mouth once daily.    NIFEdipine (PROCARDIA-XL) 90 MG (OSM) 24 hr tablet Take 1 tablet (90 mg total) by mouth once daily. (Patient taking differently: Take 90 mg by mouth nightly.)    blood glucose strip-disp meter Kit 1 application by Misc.(Non-Drug; Combo Route) route 3 (three) times daily.    blood-glucose meter (RELION ALL-IN-ONE METER) kit Use as instructed    insulin syringe-needle U-100 (BD INSULIN SYRINGE) 1 mL 25 gauge x 5/8" Syrg 1 Units by Misc.(Non-Drug; Combo Route) route every evening. (Patient not taking: Reported on 3/31/2022)    lancets Misc 1 application by Misc.(Non-Drug; Combo Route) route 3 (three) times daily. (Patient not taking: Reported on 3/31/2022)    [DISCONTINUED] DUREZOL 0.05 % Drop ophthalmic solution INSTILL 1 DROP TO SURGERY EYE 4 TIMES DAILY AFTER SURGERY FOR 30 DAYS    [DISCONTINUED] gabapentin (NEURONTIN) 300 MG capsule Take 2 capsules (600 mg total) by mouth 3 (three) times daily. Patient taking 1 " tablet in am, 1 tablet at lunch and 2 tablets at bedtime (Patient taking differently: Take 600 mg by mouth 3 (three) times daily.)    [DISCONTINUED] ketorolac 0.5% (ACULAR) 0.5 % Drop     [DISCONTINUED] rosuvastatin (CRESTOR) 5 MG tablet Take 1 tablet (5 mg total) by mouth once daily. (Patient taking differently: Take 5 mg by mouth every evening.)     Family History       Problem Relation (Age of Onset)    Breast cancer Mother, Maternal Grandmother    Cancer Mother (40), Maternal Grandmother (82)    Diabetes Mother, Father    Heart disease Mother          Tobacco Use    Smoking status: Never Smoker    Smokeless tobacco: Never Used   Substance and Sexual Activity    Alcohol use: Yes     Comment: seldom    Drug use: No    Sexual activity: Not on file     Review of Systems   Constitutional:  Negative for activity change, appetite change, chills, diaphoresis, fatigue, fever and unexpected weight change.   HENT:  Negative for congestion, sore throat, trouble swallowing and voice change.    Eyes:  Negative for pain and visual disturbance.   Respiratory:  Negative for apnea, cough, chest tightness and shortness of breath.    Cardiovascular:  Negative for chest pain, palpitations and leg swelling.   Gastrointestinal:  Negative for abdominal distention, abdominal pain, blood in stool, constipation, diarrhea, nausea and vomiting.   Endocrine: Negative.    Genitourinary:  Negative for difficulty urinating, hematuria, pelvic pain and vaginal pain.   Musculoskeletal:  Negative for arthralgias, back pain, joint swelling, myalgias, neck pain and neck stiffness.   Skin: Negative.    Allergic/Immunologic: Negative.    Neurological:  Positive for tremors and speech difficulty. Negative for dizziness, facial asymmetry, weakness, light-headedness, numbness and headaches.   Hematological:  Negative for adenopathy. Does not bruise/bleed easily.   Psychiatric/Behavioral:  Negative for agitation, confusion, decreased concentration,  self-injury and suicidal ideas. The patient is nervous/anxious.    Objective:     Vital Signs (Most Recent):  Temp: 98.5 °F (36.9 °C) (04/13/22 1130)  Pulse: 77 (04/13/22 1130)  Resp: 16 (04/13/22 1130)  BP: (!) 156/77 (04/13/22 1130)  SpO2: 97 % (04/13/22 1130)   Vital Signs (24h Range):  Temp:  [97.6 °F (36.4 °C)-98.5 °F (36.9 °C)] 98.5 °F (36.9 °C)  Pulse:  [75-95] 77  Resp:  [13-20] 16  SpO2:  [95 %-100 %] 97 %  BP: ()/() 156/77     Weight: 77.1 kg (170 lb)  Body mass index is 26.63 kg/m².    Physical Exam  Vitals reviewed.   Constitutional:       General: She is not in acute distress.     Appearance: She is not ill-appearing.   HENT:      Head: Normocephalic.   Eyes:      Extraocular Movements: EOM normal.      Pupils: Pupils are equal, round, and reactive to light.   Cardiovascular:      Rate and Rhythm: Normal rate and regular rhythm.   Pulmonary:      Effort: Pulmonary effort is normal. No respiratory distress.   Musculoskeletal:      Cervical back: No rigidity.   Skin:     General: Skin is warm.   Neurological:      General: No focal deficit present.      Mental Status: She is alert and oriented to person, place, and time.      Motor: No weakness.      Deep Tendon Reflexes: Strength normal.      Reflex Scores:       Tricep reflexes are 2+ on the right side and 2+ on the left side.       Bicep reflexes are 2+ on the right side and 2+ on the left side.       Brachioradialis reflexes are 2+ on the right side and 2+ on the left side.       Patellar reflexes are 2+ on the right side and 2+ on the left side.       Achilles reflexes are 2+ on the right side and 2+ on the left side.      NEUROLOGICAL EXAMINATION:     MENTAL STATUS   Oriented to person, place, and time.   Attention: normal. Concentration: normal.   Level of consciousness: alert    CRANIAL NERVES     CN II   Visual fields full to confrontation.     CN III, IV, VI   Pupils are equal, round, and reactive to light.  Extraocular motions are  normal.   Nystagmus: none     CN V   Facial sensation intact.     CN VII   Facial expression full, symmetric.     CN VIII   CN VIII normal.     CN IX, X   CN IX normal.   CN X normal.     CN XI   CN XI normal.     CN XII   CN XII normal.     MOTOR EXAM   Muscle bulk: normal  Overall muscle tone: normal    Strength   Strength 5/5 throughout.     REFLEXES     Reflexes   Right brachioradialis: 2+  Left brachioradialis: 2+  Right biceps: 2+  Left biceps: 2+  Right triceps: 2+  Left triceps: 2+  Right patellar: 2+  Left patellar: 2+  Right achilles: 2+  Left achilles: 2+  Right : 2+  Left : 2+    SENSORY EXAM   Light touch normal.   Vibration normal.   Proprioception normal.   Pinprick normal.     Significant Labs: All pertinent lab results from the past 24 hours have been reviewed.    Significant Imaging: I have reviewed all pertinent imaging results/findings within the past 24 hours.    Assessment and Plan:     * Episode of transient neurologic symptoms  On arrival stroke code activated and evaluated. Was not found to have FND. CTA h/n w no signs of acute ischemia or LVO. She had recent MRI last month as well for RLE weakness and normal. Low probability of stroke. Upon neurology assessment, patient's signs and symptoms not consistent with seizure. Suspecting symptoms likely due to panic disorder and possibly hypoglycemia since patient's BG 40s during my assessment. Upon re-evaluation patient feeling much better and tremors resolved. Would recommend UA to rule out UTI.    Recommendations  - UA  - Monitor BG, prevent hypoglycemia  - Consider psychiatric evaluation at ssome point      VTE Risk Mitigation (From admission, onward)         Ordered     IP VTE LOW RISK PATIENT  Once         04/13/22 0016     Place sequential compression device  Until discontinued         04/13/22 0016                Thank you for your consult. I will sign off. Please contact us if you have any additional questions.    Ivan Blanco,  MD, PGY-3  Neurology  [facility]

## 2022-04-13 NOTE — SUBJECTIVE & OBJECTIVE
Patient History               Medical as of 4/13/2022       Past Medical History       Diagnosis Date Comments Source    Aneurysm of cardiac wall, congenital -- -- Provider    Bilateral lower extremity edema -- 2/2 calcium channel blocker Provider    Diabetes type 2, uncontrolled -- -- Provider    HLD (hyperlipidemia) -- -- Provider    Hypertension -- -- Provider    Vitamin D deficiency -- -- Provider              Pertinent Negatives       Diagnosis Date Noted Comments Source    Atypical hyperplasia of breast 08/19/2021 -- Provider    BRCA1 negative 08/19/2021 -- Provider    BRCA1 positive 08/19/2021 -- Provider    BRCA2 negative 08/19/2021 -- Provider    BRCA2 positive 08/19/2021 -- Provider    Breast cancer 08/19/2021 -- Provider    Colon cancer 08/19/2021 -- Provider    Endometrial cancer 08/19/2021 -- Provider    Lobular carcinoma in situ 08/19/2021 -- Provider    Ovarian cancer 08/19/2021 -- Provider    Usual hyperplasia of lactiferous duct 08/19/2021 -- Provider                          Surgical as of 4/13/2022       Past Surgical History       Procedure Laterality Date Comments Source    PARTIAL HYSTERECTOMY -- 2002 -- Provider    BREAST SURGERY -- -- cyst removal  Provider    CATARACT EXTRACTION W/  INTRAOCULAR LENS IMPLANT -- -- -- Provider    COLONOSCOPY N/A 7/23/2021 Procedure: COLONOSCOPY;  Surgeon: Sapna Fitzgerald MD;  Location: [facility];  Service: Endoscopy;  Laterality: N/A; Provider    BREAST CYST EXCISION -- 2003 -- Provider    HYSTERECTOMY -- 2002 -- Provider                          Family as of 4/13/2022       Problem Relation Name Age of Onset Comments Source    Diabetes Mother -- -- -- Provider    Heart disease Mother -- -- -- Provider    Cancer Mother -- 40 breast Provider    Breast cancer Mother -- -- -- Provider    Diabetes Father -- -- -- Provider    Cancer Maternal Grandmother -- 82 breast Provider    Breast cancer Maternal Grandmother -- -- -- Provider                  Tobacco  Use as of 4/13/2022       Smoking Status Smoking Start Date Smoking Quit Date Packs/Day Years Used    Never Smoker -- -- -- --      Types Comments Smokeless Tobacco Status Smokeless Tobacco Quit Date Source    -- -- Never Used -- Provider                  Alcohol Use as of 4/13/2022       Alcohol Use Drinks/Week Alcohol/Week Comments Source    Yes   -- seldom Provider                  Drug Use as of 4/13/2022       Drug Use Types Frequency Comments Source    No -- -- -- Provider                  Sexual Activity as of 4/13/2022       Sexually Active Birth Control Partners Comments Source    -- -- -- -- Provider                  Activities of Daily Living as of 4/13/2022    None               Social Documentation as of 4/13/2022    None               Occupational as of 4/13/2022       Occupation Employer Comments Source    -- [employer] -- Provider                  Socioeconomic as of 4/13/2022       Marital Status Spouse Name Number of Children Years Education Education Level Preferred Language Ethnicity Race Source    Single -- -- -- -- English Not  or /a Black or  Provider                  Pertinent History       Question Response Comments    Lives with -- --    Place in Birth Order -- --    Lives in -- --    Number of Siblings -- --    Raised by -- --    Legal Involvement -- --    Childhood Trauma -- --    Criminal History of -- --    Financial Status -- --    Highest Level of Education -- --    Does patient have access to a firearm? -- --     Service -- --    Primary Leisure Activity -- --    Spirituality -- --          Past Medical History:   Diagnosis Date    Aneurysm of cardiac wall, congenital     Bilateral lower extremity edema     2/2 calcium channel blocker    Diabetes type 2, uncontrolled     HLD (hyperlipidemia)     Hypertension     Vitamin D deficiency      Past Surgical History:   Procedure Laterality Date    BREAST CYST EXCISION  2003    BREAST SURGERY       cyst removal     CATARACT EXTRACTION W/  INTRAOCULAR LENS IMPLANT      COLONOSCOPY N/A 7/23/2021    Procedure: COLONOSCOPY;  Surgeon: Sapna Fitzgerald MD;  Location: [facility];  Service: Endoscopy;  Laterality: N/A;    HYSTERECTOMY  2002    PARTIAL HYSTERECTOMY  2002     Family History       Problem Relation (Age of Onset)    Breast cancer Mother, Maternal Grandmother    Cancer Mother (40), Maternal Grandmother (82)    Diabetes Mother, Father    Heart disease Mother          Tobacco Use    Smoking status: Never Smoker    Smokeless tobacco: Never Used   Substance and Sexual Activity    Alcohol use: Yes     Comment: seldom    Drug use: No    Sexual activity: Not on file     Review of patient's allergies indicates:  No Known Allergies    No current facility-administered medications on file prior to encounter.     Current Outpatient Medications on File Prior to Encounter   Medication Sig    acetaminophen (TYLENOL) 500 MG tablet Take 500 mg by mouth daily as needed for Pain.    aspirin (ECOTRIN) 81 MG EC tablet Take 81 mg by mouth once daily.    atorvastatin (LIPITOR) 20 MG tablet Take 20 mg by mouth once daily.    canagliflozin (INVOKANA) 100 mg Tab tablet Take 1 tablet (100 mg total) by mouth once daily. (Patient taking differently: Take 100 mg by mouth nightly.)    FLUoxetine 20 MG capsule Take 1 capsule (20 mg total) by mouth every evening.    gabapentin (NEURONTIN) 600 MG tablet Take 600 mg by mouth 3 (three) times daily.    insulin (LANTUS SOLOSTAR U-100 INSULIN) glargine 100 units/mL (3mL) SubQ pen Inject 46 Units into the skin every evening.    lisinopriL (PRINIVIL,ZESTRIL) 40 MG tablet Take 1 tablet (40 mg total) by mouth once daily. (Patient taking differently: Take 40 mg by mouth nightly.)    multivitamin (ONE DAILY MULTIVITAMIN) per tablet Take 1 tablet by mouth once daily.    NIFEdipine (PROCARDIA-XL) 90 MG (OSM) 24 hr tablet Take 1 tablet (90 mg total) by mouth once daily. (Patient taking  "differently: Take 90 mg by mouth nightly.)    blood glucose strip-disp meter Kit 1 application by Misc.(Non-Drug; Combo Route) route 3 (three) times daily.    blood-glucose meter (RELION ALL-IN-ONE METER) kit Use as instructed    insulin syringe-needle U-100 (BD INSULIN SYRINGE) 1 mL 25 gauge x 5/8" Syrg 1 Units by Misc.(Non-Drug; Combo Route) route every evening. (Patient not taking: Reported on 3/31/2022)    lancets Misc 1 application by Misc.(Non-Drug; Combo Route) route 3 (three) times daily. (Patient not taking: Reported on 3/31/2022)    [DISCONTINUED] DUREZOL 0.05 % Drop ophthalmic solution INSTILL 1 DROP TO SURGERY EYE 4 TIMES DAILY AFTER SURGERY FOR 30 DAYS    [DISCONTINUED] gabapentin (NEURONTIN) 300 MG capsule Take 2 capsules (600 mg total) by mouth 3 (three) times daily. Patient taking 1 tablet in am, 1 tablet at lunch and 2 tablets at bedtime (Patient taking differently: Take 600 mg by mouth 3 (three) times daily.)    [DISCONTINUED] ketorolac 0.5% (ACULAR) 0.5 % Drop     [DISCONTINUED] rosuvastatin (CRESTOR) 5 MG tablet Take 1 tablet (5 mg total) by mouth once daily. (Patient taking differently: Take 5 mg by mouth every evening.)     Psychotherapeutics (From admission, onward)                Start     Stop Route Frequency Ordered    04/13/22 1315  lorazepam injection 0.5 mg         -- IV Once 04/13/22 1206    04/13/22 0300  FLUoxetine capsule 20 mg         -- Oral Nightly 04/13/22 0150          Review of Systems  Strengths and Liabilities: Strength: Patient accepts guidance/feedback, Strength: Patient is expressive/articulate., Strength: Patient is intelligent., Strength: Patient is motivated for change., Strength: Patient has positive support network., Strength: Patient has reasonable judgment., Strength: Patient is stable.    Objective:     Vital Signs (Most Recent):  Temp: 98.5 °F (36.9 °C) (04/13/22 1130)  Pulse: 77 (04/13/22 1130)  Resp: 16 (04/13/22 1130)  BP: (!) 156/77 (04/13/22 1130)  SpO2: 97 % " (04/13/22 1130)   Vital Signs (24h Range):  Temp:  [97.6 °F (36.4 °C)-98.5 °F (36.9 °C)] 98.5 °F (36.9 °C)  Pulse:  [75-95] 77  Resp:  [13-20] 16  SpO2:  [95 %-100 %] 97 %  BP: ()/() 156/77        Weight: 77.1 kg (170 lb)  Body mass index is 26.63 kg/m².    No intake or output data in the 24 hours ending 04/13/22 1616    Physical Exam     Significant Labs: Last 24 Hours:   Recent Lab Results  (Last 5 results in the past 24 hours)        04/13/22  1416   04/13/22  0415   04/13/22  0019   04/12/22  2227   04/12/22  2224        Allens Test       N/A         Amorphous, UA Few               Anion Gap   10             Appearance, UA Hazy               Bacteria, UA None               Baso #   0.03             Basophil %   0.5             Bilirubin (UA) Negative               Site       Other         BUN   29             Calcium   9.4             Chloride   106             CO2   23             Color, UA Yellow               Creatinine   1.7             DelSys       Room Air         Differential Method   Automated             eGFR if    38.9             eGFR if non    33.7  Comment: Calculation used to obtain the estimated glomerular filtration  rate (eGFR) is the CKD-EPI equation.                Eos #   0.2             Eosinophil %   3.5             Glucose   134             Glucose, UA 3+               Gran # (ANC)   3.3             Gran %   57.7             Hematocrit   39.0             Hemoglobin   11.5             Immature Grans (Abs)   0.03  Comment: Mild elevation in immature granulocytes is non specific and   can be seen in a variety of conditions including stress response,   acute inflammation, trauma and pregnancy. Correlation with other   laboratory and clinical findings is essential.               Immature Granulocytes   0.5             Ketones, UA Negative               Leukocytes, UA Negative               Lymph #   1.7             Lymph %   30.1             Magnesium    2.6             MCH   24.9             MCHC   29.5             MCV   85             Microscopic Comment SEE COMMENT  Comment: Other formed elements not mentioned in the report are not   present in the microscopic examination.                  Mode       SPONT         Mono #   0.4             Mono %   7.7             MPV   11.1             NITRITE UA Negative               nRBC   0             Occult Blood UA Negative               pH, UA 8.0               Phosphorus   4.6             Platelets   220             POC BE       3         POC Creatinine         1.8       POC HCO3       28.6         POC PCO2       54.3         POC PH       7.329         POC PO2       24         POC SATURATED O2       38         POC TCO2       30         Potassium   4.7             Protein, UA Negative  Comment: Recommend a 24 hour urine protein or a urine   protein/creatinine ratio if globulin induced proteinuria is  clinically suspected.                  Acceptable     Yes           RBC   4.61             RBC, UA 2               RDW   13.6             Sample       VENOUS   unknown       SARS-CoV-2 RNA, Amplification, Qual     Negative           Sodium   139             Specific [place], UA 1.025               Specimen UA Urine, Clean Catch               Squam Epithel, UA 1               WBC, UA 3               WBC   5.68             Yeast, UA None                                    Blood Alcohol Level: No results for input(s): ALCOHOLMEDIC in the last 48 hours.  CBC:   Recent Labs   Lab 04/12/22 2222 04/13/22  0415   WBC 5.69 5.68   HGB 12.0 11.5*   HCT 39.6 39.0    220     CMP:   Recent Labs   Lab 04/12/22 2222 04/13/22  0415    139   K 4.5 4.7    106   CO2 26 23   * 134*   BUN 28* 29*   CREATININE 1.6* 1.7*   CALCIUM 9.3 9.4   PROT 7.6  --    ALBUMIN 3.9  --    BILITOT 0.4  --    ALKPHOS 75  --    AST 15  --    ALT 14  --    ANIONGAP 8 10   EGFRNONAA 36.3* 33.7*     Thyroid:   Recent Labs    Lab 04/12/22  2222   TSH 2.352     Urine Toxicology: No results for input(s): ETHANOLUR, LABBENZ, LABMETH, COCAINEURINE, OPIATESCREEN, BARBITURATES, AMPHETAMINES, MARIJUANATHC, PHENCYCLIDIN, POCCREAT, TOXINFO in the last 48 hours.    Significant Imaging: CT: I have reviewed all pertinent results/findings within the past 24 hours.  No abnormal findings that would explain patient's current symptomatology  EKG: I have reviewed all pertinent results/findings within the past 24 hours.  QTC within normal limits    MENTAL STATUS EXAM  Appearance: unremarkable, age appropriate, casually dressed  Behavior/Cooperation:  Patient is cooperative but a bit guarded initially  Speech:  Abnormal initially patient's speech was normal volume rate and flow.  However when discussing about anxiety and situations on that she describes as seizure-like activity patient's speech profoundly changes where she has increased paucity and bizarrely talking very slowly with long stretched pauses patient also noted to have some increased hesitation and stuttering.  Language:   abnormal  Mood:  scared  Affect:  Patient seems anxious however patient has flat blunted affect  Thought Process: normal and logical  Thought Content: normal, no suicidality, no homicidality, delusions, or paranoia  Sensorium:  Awake  Alert and Oriented: x3 grossly intact  Memory: Intact to conversation both recent and remote  Attention/concentration: appropriate for age/education. w-[id number]-r-l-[id number]; d-l-r-o-w   Insight:  Limited  Judgment:Intact

## 2022-04-13 NOTE — SUBJECTIVE & OBJECTIVE
Past Medical History:   Diagnosis Date    Aneurysm of cardiac wall, congenital     Bilateral lower extremity edema     2/2 calcium channel blocker    Diabetes type 2, uncontrolled     HLD (hyperlipidemia)     Hypertension     Vitamin D deficiency      Past Surgical History:   Procedure Laterality Date    BREAST CYST EXCISION  2003    BREAST SURGERY      cyst removal     CATARACT EXTRACTION W/  INTRAOCULAR LENS IMPLANT      COLONOSCOPY N/A 7/23/2021    Procedure: COLONOSCOPY;  Surgeon: Sapna Fitzgerald MD;  Location: [facility];  Service: Endoscopy;  Laterality: N/A;    HYSTERECTOMY  2002    PARTIAL HYSTERECTOMY  2002     Family History   Problem Relation Age of Onset    Diabetes Mother     Heart disease Mother     Cancer Mother 40        breast    Breast cancer Mother     Diabetes Father     Cancer Maternal Grandmother 82        breast    Breast cancer Maternal Grandmother      Social History     Tobacco Use    Smoking status: Never Smoker    Smokeless tobacco: Never Used   Substance Use Topics    Alcohol use: Yes     Comment: seldom    Drug use: No     Review of patient's allergies indicates:  No Known Allergies    Medications: I have reviewed the current medication administration record.    (Not in a hospital admission)      Review of Systems   Constitutional:  Negative for appetite change, chills and fever.   HENT:  Negative for congestion, sore throat, trouble swallowing and voice change.    Eyes: Negative.    Respiratory:  Negative for cough and shortness of breath.    Cardiovascular:  Negative for chest pain and leg swelling.   Gastrointestinal:  Negative for abdominal distention, abdominal pain, constipation, nausea and vomiting.   Endocrine: Negative.    Genitourinary: Negative.    Musculoskeletal:  Negative for back pain and gait problem.   Skin: Negative.    Neurological:  Positive for speech difficulty. Negative for weakness and headaches.   Psychiatric/Behavioral:  The patient is nervous/anxious.     Objective:     Vital Signs (Most Recent):  Temp: 97.6 °F (36.4 °C) (04/12/22 2211)  Pulse: 75 (04/13/22 0202)  Resp: 14 (04/13/22 0202)  BP: (!) 98/57 (04/13/22 0202)  SpO2: 96 % (04/13/22 0202)    Vital Signs Range (Last 24H):  Temp:  [97.6 °F (36.4 °C)]   Pulse:  [75-95]   Resp:  [13-20]   BP: ()/()   SpO2:  [96 %-100 %]     Physical Exam  Constitutional:       Appearance: Normal appearance.   HENT:      Head: Normocephalic and atraumatic.      Nose: Nose normal.      Mouth/Throat:      Mouth: Mucous membranes are moist.   Eyes:      Extraocular Movements: Extraocular movements intact.      Pupils: Pupils are equal, round, and reactive to light.   Cardiovascular:      Rate and Rhythm: Normal rate and regular rhythm.   Pulmonary:      Breath sounds: Normal breath sounds.   Abdominal:      General: Bowel sounds are normal. There is no distension.      Palpations: Abdomen is soft.      Tenderness: There is no abdominal tenderness.   Musculoskeletal:         General: No swelling. Normal range of motion.      Cervical back: Normal range of motion.   Skin:     General: Skin is warm.      Capillary Refill: Capillary refill takes less than 2 seconds.   Neurological:      General: No focal deficit present.      Mental Status: She is alert and oriented to person, place, and time.       Neurological Exam:   LOC: alert  Attention Span: Good   Language: No aphasia, Stuttering   Articulation: No dysarthria  Orientation: Person, Place, Time   Visual Fields: Full  EOM (CN III, IV, VI): Full/intact  Facial Sensation (CN V): Normal  Facial Movement (CN VII): Symmetric facial expression    Motor: Arm left  Normal 5/5  Leg left  Normal 5/5  Arm right  Normal 5/5  Leg right Normal 5/5  Cerebellum: No evidence of appendicular or axial ataxia  Sensation: Intact to light touch, temperature and vibration      Laboratory:  CMP:   Recent Labs   Lab 04/12/22 2222   CALCIUM 9.3   ALBUMIN 3.9   PROT 7.6      K 4.5   CO2 26       BUN 28*   CREATININE 1.6*   ALKPHOS 75   ALT 14   AST 15   BILITOT 0.4     CBC:   Recent Labs   Lab 04/12/22 2222   WBC 5.69   RBC 4.68   HGB 12.0   HCT 39.6      MCV 85   MCH 25.6*   MCHC 30.3*     Lipid Panel: No results for input(s): CHOL, LDLCALC, HDL, TRIG in the last 168 hours.  Coagulation:   Recent Labs   Lab 04/12/22 2222   INR 1.0     Hgb A1C: No results for input(s): HGBA1C in the last 168 hours.  TSH:   Recent Labs   Lab 04/12/22  2222   TSH 2.352       Diagnostic Results:      Brain imaging:      Vessel Imaging:  CTA h/n   Impression:    1. No acute intracranial finding.  Additional evaluation, as clinically warranted.  2. CTA head and neck without large vessel occlusion or high-grade stenosis.  3. Paranasal sinus disease.    Cardiac Evaluation:   ECHO 3/18/22  There is no evidence of intracardiac shunting.  Concentric remodeling and normal systolic function.  The estimated ejection fraction is 70%.  Normal left ventricular diastolic function.  Normal right ventricular size with normal right ventricular systolic function.  Mild pulmonic regurgitation.  Normal central venous pressure (3 mmHg).  The estimated PA systolic pressure is 26 mmHg.

## 2022-04-13 NOTE — ED NOTES
MILE Chambers at bedside. States to give Pt 1 mg ativan per IV push. Pt.'s VS stable at this time. No changes to Pt.'s mentation at this time.

## 2022-04-13 NOTE — HPI
Patient is a 51 y/o F w PMHx T2DM, HLD, HTN and CKD-3 who presented to the ED with speech difficulties and tremors. One hour prior to arrival patient developed acute onset of stuttering, shacking and panic attack. Reported since episode resolved she is still having stuttering but improving. Denied any similar episode in the past. Denied any CP, cough, HA, fever, chills, edema, nausea, vomiting, urinary changes, diarrhea, constipation, appetite change, weight change, lightheadedness, dizziness, visual changes, sick contacts or recent travel.

## 2022-04-13 NOTE — ASSESSMENT & PLAN NOTE
Bonnie Lino is a 52-year-old female with past psychiatric history of depression presents to the emergency room for changes in her speech and tremulousness.    Impression  MDD recurrent mild episode  Rule out functional neurological disorder  Rule out anxiety disorder    Recommendations  Psych meds  · Increase Prozac to 40 mg q.day unsure if 20 mg has been adequately helpful  · May use p.r.n. Vistaril 25 mg q.6 for any beds said episodes in anxiety and panic symptoms.    Other  · Discussed with patient that it is imperative she follow-up with outpatient psychiatry, please consult case management to assist with outpatient mental health clinics in the area that take her insurance  · Also advised patient to follow up for outpatient talk therapy  · Discussed with the patient regarding possibility of functional neurological disorder patient accepting of this however did report that she still things they are seizures discussed with patient typical clinical situations were seizures would occur and patient's current symptomatology and clinical picture lab work examination do not reflect a seizure disorder patient also accepting of this and is slightly relieved has not had any serious medical issues.  · Please consider speech therapy moving forward and PT OT as needed for her FMD.  Please print out resources for patient that are in thedischarge instructions before patient is discharged.    Patient stable from acute psych standpoint but will need outpatient psych follow-up  -Please contact ON CALL psychiatry service for any acute issues that may arise.    RACHEL JEWELL MD   Department of Psychiatry   [facility]  4/13/2022 4:27 PM      DISCLAIMER: This note was prepared with AVIS voice recognition transcription software. Garbled syntax, mangled pronouns, and other bizarre constructions may be attributed to that software system

## 2022-04-13 NOTE — PHARMACY MED REC
"Admission Medication History     The home medication history was taken by Anny Antonio.    You may go to "Admission" then "Reconcile Home Medications" tabs to review and/or act upon these items.      The home medication list has been updated by the Pharmacy department.    Please read ALL comments highlighted in yellow.    Please address this information as you see fit.     Feel free to contact us if you have any questions or require assistance.    The medications listed below were removed from the home medication list. Please reorder if appropriate:  Patient reports no longer taking the following medication(s):   DUREZOL 0.05 % OPHT SOL   KETOROLAC 0.5 % DROP    Medications listed below were obtained from: Patient and Medications brought from home     Current Outpatient Medications on File Prior to Encounter   Medication Sig    acetaminophen (TYLENOL) 500 MG tablet   Take 500 mg by mouth daily as needed for Pain.    aspirin (ECOTRIN) 81 MG EC tablet   Take 81 mg by mouth once daily.    atorvastatin (LIPITOR) 20 MG tablet   Take 20 mg by mouth once daily.    canagliflozin (INVOKANA) 100 mg Tab tablet   Take 100 mg by mouth nightly.    FLUoxetine 20 MG capsule   Take 1 capsule (20 mg total) by mouth every evening.    gabapentin (NEURONTIN) 600 MG tablet   Take 600 mg by mouth 3 (three) times daily.    insulin (LANTUS SOLOSTAR U-100 INSULIN) glargine 100 units/mL (3mL) SubQ pen   Inject 46 Units into the skin every evening.    lisinopriL (PRINIVIL,ZESTRIL) 40 MG tablet   Take 40 mg by mouth nightly.    multivitamin (ONE DAILY MULTIVITAMIN) per tablet   Take 1 tablet by mouth once daily.    NIFEdipine (PROCARDIA-XL) 90 MG (OSM) 24 hr tablet   Take 90 mg by mouth nightly.    blood glucose strip-disp meter Kit   1 application by Misc.(Non-Drug; Combo Route) route 3 (three) times daily.    blood-glucose meter (RELION ALL-IN-ONE METER) kit   Use as instructed    insulin syringe-needle U-100 (BD INSULIN " "SYRINGE) 1 mL 25 gauge x 5/8" Syrg   1 Units by Misc.(Non-Drug; Combo Route) route every evening. (Patient not taking: Reported on 3/31/2022)    lancets Misc 1 application by Misc.(Non-Drug; Combo Route) route 3 (three) times daily. (Patient not taking: Reported on 3/31/2022)       Anny Antonio CPhT  [id number]                  .          "

## 2022-04-13 NOTE — ED NOTES
RN witnessed Pt have aphasic episode and facial tremors. Pt is alert and able to nod/shake head to questions. RN notified MILE Chambers.

## 2022-04-13 NOTE — ASSESSMENT & PLAN NOTE
On arrival stroke code activated and evaluated. Was not found to have FND. CTA h/n w no signs of acute ischemia or LVO. She had recent MRI last month as well for RLE weakness and normal. Low probability of stroke. Upon neurology assessment, patient's signs and symptoms not consistent with seizure. Suspecting symptoms likely due to panic disorder and possibly hypoglycemia since patient's BG 40s during my assessment. Upon re-evaluation patient feeling much better and tremors resolved. Would recommend UA to rule out UTI.    Recommendations  - UA  - Monitor BG  - Consider psychiatric evaluation at ssome point

## 2022-04-13 NOTE — SUBJECTIVE & OBJECTIVE
Past Medical History:   Diagnosis Date    Aneurysm of cardiac wall, congenital     Bilateral lower extremity edema     2/2 calcium channel blocker    Diabetes type 2, uncontrolled     HLD (hyperlipidemia)     Hypertension     Vitamin D deficiency        Past Surgical History:   Procedure Laterality Date    BREAST CYST EXCISION  2003    BREAST SURGERY      cyst removal     CATARACT EXTRACTION W/  INTRAOCULAR LENS IMPLANT      COLONOSCOPY N/A 7/23/2021    Procedure: COLONOSCOPY;  Surgeon: Sapna Fitzgerald MD;  Location: [facility];  Service: Endoscopy;  Laterality: N/A;    HYSTERECTOMY  2002    PARTIAL HYSTERECTOMY  2002       Review of patient's allergies indicates:  No Known Allergies    No current facility-administered medications on file prior to encounter.     Current Outpatient Medications on File Prior to Encounter   Medication Sig    aspirin (ECOTRIN) 81 MG EC tablet Take 81 mg by mouth once daily.    atorvastatin (LIPITOR) 20 MG tablet     blood glucose strip-disp meter Kit 1 application by Misc.(Non-Drug; Combo Route) route 3 (three) times daily.    blood-glucose meter (RELION ALL-IN-ONE METER) kit Use as instructed    canagliflozin (INVOKANA) 100 mg Tab tablet Take 1 tablet (100 mg total) by mouth once daily. (Patient taking differently: Take 100 mg by mouth nightly.)    DUREZOL 0.05 % Drop ophthalmic solution INSTILL 1 DROP TO SURGERY EYE 4 TIMES DAILY AFTER SURGERY FOR 30 DAYS    FLUoxetine 20 MG capsule Take 1 capsule (20 mg total) by mouth every evening.    gabapentin (NEURONTIN) 300 MG capsule Take 2 capsules (600 mg total) by mouth 3 (three) times daily. Patient taking 1 tablet in am, 1 tablet at lunch and 2 tablets at bedtime (Patient taking differently: Take 600 mg by mouth 2 (two) times daily. Patient taking 1 tablet in am and 2 tablets at bedtime)    insulin (LANTUS SOLOSTAR U-100 INSULIN) glargine 100 units/mL (3mL) SubQ pen Inject 46 Units into the skin every evening.    insulin syringe-needle  "U-100 (BD INSULIN SYRINGE) 1 mL 25 gauge x 5/8" Syrg 1 Units by Misc.(Non-Drug; Combo Route) route every evening. (Patient not taking: Reported on 3/31/2022)    ketorolac 0.5% (ACULAR) 0.5 % Drop     lancets Misc 1 application by Misc.(Non-Drug; Combo Route) route 3 (three) times daily. (Patient not taking: Reported on 3/31/2022)    lisinopriL (PRINIVIL,ZESTRIL) 40 MG tablet Take 1 tablet (40 mg total) by mouth once daily. (Patient taking differently: Take 40 mg by mouth nightly.)    NIFEdipine (PROCARDIA-XL) 90 MG (OSM) 24 hr tablet Take 1 tablet (90 mg total) by mouth once daily. (Patient taking differently: Take 90 mg by mouth nightly.)    rosuvastatin (CRESTOR) 5 MG tablet Take 1 tablet (5 mg total) by mouth once daily. (Patient taking differently: Take 5 mg by mouth every evening.)     Family History       Problem Relation (Age of Onset)    Breast cancer Mother, Maternal Grandmother    Cancer Mother (40), Maternal Grandmother (82)    Diabetes Mother, Father    Heart disease Mother          Tobacco Use    Smoking status: Never Smoker    Smokeless tobacco: Never Used   Substance and Sexual Activity    Alcohol use: Yes     Comment: seldom    Drug use: No    Sexual activity: Not on file     Review of Systems   Constitutional:  Negative for chills and fever.   HENT:  Negative for congestion and facial swelling.    Eyes:  Negative for photophobia and visual disturbance.   Respiratory:  Negative for chest tightness and shortness of breath.    Cardiovascular:  Negative for chest pain and leg swelling.   Gastrointestinal:  Negative for abdominal pain, nausea and vomiting.   Genitourinary:  Negative for dysuria, frequency and urgency.   Musculoskeletal:  Negative for gait problem.   Skin:  Negative for rash and wound.   Neurological:  Positive for tremors and speech difficulty. Negative for dizziness, facial asymmetry and weakness.   Psychiatric/Behavioral:  Negative for agitation and confusion.    Objective:     Vital " Signs (Most Recent):  Temp: 97.6 °F (36.4 °C) (04/12/22 2211)  Pulse: 88 (04/12/22 2301)  Resp: 20 (04/12/22 2301)  BP: 134/75 (04/12/22 2301)  SpO2: 99 % (04/12/22 2301) Vital Signs (24h Range):  Temp:  [97.6 °F (36.4 °C)] 97.6 °F (36.4 °C)  Pulse:  [88-95] 88  Resp:  [16-20] 20  SpO2:  [98 %-100 %] 99 %  BP: (134-171)/() 134/75     Weight: 77.1 kg (170 lb)  Body mass index is 26.63 kg/m².    Physical Exam  Vitals and nursing note reviewed.   Constitutional:       General: She is not in acute distress.     Appearance: She is well-developed. She is obese.   HENT:      Head: Normocephalic and atraumatic.      Mouth/Throat:      Mouth: Mucous membranes are moist.   Eyes:      Conjunctiva/sclera: Conjunctivae normal.      Pupils: Pupils are equal, round, and reactive to light.   Cardiovascular:      Rate and Rhythm: Normal rate and regular rhythm.      Heart sounds: Normal heart sounds.   Pulmonary:      Effort: Pulmonary effort is normal. No respiratory distress.      Breath sounds: Normal breath sounds. No wheezing.   Abdominal:      General: Bowel sounds are normal. There is no distension.      Palpations: Abdomen is soft.      Tenderness: There is no abdominal tenderness.   Musculoskeletal:         General: No tenderness. Normal range of motion.      Cervical back: Normal range of motion and neck supple.      Comments: Full ROM of bilateral upper and lower extremities. Strength equal bilaterally.    Lymphadenopathy:      Cervical: No cervical adenopathy.   Skin:     General: Skin is warm and dry.      Capillary Refill: Capillary refill takes less than 2 seconds.      Findings: No rash.   Neurological:      Mental Status: She is alert and oriented to person, place, and time.      Sensory: No sensory deficit.      Comments: Awake, alert, oriented x 3. Following commands. Answering questions appropriately, however with delayed speech with stuttering while finding words. No facial droop. Sensation intact. Able to  "name object "pen" when shown.    Psychiatric:         Behavior: Behavior normal.         Thought Content: Thought content normal.         Judgment: Judgment normal.         CRANIAL NERVES     CN III, IV, VI   Pupils are equal, round, and reactive to light.     Significant Labs: All pertinent labs within the past 24 hours have been reviewed.  CBC:   Recent Labs   Lab 04/12/22 2222   WBC 5.69   HGB 12.0   HCT 39.6        CMP:   Recent Labs   Lab 04/12/22 2222      K 4.5      CO2 26   *   BUN 28*   CREATININE 1.6*   CALCIUM 9.3   PROT 7.6   ALBUMIN 3.9   BILITOT 0.4   ALKPHOS 75   AST 15   ALT 14   ANIONGAP 8   EGFRNONAA 36.3*     Troponin:   Recent Labs   Lab 04/12/22 2222   TROPONINI <0.006     TSH:   Recent Labs   Lab 04/12/22 2222   TSH 2.352       Significant Imaging: I have reviewed all pertinent imaging results/findings within the past 24 hours.  X-Ray Chest AP Portable  Narrative: EXAMINATION:  XR CHEST AP PORTABLE    CLINICAL HISTORY:  Stroke;    TECHNIQUE:  Single frontal view of the chest was performed.    COMPARISON:  Chest radiograph 03/17/2022    FINDINGS:  Multiple surgical clips overlie the chest.  Cardiac silhouette stable.    Lungs clear without consolidation.  No pleural fluid or pneumothorax.    No significant osseous abnormality.  Impression: No acute findings in the chest.    Electronically signed by resident: Navneet Wheeler  Date:    04/12/2022  Time:    23:23    Electronically signed by: Dale Liu  Date:    04/12/2022  Time:    23:34  CTA Head and Neck (xpd)  Narrative: EXAMINATION:  CTA HEAD AND NECK (XPD)    CLINICAL HISTORY:  Stroke/TIA, determine embolic source;    TECHNIQUE:  Axial CT images obtained throughout the region of the head before and after the administration of intravenous contrast.  CT angiogram was performed from through the cervical and intracranial vasculature during the IV bolus administration of 100mL of Omnipaque 350.  Multiplanar MPR and " MIP reformats were performed.    CT source data was analyzed using artificial intelligence software for detection of a large vessel occlusion (LVO) or hemorrhage in order to enable computer assisted triage notification and aid clinical stroke decision making.    COMPARISON:  MRI brain 03/17/2022    MRA brain/neck 03/17/2022    FINDINGS:  Ventricles stable in size without evidence of hydrocephalus.    The brain parenchyma appears unchanged.  Mild cerebellar ectopia, similar to prior.  No new parenchymal mass, hemorrhage, edema or major vascular distribution infarct.    No extra-axial blood or fluid collections.    No acute displaced calvarial fracture.  Near complete opacification of the right sphenoid sinus.  Mucous retention cyst in the bilateral maxillary antra.  Mastoid air cells clear.    CTA:    The aortic arch maintains a 2 vessel branching pattern with common origin of the brachiocephalic and left common carotid arteries.    The common and internal carotid arteries are normal in course and caliber. No calcific atherosclerosis or significant stenosis in either carotid bifurcation.    The vertebral origins are patent. The cervical vertebral arteries are normal in course and caliber. Vertebrobasilar system is within normal limits without focal abnormality.    The anterior, middle, and posterior cerebral arteries are within normal limits, without evidence of significant stenosis, focal occlusion, or intracranial aneurysm formation.    Additional findings:    0.5 cm solid nodule in the right upper lobe (axial series 5, image 38).  Multinodular thyroid gland with the largest nodule measuring 1.1 cm.  Parotid and submandibular glands unremarkable.  Degenerative change of the cervical spine without acute fracture.  Impression: 1. No acute intracranial finding.  Additional evaluation, as clinically warranted.  2. CTA head and neck without large vessel occlusion or high-grade stenosis.  3. Paranasal sinus disease.  4.  0.5 cm solid nodule in the right upper lobe.  For a solid nodule <6 mm, Fleischner Society 2017 guidelines recommend no routine follow up for a low risk patient, or follow-up with non-contrast chest CT at 12 months in a high risk patient.  5. Multinodular thyroid gland with the largest nodule measuring 1.1 cm.  Consider non urgent thyroid ultrasound.    Electronically signed by resident: Navneet Wheeler  Date:    04/12/2022  Time:    22:03    Electronically signed by: Sal Wilson MD  Date:    04/12/2022  Time:    22:38

## 2022-04-13 NOTE — CODE/ RAPID DOCUMENTATION
"RAPID RESPONSE NURSE NOTE        Admit Date: 2022  LOS: 0  Code Status: Full Code   Date of Consult: 2022  : 1970  Age: 52 y.o.  Weight:   Wt Readings from Last 1 Encounters:   22 77.1 kg (170 lb)     Sex: female  Race: Black or    [facility]   MRN: [id number]  Time Rapid Response Team page Received: 12:00  Time Rapid Response Team at Bedside: 12:01  Time Rapid Response Team left Bedside: 12:15  Was the patient discharged from an ICU this admission? No   Was the patient discharged from a PACU within last 24 hours? No   Did the patient receive conscious sedation/general anesthesia in last 24 hours? No  Was the patient in the ED within the past 24 hours? Yes  Was the patient on NIPPV within the past 24 hours? No   Did this progress into an ARC or CPA: no  Attending Physician: Herb Hawthorne MD  Primary Service: [facility]       SITUATION    Notified by bedside RN via phone call.  Reason for alert: "seizure-like activity"  Called to evaluate the patient for Neuro    BACKGROUND     Why is the patient in the hospital?: Episode of transient neurologic symptoms    Patient has a past medical history of Aneurysm of cardiac wall, congenital, Bilateral lower extremity edema, Diabetes type 2, uncontrolled, HLD (hyperlipidemia), Hypertension, and Vitamin D deficiency.    Last Vitals:  Temp: 98.5 °F (36.9 °C) ( 1130)  Pulse: 77 ( 1130)  Resp: 16 ( 1130)  BP: 156/77 ( 1130)  SpO2: 97 % ( 1130)    24 Hours Vitals Range:  Temp:  [97.6 °F (36.4 °C)-98.5 °F (36.9 °C)]   Pulse:  [75-95]   Resp:  [13-20]   BP: ()/()   SpO2:  [95 %-100 %]     Labs:  Recent Labs     22  0415   WBC 5.69 5.68   HGB 12.0 11.5*   HCT 39.6 39.0    220       Recent Labs     22  0415    139   K 4.5 4.7    106   CO2 26 23   CREATININE 1.6* 1.7*   * 134*   PHOS  --  4.6*   MG 2.4 2.6        Recent Labs     " "04/12/22 2227   PH 7.329*   PCO2 54.3*   PO2 24*   HCO3 28.6*   POCSATURATED 38*   BE 3          INTERVENTIONS    The Rapid Response Team was called to evaluate this patient for "seizure-like" activity. Upon our arrival to the bedside, an assessment was performed and vital signs obtained: NIBP 144/78 / HR 82 / SpO2 97% RA / afebrile. The patient was lying supine when she began stuttering, shaking, and moving her extremities in a jerking motion. CBG stable. During this event, the patient was able to follow simple commands and visually track individuals in the room. Airway patency maintained throughout event. Dr. Hawthorne was present at the bedside for this event. He stated that neurology had already seen this patient and psych had been consulted. Orders had been placed for Ativan and to admit the patient to the observation unit. There were no interventions performed by the rapid response team other than clarifying the plan of care with the RN and MD.       RECOMMENDATIONS    We recommend: Monitor VS, mental status, and airway. Maintain seizure precautions. Transfer patient to designated unit as soon as possible. Continue to update MD and primary team with patient condition.    PROVIDER ESCALATION    Orders received and case discussed with Dr. Hawthorne.    Disposition: Remain in room ED OU 09. Orders for Observation Unit.     FOLLOW UP    Bedside RNHiral updated on plan of care. Instructed to call the Rapid Response Nurse, Polina Briscoe RN at [phone] for additional questions or concerns.      "

## 2022-04-13 NOTE — HPI
"Bonnie Lino is a 52 y.o. female with Hx of possible TIA, HLD, HTN, DM type 2, and CKD stage 3 who presented to the ED with aphasia after episode of "shaking." Patient's friend at bedside reports that the patient was hanging out with friends when her hand suddenly began to shake. She notified one of the friends what was happening and then the rest of her body started shaking. During the episode, she was moaning along with gasping for air so friends called EMS. Patient never loss consciousness during the episode and reports that she was aware of what was occurring the whole time. No reported tongue biting or bowel/bladder incontinence. Friends reported patient appeared to having a panic attack prior to symptoms, but patient denies this. Not sure how long the episode of shaking lasted, but patient still having aphasia. Glucose with EMS was 146. Patient is alert, following commands, moving extremities equally.  Upon arrival she was having difficulty with word finding/expressive aphasia, she is now improving with stuttering and slowed response. Patient denies history of seizures, no headache, vision changes, recent head trauma. She endorses recent onset depression.     Of note, patient was admitted at [facility] on 3/17 for evaluation of right LE weakness and gait instability. Per discharge summary "MRI brain and MRA head and neck without acute process. Echo with EF 75% and no intracardiac shunting. Labs unremarkable. On re-evaluation by neurology and primary team neurologic exam wnl. MRI lumbar spine without contrast obtained with signs of mild degenerative changes of the lumbar spineL4-L5 and L5-S1. Given no signs of CVA and pt remained stable, she was deemed medically stable for discharge to in patient rehab to for physical therapy. Patient will have follow-up neurology appointment for further work-up of acute leg weakness."     Stroke called upon arrival.  Vascular neuro evaluated patient. CTA head/neck " without acute findings. Patient given 1 mg ativan for anxiety.

## 2022-04-13 NOTE — CONSULTS
[facility] - Emergency Dept  Vascular Neurology  Comprehensive Stroke Center  Consult Note    Inpatient consult to Vascular (Stroke) Neurology  Consult performed by: Yvette Figueroa MD  Consult ordered by: Ruben Ribeiro MD        Assessment/Plan:     Patient is a 52 y.o. year old female with:    Stuttering  53 y/o F w PMH HLD, HTN, DM type 2, and CKD stage 3 who presented to the ED with speech difficulties for which stroke activated. Symptom onset 1 hour prior to arrival where patient was at her normal state of health and developed acute onset of stuttering, shacking and panic attack. On arrival no focal neurological deficits, patient is mentioning the first letter in stuttering format of appropriate answers to questions as well as reading and objects in stroke card. Able to write correctly, answer y/no and comprehension is intact.   CTA h/n w no signs of acute ischemia or LVO. She had recent MRI last month as well for RLE weakness and normal.     Recommendations   -- no tpa is indicated as low NIHSS and high supiscioun of stroke mimicker such as panic attack, conversion disorder   -- No need for other imaging or stroke workup as patient had recent admission where stroke workup completed and normal   -- Complete workup for underlying etiology of current presentation   -- Case and imaging reviewed with Vascular Neurology fellow and staff, Dr. Quintero and Dr Abdullahi and no further recommendations.   -- Dispo per ED     Thank you for the consult. Vascular Neurology will sign off at this time. Please call or consult for any questions or concerns.       Current mild episode of major depressive disorder without prior episode  Hx of. Patient denies any recent stressors or upcoming events that could contribute to anxiety     Controlled type 2 diabetes mellitus with stage 3 chronic kidney disease, with long-term current use of insulin  Stroke RF   BG goal 140-180    Hypertension associated with type 2 diabetes  mellitus  Stroke RF     HLD (hyperlipidemia)  Stroke RF   LDL goal <70         STROKE DOCUMENTATION     Acute Stroke Times   Last Known Normal Date: 04/12/22  Last Known Normal Time: 2030  Symptom Onset Date: 04/12/22  Symptom Onset Time: 2030  Stroke Team Called Date: 04/12/22  Stroke Team Called Time: 2130  Stroke Team Arrival Date: 04/12/22  Stroke Team Arrival Time: 2130  CT Interpretation Time: 2200  Alteplase Recommended: No  CTA Interpretation Time: 2200  Thrombectomy Recommended: No    NIH Scale:  1a. Level of Consciousness: 0-->Alert, keenly responsive  1b. LOC Questions: 0-->Answers both questions correctly  1c. LOC Commands: 0-->Performs both tasks correctly  2. Best Gaze: 0-->Normal  3. Visual: 0-->No visual loss  4. Facial Palsy: 0-->Normal symmetrical movements  5a. Motor Arm, Left: 0-->No drift, limb holds 90 (or 45) degrees for full 10 secs  5b. Motor Arm, Right: 0-->No drift, limb holds 90 (or 45) degrees for full 10 secs  6a. Motor Leg, Left: 0-->No drift, leg holds 30 degree position for full 5 secs  6b. Motor Leg, Right: 0-->No drift, leg holds 30 degree position for full 5 secs  7. Limb Ataxia: 0-->Absent  8. Sensory: 0-->Normal, no sensory loss  9. Best Language: 2-->Severe aphasia, all communication is through fragmentary expression, great need for inference, questioning, and guessing by the listener. Range of information that can be exchanged is limited, listener carries burden of. . . (see row details)  10. Dysarthria: 0-->Normal  11. Extinction and Inattention (formerly Neglect): 0-->No abnormality  Total (NIH Stroke Scale): 2    Modified Calin Score: 0  [place] Coma Scale:    ABCD2 Score:    [id number] Score:   HAS -BLED Score:   ICH Score:   Hunt & Gusman Classification:       Thrombolysis Candidate? No, Non-disabling symptoms - Low NIHSS , Strong suspicion for stroke mimic or alternative diagnosis     Delays to Thrombolysis?  No    Interventional Revascularization Candidate?   Is the  patient eligible for mechanical endovascular reperfusion (HARINI)?  No; No large vessel occlusion identified on imaging , No; no significant neurologic deficit (NIHSS <6)  and No; at this time symptoms not suggestive of large vessel occlusion    Delays to Thrombectomy? No    Hemorrhagic change of an Ischemic Stroke: Does this patient have an ischemic stroke with hemorrhagic changes? No     Subjective:     History of Present Illness:   51 y/o F w PMH HLD, HTN, DM type 2, and CKD stage 3 who presented to the ED with speech difficulties for which stroke activated. EMS refers that 1 hour prior to arrival she was at her normal state of health but then developed episode of shackiness with word finding difficulty and what is described as a panic attack. Patient is able to nod yes or no and denies any inciting events to provoke these symptoms or prodromal symptoms. On arrival stroke code called and patient is stuttering the first letter of the answers to question asks with appropriate response, including her name, the hospital as well as when reading words and imagine on stroke cards. She requests pen and paper and is able to express her self with writing. No focal neurological deficits, NIHSS 2.    CTA h/n ordered and no acute ischemia, no LVO. No tpa recommended given low NIHSS and presentation unlikely to be vascular etiology as it does not correlate with any territory or pattern. Patient had recent MRI brain for admission for lower extremity weakness and no acute infarct noted.             Past Medical History:   Diagnosis Date    Aneurysm of cardiac wall, congenital     Bilateral lower extremity edema     2/2 calcium channel blocker    Diabetes type 2, uncontrolled     HLD (hyperlipidemia)     Hypertension     Vitamin D deficiency      Past Surgical History:   Procedure Laterality Date    BREAST CYST EXCISION  2003    BREAST SURGERY      cyst removal     CATARACT EXTRACTION W/  INTRAOCULAR LENS IMPLANT       COLONOSCOPY N/A 7/23/2021    Procedure: COLONOSCOPY;  Surgeon: Sapna Fitzgerald MD;  Location: [facility];  Service: Endoscopy;  Laterality: N/A;    HYSTERECTOMY  2002    PARTIAL HYSTERECTOMY  2002     Family History   Problem Relation Age of Onset    Diabetes Mother     Heart disease Mother     Cancer Mother 40        breast    Breast cancer Mother     Diabetes Father     Cancer Maternal Grandmother 82        breast    Breast cancer Maternal Grandmother      Social History     Tobacco Use    Smoking status: Never Smoker    Smokeless tobacco: Never Used   Substance Use Topics    Alcohol use: Yes     Comment: seldom    Drug use: No     Review of patient's allergies indicates:  No Known Allergies    Medications: I have reviewed the current medication administration record.    (Not in a hospital admission)      Review of Systems   Constitutional:  Negative for appetite change, chills and fever.   HENT:  Negative for congestion, sore throat, trouble swallowing and voice change.    Eyes: Negative.    Respiratory:  Negative for cough and shortness of breath.    Cardiovascular:  Negative for chest pain and leg swelling.   Gastrointestinal:  Negative for abdominal distention, abdominal pain, constipation, nausea and vomiting.   Endocrine: Negative.    Genitourinary: Negative.    Musculoskeletal:  Negative for back pain and gait problem.   Skin: Negative.    Neurological:  Positive for speech difficulty. Negative for weakness and headaches.   Psychiatric/Behavioral:  The patient is nervous/anxious.    Objective:     Vital Signs (Most Recent):  Temp: 97.6 °F (36.4 °C) (04/12/22 2211)  Pulse: 75 (04/13/22 0202)  Resp: 14 (04/13/22 0202)  BP: (!) 98/57 (04/13/22 0202)  SpO2: 96 % (04/13/22 0202)    Vital Signs Range (Last 24H):  Temp:  [97.6 °F (36.4 °C)]   Pulse:  [75-95]   Resp:  [13-20]   BP: ()/()   SpO2:  [96 %-100 %]     Physical Exam  Constitutional:       Appearance: Normal appearance.   HENT:       Head: Normocephalic and atraumatic.      Nose: Nose normal.      Mouth/Throat:      Mouth: Mucous membranes are moist.   Eyes:      Extraocular Movements: Extraocular movements intact.      Pupils: Pupils are equal, round, and reactive to light.   Cardiovascular:      Rate and Rhythm: Normal rate and regular rhythm.   Pulmonary:      Breath sounds: Normal breath sounds.   Abdominal:      General: Bowel sounds are normal. There is no distension.      Palpations: Abdomen is soft.      Tenderness: There is no abdominal tenderness.   Musculoskeletal:         General: No swelling. Normal range of motion.      Cervical back: Normal range of motion.   Skin:     General: Skin is warm.      Capillary Refill: Capillary refill takes less than 2 seconds.   Neurological:      General: No focal deficit present.      Mental Status: She is alert and oriented to person, place, and time.       Neurological Exam:   LOC: alert  Attention Span: Good   Language: No aphasia, Stuttering   Articulation: No dysarthria  Orientation: Person, Place, Time   Visual Fields: Full  EOM (CN III, IV, VI): Full/intact  Facial Sensation (CN V): Normal  Facial Movement (CN VII): Symmetric facial expression    Motor: Arm left  Normal 5/5  Leg left  Normal 5/5  Arm right  Normal 5/5  Leg right Normal 5/5  Cerebellum: No evidence of appendicular or axial ataxia  Sensation: Intact to light touch, temperature and vibration      Laboratory:  CMP:   Recent Labs   Lab 04/12/22  2222   CALCIUM 9.3   ALBUMIN 3.9   PROT 7.6      K 4.5   CO2 26      BUN 28*   CREATININE 1.6*   ALKPHOS 75   ALT 14   AST 15   BILITOT 0.4     CBC:   Recent Labs   Lab 04/12/22  2222   WBC 5.69   RBC 4.68   HGB 12.0   HCT 39.6      MCV 85   MCH 25.6*   MCHC 30.3*     Lipid Panel: No results for input(s): CHOL, LDLCALC, HDL, TRIG in the last 168 hours.  Coagulation:   Recent Labs   Lab 04/12/22  2222   INR 1.0     Hgb A1C: No results for input(s): HGBA1C in the  last 168 hours.  TSH:   Recent Labs   Lab 04/12/22  2222   TSH 2.352       Diagnostic Results:      Brain imaging:      Vessel Imaging:  CTA h/n   Impression:    1. No acute intracranial finding.  Additional evaluation, as clinically warranted.  2. CTA head and neck without large vessel occlusion or high-grade stenosis.  3. Paranasal sinus disease.    Cardiac Evaluation:   ECHO 3/18/22  · There is no evidence of intracardiac shunting.  · Concentric remodeling and normal systolic function.  · The estimated ejection fraction is 70%.  · Normal left ventricular diastolic function.  · Normal right ventricular size with normal right ventricular systolic function.  · Mild pulmonic regurgitation.  · Normal central venous pressure (3 mmHg).  · The estimated PA systolic pressure is 26 mmHg.            Yvette Figueroa MD  Comprehensive Stroke Center  Department of Vascular Neurology   [facility] - Emergency Dept

## 2022-04-13 NOTE — PT/OT/SLP EVAL
"Speech Language Pathology Evaluation  Cognitive Communication    Patient Name:  Bonnie Lino   MRN:  [id number]  Admitting Diagnosis: Episode of transient neurologic symptoms    Recommendations:     Recommendations:                General Recommendations:  Speech/language therapy and Cognitive-linguistic therapy  General Precautions: Standard, aphasia  Communication strategies:  provide increased time to answer and go to room if call light pushed    History:     Past Medical History:   Diagnosis Date    Aneurysm of cardiac wall, congenital     Bilateral lower extremity edema     2/2 calcium channel blocker    Diabetes type 2, uncontrolled     HLD (hyperlipidemia)     Hypertension     Vitamin D deficiency        Past Surgical History:   Procedure Laterality Date    BREAST CYST EXCISION  2003    BREAST SURGERY      cyst removal     CATARACT EXTRACTION W/  INTRAOCULAR LENS IMPLANT      COLONOSCOPY N/A 7/23/2021    Procedure: COLONOSCOPY;  Surgeon: Sapna iFtzgerald MD;  Location: [facility];  Service: Endoscopy;  Laterality: N/A;    HYSTERECTOMY  2002    PARTIAL HYSTERECTOMY  2002       Social History: Patient lives alone in home in [place].     Prior Intubation HX:  None this admission     Modified Barium Swallow: none piror at this facility     Chest X-Rays: 4/12/22: No acute findings in the chest.    Prior diet: Regular, thin     Occupation/hobbies/homemaking: Employed, . Pt reports she is Independent with ADLs       Subjective     SLP reviewed Pt with RN  Pt presents lethargic  She explains, "I'm a teacher"    Pain/Comfort:  · Pain Rating 1: 0/10    Respiratory Status: Room air    Objective:   Cognitive Status:    Patient presents with waxing/waning alertness and requires cues to wake and attend to structured task greater than 30 seconds in length. She is oriented x4. SLP to continue to assess memory, reasoning, math skills and safety awareness in session as alertness and verbal " expression improve.        Receptive Language:   Comprehension:      Questions Simple yes/no : 100% accuracy, I'ly  Complex yes/no 33% accuracy, I'ly   Commands  One step WNL    Pragmatics:    Pt with flat affect, inconsistent eye contact and minimal topic maintenance     Expressive Language:  Verbal:    Automatic Speech  Phrase completion WNL  Naming Confrontation WNL, Convergent : TBA and Single word responsive naming 50% accuracy I'ly   Conversational speech : Pt with mild word-finding difficulty t/o conversational and spontaneous speech tasks as c/b prolongations and repetition of initial phoneme with intermittent hesitations.   Nonverbal:   Gestures : Pt imitates gestures 2 out of 2 attempts I'ly      Motor Speech:  Patient with dysfluent speech as c/b intermittent blocking with delayed initiation of initial syllable sound, variable rate of speech with mildly decreased breath support     Voice:   Quality Breathy  Intensity : low    Visual-Spatial:  TBA    Reading:   TBA     Written Expression:   TBA    Treatment: Pt found asleep in bed with her Uncle in the room at bedside. She woke per simple verbal cues and easily fell back to sleep which required consistent cues to wake and attend to structured tasks t/o assessment. Swallow evaluation deferred 2/2 Pt remained NPO for testing. Pt was educated on SLP role, safety/fall (call light) precautions, compensatory strategies for functional communication and ongoing SLP POC. Pt with minimal verbal teachback and would benefit from ongoing review/practice. No additional questions. Pt remained asleep in bed in room with call light in reach, family at bedside, upon SLP exit.     Assessment:   Bonnie Lino is a 52 y.o. female with an SLP diagnosis of Speech Impairment and mild word-finding difficulty.  Initial assessment limited 2/2 lethargy. ST to continue to follow.     Goals:   Multidisciplinary Problems     SLP Goals        Problem: SLP    Goal Priority Disciplines  Outcome   SLP Goal     SLP Ongoing, Progressing   Description: Speech Language Pathology Goals  Goals expected to be met by 420/22    1. Pt will recall 3+ speech strategies 90%of the time, MOD I  2. Pt will complete convergent naming tasks for a FO3-43 items with 90% accuracy, MOD I  3. Pt will answer m/u YNQ with 90% accuracy, MOD I  4. Pt will complete further assessment of reading, writing and cognitive-linguistic skills to determine ongoing POC needs  5. Educate Pt and family on safety awareness and compensatory strategies for functional communication                     Plan:   · Patient to be seen:  4 x/week   · Plan of Care expires:  05/12/22  · Plan of Care reviewed with:  patient, other (see comments) (Uncle)   · SLP Follow-Up:  Yes       Discharge recommendations:   OP ST pending Patient progress and PT/OT recs    Time Tracking:   SLP Treatment Date:   04/13/22  Speech Start Time:  1014  Speech Stop Time:  1028     Speech Total Time (min):  14 min    Billable Minutes: Eval 14     04/13/2022

## 2022-04-13 NOTE — H&P
"[facility] - Emergency [facility] Medicine  History & Physical    Patient Name: Bonnie Lino  MRN: [id number]  Patient Class: OP- Observation  Admission Date: 4/12/2022  Attending Physician: No att. providers found   Primary Care Provider: Robson Culver DO         Patient information was obtained from patient, past medical records and ER records.     Subjective:     Principal Problem:Episode of transient neurologic symptoms    Chief Complaint:   Chief Complaint   Patient presents with    Aphasia     From home, LNK 1 hour ago, EMS called for "shaking". Upon EMS arrival, states patient appeared to be having panic attack with shaking. Pt having difficulty getting her words out        HPI: Bonnie Lino is a 52 y.o. female with Hx of possible TIA, HLD, HTN, DM type 2, and CKD stage 3 who presented to the ED with aphasia after episode of "shaking." Patient's friend at bedside reports that the patient was hanging out with friends when her hand suddenly began to shake. She notified one of the friends what was happening and then the rest of her body started shaking. During the episode, she was moaning along with gasping for air so friends called EMS. Patient never loss consciousness during the episode and reports that she was aware of what was occurring the whole time. No reported tongue biting or bowel/bladder incontinence. Friends reported patient appeared to having a panic attack prior to symptoms, but patient denies this. Not sure how long the episode of shaking lasted, but patient still having aphasia. Glucose with EMS was 146. Patient is alert, following commands, moving extremities equally.  Upon arrival she was having difficulty with word finding/expressive aphasia, she is now improving with stuttering and slowed response. Patient denies history of seizures, no headache, vision changes, recent head trauma. She endorses recent onset depression.     Of note, patient was admitted at [facility] on 3/17 " "for evaluation of right LE weakness and gait instability. Per discharge summary "MRI brain and MRA head and neck without acute process. Echo with EF 75% and no intracardiac shunting. Labs unremarkable. On re-evaluation by neurology and primary team neurologic exam wnl. MRI lumbar spine without contrast obtained with signs of mild degenerative changes of the lumbar spineL4-L5 and L5-S1. Given no signs of CVA and pt remained stable, she was deemed medically stable for discharge to in patient rehab to for physical therapy. Patient will have follow-up neurology appointment for further work-up of acute leg weakness."     Stroke called upon arrival.  Vascular neuro evaluated patient. CTA head/neck without acute findings. Patient given 1 mg ativan for anxiety.       Past Medical History:   Diagnosis Date    Aneurysm of cardiac wall, congenital     Bilateral lower extremity edema     2/2 calcium channel blocker    Diabetes type 2, uncontrolled     HLD (hyperlipidemia)     Hypertension     Vitamin D deficiency        Past Surgical History:   Procedure Laterality Date    BREAST CYST EXCISION  2003    BREAST SURGERY      cyst removal     CATARACT EXTRACTION W/  INTRAOCULAR LENS IMPLANT      COLONOSCOPY N/A 7/23/2021    Procedure: COLONOSCOPY;  Surgeon: Sapna Fitzgerald MD;  Location: [facility];  Service: Endoscopy;  Laterality: N/A;    HYSTERECTOMY  2002    PARTIAL HYSTERECTOMY  2002       Review of patient's allergies indicates:  No Known Allergies    No current facility-administered medications on file prior to encounter.     Current Outpatient Medications on File Prior to Encounter   Medication Sig    aspirin (ECOTRIN) 81 MG EC tablet Take 81 mg by mouth once daily.    atorvastatin (LIPITOR) 20 MG tablet     blood glucose strip-disp meter Kit 1 application by Misc.(Non-Drug; Combo Route) route 3 (three) times daily.    blood-glucose meter (RELION ALL-IN-ONE METER) kit Use as instructed    canagliflozin " "(INVOKANA) 100 mg Tab tablet Take 1 tablet (100 mg total) by mouth once daily. (Patient taking differently: Take 100 mg by mouth nightly.)    DUREZOL 0.05 % Drop ophthalmic solution INSTILL 1 DROP TO SURGERY EYE 4 TIMES DAILY AFTER SURGERY FOR 30 DAYS    FLUoxetine 20 MG capsule Take 1 capsule (20 mg total) by mouth every evening.    gabapentin (NEURONTIN) 300 MG capsule Take 2 capsules (600 mg total) by mouth 3 (three) times daily. Patient taking 1 tablet in am, 1 tablet at lunch and 2 tablets at bedtime (Patient taking differently: Take 600 mg by mouth 2 (two) times daily. Patient taking 1 tablet in am and 2 tablets at bedtime)    insulin (LANTUS SOLOSTAR U-100 INSULIN) glargine 100 units/mL (3mL) SubQ pen Inject 46 Units into the skin every evening.    insulin syringe-needle U-100 (BD INSULIN SYRINGE) 1 mL 25 gauge x 5/8" Syrg 1 Units by Misc.(Non-Drug; Combo Route) route every evening. (Patient not taking: Reported on 3/31/2022)    ketorolac 0.5% (ACULAR) 0.5 % Drop     lancets Misc 1 application by Misc.(Non-Drug; Combo Route) route 3 (three) times daily. (Patient not taking: Reported on 3/31/2022)    lisinopriL (PRINIVIL,ZESTRIL) 40 MG tablet Take 1 tablet (40 mg total) by mouth once daily. (Patient taking differently: Take 40 mg by mouth nightly.)    NIFEdipine (PROCARDIA-XL) 90 MG (OSM) 24 hr tablet Take 1 tablet (90 mg total) by mouth once daily. (Patient taking differently: Take 90 mg by mouth nightly.)    rosuvastatin (CRESTOR) 5 MG tablet Take 1 tablet (5 mg total) by mouth once daily. (Patient taking differently: Take 5 mg by mouth every evening.)     Family History       Problem Relation (Age of Onset)    Breast cancer Mother, Maternal Grandmother    Cancer Mother (40), Maternal Grandmother (82)    Diabetes Mother, Father    Heart disease Mother          Tobacco Use    Smoking status: Never Smoker    Smokeless tobacco: Never Used   Substance and Sexual Activity    Alcohol use: Yes     " Comment: seldom    Drug use: No    Sexual activity: Not on file     Review of Systems   Constitutional:  Negative for chills and fever.   HENT:  Negative for congestion and facial swelling.    Eyes:  Negative for photophobia and visual disturbance.   Respiratory:  Negative for chest tightness and shortness of breath.    Cardiovascular:  Negative for chest pain and leg swelling.   Gastrointestinal:  Negative for abdominal pain, nausea and vomiting.   Genitourinary:  Negative for dysuria, frequency and urgency.   Musculoskeletal:  Negative for gait problem.   Skin:  Negative for rash and wound.   Neurological:  Positive for tremors and speech difficulty. Negative for dizziness, facial asymmetry and weakness.   Psychiatric/Behavioral:  Negative for agitation and confusion.    Objective:     Vital Signs (Most Recent):  Temp: 97.6 °F (36.4 °C) (04/12/22 2211)  Pulse: 88 (04/12/22 2301)  Resp: 20 (04/12/22 2301)  BP: 134/75 (04/12/22 2301)  SpO2: 99 % (04/12/22 2301) Vital Signs (24h Range):  Temp:  [97.6 °F (36.4 °C)] 97.6 °F (36.4 °C)  Pulse:  [88-95] 88  Resp:  [16-20] 20  SpO2:  [98 %-100 %] 99 %  BP: (134-171)/() 134/75     Weight: 77.1 kg (170 lb)  Body mass index is 26.63 kg/m².    Physical Exam  Vitals and nursing note reviewed.   Constitutional:       General: She is not in acute distress.     Appearance: She is well-developed. She is obese.   HENT:      Head: Normocephalic and atraumatic.      Mouth/Throat:      Mouth: Mucous membranes are moist.   Eyes:      Conjunctiva/sclera: Conjunctivae normal.      Pupils: Pupils are equal, round, and reactive to light.   Cardiovascular:      Rate and Rhythm: Normal rate and regular rhythm.      Heart sounds: Normal heart sounds.   Pulmonary:      Effort: Pulmonary effort is normal. No respiratory distress.      Breath sounds: Normal breath sounds. No wheezing.   Abdominal:      General: Bowel sounds are normal. There is no distension.      Palpations: Abdomen is  "soft.      Tenderness: There is no abdominal tenderness.   Musculoskeletal:         General: No tenderness. Normal range of motion.      Cervical back: Normal range of motion and neck supple.      Comments: Full ROM of bilateral upper and lower extremities. Strength equal bilaterally.    Lymphadenopathy:      Cervical: No cervical adenopathy.   Skin:     General: Skin is warm and dry.      Capillary Refill: Capillary refill takes less than 2 seconds.      Findings: No rash.   Neurological:      Mental Status: She is alert and oriented to person, place, and time.      Sensory: No sensory deficit.      Comments: Awake, alert, oriented x 3. Following commands. Answering questions appropriately, however with delayed speech with stuttering while finding words. No facial droop. Sensation intact. Able to name object "pen" when shown.    Psychiatric:         Behavior: Behavior normal.         Thought Content: Thought content normal.         Judgment: Judgment normal.         CRANIAL NERVES     CN III, IV, VI   Pupils are equal, round, and reactive to light.     Significant Labs: All pertinent labs within the past 24 hours have been reviewed.  CBC:   Recent Labs   Lab 04/12/22 2222   WBC 5.69   HGB 12.0   HCT 39.6        CMP:   Recent Labs   Lab 04/12/22 2222      K 4.5      CO2 26   *   BUN 28*   CREATININE 1.6*   CALCIUM 9.3   PROT 7.6   ALBUMIN 3.9   BILITOT 0.4   ALKPHOS 75   AST 15   ALT 14   ANIONGAP 8   EGFRNONAA 36.3*     Troponin:   Recent Labs   Lab 04/12/22 2222   TROPONINI <0.006     TSH:   Recent Labs   Lab 04/12/22 2222   TSH 2.352       Significant Imaging: I have reviewed all pertinent imaging results/findings within the past 24 hours.  X-Ray Chest AP Portable  Narrative: EXAMINATION:  XR CHEST AP PORTABLE    CLINICAL HISTORY:  Stroke;    TECHNIQUE:  Single frontal view of the chest was performed.    COMPARISON:  Chest radiograph 03/17/2022    FINDINGS:  Multiple surgical clips " overlie the chest.  Cardiac silhouette stable.    Lungs clear without consolidation.  No pleural fluid or pneumothorax.    No significant osseous abnormality.  Impression: No acute findings in the chest.    Electronically signed by resident: Navneet Wheeler  Date:    04/12/2022  Time:    23:23    Electronically signed by: Dale Liu  Date:    04/12/2022  Time:    23:34  CTA Head and Neck (xpd)  Narrative: EXAMINATION:  CTA HEAD AND NECK (XPD)    CLINICAL HISTORY:  Stroke/TIA, determine embolic source;    TECHNIQUE:  Axial CT images obtained throughout the region of the head before and after the administration of intravenous contrast.  CT angiogram was performed from through the cervical and intracranial vasculature during the IV bolus administration of 100mL of Omnipaque 350.  Multiplanar MPR and MIP reformats were performed.    CT source data was analyzed using artificial intelligence software for detection of a large vessel occlusion (LVO) or hemorrhage in order to enable computer assisted triage notification and aid clinical stroke decision making.    COMPARISON:  MRI brain 03/17/2022    MRA brain/neck 03/17/2022    FINDINGS:  Ventricles stable in size without evidence of hydrocephalus.    The brain parenchyma appears unchanged.  Mild cerebellar ectopia, similar to prior.  No new parenchymal mass, hemorrhage, edema or major vascular distribution infarct.    No extra-axial blood or fluid collections.    No acute displaced calvarial fracture.  Near complete opacification of the right sphenoid sinus.  Mucous retention cyst in the bilateral maxillary antra.  Mastoid air cells clear.    CTA:    The aortic arch maintains a 2 vessel branching pattern with common origin of the brachiocephalic and left common carotid arteries.    The common and internal carotid arteries are normal in course and caliber. No calcific atherosclerosis or significant stenosis in either carotid bifurcation.    The vertebral origins are patent.  The cervical vertebral arteries are normal in course and caliber. Vertebrobasilar system is within normal limits without focal abnormality.    The anterior, middle, and posterior cerebral arteries are within normal limits, without evidence of significant stenosis, focal occlusion, or intracranial aneurysm formation.    Additional findings:    0.5 cm solid nodule in the right upper lobe (axial series 5, image 38).  Multinodular thyroid gland with the largest nodule measuring 1.1 cm.  Parotid and submandibular glands unremarkable.  Degenerative change of the cervical spine without acute fracture.  Impression: 1. No acute intracranial finding.  Additional evaluation, as clinically warranted.  2. CTA head and neck without large vessel occlusion or high-grade stenosis.  3. Paranasal sinus disease.  4. 0.5 cm solid nodule in the right upper lobe.  For a solid nodule <6 mm, Fleischner Society 2017 guidelines recommend no routine follow up for a low risk patient, or follow-up with non-contrast chest CT at 12 months in a high risk patient.  5. Multinodular thyroid gland with the largest nodule measuring 1.1 cm.  Consider non urgent thyroid ultrasound.    Electronically signed by resident: Navneet Wheeler  Date:    04/12/2022  Time:    22:03    Electronically signed by: Sal Wilson MD  Date:    04/12/2022  Time:    22:38     Assessment/Plan:     * Episode of transient neurologic symptoms  Aphasia    Patient was with friends when she developed sudden hand shaking that progressed into full body shaking with grunting noises. Episode resolved with residual aphasia and dysarthria. No tongue biting or bowel/bladder incontinence.    - VSSAF  - TSH, troponin WNL  - stroke activated and vascular neuro evaluated patient  - CTA head and neck without acute findings  - patient had recent admission at [facility] for RLE weakness/ stroke like symptoms with negative MRI brain and MRA head/neck. Thought 2/2 TIA vs anxiety  - differential  includes TIA vs seizure vs pseudoseizure  - neurology consulted, appreciate recs  - EEG ordered  - SLP consulted for dysarthria  - seizure, aspiration precautions    Chronic kidney disease, stage 3b  - Cr stable at baseline  - renally dose medications  - monitor with daily labs    Right leg weakness  - recently discharged from inpatient rehab, improving  - scheduled to see neurology in clinic    Current mild episode of major depressive disorder without prior episode  - continue fluoxetine 20 mg nightly    Diabetic polyneuropathy associated with type 2 diabetes mellitus  - continue gabapentin    Controlled type 2 diabetes mellitus with stage 3 chronic kidney disease, with long-term current use of insulin  - home meds: canagliflozin, lantus 46 units nightly  - weight based insulin regimen while in hospital  - diabetic diet  - ACHS accuchecks    Hypertension associated with type 2 diabetes mellitus  - continue nifedipine and lisinopril    HLD (hyperlipidemia)  - continue statin      VTE Risk Mitigation (From admission, onward)         Ordered     IP VTE LOW RISK PATIENT  Once         04/13/22 0016     Place sequential compression device  Until discontinued         04/13/22 0016                   Pat Cabrera PA-C  Department of Hospital Medicine   [facility] - Emergency Dept

## 2022-04-13 NOTE — HPI
Bonnie Lino is a 52 y.o. F with past psychiatric history of major depressive disorder presents to the emergency room with changes in her speech and tremulousness.  Patient was worked up for stroke and Neurology was consulted who do not believe any acute neurological concerns and potential diagnosis of functional neurological disorder   Psychiatry was consulted for this and medication management  On exam today patient was laying in bed with blanket up to her chin.  Patient states that last night she had some high school friends over and was having a good time and they were praying together and laughing had some pizza than before she knew it she describes it as having a seizure-like episode.  Patient reports that this was very concerning for her and is scared about having another episode like it.  Patient reports that she has been having decreased mood and depression problems for the past couple of months in about 3 months ago she was started on Prozac 10 mg by her primary care physician at that time patient's depression symptomatology were moderate to severe.  At that time she endorse having hopelessness anhedonia would not leave the house or hang out with her friends was having a lot of crying episodes and also having passive suicidal ideation.  However since increasing the Prozac to 20 mg patient reports that she is no longer experiencing anhedonia is able to enjoy things with her family and friends and no longer having the crying episodes or suicidal ideation.  Patient currently endorsing some mild symptoms of depression.  Patient states that she has been having some increased anxiety since she has been out of work for the last month.  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  Patient states on March 16th she woke and my right leg was buckling patient states that she went to her primary care doctor who thought she was having a stroke but this was  ruled out and patient was sent to rehab facility from March 19th to 29th to get the strength back in her leg.  Patient states that she does not have panic attacks currently or in the past and does not know what they are.  Patient denied having episodes as such in the past and believes that she is having seizures.  Patient is denying any symptoms of PTSD previous history of traumas or any symptoms of evelia or psychosis either.  Psychosocial stressors  Patient currently reports that she is having financial difficulties since she has been  from her  couple years ago and that is pressing concern for her.  Patient also claims that she had lost her mother 3 years ago and that also has been very traumatic for her.  Patient denied any acute recent traumas that might have triggered her current conversion episode.    PSYCH HISTORY  Psychiatric hospitalizations-none  Previous trials of psychiatric medications-none  Previous history of suicidal ideation homicidal ideation or suicide attempts and or self-harm-none  History of previous psychiatric diagnosis-major depressive disorder  Current or previous mental health care-denied currently being managed by PCP for her depression    SUBSTANCE USE HISTORY  Patient denied any illicit substances and states that she rarely drinks any alcohol typically on special occasions      FAMILY PSYCHIATRIC HISTORY  Not but she knows of any family members with this    SOCIAL HISTORY    Patient had normal childhood development and highest level education is teaching degree.  Patient is  but is on leave.  Patient lives alone is not in a relationship has 2 children.  Patient has no  history no access to guns.  Patient is Adventism and is Hinduism.  Patient's recreational activities include spending time with friends.

## 2022-04-13 NOTE — SUBJECTIVE & OBJECTIVE
Past Medical History:   Diagnosis Date    Aneurysm of cardiac wall, congenital     Bilateral lower extremity edema     2/2 calcium channel blocker    Diabetes type 2, uncontrolled     HLD (hyperlipidemia)     Hypertension     Vitamin D deficiency        Past Surgical History:   Procedure Laterality Date    BREAST CYST EXCISION  2003    BREAST SURGERY      cyst removal     CATARACT EXTRACTION W/  INTRAOCULAR LENS IMPLANT      COLONOSCOPY N/A 7/23/2021    Procedure: COLONOSCOPY;  Surgeon: Sapan Fitzgerald MD;  Location: [facility];  Service: Endoscopy;  Laterality: N/A;    HYSTERECTOMY  2002    PARTIAL HYSTERECTOMY  2002       Review of patient's allergies indicates:  No Known Allergies    Current Neurological Medications: Gabapentin    No current facility-administered medications on file prior to encounter.     Current Outpatient Medications on File Prior to Encounter   Medication Sig    acetaminophen (TYLENOL) 500 MG tablet Take 500 mg by mouth daily as needed for Pain.    aspirin (ECOTRIN) 81 MG EC tablet Take 81 mg by mouth once daily.    atorvastatin (LIPITOR) 20 MG tablet Take 20 mg by mouth once daily.    canagliflozin (INVOKANA) 100 mg Tab tablet Take 1 tablet (100 mg total) by mouth once daily. (Patient taking differently: Take 100 mg by mouth nightly.)    FLUoxetine 20 MG capsule Take 1 capsule (20 mg total) by mouth every evening.    gabapentin (NEURONTIN) 600 MG tablet Take 600 mg by mouth 3 (three) times daily.    insulin (LANTUS SOLOSTAR U-100 INSULIN) glargine 100 units/mL (3mL) SubQ pen Inject 46 Units into the skin every evening.    lisinopriL (PRINIVIL,ZESTRIL) 40 MG tablet Take 1 tablet (40 mg total) by mouth once daily. (Patient taking differently: Take 40 mg by mouth nightly.)    multivitamin (ONE DAILY MULTIVITAMIN) per tablet Take 1 tablet by mouth once daily.    NIFEdipine (PROCARDIA-XL) 90 MG (OSM) 24 hr tablet Take 1 tablet (90 mg total) by mouth once daily. (Patient taking differently:  "Take 90 mg by mouth nightly.)    blood glucose strip-disp meter Kit 1 application by Misc.(Non-Drug; Combo Route) route 3 (three) times daily.    blood-glucose meter (RELION ALL-IN-ONE METER) kit Use as instructed    insulin syringe-needle U-100 (BD INSULIN SYRINGE) 1 mL 25 gauge x 5/8" Syrg 1 Units by Misc.(Non-Drug; Combo Route) route every evening. (Patient not taking: Reported on 3/31/2022)    lancets Misc 1 application by Misc.(Non-Drug; Combo Route) route 3 (three) times daily. (Patient not taking: Reported on 3/31/2022)    [DISCONTINUED] DUREZOL 0.05 % Drop ophthalmic solution INSTILL 1 DROP TO SURGERY EYE 4 TIMES DAILY AFTER SURGERY FOR 30 DAYS    [DISCONTINUED] gabapentin (NEURONTIN) 300 MG capsule Take 2 capsules (600 mg total) by mouth 3 (three) times daily. Patient taking 1 tablet in am, 1 tablet at lunch and 2 tablets at bedtime (Patient taking differently: Take 600 mg by mouth 3 (three) times daily.)    [DISCONTINUED] ketorolac 0.5% (ACULAR) 0.5 % Drop     [DISCONTINUED] rosuvastatin (CRESTOR) 5 MG tablet Take 1 tablet (5 mg total) by mouth once daily. (Patient taking differently: Take 5 mg by mouth every evening.)     Family History       Problem Relation (Age of Onset)    Breast cancer Mother, Maternal Grandmother    Cancer Mother (40), Maternal Grandmother (82)    Diabetes Mother, Father    Heart disease Mother          Tobacco Use    Smoking status: Never Smoker    Smokeless tobacco: Never Used   Substance and Sexual Activity    Alcohol use: Yes     Comment: seldom    Drug use: No    Sexual activity: Not on file     Review of Systems   Constitutional:  Negative for activity change, appetite change, chills, diaphoresis, fatigue, fever and unexpected weight change.   HENT:  Negative for congestion, sore throat, trouble swallowing and voice change.    Eyes:  Negative for pain and visual disturbance.   Respiratory:  Negative for apnea, cough, chest tightness and shortness of breath.    Cardiovascular:  " Negative for chest pain, palpitations and leg swelling.   Gastrointestinal:  Negative for abdominal distention, abdominal pain, blood in stool, constipation, diarrhea, nausea and vomiting.   Endocrine: Negative.    Genitourinary:  Negative for difficulty urinating, hematuria, pelvic pain and vaginal pain.   Musculoskeletal:  Negative for arthralgias, back pain, joint swelling, myalgias, neck pain and neck stiffness.   Skin: Negative.    Allergic/Immunologic: Negative.    Neurological:  Positive for tremors and speech difficulty. Negative for dizziness, facial asymmetry, weakness, light-headedness, numbness and headaches.   Hematological:  Negative for adenopathy. Does not bruise/bleed easily.   Psychiatric/Behavioral:  Negative for agitation, confusion, decreased concentration, self-injury and suicidal ideas. The patient is nervous/anxious.    Objective:     Vital Signs (Most Recent):  Temp: 98.5 °F (36.9 °C) (04/13/22 1130)  Pulse: 77 (04/13/22 1130)  Resp: 16 (04/13/22 1130)  BP: (!) 156/77 (04/13/22 1130)  SpO2: 97 % (04/13/22 1130)   Vital Signs (24h Range):  Temp:  [97.6 °F (36.4 °C)-98.5 °F (36.9 °C)] 98.5 °F (36.9 °C)  Pulse:  [75-95] 77  Resp:  [13-20] 16  SpO2:  [95 %-100 %] 97 %  BP: ()/() 156/77     Weight: 77.1 kg (170 lb)  Body mass index is 26.63 kg/m².    Physical Exam  Vitals reviewed.   Constitutional:       General: She is not in acute distress.     Appearance: She is not ill-appearing.   HENT:      Head: Normocephalic.   Eyes:      Extraocular Movements: EOM normal.      Pupils: Pupils are equal, round, and reactive to light.   Cardiovascular:      Rate and Rhythm: Normal rate and regular rhythm.   Pulmonary:      Effort: Pulmonary effort is normal. No respiratory distress.   Musculoskeletal:      Cervical back: No rigidity.   Skin:     General: Skin is warm.   Neurological:      General: No focal deficit present.      Mental Status: She is alert and oriented to person, place, and  time.      Motor: No weakness.      Deep Tendon Reflexes: Strength normal.      Reflex Scores:       Tricep reflexes are 2+ on the right side and 2+ on the left side.       Bicep reflexes are 2+ on the right side and 2+ on the left side.       Brachioradialis reflexes are 2+ on the right side and 2+ on the left side.       Patellar reflexes are 2+ on the right side and 2+ on the left side.       Achilles reflexes are 2+ on the right side and 2+ on the left side.      NEUROLOGICAL EXAMINATION:     MENTAL STATUS   Oriented to person, place, and time.   Attention: normal. Concentration: normal.   Level of consciousness: alert    CRANIAL NERVES     CN II   Visual fields full to confrontation.     CN III, IV, VI   Pupils are equal, round, and reactive to light.  Extraocular motions are normal.   Nystagmus: none     CN V   Facial sensation intact.     CN VII   Facial expression full, symmetric.     CN VIII   CN VIII normal.     CN IX, X   CN IX normal.   CN X normal.     CN XI   CN XI normal.     CN XII   CN XII normal.     MOTOR EXAM   Muscle bulk: normal  Overall muscle tone: normal    Strength   Strength 5/5 throughout.     REFLEXES     Reflexes   Right brachioradialis: 2+  Left brachioradialis: 2+  Right biceps: 2+  Left biceps: 2+  Right triceps: 2+  Left triceps: 2+  Right patellar: 2+  Left patellar: 2+  Right achilles: 2+  Left achilles: 2+  Right : 2+  Left : 2+    SENSORY EXAM   Light touch normal.   Vibration normal.   Proprioception normal.   Pinprick normal.     Significant Labs: All pertinent lab results from the past 24 hours have been reviewed.    Significant Imaging: I have reviewed all pertinent imaging results/findings within the past 24 hours.

## 2022-04-13 NOTE — PLAN OF CARE
Problem: SLP  Goal: SLP Goal  Description: Speech Language Pathology Goals  Goals expected to be met by 420/22    1. Pt will recall 3+ speech strategies 90%of the time, MOD I  2. Pt will complete convergent naming tasks for a FO3-43 items with 90% accuracy, MOD I  3. Pt will answer m/u YNQ with 90% accuracy, MOD I  4. Pt will complete further assessment of reading, writing and cognitive-linguistic skills to determine ongoing POC needs  5. Educate Pt and family on safety awareness and compensatory strategies for functional communication    Outcome: Ongoing, Progressing       SLP Speech, Language and Cognitive Evaluation initiated. Patient lethargy impacted endurance. Patient presents with speech impairment and mild word-finding difficulty. POC initiated and ST to continue to follow.      4/13/2022

## 2022-04-14 LAB
ANION GAP SERPL CALC-SCNC: 8 MMOL/L (ref 8–16)
BASOPHILS # BLD AUTO: 0.03 K/UL (ref 0–0.2)
BASOPHILS NFR BLD: 0.7 % (ref 0–1.9)
BUN SERPL-MCNC: 28 MG/DL (ref 6–20)
CALCIUM SERPL-MCNC: 9.2 MG/DL (ref 8.7–10.5)
CHLORIDE SERPL-SCNC: 108 MMOL/L (ref 95–110)
CO2 SERPL-SCNC: 24 MMOL/L (ref 23–29)
CREAT SERPL-MCNC: 1.5 MG/DL (ref 0.5–1.4)
DIFFERENTIAL METHOD: ABNORMAL
EOSINOPHIL # BLD AUTO: 0.2 K/UL (ref 0–0.5)
EOSINOPHIL NFR BLD: 4.8 % (ref 0–8)
ERYTHROCYTE [DISTWIDTH] IN BLOOD BY AUTOMATED COUNT: 13.4 % (ref 11.5–14.5)
EST. GFR  (AFRICAN AMERICAN): 45.2 ML/MIN/1.73 M^2
EST. GFR  (NON AFRICAN AMERICAN): 39.2 ML/MIN/1.73 M^2
GLUCOSE SERPL-MCNC: 56 MG/DL (ref 70–110)
HCT VFR BLD AUTO: 34.9 % (ref 37–48.5)
HGB BLD-MCNC: 10.4 G/DL (ref 12–16)
IMM GRANULOCYTES # BLD AUTO: 0.01 K/UL (ref 0–0.04)
IMM GRANULOCYTES NFR BLD AUTO: 0.2 % (ref 0–0.5)
LYMPHOCYTES # BLD AUTO: 1.2 K/UL (ref 1–4.8)
LYMPHOCYTES NFR BLD: 28.6 % (ref 18–48)
MAGNESIUM SERPL-MCNC: 2.5 MG/DL (ref 1.6–2.6)
MCH RBC QN AUTO: 24.8 PG (ref 27–31)
MCHC RBC AUTO-ENTMCNC: 29.8 G/DL (ref 32–36)
MCV RBC AUTO: 83 FL (ref 82–98)
MONOCYTES # BLD AUTO: 0.4 K/UL (ref 0.3–1)
MONOCYTES NFR BLD: 9.2 % (ref 4–15)
NEUTROPHILS # BLD AUTO: 2.5 K/UL (ref 1.8–7.7)
NEUTROPHILS NFR BLD: 56.5 % (ref 38–73)
NRBC BLD-RTO: 0 /100 WBC
PHOSPHATE SERPL-MCNC: 4.5 MG/DL (ref 2.7–4.5)
PLATELET # BLD AUTO: 191 K/UL (ref 150–450)
PMV BLD AUTO: 11.6 FL (ref 9.2–12.9)
POCT GLUCOSE: 104 MG/DL (ref 70–110)
POCT GLUCOSE: 129 MG/DL (ref 70–110)
POCT GLUCOSE: 142 MG/DL (ref 70–110)
POCT GLUCOSE: 210 MG/DL (ref 70–110)
POCT GLUCOSE: 51 MG/DL (ref 70–110)
POTASSIUM SERPL-SCNC: 4.5 MMOL/L (ref 3.5–5.1)
PROLACTIN SERPL IA-MCNC: 13.7 NG/ML (ref 5.2–26.5)
RBC # BLD AUTO: 4.2 M/UL (ref 4–5.4)
SODIUM SERPL-SCNC: 140 MMOL/L (ref 136–145)
WBC # BLD AUTO: 4.34 K/UL (ref 3.9–12.7)

## 2022-04-14 PROCEDURE — 84100 ASSAY OF PHOSPHORUS: CPT | Performed by: PHYSICIAN ASSISTANT

## 2022-04-14 PROCEDURE — 84146 ASSAY OF PROLACTIN: CPT | Performed by: FAMILY MEDICINE

## 2022-04-14 PROCEDURE — 80048 BASIC METABOLIC PNL TOTAL CA: CPT | Performed by: PHYSICIAN ASSISTANT

## 2022-04-14 PROCEDURE — 99232 SBSQ HOSP IP/OBS MODERATE 35: CPT | Mod: ,,, | Performed by: PSYCHIATRY & NEUROLOGY

## 2022-04-14 PROCEDURE — 99232 PR SUBSEQUENT HOSPITAL CARE,LEVL II: ICD-10-PCS | Mod: ,,, | Performed by: PSYCHIATRY & NEUROLOGY

## 2022-04-14 PROCEDURE — 36415 COLL VENOUS BLD VENIPUNCTURE: CPT | Performed by: PHYSICIAN ASSISTANT

## 2022-04-14 PROCEDURE — 36415 COLL VENOUS BLD VENIPUNCTURE: CPT | Performed by: FAMILY MEDICINE

## 2022-04-14 PROCEDURE — 25000003 PHARM REV CODE 250: Performed by: FAMILY MEDICINE

## 2022-04-14 PROCEDURE — 99226 PR SUBSEQUENT OBSERVATION CARE,LEVEL III: CPT | Mod: ,,, | Performed by: FAMILY MEDICINE

## 2022-04-14 PROCEDURE — 99226 PR SUBSEQUENT OBSERVATION CARE,LEVEL III: ICD-10-PCS | Mod: ,,, | Performed by: FAMILY MEDICINE

## 2022-04-14 PROCEDURE — 25000003 PHARM REV CODE 250: Performed by: PHYSICIAN ASSISTANT

## 2022-04-14 PROCEDURE — 83735 ASSAY OF MAGNESIUM: CPT | Performed by: PHYSICIAN ASSISTANT

## 2022-04-14 PROCEDURE — 11000001 HC ACUTE MED/SURG PRIVATE ROOM

## 2022-04-14 PROCEDURE — 85025 COMPLETE CBC W/AUTO DIFF WBC: CPT | Performed by: PHYSICIAN ASSISTANT

## 2022-04-14 RX ORDER — LISINOPRIL 2.5 MG/1
2.5 TABLET ORAL NIGHTLY
Status: DISCONTINUED | OUTPATIENT
Start: 2022-04-14 | End: 2022-04-16 | Stop reason: HOSPADM

## 2022-04-14 RX ORDER — NIFEDIPINE 30 MG/1
90 TABLET, EXTENDED RELEASE ORAL NIGHTLY
Status: DISCONTINUED | OUTPATIENT
Start: 2022-04-14 | End: 2022-04-16 | Stop reason: HOSPADM

## 2022-04-14 RX ORDER — GABAPENTIN 300 MG/1
300 CAPSULE ORAL 3 TIMES DAILY
Status: DISCONTINUED | OUTPATIENT
Start: 2022-04-14 | End: 2022-04-16 | Stop reason: HOSPADM

## 2022-04-14 RX ADMIN — LISINOPRIL 2.5 MG: 2.5 TABLET ORAL at 09:04

## 2022-04-14 RX ADMIN — GABAPENTIN 600 MG: 300 CAPSULE ORAL at 08:04

## 2022-04-14 RX ADMIN — ASPIRIN 81 MG: 81 TABLET, COATED ORAL at 08:04

## 2022-04-14 RX ADMIN — MELATONIN TAB 3 MG 6 MG: 3 TAB at 09:04

## 2022-04-14 RX ADMIN — NIFEDIPINE 90 MG: 30 TABLET, FILM COATED, EXTENDED RELEASE ORAL at 09:04

## 2022-04-14 RX ADMIN — GABAPENTIN 300 MG: 300 CAPSULE ORAL at 09:04

## 2022-04-14 RX ADMIN — Medication 16 G: at 08:04

## 2022-04-14 RX ADMIN — ATORVASTATIN CALCIUM 20 MG: 20 TABLET, FILM COATED ORAL at 09:04

## 2022-04-14 NOTE — NURSING
"- head leans forward and shakes head up and down.  - right arm jerking/shaking  - head shaking back and forth  -head bobbing back and forth    CB  Patient responds immediately after sternal rub. Patient is able to communicate and follow directions. Patient's speech is slow and stuttering occurs. Patient did began crying stating that "I am sick". Comforted patient. Notified Dr. Hugo Espana on call for -N and notified him of the multiple events. Asked if EEG was meant to be ordered for patient since these events have been occurring since admission to floor.  said to give PRN panic attack medicine to see if it would settle patient down. WCTM.  "

## 2022-04-14 NOTE — NURSING
EMU SEIZURE EVENT NOTE  Time event started: 7:32pm  Duration:3-5 mins  Describe symptoms during event and post-ictal symptoms: Patient randomly stopped talking during conversation and put her down. Patient was then positioned to her side when lip twisting started. Patient was also shaking her upper extremities and body.patient responds immediately after sternal rub, following directions, speaking slowly, but answering questions appropriately. This event occurred 3-4 times during one period.   Who notified: No one  Intervention: pt placed on side. Vital signs taken q5min during event and post VS taken. Standardized neurological assessment completed. Notified EMU team (if appropriate)  Patient response: Neurological assessment back at baseline, VSS at this time. See flowsheets for more event information.    (adjust note appropriately, IF cardiopulmonary unstable please notify CHARGE NURSE and call [facility] RESPONSE TEAM @ [phone])

## 2022-04-14 NOTE — SUBJECTIVE & OBJECTIVE
Neurologic Chief Complaint: stroke mimic, conversion disorder vs seizures     Subjective:     Interval History: Patient is seen for follow-up neurological assessment and treatment recommendations:     The patient has been having abnormal shaky movements. She has been seen by neurology and psychiatry. Main concern at this moment is conversion disorder given abnormal shaky movements with no loss of consciousness and slurred, delayed speed. From a stroke standpoint, stroke was ruled out and MRI WNL. VN will sign off. We would recommend further investigating possible seizures with EEG. Even if seizures is low in the differential, EEG can help reassure the patient of her diagnosis and will help with further management      HPI, Past Medical, Family, and Social History remains the same as documented in the initial encounter.     Review of Systems   Constitutional:  Positive for activity change.   HENT:  Negative for congestion.    Respiratory:  Negative for apnea, chest tightness and shortness of breath.    Cardiovascular:  Negative for chest pain, palpitations and leg swelling.   Gastrointestinal:  Negative for abdominal distention.   Genitourinary:  Negative for difficulty urinating.   Neurological:  Positive for seizures. Negative for dizziness, tremors, syncope, facial asymmetry, speech difficulty, weakness, light-headedness, numbness and headaches.   Psychiatric/Behavioral:  Negative for agitation, behavioral problems, confusion, decreased concentration, dysphoric mood, hallucinations, self-injury, sleep disturbance and suicidal ideas. The patient is nervous/anxious. The patient is not hyperactive.    Scheduled Meds:   aspirin  81 mg Oral Daily    atorvastatin  20 mg Oral QHS    FLUoxetine  40 mg Oral QHS    gabapentin  300 mg Oral TID    lorazepam  0.5 mg Intravenous Once     Continuous Infusions:  PRN Meds:acetaminophen, albuterol-ipratropium, aluminum-magnesium hydroxide-simethicone, dextrose 10%, dextrose 10%,  glucagon (human recombinant), glucose, glucose, hydrOXYzine pamoate, insulin aspart U-100, melatonin, naloxone, ondansetron, polyethylene glycol, prochlorperazine, simethicone, sodium chloride 0.9%    Objective:     Vital Signs (Most Recent):  Temp: 98.8 °F (37.1 °C) (04/14/22 1137)  Pulse: 85 (04/14/22 1501)  Resp: 16 (04/14/22 1501)  BP: 139/85 (04/14/22 1501)  SpO2: 96 % (04/14/22 1501)  BP Location: Left arm    Vital Signs Range (Last 24H):  Temp:  [96.2 °F (35.7 °C)-98.8 °F (37.1 °C)]   Pulse:  []   Resp:  [16-20]   BP: (101-187)/(57-85)   SpO2:  [96 %-98 %]   BP Location: Left arm    Physical Exam  Constitutional:       Appearance: Normal appearance.   HENT:      Head: Normocephalic.      Right Ear: Tympanic membrane normal.      Left Ear: Tympanic membrane normal.      Nose: Nose normal.      Mouth/Throat:      Mouth: Mucous membranes are dry.   Eyes:      Extraocular Movements: Extraocular movements intact.      Pupils: Pupils are equal, round, and reactive to light.   Cardiovascular:      Rate and Rhythm: Normal rate and regular rhythm.      Pulses: Normal pulses.   Pulmonary:      Effort: Pulmonary effort is normal.   Abdominal:      General: Abdomen is flat.   Musculoskeletal:      Cervical back: Normal range of motion.   Neurological:      Mental Status: She is alert.       Neurological Exam:   LOC: alert  Attention Span: Good   Language: No aphasia  Articulation: slow, -like speech  Orientation: Person, Place, Time   Visual Fields: Full  EOM (CN III, IV, VI): Full/intact  Pupils (CN II, III): PERRL  Facial Sensation (CN V): Normal  Facial Movement (CN VII): Symmetric facial expression    Gag Reflex: present  Reflexes: 2+ throughout  Motor: Arm left  Normal 5/5  Leg left  Normal 5/5  Arm right  Normal 5/5  Leg right Normal 5/5  Sensation: Intact to light touch, temperature and vibration    Laboratory:  CMP:   Recent Labs   Lab 04/14/22  0747   CALCIUM 9.2      K 4.5   CO2 24       BUN 28*   CREATININE 1.5*     BMP:   Recent Labs   Lab 04/14/22  0747      K 4.5      CO2 24   BUN 28*   CREATININE 1.5*   CALCIUM 9.2     CBC:   Recent Labs   Lab 04/14/22  0747   WBC 4.34   RBC 4.20   HGB 10.4*   HCT 34.9*      MCV 83   MCH 24.8*   MCHC 29.8*     Coagulation:   Recent Labs   Lab 04/12/22  2222   INR 1.0     TSH:   Recent Labs   Lab 04/12/22  2222   TSH 2.352     Diagnostic Results     Vessel Imaging   CTA 4/12:  1. No acute intracranial finding.  Additional evaluation, as clinically warranted.  2. CTA head and neck without large vessel occlusion or high-grade stenosis.  3. Paranasal sinus disease.  4. 0.5 cm solid nodule in the right upper lobe.  For a solid nodule <6 mm, Fleischner Society 2017 guidelines recommend no routine follow up for a low risk patient, or follow-up with non-contrast chest CT at 12 months in a high risk patient.  5. Multinodular thyroid gland with the largest nodule measuring 1.1 cm.  Consider non urgent thyroid ultrasound.     Cardiac Imaging   TTE:  There is no evidence of intracardiac shunting.  Concentric remodeling and normal systolic function.  The estimated ejection fraction is 70%.  Normal left ventricular diastolic function.  Normal right ventricular size with normal right ventricular systolic function.  Mild pulmonic regurgitation.  Normal central venous pressure (3 mmHg).  The estimated PA systolic pressure is 26 mmHg.

## 2022-04-14 NOTE — HOSPITAL COURSE
4/14: The patient has been having abnormal shaky movements. She has been seen by neurology and psychiatry. Main concern at this moment is conversion disorder given abnormal shaky movements with no loss of consciousness and slurred, delayed speed. From a stroke standpoint, stroke was ruled out and MRI WNL. VN will sign off. We would recommend further investigating possible seizures with EEG. Even if seizures is low in the differential, EEG can help reassure the patient of her diagnosis and will help with further management

## 2022-04-14 NOTE — PLAN OF CARE
"  Problem: Adult Inpatient Plan of Care  Goal: Plan of Care Review  Outcome: Ongoing, Progressing     Problem: Diabetes Comorbidity  Goal: Blood Glucose Level Within Targeted Range  Outcome: Ongoing, Progressing     Patient is AAO x4 with delayed responses and intermittent stuttering. POC reviewed with patient. Patient verbalized understanding. Patient's breathing is unlabored with equal chest expansion. Patient has rash to RLE. Patient complained of pain to shoulder;PRN pain meds given with relief obtained. Patient did have many "seizure like events" up until around 10pm. (Refer to notes for descriptions) Patient ambulates to the restroom. Patient remained free from falls. Patient rested well through shift. Bed in lowest position,bed alarm on,seizure precautions in place, side rails up x3, no complaints or signs of distress. WCTM.  See flowsheets for full assessment and VS info.    "

## 2022-04-14 NOTE — PROGRESS NOTES
Kenny Grimaldo - Neurosurgery ([facility])  Vascular Neurology  Comprehensive Stroke Center  Progress Note    Assessment/Plan:     Stuttering  53 y/o F w PMH HLD, HTN, DM type 2, and CKD stage 3 who presented to the ED with speech difficulties for which stroke activated. Symptom onset 1 hour prior to arrival where patient was at her normal state of health and developed acute onset of stuttering, shacking and panic attack. On arrival no focal neurological deficits, patient is mentioning the first letter in stuttering format of appropriate answers to questions as well as reading and objects in stroke card. Able to write correctly, answer y/no and comprehension is intact.   CTA h/n w no signs of acute ischemia or LVO. She had recent MRI last month as well for RLE weakness and normal.     Recommendations   -- no tpa is indicated as low NIHSS and high supiscioun of stroke mimicker such as panic attack, conversion disorder   -- No need for other imaging or stroke workup as patient had recent admission where stroke workup completed and normal   -- Complete workup for underlying etiology of current presentation   -- Case and imaging reviewed with Vascular Neurology fellow and staff, Dr. Quintero and Dr Abdullahi and no further recommendations.   -- The patient has been having abnormal shaky movements. She has been seen by neurology and psychiatry. Main concern at this moment is conversion disorder given abnormal shaky movements with no loss of consciousness and slurred, delayed speed. From a stroke standpoint, stroke was ruled out and MRI WNL. VN will sign off. We would recommend further investigating possible seizures with EEG. Even if seizures is low in the differential, EEG can help reassure the patient of her diagnosis and will help with further management      Thank you for the consult. Vascular Neurology will sign off at this time. Please call or consult for any questions or concerns.       Current mild episode of major depressive  disorder without prior episode  Hx of. Patient denies any recent stressors or upcoming events that could contribute to anxiety     Controlled type 2 diabetes mellitus with stage 3 chronic kidney disease, with long-term current use of insulin  Stroke RF   BG goal 140-180    Hypertension associated with type 2 diabetes mellitus  Stroke RF     HLD (hyperlipidemia)  Stroke RF   LDL goal <70          4/14: The patient has been having abnormal shaky movements. She has been seen by neurology and psychiatry. Main concern at this moment is conversion disorder given abnormal shaky movements with no loss of consciousness and slurred, delayed speed. From a stroke standpoint, stroke was ruled out and MRI WNL. VN will sign off. We would recommend further investigating possible seizures with EEG. Even if seizures is low in the differential, EEG can help reassure the patient of her diagnosis and will help with further management        STROKE DOCUMENTATION   Acute Stroke Times   Last Known Normal Date: 04/12/22  Last Known Normal Time: 2030  Symptom Onset Date: 04/12/22  Symptom Onset Time: 2030  Stroke Team Called Date: 04/12/22  Stroke Team Called Time: 2130  Stroke Team Arrival Date: 04/12/22  Stroke Team Arrival Time: 2130  CT Interpretation Time: 2200  Alteplase Recommended: No  CTA Interpretation Time: 2200  Thrombectomy Recommended: No    NIH Scale:      NIH Scale:  1a. Level of Consciousness: 0-->Alert, keenly responsive  1b. LOC Questions: 0-->Answers both questions correctly  1c. LOC Commands: 0-->Performs both tasks correctly  2. Best Gaze: 0-->Normal  3. Visual: 0-->No visual loss  4. Facial Palsy: 0-->Normal symmetrical movements  5a. Motor Arm, Left: 0-->No drift, limb holds 90 (or 45) degrees for full 10 secs  5b. Motor Arm, Right: 0-->No drift, limb holds 90 (or 45) degrees for full 10 secs  6a. Motor Leg, Left: 0-->No drift, leg holds 30 degree position for full 5 secs  6b. Motor Leg, Right: 0-->No drift, leg holds  30 degree position for full 5 secs  7. Limb Ataxia: 0-->Absent  8. Sensory: 0-->Normal, no sensory loss  9. Best Language: 1--> mild aphasia  10. Dysarthria: 0-->Normal  11. Extinction and Inattention (formerly Neglect): 0-->No abnormality  Total (NIH Stroke Scale): 1      Modified Coryell Score: 0  Weimar Coma Scale:    ABCD2 Score:    [id number] Score:   HAS -BLED Score:   ICH Score:   Hunt & Gusman Classification:      Hemorrhagic change of an Ischemic Stroke: Does this patient have an ischemic stroke with hemorrhagic changes? No     Neurologic Chief Complaint: stroke mimic, conversion disorder vs seizures     Subjective:     Interval History: Patient is seen for follow-up neurological assessment and treatment recommendations:     The patient has been having abnormal shaky movements. She has been seen by neurology and psychiatry. Main concern at this moment is conversion disorder given abnormal shaky movements with no loss of consciousness and slurred, delayed speed. From a stroke standpoint, stroke was ruled out and MRI WNL. VN will sign off. We would recommend further investigating possible seizures with EEG. Even if seizures is low in the differential, EEG can help reassure the patient of her diagnosis and will help with further management      HPI, Past Medical, Family, and Social History remains the same as documented in the initial encounter.     Review of Systems   Constitutional:  Positive for activity change.   HENT:  Negative for congestion.    Respiratory:  Negative for apnea, chest tightness and shortness of breath.    Cardiovascular:  Negative for chest pain, palpitations and leg swelling.   Gastrointestinal:  Negative for abdominal distention.   Genitourinary:  Negative for difficulty urinating.   Neurological:  Positive for seizures. Negative for dizziness, tremors, syncope, facial asymmetry, speech difficulty, weakness, light-headedness, numbness and headaches.   Psychiatric/Behavioral:  Negative  for agitation, behavioral problems, confusion, decreased concentration, dysphoric mood, hallucinations, self-injury, sleep disturbance and suicidal ideas. The patient is nervous/anxious. The patient is not hyperactive.    Scheduled Meds:   aspirin  81 mg Oral Daily    atorvastatin  20 mg Oral QHS    FLUoxetine  40 mg Oral QHS    gabapentin  300 mg Oral TID    lorazepam  0.5 mg Intravenous Once     Continuous Infusions:  PRN Meds:acetaminophen, albuterol-ipratropium, aluminum-magnesium hydroxide-simethicone, dextrose 10%, dextrose 10%, glucagon (human recombinant), glucose, glucose, hydrOXYzine pamoate, insulin aspart U-100, melatonin, naloxone, ondansetron, polyethylene glycol, prochlorperazine, simethicone, sodium chloride 0.9%    Objective:     Vital Signs (Most Recent):  Temp: 98.8 °F (37.1 °C) (04/14/22 1137)  Pulse: 85 (04/14/22 1501)  Resp: 16 (04/14/22 1501)  BP: 139/85 (04/14/22 1501)  SpO2: 96 % (04/14/22 1501)  BP Location: Left arm    Vital Signs Range (Last 24H):  Temp:  [96.2 °F (35.7 °C)-98.8 °F (37.1 °C)]   Pulse:  []   Resp:  [16-20]   BP: (101-187)/(57-85)   SpO2:  [96 %-98 %]   BP Location: Left arm    Physical Exam  Constitutional:       Appearance: Normal appearance.   HENT:      Head: Normocephalic.      Right Ear: Tympanic membrane normal.      Left Ear: Tympanic membrane normal.      Nose: Nose normal.      Mouth/Throat:      Mouth: Mucous membranes are dry.   Eyes:      Extraocular Movements: Extraocular movements intact.      Pupils: Pupils are equal, round, and reactive to light.   Cardiovascular:      Rate and Rhythm: Normal rate and regular rhythm.      Pulses: Normal pulses.   Pulmonary:      Effort: Pulmonary effort is normal.   Abdominal:      General: Abdomen is flat.   Musculoskeletal:      Cervical back: Normal range of motion.   Neurological:      Mental Status: She is alert.       Neurological Exam:   LOC: alert  Attention Span: Good   Language: No  aphasia  Articulation: slow, -like speech  Orientation: Person, Place, Time   Visual Fields: Full  EOM (CN III, IV, VI): Full/intact  Pupils (CN II, III): PERRL  Facial Sensation (CN V): Normal  Facial Movement (CN VII): Symmetric facial expression    Gag Reflex: present  Reflexes: 2+ throughout  Motor: Arm left  Normal 5/5  Leg left  Normal 5/5  Arm right  Normal 5/5  Leg right Normal 5/5  Sensation: Intact to light touch, temperature and vibration    Laboratory:  CMP:   Recent Labs   Lab 04/14/22  0747   CALCIUM 9.2      K 4.5   CO2 24      BUN 28*   CREATININE 1.5*     BMP:   Recent Labs   Lab 04/14/22  0747      K 4.5      CO2 24   BUN 28*   CREATININE 1.5*   CALCIUM 9.2     CBC:   Recent Labs   Lab 04/14/22  0747   WBC 4.34   RBC 4.20   HGB 10.4*   HCT 34.9*      MCV 83   MCH 24.8*   MCHC 29.8*     Coagulation:   Recent Labs   Lab 04/12/22  2222   INR 1.0     TSH:   Recent Labs   Lab 04/12/22  2222   TSH 2.352     Diagnostic Results     Vessel Imaging   CTA 4/12:  1. No acute intracranial finding.  Additional evaluation, as clinically warranted.  2. CTA head and neck without large vessel occlusion or high-grade stenosis.  3. Paranasal sinus disease.  4. 0.5 cm solid nodule in the right upper lobe.  For a solid nodule <6 mm, Fleischner Society 2017 guidelines recommend no routine follow up for a low risk patient, or follow-up with non-contrast chest CT at 12 months in a high risk patient.  5. Multinodular thyroid gland with the largest nodule measuring 1.1 cm.  Consider non urgent thyroid ultrasound.     Cardiac Imaging   TTE:  · There is no evidence of intracardiac shunting.  · Concentric remodeling and normal systolic function.  · The estimated ejection fraction is 70%.  · Normal left ventricular diastolic function.  · Normal right ventricular size with normal right ventricular systolic function.  · Mild pulmonic regurgitation.  · Normal central venous pressure (3  mmHg).  · The estimated PA systolic pressure is 26 mmHg.          Juancarlos Sharpe MD  [facility]  Department of Vascular Neurology   [facility] - Neurosurgery [facility])

## 2022-04-14 NOTE — PLAN OF CARE
Kenny Meyer - Neurosurgery ([facility])  Initial Discharge Assessment       Primary Care Provider: Robson Culver DO    Admission Diagnosis: Aphasia [R47.01]  Stroke [I63.9]  Episode of transient neurologic symptoms [R29.90]  Chest pain [R07.9]  Speech disturbance, unspecified type [R47.9]    Admission Date: 4/12/2022  Expected Discharge Date: 4/15/2022    SW met with patient at bedside for Discharge Planning Assessment.  Per patient, patient lives alone in a single-story apartment home with 15 step(s) to enter (no elevator is available).   Per patient, patient was mostly independent with ADLs and used a rolling walker for ambulation prior to admit.  Patient reported that she received home health PT/OT from [facility], and that they have recommended a shower chair for pt at home.     Pt is diabetic and monitors her blood sugar at home.    Patient will have assistance from her children upon discharge.  Patient does not attend a coumadin clinic and is not on dialysis.     Discharge Planning Booklet given to patient and discussed.  All questions addressed.     SW/CM to follow and assist with d/c needs as needed.    Discharge Barriers Identified: Underinsured    Payor: MEDICAID / Plan: [place] [employer] / Product Type: Managed Medicaid /     Extended Emergency Contact Information  Primary Emergency Contact: Gilberto Briscoe   [place] [facility]  Mobile Phone: [phone]  Relation: Relative  Secondary Emergency Contact: Clive Lino  Mobile Phone: [phone]  Relation: Father  Preferred language: English   needed? No    Discharge Plan A: Home Health  Discharge Plan B: Home with family      [id number] [address] [facility] [address]  [address]  [facility] [address]  Phone: [phone] Fax: [phone]      Initial Assessment (most recent)     Adult Discharge Assessment - 04/14/22 1052        Discharge Assessment     Assessment Type Discharge Planning Assessment     Confirmed/corrected address, phone number and insurance Yes     Confirmed Demographics Correct on Facesheet     Source of Information patient     Communicated ROE with patient/caregiver Date not available/Unable to determine     Reason For Admission Episode of transient neurologic symptoms     Lives With alone     Facility Arrived From: home     Do you have help at home or someone to help you manage your care at home? Yes     Who are your caregiver(s) and their phone number(s)? pt reported that she has many family and friends who could provide help at home if needed upon d/c     Prior to hospitilization cognitive status: Unable to Assess     Current cognitive status: Alert/Oriented     Walking or Climbing Stairs Difficulty ambulation difficulty, requires equipment     Dressing/Bathing Difficulty bathing difficulty, requires equipment     Home Accessibility stairs to enter home     Number of Stairs, Main Entrance other (see comments)   15    Home Layout Bathroom on 2nd floor;Bedroom on 2nd floor     Equipment Currently Used at Home walker, rolling;glucometer     Do you currently have service(s) that help you manage your care at home? Yes     Name and Contact number of agency [facility]     Is the pt/caregiver preference to resume services with current agency Yes     Who is going to help you get home at discharge? children     How do you get to doctors appointments? family or friend will provide     Are you on dialysis? No     Do you take coumadin? No     Discharge Plan A Home Health     Discharge Plan B Home with family     Discharge Plan discussed with: Patient     Discharge Barriers Identified Underinsured                      Vanesa Aguilar LMSW  [id number]

## 2022-04-14 NOTE — PT/OT/SLP PROGRESS
Speech Language Pathology      Bonnie Lino  MRN: [id number]    SLP attempted to see Patient for speech therapy.  Upon attempt, Patient with episode of jerking movements and yelling out. RN in doorway and explained vitals assessed and MD team notified. MD team arrived to room to meet with Patient.  Patient not seen today secondary to AMS/MD rounding. ST to continue to monitor and re-attempt per POC.     4/14/2022

## 2022-04-14 NOTE — PLAN OF CARE
Problem: Adult Inpatient Plan of Care  Goal: Plan of Care Review  Outcome: Ongoing, Progressing  Flowsheets (Taken 4/14/2022 4365)  Plan of Care Reviewed With:   patient   family  Goal: Patient-Specific Goal (Individualized)  Outcome: Ongoing, Progressing  Goal: Absence of Hospital-Acquired Illness or Injury  Outcome: Ongoing, Progressing  Goal: Optimal Comfort and Wellbeing  Outcome: Ongoing, Progressing     Problem: Infection  Goal: Absence of Infection Signs and Symptoms  Outcome: Ongoing, Progressing     Problem: Diabetes Comorbidity  Goal: Blood Glucose Level Within Targeted Range  Outcome: Ongoing, Progressing    POC reviewed with the patient and they verbalized understanding. All comments and concerns addressed. Bed locked in lowest position with bed alarm set, call light within reach. Safety precautions maintained. VSS, see flowsheets. No events this shift. Will continue to monitor for changes to POC and clinical condition.

## 2022-04-14 NOTE — NURSING
"Around 13:40, this RN, who is covering for primary RN's lunch, heard bizarre vocalizations from room 908. This RN entered the room and the pt was waving her arms above her head with her fingers splayed out waving to the sound of the Confucianism music her guest was playing. Pt's eyes were tightly closed. RN began to take vitals and pt's arm movements became more exaggerated. Pt suddenly slumped down, RN took vitals (see flowsheets), and then pt began waving her arms again. She also displayed jerking movements in her whole body. When asked to state her name she was able to get out "[place]" in a very slow, soft, stuttering voice. Med team N notified. Neurology team, who was consulted, came to bedside and assessed a few minutes later.   "

## 2022-04-14 NOTE — NURSING
Patient with 5 seizure-like events this AM.  Seizure description - Mouth to the side, mumbling, eyes closed with intermittent eyelid fluttering, and head shaking with both arms held up.   VS - BP initially elevated at 189/88 during episode with subsequent decrease to 142/77. HR and O2 sats remained normal at 70-80 and >98% respectively during and after the events. Patient tearful and worried afterwards, provided comfort and reassurance.  [facility] Med Team N notified - Spoke with MD Clarence who stated she would notify Neurology and Psychiatry teams.   Patient condition otherwise stable, but will continue to monitor.

## 2022-04-14 NOTE — PROGRESS NOTES
"[facility] - Neurosurgery ([facility] Medicine  Progress Note    Patient Name: Bonnie Lino  MRN: [id number]  Patient Class: OP- Observation   Admission Date: 4/12/2022  Length of Stay: 0 days  Attending Physician: Maria Elena Lima MD  Primary Care Provider: Robson Culver DO      Subjective:     Principal Problem:Episode of transient neurologic symptoms      HPI:  Bonnie Lino is a 52 y.o. female with Hx of possible TIA, HLD, HTN, DM type 2, and CKD stage 3 who presented to the ED with aphasia after episode of "shaking." Patient's friend at bedside reports that the patient was hanging out with friends when her hand suddenly began to shake. She notified one of the friends what was happening and then the rest of her body started shaking. During the episode, she was moaning along with gasping for air so friends called EMS. Patient never loss consciousness during the episode and reports that she was aware of what was occurring the whole time. No reported tongue biting or bowel/bladder incontinence. Friends reported patient appeared to having a panic attack prior to symptoms, but patient denies this. Not sure how long the episode of shaking lasted, but patient still having aphasia. Glucose with EMS was 146. Patient is alert, following commands, moving extremities equally.  Upon arrival she was having difficulty with word finding/expressive aphasia, she is now improving with stuttering and slowed response. Patient denies history of seizures, no headache, vision changes, recent head trauma. She endorses recent onset depression.     Of note, patient was admitted at [facility] on 3/17 for evaluation of right LE weakness and gait instability. Per discharge summary "MRI brain and MRA head and neck without acute process. Echo with EF 75% and no intracardiac shunting. Labs unremarkable. On re-evaluation by neurology and primary team neurologic exam wnl. MRI lumbar spine without contrast obtained with " "signs of mild degenerative changes of the lumbar spineL4-L5 and L5-S1. Given no signs of CVA and pt remained stable, she was deemed medically stable for discharge to in patient rehab to for physical therapy. Patient will have follow-up neurology appointment for further work-up of acute leg weakness."     Stroke called upon arrival.  Vascular neuro evaluated patient. CTA head/neck without acute findings. Patient given 1 mg ativan for anxiety.       Interval History: patient still having abnormal movements/ episodes. She is aware and is tearful about her current situation. She expresses she is not "crazy" and wants to get better. She started Prozac late February. At her 2 week f/u she still felt sad and her dose was increased from 10 to 20 mg. She started to feel better thereafter, up until Friday when she had her first abnormal episode. Her 2 sons at bedside say they have not noticed any recent behavior changes prior to this.       Review of Systems   Constitutional:  Negative for chills and fever.   Respiratory:  Negative for shortness of breath.    Cardiovascular:  Negative for chest pain.   Gastrointestinal:  Negative for abdominal pain.   Genitourinary:  Negative for dysuria.   Neurological:  Positive for speech difficulty. Negative for headaches.   Psychiatric/Behavioral:  Negative for confusion. The patient is hyperactive.      Objective:     Vital Signs (Most Recent):  Temp: 98.4 °F (36.9 °C) (04/14/22 1722)  Pulse: 92 (04/14/22 1722)  Resp: 18 (04/14/22 1722)  BP: (!) 159/73 (04/14/22 1722)  SpO2: 99 % (04/14/22 1722)   Vital Signs (24h Range):  Temp:  [96.2 °F (35.7 °C)-98.8 °F (37.1 °C)] 98.4 °F (36.9 °C)  Pulse:  [72-99] 92  Resp:  [16-20] 18  SpO2:  [96 %-99 %] 99 %  BP: (101-187)/(57-85) 159/73     Weight: 86.1 kg (189 lb 13.1 oz)  Body mass index is 29.73 kg/m².  No intake or output data in the 24 hours ending 04/14/22 1724   Physical Exam  Vitals and nursing note reviewed.   Constitutional:       " "General: She is not in acute distress.     Appearance: She is well-developed.   HENT:      Head: Normocephalic and atraumatic.   Eyes:      Conjunctiva/sclera: Conjunctivae normal.   Neck:      Vascular: No JVD.   Cardiovascular:      Rate and Rhythm: Normal rate and regular rhythm.      Heart sounds: Normal heart sounds.   Pulmonary:      Effort: Pulmonary effort is normal.      Breath sounds: Normal breath sounds.   Abdominal:      General: Bowel sounds are normal. There is no distension.      Palpations: Abdomen is soft.   Musculoskeletal:      Cervical back: Neck supple.      Right lower leg: No edema.      Left lower leg: No edema.   Neurological:      Mental Status: She is alert.      Comments: During these episodes, she closes her eyes and shakes her head side to side as if she is responding in a "no" fashion. She does this as well in the beginning, middle or ending of her sentences which prolongs/ drags out whatever word she is using at the time. Uncertain if she is able to control this.    Psychiatric:         Mood and Affect: Affect is tearful.         Speech: Speech is delayed (she pronounces her words slow).         Behavior: Behavior normal. Behavior is cooperative.         Thought Content: Thought content normal.         Cognition and Memory: Cognition and memory normal.       Significant Labs: All pertinent labs within the past 24 hours have been reviewed.  CBC:   Recent Labs   Lab 04/12/22 2222 04/13/22  0415 04/14/22  0747   WBC 5.69 5.68 4.34   HGB 12.0 11.5* 10.4*   HCT 39.6 39.0 34.9*    220 191     CMP:   Recent Labs   Lab 04/12/22 2222 04/13/22  0415 04/14/22  0747    139 140   K 4.5 4.7 4.5    106 108   CO2 26 23 24   * 134* 56*   BUN 28* 29* 28*   CREATININE 1.6* 1.7* 1.5*   CALCIUM 9.3 9.4 9.2   PROT 7.6  --   --    ALBUMIN 3.9  --   --    BILITOT 0.4  --   --    ALKPHOS 75  --   --    AST 15  --   --    ALT 14  --   --    ANIONGAP 8 10 8   EGFRNONAA 36.3* 33.7* " 39.2*       Significant Imaging: I have reviewed all pertinent imaging results/findings within the past 24 hours.      Assessment/Plan:      *Conversion disorder  Very low suspicion for seizure d/o. However, will go ahead and get EEG to re assure patient and family.   - neuro- psych consulted for further assistance  - discussed with neurology. Imaging reassuring-- no CVA or neurologic process suspected.  - Psychiatry has seen her and increased her Prozac. Family not in agreement with this as they are not sure if this medication may be a factor in her presentation.      Thyroid nodules  TSH wnl. Outpt f/u.     Chronic kidney disease, stage 3b  - Cr stable at baseline  - renally dose medications for Estimated Creatinine Clearance: 48.3 mL/min (A) (based on SCr of 1.5 mg/dL (H)).     Right leg weakness  - recently discharged from inpatient rehab, improving    Current mild episode of major depressive disorder without prior episode  - holding fluoxetine for now    Diabetic polyneuropathy associated with type 2 diabetes mellitus  - continue gabapentin    Controlled type 2 diabetes mellitus with stage 3 chronic kidney disease, with long-term current use of insulin  - having some hypoglycemia. Hold all scheduled insulin. SSI only for now.    - diabetic diet    Hypertension associated with type 2 diabetes mellitus  - continue nifedipine and lisinopril    HLD (hyperlipidemia)  - continue statin      VTE Risk Mitigation (From admission, onward)         Ordered     IP VTE LOW RISK PATIENT  Once         04/13/22 0016     Place sequential compression device  Until discontinued         04/13/22 0016                Discharge Planning   ROE: 4/15/2022     Code Status: Full Code   Is the patient medically ready for discharge?: No    Reason for patient still in hospital (select all that apply): Patient new problem, Patient trending condition and Consult recommendations  Discharge Plan A: Home Health          Maria Elena Lima  MD  Department of Hospital Medicine   [facility] - Neurosurgery ([facility])

## 2022-04-14 NOTE — ASSESSMENT & PLAN NOTE
51 y/o F w PMH HLD, HTN, DM type 2, and CKD stage 3 who presented to the ED with speech difficulties for which stroke activated. Symptom onset 1 hour prior to arrival where patient was at her normal state of health and developed acute onset of stuttering, shacking and panic attack. On arrival no focal neurological deficits, patient is mentioning the first letter in stuttering format of appropriate answers to questions as well as reading and objects in stroke card. Able to write correctly, answer y/no and comprehension is intact.   CTA h/n w no signs of acute ischemia or LVO. She had recent MRI last month as well for RLE weakness and normal.     Recommendations   -- no tpa is indicated as low NIHSS and high supiscioun of stroke mimicker such as panic attack, conversion disorder   -- No need for other imaging or stroke workup as patient had recent admission where stroke workup completed and normal   -- Complete workup for underlying etiology of current presentation   -- Case and imaging reviewed with Vascular Neurology fellow and staff, Dr. Quintero and Dr Abdullahi and no further recommendations.   -- The patient has been having abnormal shaky movements. She has been seen by neurology and psychiatry. Main concern at this moment is conversion disorder given abnormal shaky movements with no loss of consciousness and slurred, delayed speed. From a stroke standpoint, stroke was ruled out and MRI WNL. VN will sign off. We would recommend further investigating possible seizures with EEG. Even if seizures is low in the differential, EEG can help reassure the patient of her diagnosis and will help with further management      Thank you for the consult. Vascular Neurology will sign off at this time. Please call or consult for any questions or concerns.

## 2022-04-14 NOTE — SUBJECTIVE & OBJECTIVE
"Interval History: patient still having abnormal movements/ episodes. She is aware and is tearful about her current situation. She expresses she is not "crazy" and wants to get better. She started Prozac late February. At her 2 week f/u she still felt sad and her dose was increased from 10 to 20 mg. She started to feel better thereafter, up until Friday when she had her first abnormal episode. Her 2 sons at bedside say they have not noticed any recent behavior changes prior to this.       Review of Systems   Constitutional:  Negative for chills and fever.   Respiratory:  Negative for shortness of breath.    Cardiovascular:  Negative for chest pain.   Gastrointestinal:  Negative for abdominal pain.   Genitourinary:  Negative for dysuria.   Neurological:  Positive for speech difficulty. Negative for headaches.   Psychiatric/Behavioral:  Negative for confusion. The patient is hyperactive.      Objective:     Vital Signs (Most Recent):  Temp: 98.4 °F (36.9 °C) (04/14/22 1722)  Pulse: 92 (04/14/22 1722)  Resp: 18 (04/14/22 1722)  BP: (!) 159/73 (04/14/22 1722)  SpO2: 99 % (04/14/22 1722)   Vital Signs (24h Range):  Temp:  [96.2 °F (35.7 °C)-98.8 °F (37.1 °C)] 98.4 °F (36.9 °C)  Pulse:  [72-99] 92  Resp:  [16-20] 18  SpO2:  [96 %-99 %] 99 %  BP: (101-187)/(57-85) 159/73     Weight: 86.1 kg (189 lb 13.1 oz)  Body mass index is 29.73 kg/m².  No intake or output data in the 24 hours ending 04/14/22 1724   Physical Exam  Vitals and nursing note reviewed.   Constitutional:       General: She is not in acute distress.     Appearance: She is well-developed.   HENT:      Head: Normocephalic and atraumatic.   Eyes:      Conjunctiva/sclera: Conjunctivae normal.   Neck:      Vascular: No JVD.   Cardiovascular:      Rate and Rhythm: Normal rate and regular rhythm.      Heart sounds: Normal heart sounds.   Pulmonary:      Effort: Pulmonary effort is normal.      Breath sounds: Normal breath sounds.   Abdominal:      General: Bowel " "sounds are normal. There is no distension.      Palpations: Abdomen is soft.   Musculoskeletal:      Cervical back: Neck supple.      Right lower leg: No edema.      Left lower leg: No edema.   Neurological:      Mental Status: She is alert.      Comments: During these episodes, she closes her eyes and shakes her head side to side as if she is responding in a "no" fashion. She does this as well in the beginning, middle or ending of her sentences which prolongs/ drags out whatever word she is using at the time. Uncertain if she is able to control this.    Psychiatric:         Mood and Affect: Affect is tearful.         Speech: Speech is delayed (she pronounces her words slow).         Behavior: Behavior normal. Behavior is cooperative.         Thought Content: Thought content normal.         Cognition and Memory: Cognition and memory normal.       Significant Labs: All pertinent labs within the past 24 hours have been reviewed.  CBC:   Recent Labs   Lab 04/12/22 2222 04/13/22 0415 04/14/22  0747   WBC 5.69 5.68 4.34   HGB 12.0 11.5* 10.4*   HCT 39.6 39.0 34.9*    220 191     CMP:   Recent Labs   Lab 04/12/22 2222 04/13/22  0415 04/14/22  0747    139 140   K 4.5 4.7 4.5    106 108   CO2 26 23 24   * 134* 56*   BUN 28* 29* 28*   CREATININE 1.6* 1.7* 1.5*   CALCIUM 9.3 9.4 9.2   PROT 7.6  --   --    ALBUMIN 3.9  --   --    BILITOT 0.4  --   --    ALKPHOS 75  --   --    AST 15  --   --    ALT 14  --   --    ANIONGAP 8 10 8   EGFRNONAA 36.3* 33.7* 39.2*       Significant Imaging: I have reviewed all pertinent imaging results/findings within the past 24 hours.  "

## 2022-04-15 LAB
AMPHET+METHAMPHET UR QL: NEGATIVE
ANION GAP SERPL CALC-SCNC: 9 MMOL/L (ref 8–16)
BARBITURATES UR QL SCN>200 NG/ML: NEGATIVE
BASOPHILS # BLD AUTO: 0.03 K/UL (ref 0–0.2)
BASOPHILS NFR BLD: 0.6 % (ref 0–1.9)
BENZODIAZ UR QL SCN>200 NG/ML: NEGATIVE
BUN SERPL-MCNC: 28 MG/DL (ref 6–20)
BZE UR QL SCN: NEGATIVE
CALCIUM SERPL-MCNC: 9.5 MG/DL (ref 8.7–10.5)
CANNABINOIDS UR QL SCN: NEGATIVE
CHLORIDE SERPL-SCNC: 106 MMOL/L (ref 95–110)
CO2 SERPL-SCNC: 22 MMOL/L (ref 23–29)
CREAT SERPL-MCNC: 1.7 MG/DL (ref 0.5–1.4)
CREAT UR-MCNC: 42 MG/DL (ref 15–325)
DIFFERENTIAL METHOD: ABNORMAL
EOSINOPHIL # BLD AUTO: 0.2 K/UL (ref 0–0.5)
EOSINOPHIL NFR BLD: 4.1 % (ref 0–8)
ERYTHROCYTE [DISTWIDTH] IN BLOOD BY AUTOMATED COUNT: 13.2 % (ref 11.5–14.5)
EST. GFR  (AFRICAN AMERICAN): 38.9 ML/MIN/1.73 M^2
EST. GFR  (NON AFRICAN AMERICAN): 33.7 ML/MIN/1.73 M^2
ETHANOL UR-MCNC: <10 MG/DL
GLUCOSE SERPL-MCNC: 222 MG/DL (ref 70–110)
HCT VFR BLD AUTO: 37 % (ref 37–48.5)
HGB BLD-MCNC: 11.4 G/DL (ref 12–16)
IMM GRANULOCYTES # BLD AUTO: 0.01 K/UL (ref 0–0.04)
IMM GRANULOCYTES NFR BLD AUTO: 0.2 % (ref 0–0.5)
LYMPHOCYTES # BLD AUTO: 1.3 K/UL (ref 1–4.8)
LYMPHOCYTES NFR BLD: 27.3 % (ref 18–48)
MAGNESIUM SERPL-MCNC: 2.2 MG/DL (ref 1.6–2.6)
MCH RBC QN AUTO: 25.6 PG (ref 27–31)
MCHC RBC AUTO-ENTMCNC: 30.8 G/DL (ref 32–36)
MCV RBC AUTO: 83 FL (ref 82–98)
METHADONE UR QL SCN>300 NG/ML: NEGATIVE
MONOCYTES # BLD AUTO: 0.3 K/UL (ref 0.3–1)
MONOCYTES NFR BLD: 6 % (ref 4–15)
NEUTROPHILS # BLD AUTO: 3 K/UL (ref 1.8–7.7)
NEUTROPHILS NFR BLD: 61.8 % (ref 38–73)
NRBC BLD-RTO: 0 /100 WBC
OPIATES UR QL SCN: NEGATIVE
PCP UR QL SCN>25 NG/ML: NEGATIVE
PHOSPHATE SERPL-MCNC: 4.4 MG/DL (ref 2.7–4.5)
PLATELET # BLD AUTO: 184 K/UL (ref 150–450)
PMV BLD AUTO: 11.8 FL (ref 9.2–12.9)
POCT GLUCOSE: 144 MG/DL (ref 70–110)
POCT GLUCOSE: 217 MG/DL (ref 70–110)
POTASSIUM SERPL-SCNC: 5.1 MMOL/L (ref 3.5–5.1)
RBC # BLD AUTO: 4.46 M/UL (ref 4–5.4)
SODIUM SERPL-SCNC: 137 MMOL/L (ref 136–145)
TOXICOLOGY INFORMATION: NORMAL
WBC # BLD AUTO: 4.83 K/UL (ref 3.9–12.7)

## 2022-04-15 PROCEDURE — 11000001 HC ACUTE MED/SURG PRIVATE ROOM

## 2022-04-15 PROCEDURE — 80307 DRUG TEST PRSMV CHEM ANLYZR: CPT | Performed by: FAMILY MEDICINE

## 2022-04-15 PROCEDURE — 95816 EEG AWAKE AND DROWSY: CPT | Mod: 26,,, | Performed by: PSYCHIATRY & NEUROLOGY

## 2022-04-15 PROCEDURE — 63600175 PHARM REV CODE 636 W HCPCS: Performed by: PHYSICIAN ASSISTANT

## 2022-04-15 PROCEDURE — 83735 ASSAY OF MAGNESIUM: CPT | Performed by: PHYSICIAN ASSISTANT

## 2022-04-15 PROCEDURE — 80048 BASIC METABOLIC PNL TOTAL CA: CPT | Performed by: PHYSICIAN ASSISTANT

## 2022-04-15 PROCEDURE — 25000003 PHARM REV CODE 250: Performed by: FAMILY MEDICINE

## 2022-04-15 PROCEDURE — 85025 COMPLETE CBC W/AUTO DIFF WBC: CPT | Performed by: PHYSICIAN ASSISTANT

## 2022-04-15 PROCEDURE — 84100 ASSAY OF PHOSPHORUS: CPT | Performed by: PHYSICIAN ASSISTANT

## 2022-04-15 PROCEDURE — 99232 PR SUBSEQUENT HOSPITAL CARE,LEVL II: ICD-10-PCS | Mod: ,,, | Performed by: FAMILY MEDICINE

## 2022-04-15 PROCEDURE — 95816 PR EEG,W/AWAKE & DROWSY RECORD: ICD-10-PCS | Mod: 26,,, | Performed by: PSYCHIATRY & NEUROLOGY

## 2022-04-15 PROCEDURE — 99232 SBSQ HOSP IP/OBS MODERATE 35: CPT | Mod: ,,, | Performed by: FAMILY MEDICINE

## 2022-04-15 PROCEDURE — 94761 N-INVAS EAR/PLS OXIMETRY MLT: CPT

## 2022-04-15 PROCEDURE — 25000003 PHARM REV CODE 250: Performed by: PHYSICIAN ASSISTANT

## 2022-04-15 PROCEDURE — 95816 EEG AWAKE AND DROWSY: CPT

## 2022-04-15 PROCEDURE — 36415 COLL VENOUS BLD VENIPUNCTURE: CPT | Performed by: PHYSICIAN ASSISTANT

## 2022-04-15 RX ORDER — LISINOPRIL 2.5 MG/1
2.5 TABLET ORAL NIGHTLY
Qty: 90 TABLET | Refills: 3 | COMMUNITY
Start: 2022-04-15 | End: 2022-04-16 | Stop reason: SDUPTHER

## 2022-04-15 RX ORDER — GABAPENTIN 300 MG/1
300 CAPSULE ORAL 3 TIMES DAILY
Qty: 90 CAPSULE | Refills: 11 | COMMUNITY
Start: 2022-04-15

## 2022-04-15 RX ADMIN — ASPIRIN 81 MG: 81 TABLET, COATED ORAL at 07:04

## 2022-04-15 RX ADMIN — GABAPENTIN 300 MG: 300 CAPSULE ORAL at 08:04

## 2022-04-15 RX ADMIN — NIFEDIPINE 90 MG: 30 TABLET, FILM COATED, EXTENDED RELEASE ORAL at 08:04

## 2022-04-15 RX ADMIN — GABAPENTIN 300 MG: 300 CAPSULE ORAL at 02:04

## 2022-04-15 RX ADMIN — ACETAMINOPHEN 650 MG: 325 TABLET ORAL at 10:04

## 2022-04-15 RX ADMIN — ATORVASTATIN CALCIUM 20 MG: 20 TABLET, FILM COATED ORAL at 08:04

## 2022-04-15 RX ADMIN — ACETAMINOPHEN 650 MG: 325 TABLET ORAL at 08:04

## 2022-04-15 RX ADMIN — LISINOPRIL 2.5 MG: 2.5 TABLET ORAL at 08:04

## 2022-04-15 RX ADMIN — INSULIN ASPART 1 UNITS: 100 INJECTION, SOLUTION INTRAVENOUS; SUBCUTANEOUS at 08:04

## 2022-04-15 RX ADMIN — GABAPENTIN 300 MG: 300 CAPSULE ORAL at 07:04

## 2022-04-15 NOTE — PROGRESS NOTES
"    [facility] ([facility] Medicine  Progress Note     Patient Name: Bonnie Lino  MRN: [id number]   Admission Date: 4/12/2022  Attending Physician: Maria Elena Lima MD  Primary Care Provider: Robson Culver DO        Subjective:      Principal Problem:Episode of transient neurologic symptoms        HPI:  Bonnie Lino is a 52 y.o. female with Hx of possible TIA, HLD, HTN, DM type 2, and CKD stage 3 who presented to the ED with aphasia after episode of "shaking." Patient's friend at bedside reports that the patient was hanging out with friends when her hand suddenly began to shake. She notified one of the friends what was happening and then the rest of her body started shaking. During the episode, she was moaning along with gasping for air so friends called EMS. Patient never loss consciousness during the episode and reports that she was aware of what was occurring the whole time. No reported tongue biting or bowel/bladder incontinence. Friends reported patient appeared to having a panic attack prior to symptoms, but patient denies this. Not sure how long the episode of shaking lasted, but patient still having aphasia. Glucose with EMS was 146. Patient is alert, following commands, moving extremities equally.  Upon arrival she was having difficulty with word finding/expressive aphasia, she is now improving with stuttering and slowed response. Patient denies history of seizures, no headache, vision changes, recent head trauma. She endorses recent onset depression.      Of note, patient was admitted at [facility] on 3/17 for evaluation of right LE weakness and gait instability. Per discharge summary "MRI brain and MRA head and neck without acute process. Echo with EF 75% and no intracardiac shunting. Labs unremarkable. On re-evaluation by neurology and primary team neurologic exam wnl. MRI lumbar spine without contrast obtained with signs of mild degenerative changes of the lumbar " "spineL4-L5 and L5-S1. Given no signs of CVA and pt remained stable, she was deemed medically stable for discharge to in patient rehab to for physical therapy. Patient will have follow-up neurology appointment for further work-up of acute leg weakness."     Stroke called upon arrival.  Vascular neuro evaluated patient. CTA head/neck without acute findings. Patient given 1 mg ativan for anxiety.         Interval History: feeling better. Having some mild episodes still, but overall better.         Review of Systems   Constitutional:  Negative for chills and fever.   Respiratory:  Negative for shortness of breath.    Cardiovascular:  Negative for chest pain.   Gastrointestinal:  Negative for abdominal pain.   Genitourinary:  Negative for dysuria.        Objective:      Vital Signs (Most Recent):  Reviewed          Weight: 86.1 kg (189 lb 13.1 oz)  Body mass index is 29.73 kg/m².  No intake or output data in the 24 hours ending 04/14/22 2434   Physical Exam  Vitals and nursing note reviewed.   Constitutional:       General: She is not in acute distress.     Appearance: She is well-developed.   HENT:      Head: Normocephalic and atraumatic.   Eyes:      Conjunctiva/sclera: Conjunctivae normal.   Neck:      Vascular: No JVD.   Cardiovascular:      Rate and Rhythm: Normal rate and regular rhythm.      Heart sounds: Normal heart sounds.   Pulmonary:      Effort: Pulmonary effort is normal.      Breath sounds: Normal breath sounds.   Abdominal:      General: Bowel sounds are normal. There is no distension.      Palpations: Abdomen is soft.   Musculoskeletal:      Cervical back: Neck supple.      Right lower leg: No edema.      Left lower leg: No edema.   Neurological:      Mental Status: She is alert.      Comments: no shaking today during bedside interview  Psychiatric:         Mood and Affect: Affect is normal.         Speech: Speech is normal with an intermittent mild delay. Much improved compared to yesterday.        " Behavior: Behavior normal. Behavior is cooperative.         Thought Content: Thought content normal.         Cognition and Memory: Cognition and memory normal.         Significant Labs: All pertinent labs within the past 24 hours have been reviewed.     Significant Imaging: EEG nml        Assessment/Plan:      *Conversion disorder  Very low suspicion for seizure d/o.  EEG wnl.   - neuro- psych consulted for further assistance, but no on call provider at this time  - neuro imaging reassuring-- no CVA or neurologic process suspected.  - Psychiatry has seen her and increased her Prozac. Family not in agreement with this as they are not sure if this medication may be a factor in her presentation. Prozac held since 4/13.        Thyroid nodules  TSH wnl. Outpt f/u.      Chronic kidney disease, stage 3b  - Cr stable at baseline  - renally dose medications      Right leg weakness  - recently discharged from inpatient rehab, improving     Current mild episode of major depressive disorder without prior episode  - holding fluoxetine for now     Diabetic polyneuropathy associated with type 2 diabetes mellitus  - continue gabapentin     Controlled type 2 diabetes mellitus with stage 3 chronic kidney disease, with long-term current use of insulin  - had some hypoglycemia. Holding all scheduled insulin. SSI only for now.    - diabetic diet     Hypertension associated with type 2 diabetes mellitus  - stable, continue nifedipine and lisinopril     HLD (hyperlipidemia)  - continue statin            VTE Risk Mitigation (From admission, onward)                  Ordered        IP VTE LOW RISK PATIENT  Once         04/13/22 0016        Place sequential compression device  Until discontinued         04/13/22 0016                     Discharge Planning   ROE: 4/15/2022     Code Status: Full Code   Is the patient medically ready for discharge?: No    Reason for patient still in hospital (select all that apply): awaiting transfer to  psych  facility.   Patient medically stable for transfer to  psych facility.    Discharge Plan A: Home Health         Maria Elena Lima MD  Department of Hospital Medicine   [facility] - Neurosurgery ([facility])

## 2022-04-15 NOTE — PLAN OF CARE
[facility] - Neurosurgery (Hospital)  Discharge Final Note    Primary Care Provider: Robson Culver DO  Expected Discharge Date: 4/15/2022  Patient medically ready for discharge to [facility] inpatient psych unit today.  Nurse can call report to [phone].  Transportation via stretcher scheduled for 5:15 pm per [facility].   Is family/patient aware of discharge yes, SW notified pt at bedside.    Final Discharge Note (most recent)     Final Note - 04/15/22 1620        Final Note    Assessment Type Final Discharge Note     Anticipated Discharge Disposition [facility]     Hospital Resources/Appts/Education Provided Provided patient/caregiver with written discharge plan information;Provided education on problems/symptoms using teachback        Post-Acute Status    Post-Acute Authorization Placement     Post-Acute Placement Status Set-up Complete/Auth obtained     Discharge Delays None known at this time               Important Message from Medicare         Referral Info (most recent)     Referral Info    No documentation.               Contact Info     Robson Culver DO   Specialty: Family Medicine   Relationship: PCP - General    [address]  [facility] [id number]   Phone: [phone]       Next Steps: Follow up in 2 week(s)        Future Appointments   Date Time Provider Department Center   8/30/2022  9:00 AM Teresa Blal MD [facility]  LYND[facility] Cifuentes  Case Management  Ext: [phone]  04/15/2022

## 2022-04-15 NOTE — PLAN OF CARE
"  Problem: Adult Inpatient Plan of Care  Goal: Plan of Care Review  Outcome: Ongoing, Progressing  Goal: Optimal Comfort and Wellbeing  Outcome: Ongoing, Progressing     Problem: Adult Inpatient Plan of Care  Goal: Optimal Comfort and Wellbeing  Outcome: Ongoing, Progressing     Problem: Diabetes Comorbidity  Goal: Blood Glucose Level Within Targeted Range  Outcome: Ongoing, Progressing   Patient has slept well tonight, did receive prn Melatonin at HS. Call light in reach, bed in lowest position. Refused bed alarm as well as SCD's. Up to bathroom with 2 assist and very unsteady, BSC ordered for safety purposes. Had an episode of throwing her head back and forth and shaking her legs, gait staggered and unsteady. Assisted to bathroom then back to bed. Call light in reach. BS at HS was 210, refused sliding scale coverage because she said "it's high cause I ate a snack" Remains AOX4  and no further head shaking and jerking noted. VSS flow sheets completed, see for assessments.   "

## 2022-04-15 NOTE — PROCEDURES
Date of service  04/15/2022     Introduction  Electroencephalographic (EEG) recording is recorded with electrodes placed according to the International 10-20 placement system.  Thirty (30) channels of digital signal (sampling rate of 512/sec) were simultaneously recorded from the scalp and may include EKG, EMG, and/or eye monitors.  Recording band pass was 0.1 to 512 Hz.  Digital video recording of the patient is simultaneously recorded with the EEG.  The patient is instructed to report clinical symptoms which may occur during the recording session.  EEG and video recording are stored and archived in digital format.  Activation procedures, which include photic stimulation, hyperventilation and instructing patients to perform simple tasks, are done in selected patients.    The EEG is displayed on a monitor screen and can be reviewed using different montages.  Computer assisted-analysis is employed to detect spike and electrographic seizure activity.  The entire record is submitted for computer analysis.  The entire recording is visually reviewed, and the times identified by computer analysis as being spikes or seizures are reviewed again.    Compressed spectral analysis (CSA) is also performed on the activity recorded from each individual channel.  This is displayed as a power display of frequencies from 0 to 30 Hz over time.  The CSA is reviewed looking for asymmetries in power between homologous areas of the scalp, then compared with the original EEG recording.    Findings  The patient's background consists of a posteriorly dominant 9.5 Hz alpha rhythm, which is well formed, well modulated, and abolishes with eye opening.  Anteriorly, the patient's background consists of predominantly beta range frequencies.  There is no focal slowing.  There are no focal, lateralized, or epileptiform transients.  No electrographic seizures are seen.    During the course of the recording, the patient is noted to be awake, and  subsequently becomes drowsy.  Normal sleep architecture is not appreciated.    Hyperventilation and photic stimulation were not performed.     EKG demonstrates sinus rhythm.    Interpretation  This is a normal EEG in an awake and drowsy adult.  Please be aware that a normal EEG does not exclude the possibility of an underlying seizure disorder.

## 2022-04-15 NOTE — PLAN OF CARE
DREA notified by Dr. Lima that pt is requesting voluntary admit to inpatient psych at [facility]. Patient is medically ready for discharge.    SW called [facility] and spoke with Katherine ([phone]) who reported that she spoke with Vince in admissions at [facility] inpatient psych (phone: [phone], fax: [phone]). Per Vince, their inpatient unit is currently full but a packet could be faxed to them.  DREA faxed a packet with CBC, COVID, CMP, psych notes, H&P, and medication list to Vince and to Katherine with [facility] (fax: [phone]).     DREA requested that a UPT and drug/ethanol screen be ordered by Dr. Lima for pt as facility/[facility] will need these labs as well.    3:43 PM  Pt spoke with Nancy with [facility], [phone], who reported that facility could clinically accept pt. DREA met with pt at bedside, who reported agreement with this plan.  DREA had pt sign a Formal Voluntary Admission form and returned it to Nancy at [email or web address].      DREA will continue to coordinate with patient, family, team and insurance to complete patient's discharge plan.    Vanesa Aguilar, DREA  [id number]

## 2022-04-15 NOTE — NURSING
"In to assist PCT ambulate patient to bathroom, walking slowly then threw her head back and started shaking it and her legs as well. I stated "you need to stop, your going to make all 3 of us fall" she instantly stopped the shaking. Ordered BSC for patient safety, back to bed per PCT.  "

## 2022-04-16 VITALS
WEIGHT: 189.81 LBS | BODY MASS INDEX: 29.79 KG/M2 | SYSTOLIC BLOOD PRESSURE: 138 MMHG | HEIGHT: 67 IN | OXYGEN SATURATION: 97 % | DIASTOLIC BLOOD PRESSURE: 78 MMHG | HEART RATE: 84 BPM | TEMPERATURE: 99 F | RESPIRATION RATE: 17 BRPM

## 2022-04-16 LAB
POCT GLUCOSE: 176 MG/DL (ref 70–110)
POCT GLUCOSE: 235 MG/DL (ref 70–110)

## 2022-04-16 PROCEDURE — 99239 PR HOSPITAL DISCHARGE DAY,>30 MIN: ICD-10-PCS | Mod: ,,, | Performed by: FAMILY MEDICINE

## 2022-04-16 PROCEDURE — 99239 HOSP IP/OBS DSCHRG MGMT >30: CPT | Mod: ,,, | Performed by: FAMILY MEDICINE

## 2022-04-16 PROCEDURE — 25000003 PHARM REV CODE 250: Performed by: FAMILY MEDICINE

## 2022-04-16 PROCEDURE — 94761 N-INVAS EAR/PLS OXIMETRY MLT: CPT

## 2022-04-16 PROCEDURE — 25000003 PHARM REV CODE 250: Performed by: PHYSICIAN ASSISTANT

## 2022-04-16 RX ORDER — LISINOPRIL 2.5 MG/1
2.5 TABLET ORAL NIGHTLY
Qty: 30 TABLET | Refills: 0 | Status: SHIPPED | OUTPATIENT
Start: 2022-04-16 | End: 2022-08-25 | Stop reason: SDUPTHER

## 2022-04-16 RX ORDER — HYDROXYZINE PAMOATE 25 MG/1
25 CAPSULE ORAL EVERY 6 HOURS PRN
Qty: 30 CAPSULE | Refills: 0 | Status: SHIPPED | OUTPATIENT
Start: 2022-04-16 | End: 2022-06-29 | Stop reason: ALTCHOICE

## 2022-04-16 RX ADMIN — GABAPENTIN 300 MG: 300 CAPSULE ORAL at 08:04

## 2022-04-16 RX ADMIN — ACETAMINOPHEN 650 MG: 325 TABLET ORAL at 09:04

## 2022-04-16 RX ADMIN — GABAPENTIN 300 MG: 300 CAPSULE ORAL at 02:04

## 2022-04-16 RX ADMIN — INSULIN ASPART 2 UNITS: 100 INJECTION, SOLUTION INTRAVENOUS; SUBCUTANEOUS at 12:04

## 2022-04-16 RX ADMIN — ASPIRIN 81 MG: 81 TABLET, COATED ORAL at 08:04

## 2022-04-16 NOTE — PLAN OF CARE
Problem: Adult Inpatient Plan of Care  Goal: Plan of Care Review  Outcome: Ongoing, Progressing  Goal: Patient-Specific Goal (Individualized)  Outcome: Ongoing, Progressing  Goal: Absence of Hospital-Acquired Illness or Injury  Outcome: Ongoing, Progressing  Goal: Optimal Comfort and Wellbeing  Outcome: Ongoing, Progressing     Problem: Diabetes Comorbidity  Goal: Blood Glucose Level Within Targeted Range  Outcome: Ongoing, Progressing    POC reviewed with the patient and they verbalized understanding. All comments and concerns addressed. Bed locked in lowest position with bed alarm set, call light within reach. Safety precautions maintained. VSS, see flowsheets. No events this shift. Will continue to monitor for changes to POC and clinical condition.

## 2022-04-16 NOTE — PLAN OF CARE
Following is timeline of events regarding pt care. This timeline has also been escalated to Case Management  Isamar Arce for further review with [facility].     Call received from pt [facility] bs nurse regarding pt d/c.    -Nurse stated that pt was set to d/c yesterday evening and had not left.  -Asking advisement om re-aaranging transport for pt to d/c destination.    DREA reviewed pt chart and determined:    -DREA Alexis was in communication with intake at [facility]/Inpt psych   -Facility was in receipt of referral packet from DREA Alexis that included: 72 hours clinicals, requested urinalysis, COVID (within 72 hours), note indicating medical readiness for d/c, pt's signed Formal Voluntary Admission form.  -Facility acknowledged receipt by providing report number.  -Transport had been set with facility permission for  from [facility] at 5:15 PM.    DREA contacted [facility] to determine why pt had not been transport to [facility] as requested.    Upon speaking with Becca at [facility], DREA was advised of following:    -CM would need to complete the voluntary admission and transport request to facility.   -[facility] had no record of the transport request in their system (although it was reflected in Epic under orders).  -[facility] intake department called 4/15/22 after 5 PM to cancel the discharge/transport.  -[facility] cited that they were missing labs and could not take pt.     Becca provided contact number  to intake at [facility].    DREA attempted contact to [facility] intake line. Call immediately went to .     DREA called the number listed as report number in previous d/c note.     Nurse at the number stated she was unfamiliar with pt and transferred SW to . SW was then transferred back to .    SW was connected to [employer]. Sravani advised:    -She was responsible for pt intake.  -She had cancelled pt transport bc once she re-reviewed the packet, she  discovered there were labs missing.  -She was unsure which labs were missing. (She could not remember and no longer had the referral packet to refer to).  -She was working on admitting another patient and could not advise on what was missing from packet.  -Stated she never provided permission for transfer, nor did she provide a report number.  -Contradicted this statement by stating she spoke with Katherine at [facility] and advised her that transport should be canceled.  -Noted, again she has thrown away pt's referral packet bc she did not believe pt was coming.  -Stated several times that if DREA had somewhere else to send the pt, she should send her there.   -Went on to say that pt would likely not be accepted due to time of day (it was approximately 12 noon) and bed availability.      DREA advised Sravani of the following:    -Chart notes verified that [facility] accepted the pt, provided report and authorized transport.  -She would send a new referral packet for review.  -Asked what labs in particular were missing. Sravani was unable to specify.  -Stated pt did not have any other accepting facilities at this time for the referral to go to.     Sravani stated she would see if she could pull up the FVA form DREA Alexis sent.    Stated that she would review the packet DREA re-sent.    Advised DREA to f/u with her in 45 minutes after she was done with current patient so she could provide information on what was missing that caused pt to lose her bed.    DREA met with pt at  to complete new FVA. Also met with MD to have form signed.     Pt asked that DREA contact facility to determine visitation policy.    Once DREA contacted Sravani back to ask about visitation, Sravani immediately stated pt could not come to facility and there would not be any possible beds until Monday.     DREA updated tx team of the above timeline.         Pt provided with inpt and outpt behavioral health clinic resources. DREA advised pt  of information regarding  transport cancellation and loss of pt bed at facility. Advised that pt will have to d/c home, per MD, as this is a voluntary psych admission and there were currently no accepting facilities. Advised pt is medical stable, per MD to d/c home and pursue placement using resources provided.     Made two contact attempts with pt son. Marlene mena.    Yadi Wharton DREA  Case Management Social Worker   [facility]

## 2022-04-16 NOTE — PLAN OF CARE
Problem: Adult Inpatient Plan of Care  Goal: Plan of Care Review  Outcome: Ongoing, Progressing  Goal: Optimal Comfort and Wellbeing  Outcome: Ongoing, Progressing  Goal: Readiness for Transition of Care  Outcome: Ongoing, Progressing     Problem: Diabetes Comorbidity  Goal: Blood Glucose Level Within Targeted Range  Outcome: Ongoing, Progressing   POC reviewed and updated with the patient. Questions regarding POC were encouraged and addressed with the patient.  VSS, see flow-sheets. Patient is AO X *4 at this time. Fall/safety precautions maintained, no signs of injury noted during shift. Seizure precautions maintained, no events noted during shift.  Patient instructed to call staff for mobility, verbalized understanding. Plan is to discharge today. No acute signs of distress noted at this time.

## 2022-04-17 NOTE — DISCHARGE SUMMARY
"[facility] - Neurosurgery ([facility])  [facility] Medicine  Discharge Summary      Patient Name: Bonnie Lino  MRN: [id number]  Patient Class: IP- Inpatient  Admission Date: 4/12/2022  Hospital Length of Stay: 2 days  Discharge Date and Time: 4/16/2022  6:16 PM  Discharging Provider: Maria Elena Lima MD  Primary Care Provider: Robson Culver DO  [facility] Medicine Team: [facility] MED N Maria Elena Lima MD    HPI:   Bonnie Lino is a 52 y.o. female with Hx of possible TIA, HLD, HTN, DM type 2, and CKD stage 3 who presented to the ED with aphasia after episode of "shaking." Patient's friend at bedside reports that the patient was hanging out with friends when her hand suddenly began to shake. She notified one of the friends what was happening and then the rest of her body started shaking. During the episode, she was moaning along with gasping for air so friends called EMS. Patient never loss consciousness during the episode and reports that she was aware of what was occurring the whole time. No reported tongue biting or bowel/bladder incontinence. Friends reported patient appeared to having a panic attack prior to symptoms, but patient denies this. Not sure how long the episode of shaking lasted, but patient still having aphasia. Glucose with EMS was 146. Patient is alert, following commands, moving extremities equally.  Upon arrival she was having difficulty with word finding/expressive aphasia, she is now improving with stuttering and slowed response. Patient denies history of seizures, no headache, vision changes, recent head trauma. She endorses recent onset depression.     Of note, patient was admitted at [facility] on 3/17 for evaluation of right LE weakness and gait instability. Per discharge summary "MRI brain and MRA head and neck without acute process. Echo with EF 75% and no intracardiac shunting. Labs unremarkable. On re-evaluation by neurology and primary team neurologic exam wnl. MRI lumbar " "spine without contrast obtained with signs of mild degenerative changes of the lumbar spineL4-L5 and L5-S1. Given no signs of CVA and pt remained stable, she was deemed medically stable for discharge to in patient rehab to for physical therapy. Patient will have follow-up neurology appointment for further work-up of acute leg weakness."     Stroke called upon arrival.  Vascular neuro evaluated patient. CTA head/neck without acute findings. Patient given 1 mg ativan for anxiety.       * No surgery found *      Hospital Course:   She was evaluated by neuro and psychiatry. She was not felt to have an underlying neurological process. AED's were not recommended. Family and patient decided to d/c prozac after it had been increased by psych. Her symptoms improved daily. She was to be transferred to  psych, but there was no bed availability. Given she was almost back to baseline, she was cleared for discharge.        Goals of Care Treatment Preferences:  Code Status: Full Code      Consults:   Consults (From admission, onward)        Status Ordering Provider     Inpatient consult to Psychiatry  Once        Provider:  (Not yet assigned)    Completed BARRY HERNANDEZ     Inpatient consult to Neurology  Once        Provider:  (Not yet assigned)    Completed ESTELLE PEDRAZA     Inpatient consult to Vascular (Stroke) Neurology  Once        Provider:  (Not yet assigned)    Completed IVAN MOSES          *Conversion disorder  Very low suspicion for seizure d/o.  EEG wnl.   - neuro- psych consulted for further assistance, but no on call provider at this time  - neuro imaging reassuring-- no CVA or neurologic process suspected.  - Psychiatry has seen her and increased her Prozac. Family not in agreement with this as they are not sure if this medication may be a factor in her presentation. Prozac held since 4/13.        Thyroid nodules  TSH wnl. Outpt f/u.      Chronic kidney disease, stage 3b  - Cr stable at baseline   "   Right leg weakness  - recently discharged from inpatient rehab, improving. Can resume outpt PT.      Current mild episode of major depressive disorder without prior episode  - holding fluoxetine for now  Encouraged to voluntarily check in IP psych facility vs outpt f/u depending on mood and symptoms     Diabetic polyneuropathy associated with type 2 diabetes mellitus  - continue gabapentin     Controlled type 2 diabetes mellitus with stage 3 chronic kidney disease, with long-term current use of insulin  - had some hypoglycemia. Can resume home Lantus as warranted (at decreased dose)  - diabetic diet     Hypertension associated with type 2 diabetes mellitus  - stable, continue nifedipine and lisinopril (dose decreased).      HLD (hyperlipidemia)  - continue statin      Final Active Diagnoses:    Diagnosis Date Noted POA    PRINCIPAL PROBLEM:  Episode of transient neurologic symptoms [R29.90] 04/13/2022 Yes    Aphasia [R47.01] 04/13/2022 Yes    Stuttering [F80.81] 04/13/2022 Unknown    Right leg weakness [R29.898] 03/17/2022 Yes    Current mild episode of major depressive disorder without prior episode [F32.0] 02/25/2022 Yes    Controlled type 2 diabetes mellitus with stage 3 chronic kidney disease, with long-term current use of insulin [E11.22, N18.30, Z79.4] 08/06/2021 Not Applicable    Diabetic polyneuropathy associated with type 2 diabetes mellitus [E11.42] 08/06/2021 Yes    Venous stasis dermatitis of both lower extremities [I87.2] 08/06/2021 Yes    Chronic kidney disease, stage 3b [N18.32] 04/06/2021 Yes    Hypertension associated with type 2 diabetes mellitus [E11.59, I15.2]  Yes     Chronic    HLD (hyperlipidemia) [E78.5] 09/23/2014 Yes     Chronic      Problems Resolved During this Admission:       Discharged Condition: stable    Disposition: Home or Self Care    Follow Up:   Follow-up Information     Robson Culver, DO Follow up in 2 week(s).    Specialty: Family Medicine  Contact  information:  [address]  [facility] [address]  [phone]                       Patient Instructions:      Ambulatory referral/consult to Psychiatry   Standing Status: Future   Referral Priority: Routine Referral Type: Psychiatric   Referral Reason: Specialty Services Required   Requested Specialty: Psychiatry   Number of Visits Requested: 1     Diet diabetic     Activity as tolerated       Significant Diagnostic Studies: MRI    Pending Diagnostic Studies:     None         Medications:  Reconciled Home Medications:      Medication List      START taking these medications    hydrOXYzine pamoate 25 MG Cap  Commonly known as: VISTARIL  Take 1 capsule (25 mg total) by mouth every 6 (six) hours as needed (anxiety and symptoms of panic attack).        CHANGE how you take these medications    gabapentin 300 MG capsule  Commonly known as: NEURONTIN  Take 1 capsule (300 mg total) by mouth 3 (three) times daily.  What changed:   · how much to take  · additional instructions  · Another medication with the same name was removed. Continue taking this medication, and follow the directions you see here.     INVOKANA 100 mg Tab tablet  Generic drug: canagliflozin  Take 1 tablet (100 mg total) by mouth once daily.  What changed: when to take this     lisinopriL 2.5 MG tablet  Commonly known as: PRINIVIL,ZESTRIL  Take 1 tablet (2.5 mg total) by mouth nightly.  What changed:   · medication strength  · how much to take  · when to take this     NIFEdipine 90 MG (OSM) 24 hr tablet  Commonly known as: PROCARDIA-XL  Take 1 tablet (90 mg total) by mouth once daily.  What changed: when to take this        CONTINUE taking these medications    acetaminophen 500 MG tablet  Commonly known as: TYLENOL  Take 500 mg by mouth daily as needed for Pain.     aspirin 81 MG EC tablet  Commonly known as: ECOTRIN  Take 81 mg by mouth once daily.     atorvastatin 20 MG tablet  Commonly known as: LIPITOR  Take 20 mg by mouth once daily.     BD  "INSULIN SYRINGE 1 mL 25 gauge x 5/8" Syrg  Generic drug: insulin syringe-needle U-100  1 Units by Misc.(Non-Drug; Combo Route) route every evening.     blood glucose strip-disp meter Kit  1 application by Misc.(Non-Drug; Combo Route) route 3 (three) times daily.     blood-glucose meter kit  Commonly known as: RELION ALL-IN-ONE METER  Use as instructed     lancets Misc  1 application by Misc.(Non-Drug; Combo Route) route 3 (three) times daily.     ONE DAILY MULTIVITAMIN per tablet  Generic drug: multivitamin  Take 1 tablet by mouth once daily.        STOP taking these medications    FLUoxetine 20 MG capsule     LANTUS SOLOSTAR U-100 INSULIN glargine 100 units/mL (3mL) SubQ pen  Generic drug: insulin            Indwelling Lines/Drains at time of discharge:   Lines/Drains/Airways     Drain  Duration           Female External Urinary Catheter 03/18/22 0050 30 days                Time spent on the discharge of patient: 36 minutes   Patient examined at bedside on day of discharge. Exam findings stable. She was deemed safe for discharge.       Maria Elena Lima MD  Department of Hospital Medicine  [facility] - Neurosurgery (Hospital)  "

## 2022-04-20 ENCOUNTER — PATIENT OUTREACH (OUTPATIENT)
Dept: ADMINISTRATIVE | Facility: CLINIC | Age: 52
End: 2022-04-20
Payer: MEDICAID

## 2022-04-20 DIAGNOSIS — R29.90 EPISODE OF TRANSIENT NEUROLOGIC SYMPTOMS: Primary | ICD-10-CM

## 2022-04-20 NOTE — PROGRESS NOTES
C3 nurse spoke with Bonnie Lino for a TCC post hospital discharge follow up call. The patient does not have a scheduled HOSFU appointment with Dr. Culver within 7 days post hospital discharge date 4/16/2022. C3 nurse was unable to schedule HOSFU appointment in [facility]. NP home referral submitted.    Message sent to PCP staff requesting they contact patient and schedule follow up appointment.

## 2022-04-22 ENCOUNTER — TELEPHONE (OUTPATIENT)
Dept: FAMILY MEDICINE | Facility: HOSPITAL | Age: 52
End: 2022-04-22
Payer: MEDICAID

## 2022-04-22 DIAGNOSIS — R29.90 EPISODE OF TRANSIENT NEUROLOGIC SYMPTOMS: Primary | ICD-10-CM

## 2022-04-25 ENCOUNTER — PES CALL (OUTPATIENT)
Dept: ADMINISTRATIVE | Facility: CLINIC | Age: 52
End: 2022-04-25
Payer: MEDICAID

## 2022-05-02 ENCOUNTER — TELEPHONE (OUTPATIENT)
Dept: FAMILY MEDICINE | Facility: HOSPITAL | Age: 52
End: 2022-05-02
Payer: MEDICAID

## 2022-05-02 ENCOUNTER — OFFICE VISIT (OUTPATIENT)
Dept: HOME HEALTH SERVICES | Facility: CLINIC | Age: 52
End: 2022-05-02
Payer: MEDICAID

## 2022-05-02 DIAGNOSIS — I15.2 HYPERTENSION ASSOCIATED WITH TYPE 2 DIABETES MELLITUS: ICD-10-CM

## 2022-05-02 DIAGNOSIS — E11.22 CONTROLLED TYPE 2 DIABETES MELLITUS WITH STAGE 3 CHRONIC KIDNEY DISEASE, WITH LONG-TERM CURRENT USE OF INSULIN: Primary | ICD-10-CM

## 2022-05-02 DIAGNOSIS — E11.59 HYPERTENSION ASSOCIATED WITH TYPE 2 DIABETES MELLITUS: ICD-10-CM

## 2022-05-02 DIAGNOSIS — R29.90 EPISODE OF TRANSIENT NEUROLOGIC SYMPTOMS: ICD-10-CM

## 2022-05-02 DIAGNOSIS — N18.30 CONTROLLED TYPE 2 DIABETES MELLITUS WITH STAGE 3 CHRONIC KIDNEY DISEASE, WITH LONG-TERM CURRENT USE OF INSULIN: Primary | ICD-10-CM

## 2022-05-02 DIAGNOSIS — Z79.4 CONTROLLED TYPE 2 DIABETES MELLITUS WITH STAGE 3 CHRONIC KIDNEY DISEASE, WITH LONG-TERM CURRENT USE OF INSULIN: Primary | ICD-10-CM

## 2022-05-02 PROCEDURE — 99350 HOME/RES VST EST HIGH MDM 60: CPT | Mod: S$GLB,,, | Performed by: NURSE PRACTITIONER

## 2022-05-02 PROCEDURE — 3052F PR MOST RECENT HEMOGLOBIN A1C LEVEL 8.0 - < 9.0%: ICD-10-PCS | Mod: CPTII,S$GLB,, | Performed by: NURSE PRACTITIONER

## 2022-05-02 PROCEDURE — 99350 PR HOME VISIT,ESTAB PATIENT,LEVEL IV: ICD-10-PCS | Mod: S$GLB,,, | Performed by: NURSE PRACTITIONER

## 2022-05-02 PROCEDURE — 4010F ACE/ARB THERAPY RXD/TAKEN: CPT | Mod: CPTII,S$GLB,, | Performed by: NURSE PRACTITIONER

## 2022-05-02 PROCEDURE — 1159F PR MEDICATION LIST DOCUMENTED IN MEDICAL RECORD: ICD-10-PCS | Mod: CPTII,S$GLB,, | Performed by: NURSE PRACTITIONER

## 2022-05-02 PROCEDURE — 1160F PR REVIEW ALL MEDS BY PRESCRIBER/CLIN PHARMACIST DOCUMENTED: ICD-10-PCS | Mod: CPTII,S$GLB,, | Performed by: NURSE PRACTITIONER

## 2022-05-02 PROCEDURE — 1111F PR DISCHARGE MEDS RECONCILED W/ CURRENT OUTPATIENT MED LIST: ICD-10-PCS | Mod: CPTII,S$GLB,, | Performed by: NURSE PRACTITIONER

## 2022-05-02 PROCEDURE — 3052F HG A1C>EQUAL 8.0%<EQUAL 9.0%: CPT | Mod: CPTII,S$GLB,, | Performed by: NURSE PRACTITIONER

## 2022-05-02 PROCEDURE — 1159F MED LIST DOCD IN RCRD: CPT | Mod: CPTII,S$GLB,, | Performed by: NURSE PRACTITIONER

## 2022-05-02 PROCEDURE — 1160F RVW MEDS BY RX/DR IN RCRD: CPT | Mod: CPTII,S$GLB,, | Performed by: NURSE PRACTITIONER

## 2022-05-02 PROCEDURE — 1111F DSCHRG MED/CURRENT MED MERGE: CPT | Mod: CPTII,S$GLB,, | Performed by: NURSE PRACTITIONER

## 2022-05-02 PROCEDURE — 4010F PR ACE/ARB THEARPY RXD/TAKEN: ICD-10-PCS | Mod: CPTII,S$GLB,, | Performed by: NURSE PRACTITIONER

## 2022-05-02 NOTE — PROGRESS NOTES
"  [facility]  Transition of Care Home Visit    Visit Date: 5/2/2022  Encounter Provider: Aixa López   PCP:  Robson Culver DO    PRESENTING HISTORY      Patient ID: Bonnie Lino is a 52 y.o. female.    Consult Requested By:  Dr. Robson Culver  Reason for Consult:  Hospital Follow Up.    Bonnie SOLOMON is being seen at home due to being seen at home due to physical debility that presents a taxing effort to leave the home, to mitigate high risk of hospital readmission and/or due to the limited availability of reliable or safe options for transportation to the point of access to the provider. Prior to treatment on this visit the chart was reviewed and patient verbal consent was obtained.      Chief Complaint: Transitional Care        History of Present Illness: Ms. Bonnie Lino is a 52 y.o. female who was recently admitted to the hospital.      Bonnie Lino is a 52 y.o. female with Hx of possible TIA, HLD, HTN, DM type 2, and CKD stage 3 who presented to the ED with aphasia after episode of "shaking." Patient's friend at bedside reports that the patient was hanging out with friends when her hand suddenly began to shake. She notified one of the friends what was happening and then the rest of her body started shaking. During the episode, she was moaning along with gasping for air so friends called EMS. Patient never loss consciousness during the episode and reports that she was aware of what was occurring the whole time. No reported tongue biting or bowel/bladder incontinence. Friends reported patient appeared to having a panic attack prior to symptoms, but patient denies this. Not sure how long the episode of shaking lasted, but patient still having aphasia. Glucose with EMS was 146. Patient is alert, following commands, moving extremities equally.  Upon arrival she was having difficulty with word finding/expressive aphasia, she is now improving with stuttering and slowed response. Patient " "denies history of seizures, no headache, vision changes, recent head trauma. She endorses recent onset depression.      Of note, patient was admitted at [facility] on 3/17 for evaluation of right LE weakness and gait instability. Per discharge summary "MRI brain and MRA head and neck without acute process. Echo with EF 75% and no intracardiac shunting. Labs unremarkable. On re-evaluation by neurology and primary team neurologic exam wnl. MRI lumbar spine without contrast obtained with signs of mild degenerative changes of the lumbar spineL4-L5 and L5-S1. Given no signs of CVA and pt remained stable, she was deemed medically stable for discharge to in patient rehab to for physical therapy. Patient will have follow-up neurology appointment for further work-up of acute leg weakness."     Stroke called upon arrival.  Vascular neuro evaluated patient. CTA head/neck without acute findings. Patient given 1 mg ativan for anxiety.         * No surgery found *       Hospital Course:   She was evaluated by neuro and psychiatry. She was not felt to have an underlying neurological process. AED's were not recommended. Family and patient decided to d/c prozac after it had been increased by psych. Her symptoms improved daily. She was to be transferred to  psych, but there was no bed availability. Given she was almost back to baseline, she was cleared for discharge.  ___________________________________________________________________    Today:    HPI:  Pt is being seen today for hospital follow up. See hospital course.    She is found in her home today, AAOx3. Agreeable to this visit. She denies any pain today and reports she has already been to OT this am. She is ambulating in the apartment without difficulty.    She reports she was having episodes of seizure like activity which prompted her admit. Neuro eval did not believe she was having seizures and stopped her Prozac as a possible cause. She reports that at the time of " admit she was having about 8 episodes daily, it has decreased to about 2 episodes now.  She has a follow up appt with her PCP this week and also a neuro appt.    She is compliant with all her medications, she is taking her insulin as prescribed and glucose has been running between 100-200 since her discharge.         Review of Systems   Constitutional: Negative for activity change, fatigue and fever.   HENT: Negative.    Eyes: Negative.    Respiratory: Negative for chest tightness.    Cardiovascular: Negative.  Negative for leg swelling.   Gastrointestinal: Negative.    Endocrine: Negative.    Genitourinary: Negative.    Musculoskeletal: Negative.    Skin: Negative.    Allergic/Immunologic: Negative.    Neurological: Positive for speech difficulty (when she has her seizure like activity).   Hematological: Negative.    Psychiatric/Behavioral: Negative.  Negative for agitation.   All other systems reviewed and are negative.      Assessments:  · Environmental: 2nd floor apartment  · Functional Status: independent  · Safety: no noted issues  · Nutritional: adequate  · Home Health/DME/Supplies: none    PAST HISTORY:     Past Medical History:   Diagnosis Date    Aneurysm of cardiac wall, congenital     Bilateral lower extremity edema     2/2 calcium channel blocker    Diabetes type 2, uncontrolled     HLD (hyperlipidemia)     Hypertension     Vitamin D deficiency        Past Surgical History:   Procedure Laterality Date    BREAST CYST EXCISION  2003    BREAST SURGERY      cyst removal     CATARACT EXTRACTION W/  INTRAOCULAR LENS IMPLANT      COLONOSCOPY N/A 7/23/2021    Procedure: COLONOSCOPY;  Surgeon: Sapna Fitzgerald MD;  Location: [facility];  Service: Endoscopy;  Laterality: N/A;    HYSTERECTOMY  2002    PARTIAL HYSTERECTOMY  2002       Family History   Problem Relation Age of Onset    Diabetes Mother     Heart disease Mother     Cancer Mother 40        breast    Breast cancer Mother     Diabetes  "Father     Cancer Maternal Grandmother 82        breast    Breast cancer Maternal Grandmother        Social History     Socioeconomic History    Marital status: Single   Occupational History     Employer: [employer]   Tobacco Use    Smoking status: Never Smoker    Smokeless tobacco: Never Used   Substance and Sexual Activity    Alcohol use: Yes     Comment: seldom    Drug use: No       MEDICATIONS & ALLERGIES:     Current Outpatient Medications on File Prior to Visit   Medication Sig Dispense Refill    acetaminophen (TYLENOL) 500 MG tablet Take 500 mg by mouth daily as needed for Pain.      aspirin (ECOTRIN) 81 MG EC tablet Take 81 mg by mouth once daily.      atorvastatin (LIPITOR) 20 MG tablet Take 20 mg by mouth once daily.      blood glucose strip-disp meter Kit 1 application by Misc.(Non-Drug; Combo Route) route 3 (three) times daily. 1 kit 1    blood-glucose meter (RELION ALL-IN-ONE METER) kit Use as instructed 1 each 0    canagliflozin (INVOKANA) 100 mg Tab tablet Take 1 tablet (100 mg total) by mouth once daily. (Patient taking differently: Take 100 mg by mouth nightly.) 30 tablet 11    gabapentin (NEURONTIN) 300 MG capsule Take 1 capsule (300 mg total) by mouth 3 (three) times daily. 90 capsule 11    hydrOXYzine pamoate (VISTARIL) 25 MG Cap Take 1 capsule (25 mg total) by mouth every 6 (six) hours as needed (anxiety and symptoms of panic attack). 30 capsule 0    insulin syringe-needle U-100 (BD INSULIN SYRINGE) 1 mL 25 gauge x 5/8" Syrg 1 Units by Misc.(Non-Drug; Combo Route) route every evening. 100 each 3    lancets Misc 1 application by Misc.(Non-Drug; Combo Route) route 3 (three) times daily. 100 each 1    lisinopriL (PRINIVIL,ZESTRIL) 2.5 MG tablet Take 1 tablet (2.5 mg total) by mouth nightly. 30 tablet 0    multivitamin (THERAGRAN) per tablet Take 1 tablet by mouth once daily.      NIFEdipine (PROCARDIA-XL) 90 MG (OSM) 24 hr tablet Take 1 tablet (90 mg total) by mouth " once daily. (Patient taking differently: Take 90 mg by mouth nightly.) 90 tablet 3    [DISCONTINUED] FLUoxetine 20 MG capsule Take 1 capsule (20 mg total) by mouth every evening. 30 capsule 11    [DISCONTINUED] insulin (LANTUS SOLOSTAR U-100 INSULIN) glargine 100 units/mL (3mL) SubQ pen Inject 46 Units into the skin every evening. 13.8 mL 11     No current facility-administered medications on file prior to visit.        Review of patient's allergies indicates:  No Known Allergies    OBJECTIVE:     Vital Signs:  There were no vitals filed for this visit.  There is no height or weight on file to calculate BMI.     Physical Exam:  Physical Exam  Vitals reviewed.   Constitutional:       General: She is not in acute distress.     Appearance: She is well-developed.   HENT:      Head: Normocephalic and atraumatic.   Eyes:      Pupils: Pupils are equal, round, and reactive to light.   Cardiovascular:      Rate and Rhythm: Normal rate and regular rhythm.      Heart sounds: Normal heart sounds.   Pulmonary:      Effort: Pulmonary effort is normal.      Breath sounds: Normal breath sounds.   Abdominal:      General: Bowel sounds are normal.      Palpations: Abdomen is soft.   Musculoskeletal:         General: Normal range of motion.      Cervical back: Normal range of motion and neck supple.   Skin:     General: Skin is warm and dry.   Neurological:      Mental Status: She is alert and oriented to person, place, and time.      Cranial Nerves: No cranial nerve deficit.   Psychiatric:         Behavior: Behavior normal.         Thought Content: Thought content normal.         Judgment: Judgment normal.         Laboratory  Lab Results   Component Value Date    WBC 4.83 04/15/2022    HGB 11.4 (L) 04/15/2022    HCT 37.0 04/15/2022    MCV 83 04/15/2022     04/15/2022     Lab Results   Component Value Date    INR 1.0 04/12/2022     Lab Results   Component Value Date    HGBA1C 8.1 (H) 03/18/2022     No results for input(s):  POCTGLUCOSE in the last 72 hours.    Diagnostic Results:      TRANSITION OF CARE:     [facility] Contact Note: 4/20/22    Family and/or Caretaker present at visit?  Yes.  Diagnostic tests reviewed/disposition: No diagnosic tests pending after this hospitalization.  Disease/illness education:   Home health/community services discussion/referrals: Patient does not have home health established from hospital visit.  They do not need home health.  If needed, we will set up home health for the patient.   Establishment or re-establishment of referral orders for community resources: No other necessary community resources.   Discussion with other health care providers: No discussion with other health care providers necessary.     Transition of Care Visit:  I have reviewed and updated the history and problem list.  I have reconciled the medication list.  I have discussed the hospitalization and current medical issues, prognosis and plans with the patient/family.  I  spent more than 50% of time discussing the care with the patient/family.  Total Face-to-Face Encounter: 60 minutes.    Medications Reconciliation:   I have reconciled the patient's home medications and discharge medications with the patient/family. I have updated all changes.  Refer to After-Visit Medication List.    ASSESSMENT & PLAN:     HIGH RISK CONDITION(S):      Problem List Items Addressed This Visit        Neuro    Episode of transient neurologic symptoms       Endocrine    Hypertension associated with type 2 diabetes mellitus (Chronic)    Controlled type 2 diabetes mellitus with stage 3 chronic kidney disease, with long-term current use of insulin - Primary         - Continue all medications, treatments and therapies as ordered.   - Follow all instructions, recommendations as discussed.  - Maintain Safety Precautions at all times.  - Attend all medical appointments as scheduled.  - For worsening symptoms: call Primary Care Physician or Nurse  "Practitioner.  - For emergencies, call 911 or immediately report to the nearest emergency room.  - Limit Risks of environmental exposure to coronavirus/COVID-19 as discussed including: social distancing, hand hygiene, and limiting departures from the home for necessities only.     Were controlled substances prescribed?  No      Scheduled Follow-up :  Future Appointments   Date Time Provider Department Center   5/4/2022  3:00 PM Robson Culver DO [facility]   8/30/2022  9:00 AM Teresa Ball MD [facility]       After Visit Medication List :     Medication List          Accurate as of May 2, 2022  2:06 PM. If you have any questions, ask your nurse or doctor.            CHANGE how you take these medications    INVOKANA 100 mg Tab tablet  Generic drug: canagliflozin  Take 1 tablet (100 mg total) by mouth once daily.  What changed: when to take this     NIFEdipine 90 MG (OSM) 24 hr tablet  Commonly known as: PROCARDIA-XL  Take 1 tablet (90 mg total) by mouth once daily.  What changed: when to take this        CONTINUE taking these medications    acetaminophen 500 MG tablet  Commonly known as: TYLENOL     aspirin 81 MG EC tablet  Commonly known as: ECOTRIN     atorvastatin 20 MG tablet  Commonly known as: LIPITOR     BD INSULIN SYRINGE 1 mL 25 gauge x 5/8" Syrg  Generic drug: insulin syringe-needle U-100  1 Units by Misc.(Non-Drug; Combo Route) route every evening.     blood glucose strip-disp meter Kit  1 application by Misc.(Non-Drug; Combo Route) route 3 (three) times daily.     blood-glucose meter kit  Commonly known as: RELION ALL-IN-ONE METER  Use as instructed     gabapentin 300 MG capsule  Commonly known as: NEURONTIN     hydrOXYzine pamoate 25 MG Cap  Commonly known as: VISTARIL  Take 1 capsule (25 mg total) by mouth every 6 (six) hours as needed (anxiety and symptoms of panic attack).     lancets Misc  1 application by Misc.(Non-Drug; Combo Route) route 3 (three) times " daily.     lisinopriL 2.5 MG tablet  Commonly known as: PRINIVIL,ZESTRIL  Take 1 tablet (2.5 mg total) by mouth nightly.     multivitamin per tablet  Commonly known as: THERAGRAN        Attestation: Screening criteria to assess the level of the patient's risk for infection with COVID-19 as recommended by the CDC at the time of the above documented home visit concluded appropriateness to proceed. Universal precautions were maintained at all times, including provider use of >60% alcohol gel hand  immediately prior to entry and upon departing the patient's home as well as cleaning of equipment used in home visit with antibacterial/germicidal disposable wipes.      Signature: Aixa López NP

## 2022-05-02 NOTE — TELEPHONE ENCOUNTER
----- Message from Jillian Matute sent at 5/2/2022  1:48 PM CDT -----  Amanuel with [facility] [phone] was calling to get orders for a Bed side 3-N-one bed side conmmode for patient.

## 2022-05-04 ENCOUNTER — OFFICE VISIT (OUTPATIENT)
Dept: FAMILY MEDICINE | Facility: HOSPITAL | Age: 52
End: 2022-05-04
Attending: FAMILY MEDICINE
Payer: MEDICAID

## 2022-05-04 VITALS
HEIGHT: 67 IN | BODY MASS INDEX: 26.44 KG/M2 | DIASTOLIC BLOOD PRESSURE: 82 MMHG | SYSTOLIC BLOOD PRESSURE: 133 MMHG | WEIGHT: 168.44 LBS | HEART RATE: 91 BPM

## 2022-05-04 DIAGNOSIS — F44.5 PSEUDOSEIZURE: ICD-10-CM

## 2022-05-04 DIAGNOSIS — F44.4 CONVERSION DISORDER WITH ABNORMAL MOVEMENT: Primary | ICD-10-CM

## 2022-05-04 PROCEDURE — 99214 OFFICE O/P EST MOD 30 MIN: CPT | Performed by: STUDENT IN AN ORGANIZED HEALTH CARE EDUCATION/TRAINING PROGRAM

## 2022-05-04 NOTE — PATIENT INSTRUCTIONS
At this time we believe you are having what we call pseudo-seizures secondary to conversion disorder.

## 2022-05-04 NOTE — PROGRESS NOTES
"Progress Note  [facility]    Subjective:     Bonnie Lino is a 52 y.o. year old female with PMHx of T2DM, CKD 3b, HTN, depression who presents to clinic with son for hospital follow-up.     Pt was initially admitted for concern for stroke which was ruled-out. Was transferred to [facility] for further rehabilitation of LE weakness where she developed seizure-like activity. EEG and work-up negative for seizure. Pt was diagnosed with conversion disorder with pseudo-seizures. Prozac was discontinued during admission in case this was causing her seizure-like activity. Episodes have not resolved despite being off of this medication for a month. Plan was for patient to be discharged to inpatient psych but did not go 2/2 lack of bed availability. Pt was discharged on PRN hydroxyzine and sent home with home health PT/OT.     Since being home pt has had improvement in LE strength, currently walking with a cane. Has continued to have episodes where her head will drop down, she will stop talking, sometimes her head will shake, and then she will slowly say "I'm okay" a couple of times. After this she will raise her head, will often be mildly crying and then continue talking or doing whatever activity she was doing prior. No post-ictal state. These episodes will occur about 4-5 times a day.     Since discharge has not fallen. She has not been driving. Has good family support. Has not been able to see outpatient psychiatry or therapy.     Per son, mother has been feeling very sad about the amount of medications she is on and depressed about her chronic illnesses. Son believes a lot of these episodes do occur around stressful events or when she is feeling sad.     Patient Active Problem List    Diagnosis Date Noted    Pseudoseizure 05/04/2022    Conversion disorder with abnormal movement     Aphasia 04/13/2022    Episode of transient neurologic symptoms 04/13/2022    Stuttering 04/13/2022    Right leg weakness 03/17/2022 "    UTI (urinary tract infection) 03/17/2022    Current mild episode of major depressive disorder without prior episode 02/25/2022    Controlled type 2 diabetes mellitus with stage 3 chronic kidney disease, with long-term current use of insulin 08/06/2021    Lesion of right external ear 08/06/2021    Diabetic polyneuropathy associated with type 2 diabetes mellitus 08/06/2021    Venous stasis dermatitis of both lower extremities 08/06/2021    Chronic kidney disease, stage 3b 04/06/2021    Hypertension associated with type 2 diabetes mellitus     HLD (hyperlipidemia) 09/23/2014    Vitamin D deficiency 09/23/2014    Aneurysm of cardiac wall, congenital         Review of patient's allergies indicates:  No Known Allergies     Past Medical History:   Diagnosis Date    Aneurysm of cardiac wall, congenital     Bilateral lower extremity edema     2/2 calcium channel blocker    Diabetes type 2, uncontrolled     HLD (hyperlipidemia)     Hypertension     Vitamin D deficiency       Past Surgical History:   Procedure Laterality Date    BREAST CYST EXCISION  2003    BREAST SURGERY      cyst removal     CATARACT EXTRACTION W/  INTRAOCULAR LENS IMPLANT      COLONOSCOPY N/A 7/23/2021    Procedure: COLONOSCOPY;  Surgeon: Sapna Fitzgerald MD;  Location: [facility];  Service: Endoscopy;  Laterality: N/A;    HYSTERECTOMY  2002    PARTIAL HYSTERECTOMY  2002      Family History   Problem Relation Age of Onset    Diabetes Mother     Heart disease Mother     Cancer Mother 40        breast    Breast cancer Mother     Diabetes Father     Cancer Maternal Grandmother 82        breast    Breast cancer Maternal Grandmother       Social History     Tobacco Use    Smoking status: Never Smoker    Smokeless tobacco: Never Used   Substance Use Topics    Alcohol use: Yes     Comment: seldom        Objective:     Vitals:    05/04/22 1452   BP: 133/82   BP Location: Left arm   Pulse: 91   Weight: 76.4 kg (168 lb 6.9 oz)  "  Height: 5' 7" (1.702 m)     BMI: 26.38    Gen: No apparent distress, well nourished and developed, appears stated age  CV: RRR, S1 and S2 present, no LE edema  Resp: CTAB, normal respiratory effort  Psych: Poor insight into current disease process, however was not given diagnosis at [facility]. Good eye contract. During encounter had 2 episode. First last about 30 seconds. Her eye closed, head shook back and forth for about 10 seconds then her head dropped down and she said slowly "I'm, I'm okay, I'm okay , I'm okay Dr. Culver" and then raised her head, opened her eyes and had tears in her eyes. The second episode was shorter and she just dropped her head and said "I'm okay" slowly once then recovered. Pt was aware during both episodes. She was alert and had similar energy level after each episode.     Assessment/Plan:     Bonnie Lino is a 52 y.o. year old female who presents to clinic for hospital follow-up.     1. Pseudoseizure  Lengthy discussion about diagnosis and treatment being multi-modal with psychiatry and therapy. Discussed that prozac was unlikely to be the cause and more than likely this is her bodies way of presenting her depression and stress she has been experiencing. Given the frequency of these episodes she should continue to refrain from driving and working for her safety. Agree with neuro that these are unlikely to be true seizures, especially after witnessing them myself. Provided positive reinforcement about family support and her willingness to go to therapy. Discussed pt will need to buy into the process of psychiatry and counseling as a way to treat her current illness. Son at bedside understanding and supportive. Resources provided for outpatient psychiatry with recommendation to call. Also has pending referral from inpatient psych but unlikely to be approved with insurance.     2. Conversion disorder with abnormal movement  As above. Pt also with perceived LE weakness 2/2 conversion " disorder. Received message from home health OT with request for commode. Provided positive encouragement that she has good muscle strength and the more she believes she can walk the more likely her strength will improve. Overall this will be a long process and pt and family will need a lot of education and support through this process.   - COMMODE FOR HOME USE      Follow-up: 1 month    A total of 45 minutes was spent on patient care during this encounter which included chart review, examining the patient, formulating a treatment plan and documentation.     Medical decision making straight forward and not complex during this visit.     Case discussed with staff: Dr. Alfonso Culver,   [facility], PGY-2

## 2022-05-05 ENCOUNTER — OFFICE VISIT (OUTPATIENT)
Dept: FAMILY MEDICINE | Facility: HOSPITAL | Age: 52
End: 2022-05-05
Attending: SPECIALIST
Payer: MEDICAID

## 2022-05-05 DIAGNOSIS — F32.89 OTHER SPECIFIED DEPRESSIVE EPISODES: ICD-10-CM

## 2022-05-05 DIAGNOSIS — F44.7 FUNCTIONAL NEUROLOGICAL SYMPTOM DISORDER WITH MIXED SYMPTOMS: Primary | ICD-10-CM

## 2022-05-05 PROBLEM — N39.0 UTI (URINARY TRACT INFECTION): Status: RESOLVED | Noted: 2022-03-17 | Resolved: 2022-05-05

## 2022-05-05 PROBLEM — R29.898 RIGHT LEG WEAKNESS: Status: RESOLVED | Noted: 2022-03-17 | Resolved: 2022-05-05

## 2022-05-05 PROCEDURE — 99211 OFF/OP EST MAY X REQ PHY/QHP: CPT | Performed by: PSYCHOLOGIST

## 2022-05-06 NOTE — PROGRESS NOTES
[facility] [address]  [place] [address]    Diagnostic Assessment    A description of the assessment process, the initial 50-55-minute diagnostic appointment, 20-30-minute follow-up appointments, and patient confidentiality were reviewed.  Patient was fully cooperative throughout the interview and was an adequate historian. Patient willingly signed a consent form for treatment and limits of confidentiality were explained in this context.    Note: All information described in this report has been directly reported by the patient in the appointment (unless specifically noted) except for Mental Status, Diagnosis, and Conclusions/Recommendations/Initial Treatment Goals.      Date of Service: May 5, 2022  Patient Name:  Bonnie Lino  MRN: [id number]  : 1970  Age:  52  Length of Session: 60 minutes  Present in Session: Patient  Provider: Raquel Horne Psy.D.      Identifying Information:  Patient is a 52-year-old  female who is currently living in [place].     Referral Source:  Patient was referred to practitioner by her PCP Dr. Culver for concerns related to a conversion disorder.    Chief Complaint/Presenting Problem:  Patient stated she thought she had a stroke in 2022 but mentioned her medical tests have all shown no evidence of a stroke.    History of Chief Complaint/Presenting Problem:  Patient stated she felt depressed in 2022 and informed her PCP she felt as though she did not want to live anymore. Patient stated she then believed she had a stroke in 2022. She described her stroke-like symptoms as including a dropping of her head, closing of her eyes, and slurred speech for a few seconds. She stated she also occasionally experiences body shakes. Patient reported these symptoms occurred seemingly out of nowhere. Patient indicated she experienced these episodes roughly 6 - 9 times per day in March. Patient also  "mentioned she was prescribed Prozac 10 mg in March. Patient mentioned she has stopped taking her Prozac and noted her stroke-like episodes now occur roughly 3 - 5 times per day.    Current Significant/Relationship/Partner:  Patient stated she is not currently involved in a relationship. She discussed a friend for whom she developed romantic feelings and mentioned her desire for a relationship with him and companionship (general and romantic) but noted the two are platonic friends.    Current Mental Health Symptoms: Patient endorsed/denied changes in the following areas:    Mood: Endorsed. Patient described her current mood as "pretty good." She mentioned she feels occasionally sad but attempts to shake this feeling.    Anhedonia: Endorsed.     Sleep: Endorsed. Patient stated she has had difficulty sleeping prior to February but noted her insomnia has worsened. Patient stated she has difficulty sleeping due to racing thoughts such as why is this happening? What is wrong with me? Will I be able to cook or drive again?    Level of Energy:  Denied.    Appetite:  Denied.     Hopelessness/Worthlessness:  Denied.    Anger/Irritability/Aggression: Denied.    Concentration/Attention/Hyperactivity: Patient denied any difficulty with concentration and/or hyperactivity symptoms; however, she mentioned she has difficulty with her short-term memory. She indicated that her long-term memory remains intact.        Anxiety: Patient stated she has become anxious within the past month or two regarding her job security and finances due to her sabbatical from work in order to take care of her health.    Trauma/Re-experiencing: Denied.    Impulsivity: Denied.    Psychotic symptoms/Radhika/Hypomania: Denied.    Suicidal or Homicidal Ideation: Patient endorsed passive suicidal thoughts. She listed her Muslim and children as protective factors. Patient denied any past suicidal ideation, plan, or intent. Patient denied any past or present " homicidal ideation, plan, or intent.     Self-harming behaviors: Patient denied engaging in any past or present self-harming behaviors.    Mental Health History:    Psychiatric History:  Psychiatric Hospitalizations: Denied.    Suicidal ideation/attempts: Patient endorsed passive suicidal thought. She denied any past or present suicidal plan and/or attempts.    Psychiatric Medications: Patient stated her PCP prescribed her 10 mg of Prozac, but patient noted it was ineffective. Patient stated the prescription was increased to 20 mg before patient discontinued its use. Patient stated her stroke-like episodes have decreased in frequency and her speech has improved since she stopped taking the medication.    Family Psychiatric History: Not assessed.    Psychotherapy History: Denied.    Medical History:  Patients chart lists medical concerns related to diabetes, kidney disease, and hypertension. Patients chart also shows she had a colonoscopy in 2021, a breast cyst excision in 2003, and a hysterectomy in 2002.    Patient mentioned she was hospitalized in March 2022 as she thought she was having a stroke due to the loss of voluntary movement in her leg. Patient stated she was also hospitalized in April 2022 after her hands begun uncontrollably shaking, leading patient to again believe that she was having a stroke.    Current/Past Medications:   Patients chart lists the following medications: acetaminophen 500 mg; aspirin 81 mg; Lipitor 20 mg; Invokana 100 mg; gabapentin 300 mg; Vistaril 25 mg; insulin; lisinopril 2.5 mg; a multivitamin; and Procardia-XL 90 mg.    Substance Use:  Patient stated her last alcohol consumption consisted of 2 glasses of wine in February. Patient mentioned she does not drink often, only in a social capacity.    Patient denied any other substance use.    Family substance use: Not assessed.    Family History:       Father: Patient described a positive relationship with her father.    Mother:  "Patient reported her mother passed away in September 2019 and described a complex relationship with her.    Siblings: Patient stated she has two maternal brothers, two paternal brothers, and two paternal sisters. Patient described positive relationships with her siblings.    Children: Patient has two sons and described positive relationships with them.    Physical, Verbal, or Sexual Abuse:  Patient denied any past experiences of verbal, physical, or sexual abuse.    Relationship History:  Patient stated she underwent a divorce roughly 4 years ago. Patient mentioned she was  for 20 years but the relationship ended due to infidelity. Patient mentioned she wanted her marriage to work and stated she has a friend in whom she is romantically interested who helped her through her divorce.    Social/Peer History:  Patient listed her father, aunts, uncles, children, and best friends as her support network.    Occupational/Educational History:  Educational History:   Not assessed.    Occupational History:   Patient is employed in a full-time capacity but has not been cleared to return to work due to her medical concerns.    Current/Past Legal Involvement:  Denied.    Spirituality:  Patient identifies as spiritual.    Expectations/Goals for Treatment:  Patient stated she would like to learn how to be less worried.    Patient stated she would like to learn how to be less stressed on focus on being in a happier place. Patient elaborated by stated she would like to learn that it is OK to have dinner by yourself and you dont have to be with people.    Mental Status Exam:  Appearance: Patient was appropriately dressed for her session and had good hygiene.  Expressed Mood: pretty good"  Affect: Normal range and intensity. Affect consistent with expressed mood. Patient was occasionally tearful throughout interview.  Attitude during Interview: Open and friendly. Candid and cooperative.  Movement and Behavior: No unusual " movements or psychomotor changes.  Speech: Normal rate/tone/volume, without pressure.  Eye Contact: Makes eye contact.  Thought processes: Clear, coherent, and organized.  Thought content: No indication of hallucinations, delusions, obsessions, ideas of reference, or symptoms of dissociation.  Suicidal ideation: Denied current plan and intent.  Homicidal ideation: Denied current thoughts, plan, and intent.  Orientation: Oriented x 4 (person, place, time, and situation).  Memory/concentration: WNL.  Insight/Judgment: Fair.    Safety Issues/Plan for Safety/ Risk Management:  Patient denies current fears or concerns for personal safety.  Patient denies current or recent suicidal behaviors.  Patient denies current or recent homicidal ideation or behaviors.  Patient denies current or recent self-injurious behavior or ideation.  Patient denies other safety concerns.    A safety and risk management plan has not been developed at this time; however, patient was given after-hour numbers should there be a change in any of these risk factors.    Interactive Complexity: None.    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

## 2022-05-09 LAB
POCT GLUCOSE: 131 MG/DL (ref 70–110)
POCT GLUCOSE: 138 MG/DL (ref 70–110)
POCT GLUCOSE: 156 MG/DL (ref 70–110)
POCT GLUCOSE: 168 MG/DL (ref 70–110)
POCT GLUCOSE: 43 MG/DL (ref 70–110)

## 2022-05-19 DIAGNOSIS — E11.22 CONTROLLED TYPE 2 DIABETES MELLITUS WITH STAGE 3 CHRONIC KIDNEY DISEASE, WITH LONG-TERM CURRENT USE OF INSULIN: ICD-10-CM

## 2022-05-19 DIAGNOSIS — N18.30 CONTROLLED TYPE 2 DIABETES MELLITUS WITH STAGE 3 CHRONIC KIDNEY DISEASE, WITH LONG-TERM CURRENT USE OF INSULIN: ICD-10-CM

## 2022-05-19 DIAGNOSIS — Z79.4 CONTROLLED TYPE 2 DIABETES MELLITUS WITH STAGE 3 CHRONIC KIDNEY DISEASE, WITH LONG-TERM CURRENT USE OF INSULIN: ICD-10-CM

## 2022-05-20 RX ORDER — INSULIN GLARGINE 100 [IU]/ML
46 INJECTION, SOLUTION SUBCUTANEOUS NIGHTLY
Qty: 13.8 ML | Refills: 11 | Status: SHIPPED | OUTPATIENT
Start: 2022-05-20 | End: 2023-06-08 | Stop reason: SDUPTHER

## 2022-05-21 ENCOUNTER — TELEPHONE (OUTPATIENT)
Dept: FAMILY MEDICINE | Facility: HOSPITAL | Age: 52
End: 2022-05-21
Payer: MEDICAID

## 2022-05-21 DIAGNOSIS — E78.5 HYPERLIPIDEMIA, UNSPECIFIED HYPERLIPIDEMIA TYPE: Primary | ICD-10-CM

## 2022-05-21 RX ORDER — ATORVASTATIN CALCIUM 20 MG/1
20 TABLET, FILM COATED ORAL DAILY
Qty: 90 TABLET | Refills: 3 | Status: SHIPPED | OUTPATIENT
Start: 2022-05-21 | End: 2023-05-18 | Stop reason: SDUPTHER

## 2022-05-21 NOTE — TELEPHONE ENCOUNTER
----- Message from Selma Hooper MA sent at 5/20/2022 12:32 PM CDT -----  Contact: Patient [phone]  Patient requests atorvastatin (LIPITOR) 20 MG tablet be sent to [facility] [address] [facility] [place].  Thanks.

## 2022-05-23 DIAGNOSIS — E78.5 HYPERLIPIDEMIA, UNSPECIFIED HYPERLIPIDEMIA TYPE: ICD-10-CM

## 2022-05-31 ENCOUNTER — OFFICE VISIT (OUTPATIENT)
Dept: FAMILY MEDICINE | Facility: HOSPITAL | Age: 52
End: 2022-05-31
Attending: FAMILY MEDICINE
Payer: MEDICAID

## 2022-05-31 DIAGNOSIS — F44.7 FUNCTIONAL NEUROLOGICAL SYMPTOM DISORDER WITH MIXED SYMPTOMS: Primary | ICD-10-CM

## 2022-05-31 PROCEDURE — 99211 OFF/OP EST MAY X REQ PHY/QHP: CPT | Performed by: PSYCHOLOGIST
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
## 2022-06-02 ENCOUNTER — OFFICE VISIT (OUTPATIENT)
Dept: FAMILY MEDICINE | Facility: HOSPITAL | Age: 52
End: 2022-06-02
Attending: FAMILY MEDICINE
Payer: MEDICAID

## 2022-06-02 VITALS
SYSTOLIC BLOOD PRESSURE: 140 MMHG | WEIGHT: 171.75 LBS | HEART RATE: 78 BPM | HEIGHT: 67 IN | BODY MASS INDEX: 26.96 KG/M2 | DIASTOLIC BLOOD PRESSURE: 88 MMHG

## 2022-06-02 DIAGNOSIS — N18.30 CONTROLLED TYPE 2 DIABETES MELLITUS WITH STAGE 3 CHRONIC KIDNEY DISEASE, WITH LONG-TERM CURRENT USE OF INSULIN: Primary | ICD-10-CM

## 2022-06-02 DIAGNOSIS — Z79.4 CONTROLLED TYPE 2 DIABETES MELLITUS WITH STAGE 3 CHRONIC KIDNEY DISEASE, WITH LONG-TERM CURRENT USE OF INSULIN: Primary | ICD-10-CM

## 2022-06-02 DIAGNOSIS — E11.22 CONTROLLED TYPE 2 DIABETES MELLITUS WITH STAGE 3 CHRONIC KIDNEY DISEASE, WITH LONG-TERM CURRENT USE OF INSULIN: Primary | ICD-10-CM

## 2022-06-02 PROCEDURE — 99213 OFFICE O/P EST LOW 20 MIN: CPT | Performed by: STUDENT IN AN ORGANIZED HEALTH CARE EDUCATION/TRAINING PROGRAM

## 2022-06-02 NOTE — MEDICAL/APP STUDENT
"Progress Note  \[facility]\"" Family Medicine    Subjective:     Bonnie Lino is a 52 y.o. year old female with PMHx of DM II, HTN, HLD, and conversion disorder w/ abnormal movement who presents to clinic because of elevated blood sugars and malodorous urine starting 2 weeks ago. Pt says her BG readings have been around 200-300 and are normally around 120-150. She also has had an abnormal odor to her urine w/ no associated fever/chills, dysuria, hematuria, abnormal discharge, pelvic pain or back pain. However, she has had L sided vaginal "soreness" for a couple days when washing. Bonnie was recommended to come here by her home health nurse to be evaluated for UTI.     Patient Active Problem List    Diagnosis Date Noted    Pseudoseizure 05/04/2022    Conversion disorder with abnormal movement     Aphasia 04/13/2022    Episode of transient neurologic symptoms 04/13/2022    Stuttering 04/13/2022    Current mild episode of major depressive disorder without prior episode 02/25/2022    Controlled type 2 diabetes mellitus with stage 3 chronic kidney disease, with long-term current use of insulin 08/06/2021    Lesion of right external ear 08/06/2021    Diabetic polyneuropathy associated with type 2 diabetes mellitus 08/06/2021    Venous stasis dermatitis of both lower extremities 08/06/2021    Chronic kidney disease, stage 3b 04/06/2021    Hypertension associated with type 2 diabetes mellitus     HLD (hyperlipidemia) 09/23/2014    Vitamin D deficiency 09/23/2014    Aneurysm of cardiac wall, congenital         Review of patient's allergies indicates:  No Known Allergies     Past Medical History:   Diagnosis Date    Aneurysm of cardiac wall, congenital     Bilateral lower extremity edema     2/2 calcium channel blocker    Conversion disorder with abnormal movement     Diabetes type 2, uncontrolled     HLD (hyperlipidemia)     Hypertension     Pseudoseizures     Vitamin D deficiency       Past Surgical History: " "  Procedure Laterality Date    BREAST CYST EXCISION  2003    BREAST SURGERY      cyst removal     CATARACT EXTRACTION W/  INTRAOCULAR LENS IMPLANT      COLONOSCOPY N/A 7/23/2021    Procedure: COLONOSCOPY;  Surgeon: Sapna Fitzgerald MD;  Location: [facility];  Service: Endoscopy;  Laterality: N/A;    HYSTERECTOMY  2002    PARTIAL HYSTERECTOMY  2002      Family History   Problem Relation Age of Onset    Diabetes Mother     Heart disease Mother     Cancer Mother 40        breast    Breast cancer Mother     Diabetes Father     Cancer Maternal Grandmother 82        breast    Breast cancer Maternal Grandmother       Social History     Tobacco Use    Smoking status: Never Smoker    Smokeless tobacco: Never Used   Substance Use Topics    Alcohol use: Yes     Comment: seldom        Objective:     Physical Examination  BP (!) 140/88 (BP Location: Left arm)   Pulse 78   Ht 5' 7" (1.702 m)   Wt 77.9 kg (171 lb 11.8 oz)   LMP  (LMP Unknown)   BMI 26.90 kg/m²   Wt Readings from Last 3 Encounters:   06/02/22 77.9 kg (171 lb 11.8 oz)   05/04/22 76.4 kg (168 lb 6.9 oz)   04/14/22 86.1 kg (189 lb 13.1 oz)     BP Readings from Last 3 Encounters:   06/02/22 (!) 140/88   05/04/22 133/82   04/16/22 138/78     Estimated body mass index is 26.9 kg/m² as calculated from the following:    Height as of this encounter: 5' 7" (1.702 m).    Weight as of this encounter: 77.9 kg (171 lb 11.8 oz).     Physical Exam  Vitals and nursing note reviewed.   Constitutional:       Appearance: Normal appearance.   HENT:      Head: Normocephalic and atraumatic.   Cardiovascular:      Rate and Rhythm: Normal rate and regular rhythm.      Heart sounds: Normal heart sounds.   Pulmonary:      Effort: Pulmonary effort is normal.      Breath sounds: Normal breath sounds.   Musculoskeletal:         General: Normal range of motion.      Cervical back: Normal range of motion.   Skin:     General: Skin is warm and dry.   Neurological:      " "General: No focal deficit present.      Mental Status: She is alert and oriented to person, place, and time.   Psychiatric:         Mood and Affect: Mood normal.         Thought Content: Thought content normal.          Assessment/Plan:     Bonnie Lino is a 52 y.o. year old female who presents to clinic for evaluation for UTI 2/2 recent elevated blood sugars and malodorous urine starting 2 weeks ago.     Malodorous urine  - Urinalysis in clinic w/ no findings of UTI: negative for nitrates, leukocytes or blood.  - Instructed to monitor her vaginal "soreness" and RTC if it does not improve w/in 1-2 wks    DM II  - Pt reports her blood sugars the last 2 wks were 200-300 and are normally 120-150.  - Pt instructed to increase her insulin at night by 1 unit qd until her blood sugars are back in her normal range. She is in agreement w/ plan.        Case discussed with staff: Dr. Ugo Hutchins, MS3    "

## 2022-06-02 NOTE — PROGRESS NOTES
"[facility] Note  [facility]    Subjective:      Bonnie Lino is a 52 y.o. female here     #UTI concerns: sent by home health; denies dysuria, polyuria, discharge, rash, lesions, hematuria; UA in clinic negative for LE/Nitrite    #DM: states her sugars have been 2-300s lately despite compliance with all DM medications; denies any changes to diet    Review of Systems   Constitutional: Negative for chills and fever.   Respiratory: Negative for cough and shortness of breath.    Cardiovascular: Negative for chest pain and leg swelling.   Gastrointestinal: Negative for abdominal pain, constipation, diarrhea, nausea and vomiting.   Genitourinary: Negative for dysuria.   Musculoskeletal: Negative for myalgias.         Objective:   BP (!) 140/88 (BP Location: Left arm)   Pulse 78   Ht 5' 7" (1.702 m)   Wt 77.9 kg (171 lb 11.8 oz)   LMP  (LMP Unknown)   BMI 26.90 kg/m²      Physical Exam  Vitals reviewed.   Constitutional:       General: She is not in acute distress.     Appearance: Normal appearance. She is not ill-appearing, toxic-appearing or diaphoretic.   HENT:      Head: Normocephalic and atraumatic.      Right Ear: External ear normal.      Left Ear: External ear normal.      Nose: Nose normal.      Mouth/Throat:      Mouth: Mucous membranes are moist.   Eyes:      Extraocular Movements: Extraocular movements intact.   Cardiovascular:      Rate and Rhythm: Normal rate.   Pulmonary:      Effort: Pulmonary effort is normal. No respiratory distress.   Abdominal:      Tenderness: There is no guarding.   Musculoskeletal:         General: Normal range of motion.      Cervical back: Normal range of motion.   Skin:     General: Skin is warm.      Capillary Refill: Capillary refill takes less than 2 seconds.   Neurological:      Mental Status: She is alert and oriented to person, place, and time.   Psychiatric:         Mood and Affect: Mood normal.         Behavior: Behavior normal.          Assessment:   52 y.o. " with        1. Controlled type 2 diabetes mellitus with stage 3 chronic kidney disease, with long-term current use of insulin         Plan:   Bonnie SOLOMON was seen today for urinary tract infection.    Diagnoses and all orders for this visit:    Controlled type 2 diabetes mellitus with stage 3 chronic kidney disease, with long-term current use of insulin    encourage patient to increase her nightly insulin dose by 1u per day until her AM BG is <140, patient verbalized understanding     Follow up in about 4 weeks (around 6/30/2022), or if symptoms worsen or fail to improve, for A1c recheck.    Juancho Velez DO  [facility], PGY-2  1:12 PM, 06/02/2022    *This note was dictated using the M*Modal Fluency Direct word recognition program. There are word rescognition mistakes that are occasionally missed on review. \    The following information is provided to all patients.  This information is to help you find resources for any of the problems found today that may be affecting your health:                Living healthy guide: www.[facility].[place].gov       Understanding Diabetes: www.diabetes.org       Eating healthy: www.cdc.gov/healthyweight      CDC home safety checklist: www.cdc.gov/steadi/patient.html      Agency on Aging: www.goea.[place].gov       Alcoholics anonymous (AA): www.aa.org      Physical Activity: www.patrice.nih.gov/ue3xrmz       Tobacco use: www.quitwithusla.org

## 2022-06-08 DIAGNOSIS — E11.59 HYPERTENSION ASSOCIATED WITH TYPE 2 DIABETES MELLITUS: ICD-10-CM

## 2022-06-08 DIAGNOSIS — I15.2 HYPERTENSION ASSOCIATED WITH TYPE 2 DIABETES MELLITUS: ICD-10-CM

## 2022-06-08 RX ORDER — NIFEDIPINE 90 MG/1
90 TABLET, EXTENDED RELEASE ORAL DAILY
Qty: 90 TABLET | Refills: 3 | Status: SHIPPED | OUTPATIENT
Start: 2022-06-08 | End: 2022-06-15 | Stop reason: SDUPTHER

## 2022-06-09 NOTE — PROGRESS NOTES
I assume primary medical responsibility for this patient. I have reviewed the history, physical, and assessement & treatment plan with the resident and agree that the care is reasonable and necessary. This service has been performed by a resident without the presence of a teaching physician under the primary care exception. If necessary, an addendum of additional findings or evaluation beyond the resident documentation will be noted below.     Aura Arizmendi MD

## 2022-06-11 ENCOUNTER — TELEPHONE (OUTPATIENT)
Dept: FAMILY MEDICINE | Facility: HOSPITAL | Age: 52
End: 2022-06-11
Payer: MEDICAID

## 2022-06-11 DIAGNOSIS — N18.30 CONTROLLED TYPE 2 DIABETES MELLITUS WITH STAGE 3 CHRONIC KIDNEY DISEASE, WITH LONG-TERM CURRENT USE OF INSULIN: ICD-10-CM

## 2022-06-11 DIAGNOSIS — Z79.4 CONTROLLED TYPE 2 DIABETES MELLITUS WITH STAGE 3 CHRONIC KIDNEY DISEASE, WITH LONG-TERM CURRENT USE OF INSULIN: ICD-10-CM

## 2022-06-11 DIAGNOSIS — E11.22 CONTROLLED TYPE 2 DIABETES MELLITUS WITH STAGE 3 CHRONIC KIDNEY DISEASE, WITH LONG-TERM CURRENT USE OF INSULIN: ICD-10-CM

## 2022-06-11 RX ORDER — CANAGLIFLOZIN 100 MG/1
100 TABLET, FILM COATED ORAL DAILY
Qty: 90 TABLET | Refills: 3 | Status: SHIPPED | OUTPATIENT
Start: 2022-06-11 | End: 2023-05-18 | Stop reason: SDUPTHER

## 2022-06-11 NOTE — TELEPHONE ENCOUNTER
----- Message from Selma Hooper MA sent at 6/10/2022 12:01 PM CDT -----  Contact: Patient [phone]  Patient requests canagliflozin (INVOKANA) 100 mg Tab tablet be sent to [facility].  Thanks.

## 2022-06-14 ENCOUNTER — TELEPHONE (OUTPATIENT)
Dept: FAMILY MEDICINE | Facility: HOSPITAL | Age: 52
End: 2022-06-14
Payer: MEDICAID

## 2022-06-14 DIAGNOSIS — I15.2 HYPERTENSION ASSOCIATED WITH TYPE 2 DIABETES MELLITUS: ICD-10-CM

## 2022-06-14 DIAGNOSIS — E11.59 HYPERTENSION ASSOCIATED WITH TYPE 2 DIABETES MELLITUS: ICD-10-CM

## 2022-06-14 NOTE — TELEPHONE ENCOUNTER
----- Message from Eveline Isaacs sent at 6/14/2022  1:06 PM CDT -----  Pt called stating [facility]  needs a  Approval  to refill  NIFEdipine (PROCARDIA-XL) 90 MG (OSM) 24 hr tablet. Thank you

## 2022-06-15 RX ORDER — NIFEDIPINE 90 MG/1
90 TABLET, EXTENDED RELEASE ORAL DAILY
Qty: 90 TABLET | Refills: 3 | Status: SHIPPED | OUTPATIENT
Start: 2022-06-15 | End: 2022-08-25 | Stop reason: SDUPTHER

## 2022-06-21 ENCOUNTER — TELEPHONE (OUTPATIENT)
Dept: FAMILY MEDICINE | Facility: HOSPITAL | Age: 52
End: 2022-06-21
Payer: MEDICAID

## 2022-06-21 NOTE — TELEPHONE ENCOUNTER
----- Message from Jillian Matute sent at 6/21/2022 10:50 AM CDT -----  Winston with [facility] [phone] was calling to report she was visiting Ms. Lino and she had a panic attach she stated she just overwhelmed with the Mold in her house and other things she would like to speak with you

## 2022-06-29 ENCOUNTER — OFFICE VISIT (OUTPATIENT)
Dept: FAMILY MEDICINE | Facility: HOSPITAL | Age: 52
End: 2022-06-29
Attending: SPECIALIST
Payer: MEDICAID

## 2022-06-29 VITALS
HEART RATE: 91 BPM | BODY MASS INDEX: 26.19 KG/M2 | DIASTOLIC BLOOD PRESSURE: 69 MMHG | HEIGHT: 67 IN | WEIGHT: 166.88 LBS | SYSTOLIC BLOOD PRESSURE: 103 MMHG

## 2022-06-29 DIAGNOSIS — E11.22 CONTROLLED TYPE 2 DIABETES MELLITUS WITH STAGE 3 CHRONIC KIDNEY DISEASE, WITH LONG-TERM CURRENT USE OF INSULIN: ICD-10-CM

## 2022-06-29 DIAGNOSIS — N18.30 CONTROLLED TYPE 2 DIABETES MELLITUS WITH STAGE 3 CHRONIC KIDNEY DISEASE, WITH LONG-TERM CURRENT USE OF INSULIN: ICD-10-CM

## 2022-06-29 DIAGNOSIS — F44.4 CONVERSION DISORDER WITH ABNORMAL MOVEMENT: Primary | ICD-10-CM

## 2022-06-29 DIAGNOSIS — Z79.4 CONTROLLED TYPE 2 DIABETES MELLITUS WITH STAGE 3 CHRONIC KIDNEY DISEASE, WITH LONG-TERM CURRENT USE OF INSULIN: ICD-10-CM

## 2022-06-29 DIAGNOSIS — F32.0 CURRENT MILD EPISODE OF MAJOR DEPRESSIVE DISORDER WITHOUT PRIOR EPISODE: ICD-10-CM

## 2022-06-29 DIAGNOSIS — F44.5 PSEUDOSEIZURE: ICD-10-CM

## 2022-06-29 PROBLEM — R29.90 EPISODE OF TRANSIENT NEUROLOGIC SYMPTOMS: Status: RESOLVED | Noted: 2022-04-13 | Resolved: 2022-06-29

## 2022-06-29 PROBLEM — F80.81 STUTTERING: Status: RESOLVED | Noted: 2022-04-13 | Resolved: 2022-06-29

## 2022-06-29 PROBLEM — R47.01 APHASIA: Status: RESOLVED | Noted: 2022-04-13 | Resolved: 2022-06-29

## 2022-06-29 PROCEDURE — 99214 OFFICE O/P EST MOD 30 MIN: CPT | Performed by: STUDENT IN AN ORGANIZED HEALTH CARE EDUCATION/TRAINING PROGRAM

## 2022-06-29 RX ORDER — SYRINGE AND NEEDLE,INSULIN,1ML 25GX1"
1 SYRINGE, EMPTY DISPOSABLE MISCELLANEOUS NIGHTLY
Qty: 100 EACH | Refills: 3 | Status: SHIPPED | OUTPATIENT
Start: 2022-06-29 | End: 2022-08-25

## 2022-06-29 RX ORDER — HYDROXYZINE PAMOATE 50 MG/1
50 CAPSULE ORAL NIGHTLY PRN
Qty: 30 CAPSULE | Refills: 11 | Status: SHIPPED | OUTPATIENT
Start: 2022-06-29 | End: 2023-07-06 | Stop reason: SDUPTHER

## 2022-06-29 RX ORDER — LANCETS
1 EACH MISCELLANEOUS 3 TIMES DAILY
Qty: 100 EACH | Refills: 1 | Status: SHIPPED | OUTPATIENT
Start: 2022-06-29 | End: 2023-09-27

## 2022-06-29 NOTE — PATIENT INSTRUCTIONS
Hydroxyzine: Take 1-2 pills at night for sleep. Start with 1 pill, if that doesn't work in 1 hour take a second one. Do not take more than 2 pills at a time.

## 2022-06-29 NOTE — PROGRESS NOTES
"Progress Note  [facility]    Subjective:     Bonnie Lino is a 52 y.o. year old female with PMHx of conversion disorder, pseudoseizures, T2DM who presents to clinic for follow-up.    Conversion disorder: Pt doing much better than last visit with me. Continues to work with speech therapy through home health. Is established with a therapist that is helping. Feels ready to go back to work part-time. Last seizure was last week. Seizures no longer happening multiple times a day. Currently seizures triggered when the patient "gets too excited" or has a very stressful experience. Most recent seizure triggered by panic attack about mold in her home. Pt continues to not drive.     Pt is having some difficulty falling asleep. Has not tried vistaril for sleep. No currently taking it.     Patient Active Problem List    Diagnosis Date Noted    Pseudoseizure 05/04/2022    Conversion disorder with abnormal movement     Aphasia 04/13/2022    Episode of transient neurologic symptoms 04/13/2022    Stuttering 04/13/2022    Current mild episode of major depressive disorder without prior episode 02/25/2022    Controlled type 2 diabetes mellitus with stage 3 chronic kidney disease, with long-term current use of insulin 08/06/2021    Lesion of right external ear 08/06/2021    Diabetic polyneuropathy associated with type 2 diabetes mellitus 08/06/2021    Venous stasis dermatitis of both lower extremities 08/06/2021    Chronic kidney disease, stage 3b 04/06/2021    Hypertension associated with type 2 diabetes mellitus     HLD (hyperlipidemia) 09/23/2014    Vitamin D deficiency 09/23/2014    Aneurysm of cardiac wall, congenital         Review of patient's allergies indicates:  No Known Allergies     Past Medical History:   Diagnosis Date    Aneurysm of cardiac wall, congenital     Bilateral lower extremity edema     2/2 calcium channel blocker    Conversion disorder with abnormal movement     Diabetes type 2, " "uncontrolled     HLD (hyperlipidemia)     Hypertension     Pseudoseizures     Vitamin D deficiency       Past Surgical History:   Procedure Laterality Date    BREAST CYST EXCISION  2003    BREAST SURGERY      cyst removal     CATARACT EXTRACTION W/  INTRAOCULAR LENS IMPLANT      COLONOSCOPY N/A 7/23/2021    Procedure: COLONOSCOPY;  Surgeon: Sapna Fitzgerald MD;  Location: [facility];  Service: Endoscopy;  Laterality: N/A;    HYSTERECTOMY  2002    PARTIAL HYSTERECTOMY  2002      Family History   Problem Relation Age of Onset    Diabetes Mother     Heart disease Mother     Cancer Mother 40        breast    Breast cancer Mother     Diabetes Father     Cancer Maternal Grandmother 82        breast    Breast cancer Maternal Grandmother       Social History     Tobacco Use    Smoking status: Never Smoker    Smokeless tobacco: Never Used   Substance Use Topics    Alcohol use: Yes     Comment: seldom        Objective:     Vitals:    06/29/22 1032   BP: 103/69   Pulse: 91   Weight: 75.7 kg (166 lb 14.2 oz)   Height: 5' 7" (1.702 m)     BMI: 26.14    Gen: No apparent distress, well nourished and developed, appears stated age  CV: RRR, S1 and S2 present, no LE edema  Resp: CTAB, normal respiratory effort  Psych: Good eye contact, cooperative. Some lack of insight into disease process.     Assessment/Plan:     Bonnie Lino is a 52 y.o. year old female who presents to clinic for follow-up.     1. Current mild episode of major depressive disorder without prior episode  Will trial hydroxyzine at a higher dose to help with sleep.   - hydrOXYzine pamoate (VISTARIL) 50 MG Cap; Take 1 capsule (50 mg total) by mouth nightly as needed (sleep).  Dispense: 30 capsule; Refill: 11    2. Controlled type 2 diabetes mellitus with stage 3 chronic kidney disease, with long-term current use of insulin  Doing well on current regimen.   - insulin syringe-needle U-100 (BD INSULIN SYRINGE) 1 mL 25 gauge x 5/8" Syrg; 1 Units by " Misc.(Non-Drug; Combo Route) route every evening.  Dispense: 100 each; Refill: 3  - lancets Misc; 1 application by Misc.(Non-Drug; Combo Route) route 3 (three) times daily.  Dispense: 100 each; Refill: 1    3. Pseudoseizure  Episodes have become less frequent. Discussed that patient should not drive as long as episodes are occurring. 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 Given patient will have support at work okay to return to work part-time.     4. Conversion disorder with abnormal movement  Pt continues to improve. Provided encouragement and recommended continued work with psych in the outpatient setting.     Follow-up: 1 month     A total of 35 minutes was spent on patient care during this encounter which included chart review, examining the patient, formulating a treatment plan and documentation.     Medical decision making straight forward and not complex during this visit.     Case discussed with staff: Dr. Alfonso Culver,   [facility], PGY-2

## 2022-07-27 ENCOUNTER — OFFICE VISIT (OUTPATIENT)
Dept: FAMILY MEDICINE | Facility: HOSPITAL | Age: 52
End: 2022-07-27
Payer: MEDICAID

## 2022-07-27 VITALS
HEART RATE: 81 BPM | HEIGHT: 68 IN | SYSTOLIC BLOOD PRESSURE: 134 MMHG | DIASTOLIC BLOOD PRESSURE: 95 MMHG | WEIGHT: 165.81 LBS | BODY MASS INDEX: 25.13 KG/M2

## 2022-07-27 DIAGNOSIS — F44.4 CONVERSION DISORDER WITH ABNORMAL MOVEMENT: ICD-10-CM

## 2022-07-27 DIAGNOSIS — F44.5 PSEUDOSEIZURE: Primary | ICD-10-CM

## 2022-07-27 PROCEDURE — 99213 OFFICE O/P EST LOW 20 MIN: CPT | Performed by: STUDENT IN AN ORGANIZED HEALTH CARE EDUCATION/TRAINING PROGRAM

## 2022-07-28 NOTE — PROGRESS NOTES
Progress Note  [facility]    Subjective:     Bonnie Lino is a 52 y.o. year old female with PMHx of conversion disorder with pseudoseizures who presents to clinic for follow-up.     Pt has continued to improve since last visit. Has only had 1 pseudoseizure in the last month. Has returned to work and is enjoying it however she notes that she struggles to work past 1PM. Pt continues to do speech therapy and see a psychologist which have been helping. No longer needs to walk with assistive device and has not fallen. Has continued to not drive. Has not established with a psychiatrist. Reports feeling happy and that her mood is good.     Patient Active Problem List    Diagnosis Date Noted    Pseudoseizure 05/04/2022    Conversion disorder with abnormal movement     Current mild episode of major depressive disorder without prior episode 02/25/2022    Controlled type 2 diabetes mellitus with stage 3 chronic kidney disease, with long-term current use of insulin 08/06/2021    Lesion of right external ear 08/06/2021    Diabetic polyneuropathy associated with type 2 diabetes mellitus 08/06/2021    Venous stasis dermatitis of both lower extremities 08/06/2021    Chronic kidney disease, stage 3b 04/06/2021    Hypertension associated with type 2 diabetes mellitus     HLD (hyperlipidemia) 09/23/2014    Vitamin D deficiency 09/23/2014    Aneurysm of cardiac wall, congenital         Review of patient's allergies indicates:  No Known Allergies     Past Medical History:   Diagnosis Date    Aneurysm of cardiac wall, congenital     Bilateral lower extremity edema     2/2 calcium channel blocker    Conversion disorder with abnormal movement     Diabetes type 2, uncontrolled     HLD (hyperlipidemia)     Hypertension     Pseudoseizures     Vitamin D deficiency       Past Surgical History:   Procedure Laterality Date    BREAST CYST EXCISION  2003    BREAST SURGERY      cyst removal     CATARACT EXTRACTION  "W/  INTRAOCULAR LENS IMPLANT      COLONOSCOPY N/A 7/23/2021    Procedure: COLONOSCOPY;  Surgeon: Sapna Fitzgerald MD;  Location: [facility];  Service: Endoscopy;  Laterality: N/A;    HYSTERECTOMY  2002    PARTIAL HYSTERECTOMY  2002      Family History   Problem Relation Age of Onset    Diabetes Mother     Heart disease Mother     Cancer Mother 40        breast    Breast cancer Mother     Diabetes Father     Cancer Maternal Grandmother 82        breast    Breast cancer Maternal Grandmother       Social History     Tobacco Use    Smoking status: Never Smoker    Smokeless tobacco: Never Used   Substance Use Topics    Alcohol use: Yes     Comment: seldom        Objective:     Vitals:    07/27/22 1510   BP: (!) 134/95   BP Location: Right arm   Patient Position: Sitting   BP Method: Medium (Automatic)   Pulse: 81   Weight: 75.2 kg (165 lb 12.6 oz)   Height: 5' 8" (1.727 m)     BMI: 25.21    Gen: No apparent distress, well nourished and developed, appears stated age  CV: RRR, S1 and S2 present, no LE edema  Resp: CTAB, normal respiratory effort  Psych: Cooperative, happy mood, no AVH, congruent affect    Assessment/Plan:     Bonnie Lino is a 52 y.o. year old female who presents to clinic for follow-up.     1. Pseudoseizure  Frequency of seizures continues to improve. With patient continuing to have seizures it is still unsafe for her to drive. Recommend patient have minimum of 6 weeks without pseudoseizure before we can discuss driving again.     2. Conversion disorder with abnormal movement  Recommend patient continue therapy. Provided support. Discussed that as long as she continues to improve we can hold off on seeing psychiatry of starting medications. However, if she has a relapse we may need to consider both options. Pt verbalized understanding and was in agreement with the plan.        Follow-up: 1 month    A total of 35 minutes was spent on patient care during this encounter which included chart " review, examining the patient, formulating a treatment plan and documentation.     Medical decision making straight forward and not complex during this visit.     Case discussed with staff: Dr. Ugo Culver,   [facility], PGY-3

## 2022-08-25 ENCOUNTER — OFFICE VISIT (OUTPATIENT)
Dept: FAMILY MEDICINE | Facility: HOSPITAL | Age: 52
End: 2022-08-25
Attending: STUDENT IN AN ORGANIZED HEALTH CARE EDUCATION/TRAINING PROGRAM
Payer: MEDICAID

## 2022-08-25 VITALS
HEART RATE: 81 BPM | BODY MASS INDEX: 25.33 KG/M2 | HEIGHT: 68 IN | SYSTOLIC BLOOD PRESSURE: 153 MMHG | WEIGHT: 167.13 LBS | DIASTOLIC BLOOD PRESSURE: 85 MMHG

## 2022-08-25 DIAGNOSIS — Z23 IMMUNIZATION DUE: ICD-10-CM

## 2022-08-25 DIAGNOSIS — I15.2 HYPERTENSION ASSOCIATED WITH TYPE 2 DIABETES MELLITUS: ICD-10-CM

## 2022-08-25 DIAGNOSIS — Z79.4 CONTROLLED TYPE 2 DIABETES MELLITUS WITH STAGE 3 CHRONIC KIDNEY DISEASE, WITH LONG-TERM CURRENT USE OF INSULIN: Primary | ICD-10-CM

## 2022-08-25 DIAGNOSIS — N18.30 CONTROLLED TYPE 2 DIABETES MELLITUS WITH STAGE 3 CHRONIC KIDNEY DISEASE, WITH LONG-TERM CURRENT USE OF INSULIN: Primary | ICD-10-CM

## 2022-08-25 DIAGNOSIS — F44.5 PSEUDOSEIZURE: ICD-10-CM

## 2022-08-25 DIAGNOSIS — E11.22 CONTROLLED TYPE 2 DIABETES MELLITUS WITH STAGE 3 CHRONIC KIDNEY DISEASE, WITH LONG-TERM CURRENT USE OF INSULIN: Primary | ICD-10-CM

## 2022-08-25 DIAGNOSIS — Z12.31 ENCOUNTER FOR SCREENING MAMMOGRAM FOR MALIGNANT NEOPLASM OF BREAST: ICD-10-CM

## 2022-08-25 DIAGNOSIS — E11.59 HYPERTENSION ASSOCIATED WITH TYPE 2 DIABETES MELLITUS: ICD-10-CM

## 2022-08-25 PROCEDURE — 99214 OFFICE O/P EST MOD 30 MIN: CPT | Performed by: STUDENT IN AN ORGANIZED HEALTH CARE EDUCATION/TRAINING PROGRAM

## 2022-08-25 RX ORDER — NIFEDIPINE 90 MG/1
90 TABLET, EXTENDED RELEASE ORAL DAILY
Qty: 90 TABLET | Refills: 3 | Status: SHIPPED | OUTPATIENT
Start: 2022-08-25 | End: 2023-10-25 | Stop reason: SDUPTHER

## 2022-08-25 RX ORDER — PEN NEEDLE, DIABETIC 31 GX5/16"
NEEDLE, DISPOSABLE MISCELLANEOUS
COMMUNITY
Start: 2022-04-29 | End: 2022-08-25 | Stop reason: SDUPTHER

## 2022-08-25 RX ORDER — PEN NEEDLE, DIABETIC 31 GX5/16"
1 NEEDLE, DISPOSABLE MISCELLANEOUS NIGHTLY
Qty: 100 EACH | Refills: 3 | Status: SHIPPED | OUTPATIENT
Start: 2022-08-25 | End: 2023-03-10 | Stop reason: SDUPTHER

## 2022-08-25 RX ORDER — FLUOXETINE 10 MG/1
CAPSULE ORAL
COMMUNITY
Start: 2022-08-24 | End: 2022-08-25

## 2022-08-25 RX ORDER — LISINOPRIL 2.5 MG/1
2.5 TABLET ORAL NIGHTLY
Qty: 90 TABLET | Refills: 3 | Status: SHIPPED | OUTPATIENT
Start: 2022-08-25 | End: 2023-07-06 | Stop reason: DRUGHIGH

## 2022-08-25 NOTE — PROGRESS NOTES
Progress Note  [facility]    Subjective:     Bonnie Lino is a 52 y.o. year old female with PMHx of T2DM, conversion disorder with pseudoseizures, HTN who presents to clinic for follow-up.     Pseudoseizures: Pt has only had 2 episodes in the last month, both triggered by stress at work. Continues to not drive. Overall doing much better.     HTN: Ran out of nifedipine and did not take it last night. Reports BP was normal when HH saw her on Monday. No LE edema or chest pain.     T2DM: No signs or symptoms of gastroparesis. Reports glucose 200 when eats poorly but otherwise in the 100's.    Current regimen- 46U lantus qHS, Invokana 100mg daily  Last A1C- 8.2%   Last Urine Microalbumin- over 1 year ago, ratio 912  Last eye exam- within the last year    Health maintenance:  Mammogram- due 8/26/22  Pap smear- s/p hysterectomy, no longer indicated  Immunizations: plans to get shingles shot this weekend, due for tetanus   Colonoscopy: wnl 2020, 10-year follow-up      Patient Active Problem List    Diagnosis Date Noted    Pseudoseizure 05/04/2022    Conversion disorder with abnormal movement     Current mild episode of major depressive disorder without prior episode 02/25/2022    Controlled type 2 diabetes mellitus with stage 3 chronic kidney disease, with long-term current use of insulin 08/06/2021    Lesion of right external ear 08/06/2021    Diabetic polyneuropathy associated with type 2 diabetes mellitus 08/06/2021    Venous stasis dermatitis of both lower extremities 08/06/2021    Chronic kidney disease, stage 3b 04/06/2021    Hypertension associated with type 2 diabetes mellitus     HLD (hyperlipidemia) 09/23/2014    Vitamin D deficiency 09/23/2014    Aneurysm of cardiac wall, congenital         Review of patient's allergies indicates:  No Known Allergies     Past Medical History:   Diagnosis Date    Aneurysm of cardiac wall, congenital     Bilateral lower extremity edema     2/2 calcium  "channel blocker    Conversion disorder with abnormal movement     Diabetes type 2, uncontrolled     HLD (hyperlipidemia)     Hypertension     Pseudoseizures     Vitamin D deficiency       Past Surgical History:   Procedure Laterality Date    BREAST CYST EXCISION  2003    BREAST SURGERY      cyst removal     CATARACT EXTRACTION W/  INTRAOCULAR LENS IMPLANT      COLONOSCOPY N/A 7/23/2021    Procedure: COLONOSCOPY;  Surgeon: Sapna Fitzgerald MD;  Location: [facility];  Service: Endoscopy;  Laterality: N/A;    HYSTERECTOMY  2002    PARTIAL HYSTERECTOMY  2002      Family History   Problem Relation Age of Onset    Diabetes Mother     Heart disease Mother     Cancer Mother 40        breast    Breast cancer Mother     Diabetes Father     Cancer Maternal Grandmother 82        breast    Breast cancer Maternal Grandmother       Social History     Tobacco Use    Smoking status: Never Smoker    Smokeless tobacco: Never Used   Substance Use Topics    Alcohol use: Yes     Comment: seldom        Objective:     Vitals:    08/25/22 1504   BP: (!) 153/85   Pulse: 81   Weight: 75.8 kg (167 lb 1.7 oz)   Height: 5' 8" (1.727 m)     BMI: 25.41    Gen: No apparent distress, well nourished and developed, appears stated age  CV: RRR, S1 and S2 present, no LE edema  Resp: CTAB, normal respiratory effort  Psych: Logical thought process, good eye contact    Assessment/Plan:     Bonnie Lino is a 52 y.o. year old female who presents to clinic for follow-up.     1. Controlled type 2 diabetes mellitus with stage 3 chronic kidney disease, with long-term current use of insulin  Due for Hgb A1C. Eye exam UTD. Will do foot exam next visit.   - BD ULTRA-FINE SHORT PEN NEEDLE 31 gauge x 5/16" Ndle; Inject 1 pen into the skin every evening.  Dispense: 100 each; Refill: 3  - Hemoglobin A1C; Future  - Comprehensive Metabolic Panel; Future  - Microalbumin/creatinine urine ratio; Future    2. Hypertension associated with type 2 " diabetes mellitus  Well controlled on current regimen. BP elevated today 2/2 not taking procardia last night.   - lisinopriL (PRINIVIL,ZESTRIL) 2.5 MG tablet; Take 1 tablet (2.5 mg total) by mouth nightly.  Dispense: 90 tablet; Refill: 3  - NIFEdipine (PROCARDIA-XL) 90 MG (OSM) 24 hr tablet; Take 1 tablet (90 mg total) by mouth once daily.  Dispense: 90 tablet; Refill: 3    3. Encounter for screening mammogram for malignant neoplasm of breast  - Mammo Digital Screening Bilat; Future    4. Pseudoseizures  Discussed need to continue to not drive while having episodes. Reassuring patient doing well. Support provided. Pt verbalized understanding and was in agreement with the plan.      Follow-up: 2-3 months for HTN and T2DM    A total of 35 minutes was spent on patient care during this encounter which included chart review, examining the patient, formulating a treatment plan and documentation.     Medical decision making straight forward and not complex during this visit.     Case discussed with staff: Dr. Yifan Culver DO  [facility], PGY-3

## 2022-08-26 NOTE — PROGRESS NOTES
I assume primary medical responsibility for this patient. I have reviewed the history, physical, and assessement & treatment plan with the resident and agree that the care is reasonable and necessary. This service has been performed by a resident without the presence of a teaching physician under the primary care exception. If necessary, an addendum of additional findings or evaluation beyond the resident documentation will be noted below.      Dorothea Saha MD    [facility]

## 2022-08-30 ENCOUNTER — OFFICE VISIT (OUTPATIENT)
Dept: NEUROLOGY | Facility: CLINIC | Age: 52
End: 2022-08-30
Payer: MEDICAID

## 2022-08-30 ENCOUNTER — LAB VISIT (OUTPATIENT)
Dept: LAB | Facility: HOSPITAL | Age: 52
End: 2022-08-30
Attending: PSYCHIATRY & NEUROLOGY
Payer: MEDICAID

## 2022-08-30 VITALS
HEART RATE: 92 BPM | DIASTOLIC BLOOD PRESSURE: 91 MMHG | WEIGHT: 164.69 LBS | HEIGHT: 68 IN | BODY MASS INDEX: 24.96 KG/M2 | SYSTOLIC BLOOD PRESSURE: 153 MMHG

## 2022-08-30 DIAGNOSIS — R26.9 FUNCTIONAL GAIT DISORDER: ICD-10-CM

## 2022-08-30 DIAGNOSIS — R41.3 MEMORY LOSS: ICD-10-CM

## 2022-08-30 DIAGNOSIS — R25.1 SHAKING: Primary | ICD-10-CM

## 2022-08-30 LAB
AMMONIA PLAS-SCNC: 20 UMOL/L (ref 10–50)
TSH SERPL DL<=0.005 MIU/L-ACNC: 1.14 UIU/ML (ref 0.4–4)

## 2022-08-30 PROCEDURE — 3077F PR MOST RECENT SYSTOLIC BLOOD PRESSURE >= 140 MM HG: ICD-10-PCS | Mod: CPTII,,, | Performed by: PSYCHIATRY & NEUROLOGY

## 2022-08-30 PROCEDURE — 84443 ASSAY THYROID STIM HORMONE: CPT | Performed by: PSYCHIATRY & NEUROLOGY

## 2022-08-30 PROCEDURE — 3080F PR MOST RECENT DIASTOLIC BLOOD PRESSURE >= 90 MM HG: ICD-10-PCS | Mod: CPTII,,, | Performed by: PSYCHIATRY & NEUROLOGY

## 2022-08-30 PROCEDURE — 3077F SYST BP >= 140 MM HG: CPT | Mod: CPTII,,, | Performed by: PSYCHIATRY & NEUROLOGY

## 2022-08-30 PROCEDURE — 82746 ASSAY OF FOLIC ACID SERUM: CPT | Performed by: PSYCHIATRY & NEUROLOGY

## 2022-08-30 PROCEDURE — 3080F DIAST BP >= 90 MM HG: CPT | Mod: CPTII,,, | Performed by: PSYCHIATRY & NEUROLOGY

## 2022-08-30 PROCEDURE — 3066F NEPHROPATHY DOC TX: CPT | Mod: CPTII,,, | Performed by: PSYCHIATRY & NEUROLOGY

## 2022-08-30 PROCEDURE — 82140 ASSAY OF AMMONIA: CPT | Performed by: PSYCHIATRY & NEUROLOGY

## 2022-08-30 PROCEDURE — 1159F MED LIST DOCD IN RCRD: CPT | Mod: CPTII,,, | Performed by: PSYCHIATRY & NEUROLOGY

## 2022-08-30 PROCEDURE — 99215 OFFICE O/P EST HI 40 MIN: CPT | Mod: S$PBB,,, | Performed by: PSYCHIATRY & NEUROLOGY

## 2022-08-30 PROCEDURE — 3052F HG A1C>EQUAL 8.0%<EQUAL 9.0%: CPT | Mod: CPTII,,, | Performed by: PSYCHIATRY & NEUROLOGY

## 2022-08-30 PROCEDURE — 3008F BODY MASS INDEX DOCD: CPT | Mod: CPTII,,, | Performed by: PSYCHIATRY & NEUROLOGY

## 2022-08-30 PROCEDURE — 82607 VITAMIN B-12: CPT | Performed by: PSYCHIATRY & NEUROLOGY

## 2022-08-30 PROCEDURE — 3066F PR DOCUMENTATION OF TREATMENT FOR NEPHROPATHY: ICD-10-PCS | Mod: CPTII,,, | Performed by: PSYCHIATRY & NEUROLOGY

## 2022-08-30 PROCEDURE — 3060F PR POS MICROALBUMINURIA RESULT DOCUMENTED/REVIEW: ICD-10-PCS | Mod: CPTII,,, | Performed by: PSYCHIATRY & NEUROLOGY

## 2022-08-30 PROCEDURE — 83921 ORGANIC ACID SINGLE QUANT: CPT | Performed by: PSYCHIATRY & NEUROLOGY

## 2022-08-30 PROCEDURE — 3052F PR MOST RECENT HEMOGLOBIN A1C LEVEL 8.0 - < 9.0%: ICD-10-PCS | Mod: CPTII,,, | Performed by: PSYCHIATRY & NEUROLOGY

## 2022-08-30 PROCEDURE — 99214 OFFICE O/P EST MOD 30 MIN: CPT | Mod: PBBFAC,PN | Performed by: PSYCHIATRY & NEUROLOGY

## 2022-08-30 PROCEDURE — 3008F PR BODY MASS INDEX (BMI) DOCUMENTED: ICD-10-PCS | Mod: CPTII,,, | Performed by: PSYCHIATRY & NEUROLOGY

## 2022-08-30 PROCEDURE — 99999 PR PBB SHADOW E&M-EST. PATIENT-LVL IV: ICD-10-PCS | Mod: PBBFAC,,, | Performed by: PSYCHIATRY & NEUROLOGY

## 2022-08-30 PROCEDURE — 3060F POS MICROALBUMINURIA REV: CPT | Mod: CPTII,,, | Performed by: PSYCHIATRY & NEUROLOGY

## 2022-08-30 PROCEDURE — 4010F ACE/ARB THERAPY RXD/TAKEN: CPT | Mod: CPTII,,, | Performed by: PSYCHIATRY & NEUROLOGY

## 2022-08-30 PROCEDURE — 99999 PR PBB SHADOW E&M-EST. PATIENT-LVL IV: CPT | Mod: PBBFAC,,, | Performed by: PSYCHIATRY & NEUROLOGY

## 2022-08-30 PROCEDURE — 4010F PR ACE/ARB THEARPY RXD/TAKEN: ICD-10-PCS | Mod: CPTII,,, | Performed by: PSYCHIATRY & NEUROLOGY

## 2022-08-30 PROCEDURE — 99215 PR OFFICE/OUTPT VISIT, EST, LEVL V, 40-54 MIN: ICD-10-PCS | Mod: S$PBB,,, | Performed by: PSYCHIATRY & NEUROLOGY

## 2022-08-30 PROCEDURE — 1159F PR MEDICATION LIST DOCUMENTED IN MEDICAL RECORD: ICD-10-PCS | Mod: CPTII,,, | Performed by: PSYCHIATRY & NEUROLOGY

## 2022-08-30 PROCEDURE — 36415 COLL VENOUS BLD VENIPUNCTURE: CPT | Performed by: PSYCHIATRY & NEUROLOGY

## 2022-08-30 RX ORDER — ESCITALOPRAM OXALATE 10 MG/1
TABLET ORAL
Qty: 30 TABLET | Refills: 2 | Status: SHIPPED | OUTPATIENT
Start: 2022-08-30 | End: 2022-10-11 | Stop reason: SDUPTHER

## 2022-08-30 NOTE — PROGRESS NOTES
"Subjective:       Patient ID: Bonnie Lino is a 52 y.o. female.    Chief Complaint: No chief complaint on file.      52 year old AA female  with HTN, IDDM, HLD, ( no vascular event), CKD 3, and mild well controlled DPN is here for an evaluation of sudden neurologic deficit in March, as well as shaking spells. She awoke on 3-17-22 with weakness of the RLE, for which she was admitted at [facility] for 2 days then transferred to inpt rehab ( [facility] for about 7-10 days). Then had HH after dc.   She had felt poorly for several days prior to the onset of the weakness ( no fever). The weakness was isolated tot the leg which was painless, and with no N/T. Over time, while hospitalized her speech became slurred. She notes that while in rehab she was told her coordination was off, and she had trouble swallowing.  The weakness improved over time very gradually. She did return to work.     On 4-12 she had a witnessed szr like episode at home while having a prayer group with friends. She is amnestic for the event. We called her friend Hair who was there and says that during the prayer lunch she got quiet and then stared into space and then her left side started to shake. Her head was hitting the chair, but she is unsure if the shaking was on both sides.   She was admitted to the hospital until the day before Easter.  Had one 4-12-22,  several per month up to 2-3/day in May and June, two in July; they were getting much better but then had more after she returned to work. The schedule is more demanding than she had anticipated, and she has to work 2 hours more than expected over the past 6 weeks. While her friend was on the phone with us she had a spell (see PE). After the spells she has a headache and her speech is halting, "I talk slow".    She lives alone and WFT. She stopped driving, as of last week when advised by one of her providers.     She has never been prescribed ASM.   Rare etoh, no tobacco " ever, no THC, no drugs.   No FH of epilepsy  No CHT  Saw a psychiatrist twice.             Depression MRI Brain Without Contrast    Narrative & Impression  EXAMINATION:  MRI BRAIN WITHOUT CONTRAST; MRA BRAIN WITHOUT CONTRAST; MRA NECK WITHOUT CONTRAST     CLINICAL HISTORY:  New neurological deficits; Neuro deficit, acute, stroke suspected;     TECHNIQUE:  Per separate acquisition multiplanar multisequence MR imaging of the brain was performed without contrast.     3D time-of-flight MRA of the intracranial circulation was performed without contrast. MIP reconstructions were obtained and reviewed.     3D time-of-flight MRA of the neck was performed without intravenous contrast.  MIP reconstructions were obtained and reviewed.     COMPARISON:  CT head dated 03/17/2021.     FINDINGS:  MRI brain:     There is mild cerebellar tonsillar ectopia.  The sellar and parasellar structures are within normal limits.  The midline structures are intact.  The intracranial flow voids are within normal limits.     No diffusion-weighted signal abnormality is present.  There is no focal parenchymal signal abnormality.  The ventricles and sulci are within normal limits.  There are no extra-axial fluid collections.  There is no evidence of intracranial hemorrhage.  There is no evidence of mass effect.     There are postoperative changes in the globes.  There is mucosal thickening within the maxillary sinuses.  There is fluid within the right sphenoid sinus.  The mastoid air cells are clear.  The calvarium is intact.     MRA brain:     The intracranial segments of the ICAs are within normal limits.  The anterior cerebral arteries and anterior communicating complex are within normal limits.  The middle cerebral arteries are unremarkable.     The vertebral arteries are within normal limits.  The basilar is unremarkable.  The posterior cerebral arteries are within normal limits.     There is no evidence of aneurysm or stenosis of the  intracranial vessels.     MRA neck:     The MRA neck examination is significantly degraded by motion.     The origins of the carotid arteries are significantly limited in evaluation given extensive motion.  The internal and external carotid arteries appear unremarkable.  The vertebral arteries appear grossly unremarkable.     There is no hemodynamically significant stenosis of the neck vessels.     Impression:     MRI brain:     No MR evidence of acute infarction.     Mild cerebellar tonsillar ectopia.     Paranasal sinus disease.     MRA brain:     Unremarkable MRA of the intracranial circulation.     MRA neck:     Limited MRA of the neck examination secondary to motion.  No definitive stenosis.  Carotid ultrasound may be attempted for further evaluation.        Electronically signed by: Sal Wilson MD  Date:                                            03/17/2022  Time:                                           22:18            MRI Lumbar Spine Without Contrast    Narrative & Impression  EXAMINATION:  MRI LUMBAR SPINE WITHOUT CONTRAST     CLINICAL HISTORY:  Low back pain, progressive neurologic deficit;     TECHNIQUE:  Multiplanar, multisequence MR images were acquired from the thoracolumbar junction to the sacrum without contrast.     COMPARISON:  None.     FINDINGS:  Alignment: Normal.     Vertebrae: There are endplate degenerative changes at L5-S1 and to a lesser extent at the inferior endplate of L4.  Remaining marrow signal is unremarkable.  No acute fracture.  Vertebral body heights are maintained.  No aggressive osseous lesion.     Discs: Mild disc height loss at L5-S1.  Remaining disc heights are maintained     Cord: Normal.  Conus terminates at L1.     Degenerative findings:     T12-L1 through L3-L4: No spinal canal stenosis or neural foraminal narrowing.     L4-L5: There is a small posterior disc bulge.  No spinal canal stenosis or neural foraminal narrowing.     L5-S1: Minimal circumferential disc bulge.   No spinal canal stenosis or neural foraminal narrowing.     Paraspinal muscles & soft tissues: Unremarkable.     Impression:     Mild degenerative changes of the lumbar spine, most significant at L4-L5 and L5-S1 as detailed above.  No significant spinal canal stenosis or neural foraminal narrowing.        Electronically signed by: Ozzie Delong  Date:                                            03/18/2022  Time:                                           15:19        Exam Ended: 03/18/22 13:55 Last Resulted: 03/18/22 15:19      Date of service  04/15/2022      Introduction  Electroencephalographic (EEG) recording is recorded with electrodes placed according to the International 10-20 placement system.  Thirty (30) channels of digital signal (sampling rate of 512/sec) were simultaneously recorded from the scalp and may include EKG, EMG, and/or eye monitors.  Recording band pass was 0.1 to 512 Hz.  Digital video recording of the patient is simultaneously recorded with the EEG.  The patient is instructed to report clinical symptoms which may occur during the recording session.  EEG and video recording are stored and archived in digital format.  Activation procedures, which include photic stimulation, hyperventilation and instructing patients to perform simple tasks, are done in selected patients.     The EEG is displayed on a monitor screen and can be reviewed using different montages.  Computer assisted-analysis is employed to detect spike and electrographic seizure activity.  The entire record is submitted for computer analysis.  The entire recording is visually reviewed, and the times identified by computer analysis as being spikes or seizures are reviewed again.     Compressed spectral analysis (CSA) is also performed on the activity recorded from each individual channel.  This is displayed as a power display of frequencies from 0 to 30 Hz over time.  The CSA is reviewed looking for asymmetries in power between  "homologous areas of the scalp, then compared with the original EEG recording.     Findings  The patient's background consists of a posteriorly dominant 9.5 Hz alpha rhythm, which is well formed, well modulated, and abolishes with eye opening.  Anteriorly, the patient's background consists of predominantly beta range frequencies.  There is no focal slowing.  There are no focal, lateralized, or epileptiform transients.  No electrographic seizures are seen.     During the course of the recording, the patient is noted to be awake, and subsequently becomes drowsy.  Normal sleep architecture is not appreciated.     Hyperventilation and photic stimulation were not performed.     EKG demonstrates sinus rhythm.     Interpretation  This is a normal EEG in an awake and drowsy adult.  Please be aware that a normal EEG does not exclude the possibility of an underlying seizure disorder.         Exam Ended: 03/17/22 21:32 Last Resulted: 03/17/22 22:18          Past Medical History:   Diagnosis Date    Aneurysm of cardiac wall, congenital     Bilateral lower extremity edema     2/2 calcium channel blocker    Conversion disorder with abnormal movement     Diabetes type 2, uncontrolled     HLD (hyperlipidemia)     Hypertension     Pseudoseizures     Vitamin D deficiency       Past Surgical History:   Procedure Laterality Date    BREAST CYST EXCISION  2003    BREAST SURGERY      cyst removal     CATARACT EXTRACTION W/  INTRAOCULAR LENS IMPLANT      COLONOSCOPY N/A 7/23/2021    Procedure: COLONOSCOPY;  Surgeon: Sapna Fitzgerald MD;  Location: [facility];  Service: Endoscopy;  Laterality: N/A;    HYSTERECTOMY  2002    PARTIAL HYSTERECTOMY  2002        Current Outpatient Medications:     atorvastatin (LIPITOR) 20 MG tablet, Take 1 tablet (20 mg total) by mouth once daily., Disp: 90 tablet, Rfl: 3    BD ULTRA-FINE SHORT PEN NEEDLE 31 gauge x 5/16" Ndle, Inject 1 pen into the skin every evening., Disp: 100 each, Rfl: 3    canagliflozin " (INVOKANA) 100 mg Tab tablet, Take 1 tablet (100 mg total) by mouth once daily., Disp: 90 tablet, Rfl: 3    gabapentin (NEURONTIN) 300 MG capsule, Take 1 capsule (300 mg total) by mouth 3 (three) times daily., Disp: 90 capsule, Rfl: 11    hydrOXYzine pamoate (VISTARIL) 50 MG Cap, Take 1 capsule (50 mg total) by mouth nightly as needed (sleep)., Disp: 30 capsule, Rfl: 11    insulin (LANTUS SOLOSTAR U-100 INSULIN) glargine 100 units/mL (3mL) SubQ pen, Inject 46 Units into the skin every evening., Disp: 13.8 mL, Rfl: 11    lancets Misc, 1 application by Misc.(Non-Drug; Combo Route) route 3 (three) times daily., Disp: 100 each, Rfl: 1    lisinopriL (PRINIVIL,ZESTRIL) 2.5 MG tablet, Take 1 tablet (2.5 mg total) by mouth nightly., Disp: 90 tablet, Rfl: 3    multivitamin (THERAGRAN) per tablet, Take 1 tablet by mouth once daily., Disp: , Rfl:     NIFEdipine (PROCARDIA-XL) 90 MG (OSM) 24 hr tablet, Take 1 tablet (90 mg total) by mouth once daily., Disp: 90 tablet, Rfl: 3    acetaminophen (TYLENOL) 500 MG tablet, Take 500 mg by mouth daily as needed for Pain., Disp: , Rfl:     aspirin (ECOTRIN) 81 MG EC tablet, Take 81 mg by mouth once daily., Disp: , Rfl:     blood glucose strip-disp meter Kit, 1 application by Misc.(Non-Drug; Combo Route) route 3 (three) times daily., Disp: 1 kit, Rfl: 1    blood-glucose meter (RELION ALL-IN-ONE METER) kit, Use as instructed, Disp: 1 each, Rfl: 0    EScitalopram oxalate (LEXAPRO) 10 MG tablet, 1/2 po qhs for 2 weeks then one po qhs, Disp: 30 tablet, Rfl: 2   Review of patient's allergies indicates:  No Known Allergies     Review of Systems   Constitutional:  Positive for fatigue. Negative for unexpected weight change.   Respiratory:  Negative for chest tightness and shortness of breath.    Musculoskeletal:  Positive for gait problem (resolved).   Neurological:  Positive for speech difficulty, headaches and memory loss (short term not good). Negative for weakness.   Psychiatric/Behavioral:   Positive for sleep disturbance (well controlled with vistaril). Negative for dysphoric mood (denies depression). The patient is nervous/anxious.          Objective:      Physical Exam  Constitutional:       General: She is not in acute distress.     Appearance: She is not ill-appearing.   Cardiovascular:      Rate and Rhythm: Normal rate.   Musculoskeletal:      Right lower leg: No edema.      Left lower leg: No edema.   Neurological:      Mental Status: She is alert.      Cranial Nerves: No cranial nerve deficit or dysarthria.      Motor: No weakness, tremor, atrophy, abnormal muscle tone or pronator drift.      Coordination: Coordination normal. Finger-Nose-Finger Test and Heel to Shin Test normal.      Gait: Gait and tandem walk normal.      Deep Tendon Reflexes:      Reflex Scores:       Tricep reflexes are 0 on the right side and 0 on the left side.       Bicep reflexes are 0 on the right side and 0 on the left side.       Brachioradialis reflexes are 0 on the right side and 0 on the left side.       Patellar reflexes are 0 on the right side and 0 on the left side.       Achilles reflexes are 0 on the right side and 0 on the left side.     Comments: Speech and language are normal  Insight seems good.     Spell--while her friend was on the cell phone talking with us describing a spell her head began to rhythmically shake with eyes closed, LUE extended and RUE comfortably flexed. Legs still. Slid down in the chair. Became a bit tearful. Came out of it immediately.    Psychiatric:         Thought Content: Thought content normal.         Assessment:       1. Shaking    2. Memory loss    3. Functional gait disorder          Plan:            New onset shaking spells, strongly suspect FND  During the exam pt had a shaking spell ( see PE description), looked very much like a FND  Pt agrees to see Dr Horne again ( PhD psychologist), and I will prescribed lexapro as first effort. If does not help then will refer to  psychiatrist, ( prefers not to see psychiatrist at this time). Note she was depressed early in 2022, and had SI at that time. Denies SI at this time.   Strongly encouraged exercise.     Headaches after her spells, achiness on the top of her head, relieved with tylenol.     Mild DPN.      Teresa Ball MD   08/30/2022   9:10 AM

## 2022-08-31 LAB — FOLATE SERPL-MCNC: 15.7 NG/ML (ref 4–24)

## 2022-09-01 LAB — VIT B12 SERPL-MCNC: 383 NG/L (ref 180–914)

## 2022-09-02 ENCOUNTER — TELEPHONE (OUTPATIENT)
Dept: NEUROLOGY | Facility: CLINIC | Age: 52
End: 2022-09-02
Payer: MEDICAID

## 2022-09-06 ENCOUNTER — TELEPHONE (OUTPATIENT)
Dept: FAMILY MEDICINE | Facility: HOSPITAL | Age: 52
End: 2022-09-06
Payer: MEDICAID

## 2022-09-07 LAB — METHYLMALONATE SERPL-SCNC: 0.18 NMOL/ML

## 2022-09-08 ENCOUNTER — TELEPHONE (OUTPATIENT)
Dept: FAMILY MEDICINE | Facility: HOSPITAL | Age: 52
End: 2022-09-08
Payer: MEDICAID

## 2022-09-08 NOTE — TELEPHONE ENCOUNTER
Attempted to call to give lab results. Voicemail box still not set up. No answer. Overall lab results improved/stable. May need adjustment for insulin regimen. Will plan to discuss at next office visit.     Robson Culver DO  [facility], PGY-3

## 2022-09-21 ENCOUNTER — TELEPHONE (OUTPATIENT)
Dept: FAMILY MEDICINE | Facility: HOSPITAL | Age: 52
End: 2022-09-21
Payer: MEDICAID

## 2022-09-22 ENCOUNTER — HOSPITAL ENCOUNTER (OUTPATIENT)
Dept: RADIOLOGY | Facility: HOSPITAL | Age: 52
Discharge: HOME | End: 2022-09-22
Attending: STUDENT IN AN ORGANIZED HEALTH CARE EDUCATION/TRAINING PROGRAM
Payer: MEDICAID

## 2022-09-22 ENCOUNTER — TELEPHONE (OUTPATIENT)
Dept: NEUROLOGY | Facility: CLINIC | Age: 52
End: 2022-09-22
Payer: MEDICAID

## 2022-09-22 DIAGNOSIS — Z12.31 ENCOUNTER FOR SCREENING MAMMOGRAM FOR MALIGNANT NEOPLASM OF BREAST: ICD-10-CM

## 2022-09-22 PROCEDURE — 77063 BREAST TOMOSYNTHESIS BI: CPT | Mod: 26,,, | Performed by: RADIOLOGY

## 2022-09-22 PROCEDURE — 77063 MAMMO DIGITAL SCREENING BILAT WITH TOMO: ICD-10-PCS | Mod: 26,,, | Performed by: RADIOLOGY

## 2022-09-22 PROCEDURE — 77067 SCR MAMMO BI INCL CAD: CPT | Mod: TC

## 2022-09-22 PROCEDURE — 77067 MAMMO DIGITAL SCREENING BILAT WITH TOMO: ICD-10-PCS | Mod: 26,,, | Performed by: RADIOLOGY

## 2022-09-22 PROCEDURE — 77063 BREAST TOMOSYNTHESIS BI: CPT | Mod: TC

## 2022-09-22 PROCEDURE — 77067 SCR MAMMO BI INCL CAD: CPT | Mod: 26,,, | Performed by: RADIOLOGY

## 2022-09-22 NOTE — TELEPHONE ENCOUNTER
Patient returned call stating her weight was taken on carpet but once the scale was moved to a flat surface itr was normal. And she also stated she is no currently seeing her psychiatrist

## 2022-09-22 NOTE — TELEPHONE ENCOUNTER
----- Message from Daniel Hudson sent at 9/22/2022  2:45 PM CDT -----  Contact: [phone]  Who Called: PT  Regarding: blood work results   Would the patient rather a call back or a response via [facility]? Call back  Best Call Back Number: [phone]   Additional Information: n/a

## 2022-10-11 ENCOUNTER — OFFICE VISIT (OUTPATIENT)
Dept: NEUROLOGY | Facility: CLINIC | Age: 52
End: 2022-10-11
Payer: MEDICAID

## 2022-10-11 VITALS
SYSTOLIC BLOOD PRESSURE: 152 MMHG | DIASTOLIC BLOOD PRESSURE: 83 MMHG | BODY MASS INDEX: 25.56 KG/M2 | HEART RATE: 86 BPM | WEIGHT: 168.63 LBS | HEIGHT: 68 IN

## 2022-10-11 DIAGNOSIS — R25.1 SHAKING: Primary | ICD-10-CM

## 2022-10-11 DIAGNOSIS — R26.9 FUNCTIONAL GAIT DISORDER: ICD-10-CM

## 2022-10-11 PROCEDURE — 3052F HG A1C>EQUAL 8.0%<EQUAL 9.0%: CPT | Mod: CPTII,,, | Performed by: PSYCHIATRY & NEUROLOGY

## 2022-10-11 PROCEDURE — 99214 PR OFFICE/OUTPT VISIT, EST, LEVL IV, 30-39 MIN: ICD-10-PCS | Mod: S$PBB,,, | Performed by: PSYCHIATRY & NEUROLOGY

## 2022-10-11 PROCEDURE — 3066F NEPHROPATHY DOC TX: CPT | Mod: CPTII,,, | Performed by: PSYCHIATRY & NEUROLOGY

## 2022-10-11 PROCEDURE — 99999 PR PBB SHADOW E&M-EST. PATIENT-LVL III: CPT | Mod: PBBFAC,,, | Performed by: PSYCHIATRY & NEUROLOGY

## 2022-10-11 PROCEDURE — 3077F SYST BP >= 140 MM HG: CPT | Mod: CPTII,,, | Performed by: PSYCHIATRY & NEUROLOGY

## 2022-10-11 PROCEDURE — 3079F PR MOST RECENT DIASTOLIC BLOOD PRESSURE 80-89 MM HG: ICD-10-PCS | Mod: CPTII,,, | Performed by: PSYCHIATRY & NEUROLOGY

## 2022-10-11 PROCEDURE — 99214 OFFICE O/P EST MOD 30 MIN: CPT | Mod: S$PBB,,, | Performed by: PSYCHIATRY & NEUROLOGY

## 2022-10-11 PROCEDURE — 3066F PR DOCUMENTATION OF TREATMENT FOR NEPHROPATHY: ICD-10-PCS | Mod: CPTII,,, | Performed by: PSYCHIATRY & NEUROLOGY

## 2022-10-11 PROCEDURE — 4010F PR ACE/ARB THEARPY RXD/TAKEN: ICD-10-PCS | Mod: CPTII,,, | Performed by: PSYCHIATRY & NEUROLOGY

## 2022-10-11 PROCEDURE — 99999 PR PBB SHADOW E&M-EST. PATIENT-LVL III: ICD-10-PCS | Mod: PBBFAC,,, | Performed by: PSYCHIATRY & NEUROLOGY

## 2022-10-11 PROCEDURE — 3079F DIAST BP 80-89 MM HG: CPT | Mod: CPTII,,, | Performed by: PSYCHIATRY & NEUROLOGY

## 2022-10-11 PROCEDURE — 3052F PR MOST RECENT HEMOGLOBIN A1C LEVEL 8.0 - < 9.0%: ICD-10-PCS | Mod: CPTII,,, | Performed by: PSYCHIATRY & NEUROLOGY

## 2022-10-11 PROCEDURE — 4010F ACE/ARB THERAPY RXD/TAKEN: CPT | Mod: CPTII,,, | Performed by: PSYCHIATRY & NEUROLOGY

## 2022-10-11 PROCEDURE — 1159F MED LIST DOCD IN RCRD: CPT | Mod: CPTII,,, | Performed by: PSYCHIATRY & NEUROLOGY

## 2022-10-11 PROCEDURE — 99213 OFFICE O/P EST LOW 20 MIN: CPT | Mod: PBBFAC,PN | Performed by: PSYCHIATRY & NEUROLOGY

## 2022-10-11 PROCEDURE — 1159F PR MEDICATION LIST DOCUMENTED IN MEDICAL RECORD: ICD-10-PCS | Mod: CPTII,,, | Performed by: PSYCHIATRY & NEUROLOGY

## 2022-10-11 PROCEDURE — 3008F BODY MASS INDEX DOCD: CPT | Mod: CPTII,,, | Performed by: PSYCHIATRY & NEUROLOGY

## 2022-10-11 PROCEDURE — 3077F PR MOST RECENT SYSTOLIC BLOOD PRESSURE >= 140 MM HG: ICD-10-PCS | Mod: CPTII,,, | Performed by: PSYCHIATRY & NEUROLOGY

## 2022-10-11 PROCEDURE — 3008F PR BODY MASS INDEX (BMI) DOCUMENTED: ICD-10-PCS | Mod: CPTII,,, | Performed by: PSYCHIATRY & NEUROLOGY

## 2022-10-11 PROCEDURE — 3060F PR POS MICROALBUMINURIA RESULT DOCUMENTED/REVIEW: ICD-10-PCS | Mod: CPTII,,, | Performed by: PSYCHIATRY & NEUROLOGY

## 2022-10-11 PROCEDURE — 3060F POS MICROALBUMINURIA REV: CPT | Mod: CPTII,,, | Performed by: PSYCHIATRY & NEUROLOGY

## 2022-10-11 RX ORDER — ESCITALOPRAM OXALATE 10 MG/1
TABLET ORAL
Qty: 30 TABLET | Refills: 2 | Status: SHIPPED | OUTPATIENT
Start: 2022-10-11 | End: 2022-10-11

## 2022-10-11 RX ORDER — FLUOXETINE 10 MG/1
10 CAPSULE ORAL DAILY
COMMUNITY
Start: 2022-09-26 | End: 2022-10-11

## 2022-10-11 RX ORDER — ESCITALOPRAM OXALATE 10 MG/1
TABLET ORAL
Qty: 90 TABLET | Refills: 1 | Status: SHIPPED | OUTPATIENT
Start: 2022-10-11 | End: 2023-09-27

## 2022-10-11 NOTE — PROGRESS NOTES
"Subjective:       Patient ID: Bonnie Lino is a 52 y.o. female.    Chief Complaint: No chief complaint on file.      3 total spells since our last visit, 2 at work.     Past Medical History:   Diagnosis Date    Aneurysm of cardiac wall, congenital     Bilateral lower extremity edema     2/2 calcium channel blocker    Conversion disorder with abnormal movement     Diabetes type 2, uncontrolled     HLD (hyperlipidemia)     Hypertension     Pseudoseizures     Vitamin D deficiency       Past Surgical History:   Procedure Laterality Date    BREAST CYST EXCISION  2003    BREAST SURGERY      cyst removal     CATARACT EXTRACTION W/  INTRAOCULAR LENS IMPLANT      COLONOSCOPY N/A 7/23/2021    Procedure: COLONOSCOPY;  Surgeon: Sapna Fitzgerald MD;  Location: [facility];  Service: Endoscopy;  Laterality: N/A;    HYSTERECTOMY  2002    PARTIAL HYSTERECTOMY  2002        Current Outpatient Medications:     atorvastatin (LIPITOR) 20 MG tablet, Take 1 tablet (20 mg total) by mouth once daily., Disp: 90 tablet, Rfl: 3    BD ULTRA-FINE SHORT PEN NEEDLE 31 gauge x 5/16" Ndle, Inject 1 pen into the skin every evening., Disp: 100 each, Rfl: 3    canagliflozin (INVOKANA) 100 mg Tab tablet, Take 1 tablet (100 mg total) by mouth once daily., Disp: 90 tablet, Rfl: 3    EScitalopram oxalate (LEXAPRO) 10 MG tablet, 1/2 po qhs for 2 weeks then one po qhs, Disp: 30 tablet, Rfl: 2    FLUoxetine 10 MG capsule, Take 10 mg by mouth once daily., Disp: , Rfl:     gabapentin (NEURONTIN) 300 MG capsule, Take 1 capsule (300 mg total) by mouth 3 (three) times daily., Disp: 90 capsule, Rfl: 11    hydrOXYzine pamoate (VISTARIL) 50 MG Cap, Take 1 capsule (50 mg total) by mouth nightly as needed (sleep)., Disp: 30 capsule, Rfl: 11    insulin (LANTUS SOLOSTAR U-100 INSULIN) glargine 100 units/mL (3mL) SubQ pen, Inject 46 Units into the skin every evening., Disp: 13.8 mL, Rfl: 11    lancets Misc, 1 application by Misc.(Non-Drug; Combo Route) route 3 (three) " times daily., Disp: 100 each, Rfl: 1    lisinopriL (PRINIVIL,ZESTRIL) 2.5 MG tablet, Take 1 tablet (2.5 mg total) by mouth nightly., Disp: 90 tablet, Rfl: 3    multivitamin (THERAGRAN) per tablet, Take 1 tablet by mouth once daily., Disp: , Rfl:     NIFEdipine (PROCARDIA-XL) 90 MG (OSM) 24 hr tablet, Take 1 tablet (90 mg total) by mouth once daily., Disp: 90 tablet, Rfl: 3    acetaminophen (TYLENOL) 500 MG tablet, Take 500 mg by mouth daily as needed for Pain., Disp: , Rfl:     aspirin (ECOTRIN) 81 MG EC tablet, Take 81 mg by mouth once daily., Disp: , Rfl:     blood glucose strip-disp meter Kit, 1 application by Misc.(Non-Drug; Combo Route) route 3 (three) times daily., Disp: 1 kit, Rfl: 1    blood-glucose meter (RELION ALL-IN-ONE METER) kit, Use as instructed, Disp: 1 each, Rfl: 0   Review of patient's allergies indicates:  No Known Allergies     Review of Systems        Objective:      Physical Exam  Neurological:      Mental Status: Mental status is at baseline.   Psychiatric:         Behavior: Behavior normal.         Thought Content: Thought content normal.         Assessment:       1. Shaking    2. Functional gait disorder        Plan:            Pt feels much better since last visit when lexapro was initiated. No SE. No new symptoms. Had 3 spells since our last visit, 2 while at work.   She is walking for exercise.   We called her supervisor Bel who described the spell that occurred last week. She was standing up, speech became slurred, then no words would come out, her head bobbed forward for less than one minute then she came around.   Mood is better, she feels well, and she is not interested in seeing a psychiatrist.   She is walking for exercise.   Plan continue lexapro.     Continue daily B12 one mg ( B12 383 on 8-30-22)  RFU 6 months.        Teresa Ball MD   10/11/2022   2:50 PM

## 2022-10-24 ENCOUNTER — TELEPHONE (OUTPATIENT)
Dept: FAMILY MEDICINE | Facility: HOSPITAL | Age: 52
End: 2022-10-24
Payer: MEDICAID

## 2022-10-24 NOTE — TELEPHONE ENCOUNTER
----- Message from Joycelyn Ackerman sent at 10/21/2022  3:39 PM CDT -----  Pt called stated she wanted to make an appt with Dr. Culver  unable to schedule with Dr. Culver patient prefer Dr. Culver Patient stated if something comes available please give her a call if she do not answer please leave a voice message  [phone] . Thank you

## 2022-11-02 ENCOUNTER — OFFICE VISIT (OUTPATIENT)
Dept: URGENT CARE | Facility: CLINIC | Age: 52
End: 2022-11-02
Payer: MEDICAID

## 2022-11-02 VITALS
WEIGHT: 168 LBS | OXYGEN SATURATION: 98 % | RESPIRATION RATE: 16 BRPM | DIASTOLIC BLOOD PRESSURE: 88 MMHG | BODY MASS INDEX: 25.46 KG/M2 | HEIGHT: 68 IN | HEART RATE: 77 BPM | TEMPERATURE: 97 F | SYSTOLIC BLOOD PRESSURE: 148 MMHG

## 2022-11-02 DIAGNOSIS — R05.9 COUGH, UNSPECIFIED TYPE: Primary | ICD-10-CM

## 2022-11-02 DIAGNOSIS — J06.9 VIRAL URI: ICD-10-CM

## 2022-11-02 LAB
CTP QC/QA: YES
CTP QC/QA: YES
POC MOLECULAR INFLUENZA A AGN: NEGATIVE
POC MOLECULAR INFLUENZA B AGN: NEGATIVE
SARS-COV-2 AG RESP QL IA.RAPID: NEGATIVE

## 2022-11-02 PROCEDURE — 3060F PR POS MICROALBUMINURIA RESULT DOCUMENTED/REVIEW: ICD-10-PCS | Mod: CPTII,S$GLB,, | Performed by: INTERNAL MEDICINE

## 2022-11-02 PROCEDURE — 87811 SARS CORONAVIRUS 2 ANTIGEN POCT, MANUAL READ: ICD-10-PCS | Mod: QW,S$GLB,, | Performed by: INTERNAL MEDICINE

## 2022-11-02 PROCEDURE — 3052F HG A1C>EQUAL 8.0%<EQUAL 9.0%: CPT | Mod: CPTII,S$GLB,, | Performed by: INTERNAL MEDICINE

## 2022-11-02 PROCEDURE — 4010F ACE/ARB THERAPY RXD/TAKEN: CPT | Mod: CPTII,S$GLB,, | Performed by: INTERNAL MEDICINE

## 2022-11-02 PROCEDURE — 3079F PR MOST RECENT DIASTOLIC BLOOD PRESSURE 80-89 MM HG: ICD-10-PCS | Mod: CPTII,S$GLB,, | Performed by: INTERNAL MEDICINE

## 2022-11-02 PROCEDURE — 1159F PR MEDICATION LIST DOCUMENTED IN MEDICAL RECORD: ICD-10-PCS | Mod: CPTII,S$GLB,, | Performed by: INTERNAL MEDICINE

## 2022-11-02 PROCEDURE — 3077F PR MOST RECENT SYSTOLIC BLOOD PRESSURE >= 140 MM HG: ICD-10-PCS | Mod: CPTII,S$GLB,, | Performed by: INTERNAL MEDICINE

## 2022-11-02 PROCEDURE — 99214 OFFICE O/P EST MOD 30 MIN: CPT | Mod: S$GLB,,, | Performed by: INTERNAL MEDICINE

## 2022-11-02 PROCEDURE — 99214 PR OFFICE/OUTPT VISIT, EST, LEVL IV, 30-39 MIN: ICD-10-PCS | Mod: S$GLB,,, | Performed by: INTERNAL MEDICINE

## 2022-11-02 PROCEDURE — 3079F DIAST BP 80-89 MM HG: CPT | Mod: CPTII,S$GLB,, | Performed by: INTERNAL MEDICINE

## 2022-11-02 PROCEDURE — 1159F MED LIST DOCD IN RCRD: CPT | Mod: CPTII,S$GLB,, | Performed by: INTERNAL MEDICINE

## 2022-11-02 PROCEDURE — 87811 SARS-COV-2 COVID19 W/OPTIC: CPT | Mod: QW,S$GLB,, | Performed by: INTERNAL MEDICINE

## 2022-11-02 PROCEDURE — 4010F PR ACE/ARB THEARPY RXD/TAKEN: ICD-10-PCS | Mod: CPTII,S$GLB,, | Performed by: INTERNAL MEDICINE

## 2022-11-02 PROCEDURE — 3052F PR MOST RECENT HEMOGLOBIN A1C LEVEL 8.0 - < 9.0%: ICD-10-PCS | Mod: CPTII,S$GLB,, | Performed by: INTERNAL MEDICINE

## 2022-11-02 PROCEDURE — 87502 POCT INFLUENZA A/B MOLECULAR: ICD-10-PCS | Mod: QW,S$GLB,, | Performed by: INTERNAL MEDICINE

## 2022-11-02 PROCEDURE — 3008F PR BODY MASS INDEX (BMI) DOCUMENTED: ICD-10-PCS | Mod: CPTII,S$GLB,, | Performed by: INTERNAL MEDICINE

## 2022-11-02 PROCEDURE — 3066F NEPHROPATHY DOC TX: CPT | Mod: CPTII,S$GLB,, | Performed by: INTERNAL MEDICINE

## 2022-11-02 PROCEDURE — 3066F PR DOCUMENTATION OF TREATMENT FOR NEPHROPATHY: ICD-10-PCS | Mod: CPTII,S$GLB,, | Performed by: INTERNAL MEDICINE

## 2022-11-02 PROCEDURE — 87502 INFLUENZA DNA AMP PROBE: CPT | Mod: QW,S$GLB,, | Performed by: INTERNAL MEDICINE

## 2022-11-02 PROCEDURE — 71046 XR CHEST PA AND LATERAL: ICD-10-PCS | Mod: FY,S$GLB,, | Performed by: RADIOLOGY

## 2022-11-02 PROCEDURE — 3077F SYST BP >= 140 MM HG: CPT | Mod: CPTII,S$GLB,, | Performed by: INTERNAL MEDICINE

## 2022-11-02 PROCEDURE — 3008F BODY MASS INDEX DOCD: CPT | Mod: CPTII,S$GLB,, | Performed by: INTERNAL MEDICINE

## 2022-11-02 PROCEDURE — 71046 X-RAY EXAM CHEST 2 VIEWS: CPT | Mod: FY,S$GLB,, | Performed by: RADIOLOGY

## 2022-11-02 PROCEDURE — 3060F POS MICROALBUMINURIA REV: CPT | Mod: CPTII,S$GLB,, | Performed by: INTERNAL MEDICINE

## 2022-11-02 RX ORDER — PROMETHAZINE HYDROCHLORIDE AND DEXTROMETHORPHAN HYDROBROMIDE 6.25; 15 MG/5ML; MG/5ML
5 SYRUP ORAL EVERY 4 HOURS PRN
Qty: 180 ML | Refills: 0 | Status: SHIPPED | OUTPATIENT
Start: 2022-11-02 | End: 2022-11-12

## 2022-11-02 RX ORDER — ALBUTEROL SULFATE 90 UG/1
2 AEROSOL, METERED RESPIRATORY (INHALATION) EVERY 6 HOURS PRN
Qty: 18 G | Refills: 0 | Status: SHIPPED | OUTPATIENT
Start: 2022-11-02 | End: 2023-09-27

## 2022-11-02 NOTE — LETTER
November 2, 2022      [facility]  [address] [address]  Phone: [phone]  Fax: [phone]       Patient: Bonnie Lino   YOB: 1970  Date of Visit: 11/02/2022    To Whom It May Concern:    Anuradha Lino  was at [facility] on 11/02/2022. The patient may return to work/school once they have improved symptoms and no fever for 24 hours (without the use of fever reducing medications). If you have any questions or concerns, or if I can be of further assistance, please do not hesitate to contact me.    Sincerely,    Eliel Martino MD

## 2022-11-02 NOTE — PROGRESS NOTES
"Subjective:       Patient ID: Bonnie Lino is a 52 y.o. female.    Vitals:  height is 5' 8" (1.727 m) and weight is 76.2 kg (168 lb). Her temporal temperature is 97.2 °F (36.2 °C). Her blood pressure is 148/88 (abnormal) and her pulse is 77. Her respiration is 16 and oxygen saturation is 98%.     Chief Complaint: Cough    Cough  This is a new problem. The current episode started in the past 7 days (3 days ago). The problem has been gradually worsening. The problem occurs every few minutes. The cough is Non-productive. Associated symptoms include chills, ear congestion, a fever, nasal congestion, postnasal drip and a sore throat. Pertinent negatives include no ear pain, headaches, shortness of breath or wheezing. Nothing aggravates the symptoms. Treatments tried: day quil and nyquil. The treatment provided no relief. There is no history of asthma, bronchitis or pneumonia.     Constitution: Positive for chills and fever.   HENT:  Positive for postnasal drip and sore throat. Negative for ear pain.    Respiratory:  Positive for cough. Negative for shortness of breath and wheezing.    Skin:  Negative for erythema.   Neurological:  Negative for headaches.     Objective:      Physical Exam   Constitutional: She is oriented to person, place, and time. She appears well-developed. No distress.   HENT:   Head: Normocephalic and atraumatic.   Ears:   Right Ear: External ear normal.   Left Ear: External ear normal.   Nose: Nose normal.   Mouth/Throat: Oropharynx is clear and moist. No oropharyngeal exudate.   Eyes: Conjunctivae and EOM are normal. Pupils are equal, round, and reactive to light. Right eye exhibits no discharge. Left eye exhibits no discharge. No scleral icterus.   Neck: Neck supple.   Cardiovascular: Normal rate, regular rhythm and normal heart sounds.   No murmur heard.Exam reveals no gallop and no friction rub.   Pulmonary/Chest: Effort normal. No respiratory distress. She has no wheezes. She has no rales. "   Abdominal: Bowel sounds are normal. She exhibits no distension. Soft.   Lymphadenopathy:     She has no cervical adenopathy.   Neurological: She is alert and oriented to person, place, and time.   Skin: Skin is warm, dry, not diaphoretic and no rash. Capillary refill takes less than 2 seconds. No erythema   Psychiatric: Her behavior is normal.   Nursing note and vitals reviewed.        X-Ray Chest PA And Lateral    Result Date: 11/2/2022  EXAMINATION: XR CHEST PA AND LATERAL CLINICAL HISTORY: Cough, unspecified TECHNIQUE: PA and lateral views of the chest were performed. COMPARISON: 04/12/2022 FINDINGS: Cardiac size is normal.  Lungs are clear no infiltrate or pleural effusion is seen.  Surgical clips are present.     See above Electronically signed by: Raul Pardo MD Date:    11/02/2022 Time:    11:09     Assessment:       1. Cough, unspecified type    2. Viral URI          Plan:         Cough, unspecified type  -     POCT Influenza A/B Molecular  -     SARS Coronavirus 2 Antigen, POCT Manual Read  -     X-Ray Chest PA And Lateral; Future; Expected date: 11/02/2022    Viral URI  -     albuterol (VENTOLIN HFA) 90 mcg/actuation inhaler; Inhale 2 puffs into the lungs every 6 (six) hours as needed for Wheezing. Rescue  Dispense: 18 g; Refill: 0  -     promethazine-dextromethorphan (PROMETHAZINE-DM) 6.25-15 mg/5 mL Syrp; Take 5 mLs by mouth every 4 (four) hours as needed.  Dispense: 180 mL; Refill: 0

## 2022-11-10 ENCOUNTER — OFFICE VISIT (OUTPATIENT)
Dept: FAMILY MEDICINE | Facility: HOSPITAL | Age: 52
End: 2022-11-10
Payer: MEDICAID

## 2022-11-10 VITALS
SYSTOLIC BLOOD PRESSURE: 144 MMHG | HEART RATE: 92 BPM | WEIGHT: 166 LBS | BODY MASS INDEX: 25.16 KG/M2 | HEIGHT: 68 IN | DIASTOLIC BLOOD PRESSURE: 86 MMHG

## 2022-11-10 DIAGNOSIS — Z79.4 CONTROLLED TYPE 2 DIABETES MELLITUS WITH STAGE 3 CHRONIC KIDNEY DISEASE, WITH LONG-TERM CURRENT USE OF INSULIN: Primary | ICD-10-CM

## 2022-11-10 DIAGNOSIS — E11.22 CONTROLLED TYPE 2 DIABETES MELLITUS WITH STAGE 3 CHRONIC KIDNEY DISEASE, WITH LONG-TERM CURRENT USE OF INSULIN: Primary | ICD-10-CM

## 2022-11-10 DIAGNOSIS — Z23 NEED FOR PROPHYLACTIC VACCINATION AND INOCULATION AGAINST INFLUENZA: ICD-10-CM

## 2022-11-10 DIAGNOSIS — N18.30 CONTROLLED TYPE 2 DIABETES MELLITUS WITH STAGE 3 CHRONIC KIDNEY DISEASE, WITH LONG-TERM CURRENT USE OF INSULIN: Primary | ICD-10-CM

## 2022-11-10 DIAGNOSIS — Z23 NEED FOR PROPHYLACTIC VACCINATION AGAINST DIPHTHERIA-TETANUS-PERTUSSIS (DTP): ICD-10-CM

## 2022-11-10 DIAGNOSIS — Z23 NEED FOR PROPHYLACTIC VACCINATION AGAINST STREPTOCOCCUS PNEUMONIAE (PNEUMOCOCCUS): ICD-10-CM

## 2022-11-10 PROCEDURE — 90715 TDAP VACCINE 7 YRS/> IM: CPT

## 2022-11-10 PROCEDURE — 99214 OFFICE O/P EST MOD 30 MIN: CPT | Performed by: STUDENT IN AN ORGANIZED HEALTH CARE EDUCATION/TRAINING PROGRAM

## 2022-11-10 PROCEDURE — 90471 IMMUNIZATION ADMIN: CPT

## 2022-11-10 PROCEDURE — 90677 PCV20 VACCINE IM: CPT

## 2022-11-11 NOTE — PROGRESS NOTES
[facility]  History & Physical    SUBJECTIVE:     Chief Complaint:   Chief Complaint   Patient presents with    Follow-up     DIABETES       History of Present Illness:  52 y.o. female who  has a past medical history of Aneurysm of cardiac wall, congenital, Bilateral lower extremity edema, Conversion disorder with abnormal movement, Diabetes type 2, uncontrolled, HLD (hyperlipidemia), Hypertension, Pseudoseizures, and Vitamin D deficiency. presents to clinic today for management of diabetes. Patient reports glucose levels average 140-150 in AM and 190-200 at night. Patient reports no difficulty with her medication compliance. Patient denies any hypoglycemic events. Patient also reports area of hyperpigmentation in her ear. She denies any pain, swelling, itching.      Allergies:  Review of patient's allergies indicates:  No Known Allergies    Home Medications:  Current Outpatient Medications on File Prior to Visit   Medication Sig    aspirin (ECOTRIN) 81 MG EC tablet Take 81 mg by mouth once daily.    atorvastatin (LIPITOR) 20 MG tablet Take 1 tablet (20 mg total) by mouth once daily.    canagliflozin (INVOKANA) 100 mg Tab tablet Take 1 tablet (100 mg total) by mouth once daily.    EScitalopram oxalate (LEXAPRO) 10 MG tablet one po qhs    gabapentin (NEURONTIN) 300 MG capsule Take 1 capsule (300 mg total) by mouth 3 (three) times daily.    hydrOXYzine pamoate (VISTARIL) 50 MG Cap Take 1 capsule (50 mg total) by mouth nightly as needed (sleep).    insulin (LANTUS SOLOSTAR U-100 INSULIN) glargine 100 units/mL (3mL) SubQ pen Inject 46 Units into the skin every evening.    lisinopriL (PRINIVIL,ZESTRIL) 2.5 MG tablet Take 1 tablet (2.5 mg total) by mouth nightly.    multivitamin (THERAGRAN) per tablet Take 1 tablet by mouth once daily.    NIFEdipine (PROCARDIA-XL) 90 MG (OSM) 24 hr tablet Take 1 tablet (90 mg total) by mouth once daily.    promethazine-dextromethorphan (PROMETHAZINE-DM) 6.25-15 mg/5 mL Syrp  "Take 5 mLs by mouth every 4 (four) hours as needed.    acetaminophen (TYLENOL) 500 MG tablet Take 500 mg by mouth daily as needed for Pain.    albuterol (VENTOLIN HFA) 90 mcg/actuation inhaler Inhale 2 puffs into the lungs every 6 (six) hours as needed for Wheezing. Rescue (Patient not taking: Reported on 11/10/2022)    BD ULTRA-FINE SHORT PEN NEEDLE 31 gauge x 5/16" Ndle Inject 1 pen into the skin every evening. (Patient not taking: Reported on 11/10/2022)    blood glucose strip-disp meter Kit 1 application by Misc.(Non-Drug; Combo Route) route 3 (three) times daily.    blood-glucose meter (RELION ALL-IN-ONE METER) kit Use as instructed    lancets Misc 1 application by Misc.(Non-Drug; Combo Route) route 3 (three) times daily. (Patient not taking: Reported on 11/10/2022)     No current facility-administered medications on file prior to visit.       Past Medical History:   Diagnosis Date    Aneurysm of cardiac wall, congenital     Bilateral lower extremity edema     2/2 calcium channel blocker    Conversion disorder with abnormal movement     Diabetes type 2, uncontrolled     HLD (hyperlipidemia)     Hypertension     Pseudoseizures     Vitamin D deficiency      Past Surgical History:   Procedure Laterality Date    BREAST CYST EXCISION  2003    BREAST SURGERY      cyst removal     CATARACT EXTRACTION W/  INTRAOCULAR LENS IMPLANT      COLONOSCOPY N/A 7/23/2021    Procedure: COLONOSCOPY;  Surgeon: Sapna Fitzgerald MD;  Location: [facility];  Service: Endoscopy;  Laterality: N/A;    HYSTERECTOMY  2002    PARTIAL HYSTERECTOMY  2002     Family History   Problem Relation Age of Onset    Diabetes Mother     Heart disease Mother     Cancer Mother 40        breast    Breast cancer Mother     Diabetes Father     Cancer Maternal Grandmother 82        breast    Breast cancer Maternal Grandmother      Social History     Tobacco Use    Smoking status: Never    Smokeless tobacco: Never   Substance Use Topics    Alcohol use: Yes    "  Comment: seldom    Drug use: No        Review of Systems   Constitutional:  Negative for fever and weight loss.   HENT:  Negative for hearing loss and sore throat.    Eyes:  Negative for blurred vision.   Respiratory:  Negative for cough, shortness of breath and wheezing.    Cardiovascular:  Negative for chest pain and leg swelling.   Gastrointestinal:  Negative for heartburn, nausea and vomiting.   Genitourinary:  Negative for dysuria.   Musculoskeletal:  Negative for back pain, joint pain and myalgias.   Neurological:  Negative for dizziness, weakness and headaches.      OBJECTIVE:     Vital Signs:  Pulse: 92 (11/10/22 1532)  BP: (!) 144/86 (11/10/22 1532)    Physical Exam  HENT:      Head: Normocephalic.      Ears:      Comments: Right external ear canal shows signs of hyperpigmentation     Nose: Nose normal.      Mouth/Throat:      Mouth: Mucous membranes are moist.   Eyes:      Extraocular Movements: Extraocular movements intact.      Pupils: Pupils are equal, round, and reactive to light.   Cardiovascular:      Rate and Rhythm: Normal rate.   Pulmonary:      Effort: Pulmonary effort is normal.   Abdominal:      Palpations: Abdomen is soft.   Musculoskeletal:         General: Normal range of motion.      Cervical back: Normal range of motion.   Skin:     General: Skin is warm.      Capillary Refill: Capillary refill takes less than 2 seconds.   Neurological:      General: No focal deficit present.      Mental Status: She is alert and oriented to person, place, and time.   Psychiatric:         Mood and Affect: Mood normal.         Behavior: Behavior normal.       Laboratory:  Hemoglobin A1C   Date Value Ref Range Status   08/30/2022 8.2 (H) 4.0 - 5.6 % Final     Comment:     ADA Screening Guidelines:  5.7-6.4%  Consistent with prediabetes  >or=6.5%  Consistent with diabetes    High levels of fetal hemoglobin interfere with the HbA1C  assay. Heterozygous hemoglobin variants (HbS, HgC, etc)do  not significantly  interfere with this assay.   However, presence of multiple variants may affect accuracy.     03/18/2022 8.1 (H) 4.0 - 5.6 % Final     Comment:     ADA Screening Guidelines:  5.7-6.4%  Consistent with prediabetes  >or=6.5%  Consistent with diabetes    High levels of fetal hemoglobin interfere with the HbA1C  assay. Heterozygous hemoglobin variants (HbS, HgC, etc)do  not significantly interfere with this assay.   However, presence of multiple variants may affect accuracy.     08/05/2021 7.3 (H) 4.0 - 5.6 % Final     Comment:     ADA Screening Guidelines:  5.7-6.4%  Consistent with prediabetes  >or=6.5%  Consistent with diabetes    High levels of fetal hemoglobin interfere with the HbA1C  assay. Heterozygous hemoglobin variants (HbS, HgC, etc)do  not significantly interfere with this assay.   However, presence of multiple variants may affect accuracy.         A/P:  Bonnie SOLOMON was seen today for follow-up.    Diagnoses and all orders for this visit:    Controlled type 2 diabetes mellitus with stage 3 chronic kidney disease, with long-term current use of insulin  -     Hemoglobin A1C; Future  -     Recommended patient divide her current Lantus dose to be delivered as BID injections of 23 units    Need for prophylactic vaccination and inoculation against influenza  -     Flu Vaccine - Quadrivalent *Preferred* (PF) (6 months & older)    Need for prophylactic vaccination against diphtheria-tetanus-pertussis (DTP)  -     (In Office Administered) Tdap Vaccine    Need for prophylactic vaccination against Streptococcus pneumoniae (pneumococcus)  -     (In Office Administered) Pneumococcal Conjugate Vaccine (20 Valent) (IM)          Follow up in about 3 months (around 2/10/2023).      Thomas Jones MD  [facility] PGY-2  11/10/2022

## 2022-11-15 NOTE — PROGRESS NOTES
I have reviewed the notes, assessments, and/or procedures performed by Dr. Ingram, I concur with her/his documentation of Bonnie Lino. Agree with adjustment of insulin

## 2023-01-09 ENCOUNTER — OFFICE VISIT (OUTPATIENT)
Dept: FAMILY MEDICINE | Facility: HOSPITAL | Age: 53
End: 2023-01-09
Payer: MEDICAID

## 2023-01-09 VITALS
HEIGHT: 68 IN | DIASTOLIC BLOOD PRESSURE: 90 MMHG | WEIGHT: 170.19 LBS | HEART RATE: 93 BPM | BODY MASS INDEX: 25.79 KG/M2 | SYSTOLIC BLOOD PRESSURE: 149 MMHG

## 2023-01-09 DIAGNOSIS — F44.5 PSEUDOSEIZURE: ICD-10-CM

## 2023-01-09 DIAGNOSIS — Z79.4 CONTROLLED TYPE 2 DIABETES MELLITUS WITH STAGE 3 CHRONIC KIDNEY DISEASE, WITH LONG-TERM CURRENT USE OF INSULIN: Primary | ICD-10-CM

## 2023-01-09 DIAGNOSIS — F44.4 CONVERSION DISORDER WITH ABNORMAL MOVEMENT: ICD-10-CM

## 2023-01-09 DIAGNOSIS — E11.22 CONTROLLED TYPE 2 DIABETES MELLITUS WITH STAGE 3 CHRONIC KIDNEY DISEASE, WITH LONG-TERM CURRENT USE OF INSULIN: Primary | ICD-10-CM

## 2023-01-09 DIAGNOSIS — N18.30 CONTROLLED TYPE 2 DIABETES MELLITUS WITH STAGE 3 CHRONIC KIDNEY DISEASE, WITH LONG-TERM CURRENT USE OF INSULIN: Primary | ICD-10-CM

## 2023-01-09 PROCEDURE — 99214 OFFICE O/P EST MOD 30 MIN: CPT | Performed by: STUDENT IN AN ORGANIZED HEALTH CARE EDUCATION/TRAINING PROGRAM

## 2023-01-10 NOTE — PROGRESS NOTES
Progress Note  [facility]    Subjective:     Bonnie Lino is a 52 y.o. year old female who presents to clinic for follow-up.     Pseudoseizure: Last episode over 2 months ago. Has been going back to [employer] and getting involved in that community which has been helpful for her. Back at work full-time. Overall doing really well.     T2DM: Glucose in the 100's with changing lantus to 20U BID. Lowest is 70-80's. No signs or symptoms of gastroparesis, neuropathy or retinopathy.   Last A1C- 8.2% on 8/2022  Last Urine Microalbumin- 117.5 (improved from prior) 8/2022  Last eye exam- 1 year ago, plans to schedule follow-up  Last foot exam- Due today     Patient Active Problem List    Diagnosis Date Noted    Pseudoseizure 05/04/2022    Conversion disorder with abnormal movement     Current mild episode of major depressive disorder without prior episode 02/25/2022    Controlled type 2 diabetes mellitus with stage 3 chronic kidney disease, with long-term current use of insulin 08/06/2021    Lesion of right external ear 08/06/2021    Diabetic polyneuropathy associated with type 2 diabetes mellitus 08/06/2021    Venous stasis dermatitis of both lower extremities 08/06/2021    Chronic kidney disease, stage 3b 04/06/2021    Hypertension associated with type 2 diabetes mellitus     HLD (hyperlipidemia) 09/23/2014    Vitamin D deficiency 09/23/2014    Aneurysm of cardiac wall, congenital         Review of patient's allergies indicates:  No Known Allergies     Past Medical History:   Diagnosis Date    Aneurysm of cardiac wall, congenital     Bilateral lower extremity edema     2/2 calcium channel blocker    Conversion disorder with abnormal movement     Diabetes type 2, uncontrolled     HLD (hyperlipidemia)     Hypertension     Pseudoseizures     Vitamin D deficiency       Past Surgical History:   Procedure Laterality Date    BREAST CYST EXCISION  2003    BREAST SURGERY      cyst removal     CATARACT EXTRACTION W/   "INTRAOCULAR LENS IMPLANT      COLONOSCOPY N/A 7/23/2021    Procedure: COLONOSCOPY;  Surgeon: Sapna Fitzgerald MD;  Location: [facility];  Service: Endoscopy;  Laterality: N/A;    HYSTERECTOMY  2002    PARTIAL HYSTERECTOMY  2002      Family History   Problem Relation Age of Onset    Diabetes Mother     Heart disease Mother     Cancer Mother 40        breast    Breast cancer Mother     Diabetes Father     Cancer Maternal Grandmother 82        breast    Breast cancer Maternal Grandmother       Social History     Tobacco Use    Smoking status: Never    Smokeless tobacco: Never   Substance Use Topics    Alcohol use: Yes     Comment: seldom        Objective:     Vitals:    01/09/23 1421   BP: (!) 149/90   Pulse: 93   Weight: 77.2 kg (170 lb 3.1 oz)   Height: 5' 8" (1.727 m)     BMI: 25.88    Gen: No apparent distress, well nourished and developed, appears stated age  CV: RRR, S1 and S2 present, no LE edema  Resp: CTAB, normal respiratory effort  Diabetic foot exam: Sensation intact throughout to light touch and monofilament. Dorsalis pedis pulses equal and present bilaterally. Skin is well moisturized without signs of ulceration or injury. Toe nails well kept without signs of infection.      Assessment/Plan:     Bonnie Lino is a 52 y.o. year old female who presents to clinic for follow-up.     1. Controlled type 2 diabetes mellitus with stage 3 chronic kidney disease, with long-term current use of insulin  Appears well controlled on current regimen. Due for screening. Foot exam wnl today.   - Hemoglobin A1C; Future  - Basic Metabolic Panel; Future    2. Conversion disorder with abnormal movement  Doing well. Has not had pseudoseizure in over 2 months. Okay to re-start driving. Instructed to only drive short distances and not to drive alone at first until she knows she is tolerating it. Pt verbalized understanding and was in agreement with the plan.      3. Pseudoseizure  As above        Follow-up: 4 months or sooner " PRN    A total of 35 minutes was spent on patient care during this encounter which included chart review, examining the patient, formulating a treatment plan and documentation.     Medical decision making straight forward and not complex during this visit.     Case discussed with staff: Dr. Yifan Culver, DO  [facility], PGY-3

## 2023-01-11 ENCOUNTER — TELEPHONE (OUTPATIENT)
Dept: FAMILY MEDICINE | Facility: HOSPITAL | Age: 53
End: 2023-01-11
Payer: MEDICAID

## 2023-01-11 NOTE — TELEPHONE ENCOUNTER
Called and left message with lab results per patient request last visit. Hgb A1C improved to 8% with normal glucose level on BMP so will leave insulin dose as is for now. BMP with kidney function stable and sodium mildly elevated at 147. Recommend patient increase water intake.    Robson Culver DO  [facility], PGY-3

## 2023-02-27 ENCOUNTER — OFFICE VISIT (OUTPATIENT)
Dept: FAMILY MEDICINE | Facility: HOSPITAL | Age: 53
End: 2023-02-27
Payer: MEDICAID

## 2023-02-27 VITALS
WEIGHT: 167.31 LBS | HEART RATE: 109 BPM | HEIGHT: 68 IN | DIASTOLIC BLOOD PRESSURE: 96 MMHG | BODY MASS INDEX: 25.36 KG/M2 | SYSTOLIC BLOOD PRESSURE: 151 MMHG

## 2023-02-27 DIAGNOSIS — E07.89 THYROID PAIN: ICD-10-CM

## 2023-02-27 DIAGNOSIS — R68.83 CHILLS: Primary | ICD-10-CM

## 2023-02-27 DIAGNOSIS — R05.9 COUGH, UNSPECIFIED TYPE: ICD-10-CM

## 2023-02-27 PROCEDURE — 87635 SARS-COV-2 COVID-19 AMP PRB: CPT | Performed by: STUDENT IN AN ORGANIZED HEALTH CARE EDUCATION/TRAINING PROGRAM

## 2023-02-27 PROCEDURE — 99214 OFFICE O/P EST MOD 30 MIN: CPT | Performed by: STUDENT IN AN ORGANIZED HEALTH CARE EDUCATION/TRAINING PROGRAM

## 2023-02-27 PROCEDURE — 87502 INFLUENZA DNA AMP PROBE: CPT | Performed by: STUDENT IN AN ORGANIZED HEALTH CARE EDUCATION/TRAINING PROGRAM

## 2023-02-27 RX ORDER — BENZONATATE 100 MG/1
100 CAPSULE ORAL 3 TIMES DAILY PRN
Qty: 30 CAPSULE | Refills: 0 | Status: SHIPPED | OUTPATIENT
Start: 2023-02-27 | End: 2023-03-09

## 2023-03-01 NOTE — PROGRESS NOTES
[facility]  History & Physical    SUBJECTIVE:     Chief Complaint:   Chief Complaint   Patient presents with    Cough    Chills       History of Present Illness:  53 y.o. female who  has a past medical history of Aneurysm of cardiac wall, congenital, Bilateral lower extremity edema, Conversion disorder with abnormal movement, Diabetes type 2, uncontrolled, HLD (hyperlipidemia), Hypertension, Pseudoseizures, and Vitamin D deficiency. presents to clinic today for cough and chills last 5 days. Patient reports chills have resolved but she continues to have persistent cough, sore throat and hoarse voice. Patient states that it does not hurt to swallow but she feels like front of her neck hurts. Patient denies chest pain, nausea, vomiting or fever.       Allergies:  Review of patient's allergies indicates:  No Known Allergies    Home Medications:  Current Outpatient Medications on File Prior to Visit   Medication Sig    acetaminophen (TYLENOL) 500 MG tablet Take 500 mg by mouth daily as needed for Pain.    aspirin (ECOTRIN) 81 MG EC tablet Take 81 mg by mouth once daily.    atorvastatin (LIPITOR) 20 MG tablet Take 1 tablet (20 mg total) by mouth once daily.    canagliflozin (INVOKANA) 100 mg Tab tablet Take 1 tablet (100 mg total) by mouth once daily.    EScitalopram oxalate (LEXAPRO) 10 MG tablet one po qhs    gabapentin (NEURONTIN) 300 MG capsule Take 1 capsule (300 mg total) by mouth 3 (three) times daily.    hydrOXYzine pamoate (VISTARIL) 50 MG Cap Take 1 capsule (50 mg total) by mouth nightly as needed (sleep).    insulin (LANTUS SOLOSTAR U-100 INSULIN) glargine 100 units/mL (3mL) SubQ pen Inject 46 Units into the skin every evening.    lisinopriL (PRINIVIL,ZESTRIL) 2.5 MG tablet Take 1 tablet (2.5 mg total) by mouth nightly.    multivitamin (THERAGRAN) per tablet Take 1 tablet by mouth once daily.    NIFEdipine (PROCARDIA-XL) 90 MG (OSM) 24 hr tablet Take 1 tablet (90 mg total) by mouth once daily.     "albuterol (VENTOLIN HFA) 90 mcg/actuation inhaler Inhale 2 puffs into the lungs every 6 (six) hours as needed for Wheezing. Rescue (Patient not taking: Reported on 11/10/2022)    BD ULTRA-FINE SHORT PEN NEEDLE 31 gauge x 5/16" Ndle Inject 1 pen into the skin every evening. (Patient not taking: Reported on 11/10/2022)    blood glucose strip-disp meter Kit 1 application by Misc.(Non-Drug; Combo Route) route 3 (three) times daily.    blood-glucose meter (RELION ALL-IN-ONE METER) kit Use as instructed    lancets Misc 1 application by Misc.(Non-Drug; Combo Route) route 3 (three) times daily. (Patient not taking: Reported on 11/10/2022)     No current facility-administered medications on file prior to visit.       Past Medical History:   Diagnosis Date    Aneurysm of cardiac wall, congenital     Bilateral lower extremity edema     2/2 calcium channel blocker    Conversion disorder with abnormal movement     Diabetes type 2, uncontrolled     HLD (hyperlipidemia)     Hypertension     Pseudoseizures     Vitamin D deficiency      Past Surgical History:   Procedure Laterality Date    BREAST CYST EXCISION  2003    BREAST SURGERY      cyst removal     CATARACT EXTRACTION W/  INTRAOCULAR LENS IMPLANT      COLONOSCOPY N/A 7/23/2021    Procedure: COLONOSCOPY;  Surgeon: Sapna Fitzgerald MD;  Location: [facility];  Service: Endoscopy;  Laterality: N/A;    HYSTERECTOMY  2002    PARTIAL HYSTERECTOMY  2002     Family History   Problem Relation Age of Onset    Diabetes Mother     Heart disease Mother     Cancer Mother 40        breast    Breast cancer Mother     Diabetes Father     Cancer Maternal Grandmother 82        breast    Breast cancer Maternal Grandmother      Social History     Tobacco Use    Smoking status: Never    Smokeless tobacco: Never   Substance Use Topics    Alcohol use: Yes     Comment: seldom    Drug use: No        Review of Systems   Constitutional:  Positive for chills.   HENT:  Positive for congestion and sore " throat.    Eyes:  Negative for blurred vision.   Respiratory:  Positive for cough. Negative for shortness of breath.    Cardiovascular:  Negative for chest pain.   Gastrointestinal:  Negative for heartburn, nausea and vomiting.   Genitourinary:  Negative for dysuria.   Musculoskeletal:  Negative for myalgias.   Neurological: Negative.    Psychiatric/Behavioral: Negative.        OBJECTIVE:     Vital Signs:  Pulse: 109 (02/27/23 1550)  BP: (!) 151/96 (02/27/23 1550)  Body mass index is 25.44 kg/m².  Physical Exam  HENT:      Head: Normocephalic.      Right Ear: External ear normal.      Left Ear: External ear normal.      Nose: Congestion present.      Mouth/Throat:      Mouth: Mucous membranes are moist.      Comments: Hoarse Voice  Eyes:      Extraocular Movements: Extraocular movements intact.      Pupils: Pupils are equal, round, and reactive to light.   Neck:      Comments: Tenderness over the thyroid  Cardiovascular:      Rate and Rhythm: Normal rate.      Pulses: Normal pulses.   Pulmonary:      Effort: Pulmonary effort is normal.   Abdominal:      Palpations: Abdomen is soft.   Musculoskeletal:         General: Normal range of motion.      Cervical back: Normal range of motion. Tenderness present.   Skin:     General: Skin is warm.   Neurological:      Mental Status: She is alert.   Psychiatric:         Mood and Affect: Mood normal.       Laboratory:  Hemoglobin A1C   Date Value Ref Range Status   01/09/2023 8.0 (H) 4.0 - 5.6 % Final     Comment:     ADA Screening Guidelines:  5.7-6.4%  Consistent with prediabetes  >or=6.5%  Consistent with diabetes    High levels of fetal hemoglobin interfere with the HbA1C  assay. Heterozygous hemoglobin variants (HbS, HgC, etc)do  not significantly interfere with this assay.   However, presence of multiple variants may affect accuracy.     08/30/2022 8.2 (H) 4.0 - 5.6 % Final     Comment:     ADA Screening Guidelines:  5.7-6.4%  Consistent with prediabetes  >or=6.5%   Consistent with diabetes    High levels of fetal hemoglobin interfere with the HbA1C  assay. Heterozygous hemoglobin variants (HbS, HgC, etc)do  not significantly interfere with this assay.   However, presence of multiple variants may affect accuracy.     03/18/2022 8.1 (H) 4.0 - 5.6 % Final     Comment:     ADA Screening Guidelines:  5.7-6.4%  Consistent with prediabetes  >or=6.5%  Consistent with diabetes    High levels of fetal hemoglobin interfere with the HbA1C  assay. Heterozygous hemoglobin variants (HbS, HgC, etc)do  not significantly interfere with this assay.   However, presence of multiple variants may affect accuracy.         A/P:  Bonnie SOLOMON was seen today for cough and chills. Will assess for possible thyroiditis. Tessalon Perles provided for cough. Mucinex instasoothe discussed for its ability to soothe sore throats. Suspect viral upper respiratory infection. If symptoms continue or worsen recommend return to clinic or urgent care. If shortness of breath develops recommend ED.    Diagnoses and all orders for this visit:    Chills  -     TSH; Future  -     POCT COVID-19 Rapid Screening  -     POCT Influenza A/B Molecular    Thyroid pain  -     TSH; Future    Cough, unspecified type  -     POCT COVID-19 Rapid Screening  -     POCT Influenza A/B Molecular  -     benzonatate (TESSALON) 100 MG capsule; Take 1 capsule (100 mg total) by mouth 3 (three) times daily as needed for Cough.        Follow up if symptoms worsen or fail to improve.      Thomas Jones MD  [facility] PGY-2  02/28/2023

## 2023-03-02 NOTE — PROGRESS NOTES
I assume primary medical responsibility for this patient. I have reviewed the history, physical, and assessement & treatment plan with the resident and agree that the care is reasonable and necessary. This service has been performed by a resident without the presence of a teaching physician under the primary care exception. If necessary, an addendum of additional findings or evaluation beyond the resident documentation will be noted below.      Dorothea Saha MD    [facility]

## 2023-03-10 DIAGNOSIS — N18.30 CONTROLLED TYPE 2 DIABETES MELLITUS WITH STAGE 3 CHRONIC KIDNEY DISEASE, WITH LONG-TERM CURRENT USE OF INSULIN: ICD-10-CM

## 2023-03-10 DIAGNOSIS — E11.22 CONTROLLED TYPE 2 DIABETES MELLITUS WITH STAGE 3 CHRONIC KIDNEY DISEASE, WITH LONG-TERM CURRENT USE OF INSULIN: ICD-10-CM

## 2023-03-10 DIAGNOSIS — Z79.4 CONTROLLED TYPE 2 DIABETES MELLITUS WITH STAGE 3 CHRONIC KIDNEY DISEASE, WITH LONG-TERM CURRENT USE OF INSULIN: ICD-10-CM

## 2023-03-10 RX ORDER — PEN NEEDLE, DIABETIC 31 GX5/16"
1 NEEDLE, DISPOSABLE MISCELLANEOUS NIGHTLY
Qty: 100 EACH | Refills: 3 | Status: SHIPPED | OUTPATIENT
Start: 2023-03-10 | End: 2023-09-28 | Stop reason: SDUPTHER

## 2023-05-08 ENCOUNTER — TELEPHONE (OUTPATIENT)
Dept: FAMILY MEDICINE | Facility: HOSPITAL | Age: 53
End: 2023-05-08
Payer: MEDICAID

## 2023-05-08 NOTE — TELEPHONE ENCOUNTER
"----- Message from Angie Reno sent at 2023  3:53 PM CDT -----  Regarding: refill on all meds  Pt call for a refill on her medicines...    lisinopriL (PRINIVIL,ZESTRIL) 2.5 MG tablet   and atorvastatin (LIPITOR) 20 MG tablet  acetaminophen (TYLENOL) 500 MG   albuterol (VENTOLIN HFA) 90 mcg/actuation inhaler   aspirin (ECOTRIN) 81 MG EC tablet   BD ULTRA-FINE SHORT PEN NEEDLE 31 gauge x 5/16" Ndle     - blood glucose strip-disp meter Kit   - blood-glucose meter (RELION ALL-IN-ONE METER) kit  canagliflozin (INVOKANA) 100 mg Tab     EScitalopram oxalate (LEXAPRO) 10 MG tablet     gabapentin (NEURONTIN) 300 MG capsule    hydrOXYzine pamoate (VISTARIL) 50 Mg    insulin (LANTUS SOLOSTAR U-100 INSULIN) glargine 100 units/mL (3mL) SubQ pen  NIFEdipine (PROCARDIA-XL) 90 MG (OSM) 24 hr   multivitamin (THERAGRAN) per     [facility] in [facility][note 3].. call back # [phone]    "

## 2023-05-18 ENCOUNTER — TELEPHONE (OUTPATIENT)
Dept: FAMILY MEDICINE | Facility: HOSPITAL | Age: 53
End: 2023-05-18
Payer: MEDICAID

## 2023-05-18 DIAGNOSIS — Z79.4 CONTROLLED TYPE 2 DIABETES MELLITUS WITH STAGE 3 CHRONIC KIDNEY DISEASE, WITH LONG-TERM CURRENT USE OF INSULIN: ICD-10-CM

## 2023-05-18 DIAGNOSIS — E11.22 CONTROLLED TYPE 2 DIABETES MELLITUS WITH STAGE 3 CHRONIC KIDNEY DISEASE, WITH LONG-TERM CURRENT USE OF INSULIN: ICD-10-CM

## 2023-05-18 DIAGNOSIS — E78.5 HYPERLIPIDEMIA, UNSPECIFIED HYPERLIPIDEMIA TYPE: ICD-10-CM

## 2023-05-18 DIAGNOSIS — N18.30 CONTROLLED TYPE 2 DIABETES MELLITUS WITH STAGE 3 CHRONIC KIDNEY DISEASE, WITH LONG-TERM CURRENT USE OF INSULIN: ICD-10-CM

## 2023-05-18 RX ORDER — CANAGLIFLOZIN 100 MG/1
100 TABLET, FILM COATED ORAL DAILY
Qty: 90 TABLET | Refills: 3 | Status: SHIPPED | OUTPATIENT
Start: 2023-05-18 | End: 2023-10-30 | Stop reason: SDUPTHER

## 2023-05-18 RX ORDER — ATORVASTATIN CALCIUM 20 MG/1
20 TABLET, FILM COATED ORAL DAILY
Qty: 90 TABLET | Refills: 3 | Status: SHIPPED | OUTPATIENT
Start: 2023-05-18 | End: 2023-08-07 | Stop reason: SDUPTHER

## 2023-06-08 DIAGNOSIS — N18.30 CONTROLLED TYPE 2 DIABETES MELLITUS WITH STAGE 3 CHRONIC KIDNEY DISEASE, WITH LONG-TERM CURRENT USE OF INSULIN: ICD-10-CM

## 2023-06-08 DIAGNOSIS — Z79.4 CONTROLLED TYPE 2 DIABETES MELLITUS WITH STAGE 3 CHRONIC KIDNEY DISEASE, WITH LONG-TERM CURRENT USE OF INSULIN: ICD-10-CM

## 2023-06-08 DIAGNOSIS — E11.22 CONTROLLED TYPE 2 DIABETES MELLITUS WITH STAGE 3 CHRONIC KIDNEY DISEASE, WITH LONG-TERM CURRENT USE OF INSULIN: ICD-10-CM

## 2023-06-08 RX ORDER — INSULIN GLARGINE 100 [IU]/ML
46 INJECTION, SOLUTION SUBCUTANEOUS NIGHTLY
Qty: 13.8 ML | Refills: 5 | Status: SHIPPED | OUTPATIENT
Start: 2023-06-08 | End: 2023-09-27

## 2023-06-20 NOTE — PROGRESS NOTES
Diabetes Education  Author: Linwood Sultana RN  Date: 7/29/2020  Diabetes Care Management Summary  Diabetes Education Record Assessment/Progress: Post Program/Follow-up  Current Diabetes Risk Level: Moderate   Last A1c:     Last visit with Diabetes Educator: : 07/29/2020  Diabetes Type  Diabetes Type : Type II    Diabetes History  Current Treatment: Oral Medication, Diet, Injectable, Exercise, Insulin  Health Maintenance was reviewed today with patient. Discussed with patient importance of routine eye exams, foot exams/foot care, blood work (i.e.: A1c, microalbumin, and lipid), dental visits, yearly flu vaccine, and pneumonia vaccine as indicated by PCP. Patient verbalized understanding.     Health Maintenance Topics with due status: Not Due       Topic Last Completion Date    Lipid Panel 05/07/2020    Hemoglobin A1c 05/07/2020    Low Dose Statin 07/15/2020    Influenza Vaccine Not Due     Health Maintenance Due   Topic Date Due    Hepatitis C Screening  1970    Eye Exam  02/04/1978    HIV Screening  02/04/1983    TETANUS VACCINE  02/04/1986    Mammogram  03/25/2015    Foot Exam  02/10/2017    Shingles Vaccine (1 of 2) 02/04/2018    Colorectal Cancer Screening  02/04/2018   Nutrition  Meal Planning: water, 3 meals per day, snacks between meal  What type of beverages do you drink?: water  Meal Plan 24 Hour Recall - Breakfast: oatmeal, 4 blackberries, egg, orange juice  Meal Plan 24 Hour Recall - Lunch: hot dog, bun, chili, pasta  Meal Plan 24 Hour Recall - Dinner: red beans and rice, cornbread  Meal Plan 24 Hour Recall - Snack: cheese its, chips  Monitoring   Self Monitoring : pt has a meter and is testing a few times a day. pt was instructed to test 2 times a day- fasting in am and before injection. Discussed with pt about curiousity testing. Pt will try to test only at the instructed times. bs's rnage from   Blood Glucose Logs: Yes  Do you use a personal continuous glucose monitor?: No  In the  last month, how often have you had a low blood sugar reaction?: never  Can you tell when your blood sugar is too high?: no  Exercise   Exercise Type: none  Current Diabetes Treatment   Current Treatment: Oral Medication, Diet, Injectable, Exercise, Insulin  Social History  Primary Support: Self    DDS-2 Score  ( > 3 = SIGNIFICANT DISTRESS): 2     Barriers to Change  Barriers to Change: None  Learning Challenges : None  Readiness to Learn   Readiness to Learn : Acceptance  Diabetes Education Assessment/Progress  Diabetes Disease Process (diabetes disease process and treatment options): Not Covered/Deferred  Nutrition (Incorporating nutritional management into one's lifestyle): Discussion, Individual Session, Demonstrates Understanding/Competency (verbalizes/demonstrates), Instructed  Physical Activity (incorporating physical activity into one's lifestyle): Discussion, Individual Session, Demonstrates Understanding/Competency (verbalizes/demonstrates), Instructed  Medications (states correct name, dose, onset, peak, duration, side effects & timing of meds): Discussion, Individual Session, Demonstrates Understanding/Competency(verbalizes/demonstrates), Instructed  Monitoring (monitoring blood glucose/other parameters & using results): Discussion, Individual Session, Demonstrates Understanding/Competency (verbalizes/demonstrates), Instructed  Acute Complications (preventing, detecting, and treating acute complications): Not Covered/Deferred  Chronic Complications (preventing, detecting, and treating chronic complications): Not Covered/Deferred  Clinical (diabetes, other pertinent medical history, and relevant comorbidities reviewed during visit): Discussion, Individual Session, Demonstrates Understanding/Competency (verbalizes/demonstrates)  Cognitive (knowledge of self-management skills, functional health literacy): Discussion, Individual Session, Demonstrates Understanding/Competency  (verbalizes/demonstrates)  Psychosocial (emotional response to diabetes): Discussion, Individual Session, Demonstrates Understanding/Competency (verbalizes/demonstrates)  Diabetes Distress and Support Systems: Discussion, Individual Session, Demonstrates Understanding/Competency (verbalizes/demonstrates)  Behavioral (readiness for change, lifestyle practices, self-care behaviors): Discussion, Individual Session, Demonstrates Understanding/Competency (verbalizes/demonstrates)  Goals  Patient has selected/evaluated goals during today's session: Yes, evaluated  Healthy Eating: % Met  Met Percentage : 75%  Monitoring: % Met  Met Percentage : 100%  Healthy Coping: % Met  Met Percentage : 50%  Diabetes Meal Plan  Restrictions: Restricted Carbohydrate  Calories: 1800  Carbohydrate Per Meal: 30-45g  Carbohydrate Per Snack : 15-20g  Pt completed diabetes fu visit via phone. Pt states that she has still been very stressed. She is anxious about going back to work soon. Pt is aware that her stress is increasing her bs's. Pt reports that bs's are ranging all over the place. Pt has been testing very frequently and she was advised not to do this. Pt drinks only water. She does have an issue with frequent snacking and large amounts of snacks. Pt likes cheese its and it was suggested that she get the individual bags instead of eating out of the box. It was also suggested that she plan her snacks. Pt is working on her meals but does not count her carbs. Discussed with pt what needs to be adjusted in her meals to make them more balanced. Pt states that she is waking up and eating in the middle of the night between 1-3. Discussed with pt that this might be a low bs and she needs to check her bs at this time. Pt will work on adjustments. Pt had good understanding of adjustments. Pts questions were addressed and answered. Discussed covid 19 precautions with pt. Pt was advised to call for any problems or questions. Will fu in one  month.  Today's Self-Management Care Plan was developed with the patient's input and is based on barriers identified during today's assessment.    The long and short-term goals in the care plan were written with the patient/caregiver's input. The patient has agreed to work toward these goals to improve her overall diabetes control.      The patient received a copy of today's self-management plan and verbalized understanding of the care plan, goals, and all of today's instructions.      The patient was encouraged to communicate with her physician and care team regarding her condition(s) and treatment.  I provided the patient with my contact information today and encouraged her to contact me via phone or patient portal as needed.     Education Units of Time   Time Spent: 60 min Telephone - [id number] ([id number])    Diabetes Care Specialist Telehealth Visit Note  This visit was conducted using Telehealth/Telephonic Technology. It was in the patient's best interest to receive diabetes self-management education and support services in this format to prevent the exposure to COVID-19.      Adbry Pregnancy And Lactation Text: It is unknown if this medication will adversely affect pregnancy or breast feeding.

## 2023-07-06 ENCOUNTER — OFFICE VISIT (OUTPATIENT)
Dept: FAMILY MEDICINE | Facility: HOSPITAL | Age: 53
End: 2023-07-06
Payer: MEDICAID

## 2023-07-06 VITALS
SYSTOLIC BLOOD PRESSURE: 150 MMHG | HEIGHT: 68 IN | DIASTOLIC BLOOD PRESSURE: 89 MMHG | HEART RATE: 90 BPM | WEIGHT: 168.19 LBS | BODY MASS INDEX: 25.49 KG/M2

## 2023-07-06 DIAGNOSIS — F32.0 CURRENT MILD EPISODE OF MAJOR DEPRESSIVE DISORDER WITHOUT PRIOR EPISODE: ICD-10-CM

## 2023-07-06 DIAGNOSIS — N18.30 CONTROLLED TYPE 2 DIABETES MELLITUS WITH STAGE 3 CHRONIC KIDNEY DISEASE, WITH LONG-TERM CURRENT USE OF INSULIN: Primary | ICD-10-CM

## 2023-07-06 DIAGNOSIS — E11.22 CONTROLLED TYPE 2 DIABETES MELLITUS WITH STAGE 3 CHRONIC KIDNEY DISEASE, WITH LONG-TERM CURRENT USE OF INSULIN: Primary | ICD-10-CM

## 2023-07-06 DIAGNOSIS — Z79.4 CONTROLLED TYPE 2 DIABETES MELLITUS WITH STAGE 3 CHRONIC KIDNEY DISEASE, WITH LONG-TERM CURRENT USE OF INSULIN: Primary | ICD-10-CM

## 2023-07-06 PROCEDURE — 99214 OFFICE O/P EST MOD 30 MIN: CPT | Performed by: STUDENT IN AN ORGANIZED HEALTH CARE EDUCATION/TRAINING PROGRAM

## 2023-07-06 RX ORDER — SEMAGLUTIDE 0.68 MG/ML
INJECTION, SOLUTION SUBCUTANEOUS
Qty: 7.5 ML | Refills: 0 | Status: SHIPPED | OUTPATIENT
Start: 2023-07-06 | End: 2023-09-05 | Stop reason: SDUPTHER

## 2023-07-06 RX ORDER — LISINOPRIL 10 MG/1
10 TABLET ORAL DAILY
Qty: 90 TABLET | Refills: 3 | Status: SHIPPED | OUTPATIENT
Start: 2023-07-06 | End: 2023-10-30 | Stop reason: SDUPTHER

## 2023-07-06 RX ORDER — HYDROXYZINE PAMOATE 50 MG/1
50 CAPSULE ORAL NIGHTLY PRN
Qty: 30 CAPSULE | Refills: 11 | Status: SHIPPED | OUTPATIENT
Start: 2023-07-06

## 2023-07-06 NOTE — PROGRESS NOTES
Clinic Note  \[facility]\"" Family Medicine    Subjective:      Bonnie Lino is a 53 y.o. female with PMHx of T2DM, CKD, HTN, and Depression. Patient says she's feeling tired lately, lingering cough, says over the counter meds have not been helping. A1C 5 months ago was 8, adherent to 46 nightly levemir and 10 invokana. CKD has been stable Cr 1.6, out of follow up at [facility]. Patient amenable to adding ozempic to her regimen.    Review of Systems   Constitutional:  Positive for malaise/fatigue. Negative for chills and fever.   HENT:  Negative for congestion and sore throat.    Respiratory:  Negative for cough.    Cardiovascular:  Negative for chest pain and palpitations.   Gastrointestinal:  Negative for abdominal pain, diarrhea, nausea and vomiting.   Genitourinary:  Negative for dysuria.   Musculoskeletal:  Negative for joint pain and myalgias.   Neurological:  Negative for dizziness, tingling, weakness and headaches.   Psychiatric/Behavioral:  Negative for depression. The patient is not nervous/anxious.         Objective:      Vitals:    07/06/23 1634   BP: (!) 150/89   Pulse: 90     Body mass index is 25.58 kg/m².      Physical Exam  Constitutional:       Appearance: Normal appearance. She is well-developed.   Cardiovascular:      Rate and Rhythm: Normal rate and regular rhythm.      Pulses: Normal pulses.   Pulmonary:      Effort: Pulmonary effort is normal.      Breath sounds: Normal breath sounds.   Abdominal:      General: Abdomen is flat. Bowel sounds are normal.      Palpations: Abdomen is soft.   Musculoskeletal:         General: Normal range of motion.      Cervical back: Normal range of motion.   Skin:     General: Skin is warm and dry.      Capillary Refill: Capillary refill takes less than 2 seconds.      Coloration: Skin is not pale.   Neurological:      General: No focal deficit present.      Mental Status: She is alert and oriented to person, place, and time.   Psychiatric:         Mood and Affect: Mood  normal.         Behavior: Behavior normal.          Assessment/Plan:      Patient feeling generally weak last few weeks but on exam no notable rhinorrhea or coughing. Will rule out underlying lab abnormalities. HTN has been consistently uncontrolled for past several visits - lisinopril only 2.5 so will raise. Patient out of follow up at [facility] nephro - will re-refer.    1. Current mild episode of major depressive disorder without prior episode    - hydrOXYzine pamoate (VISTARIL) 50 MG Cap; Take 1 capsule (50 mg total) by mouth nightly as needed (sleep).  Dispense: 30 capsule; Refill: 11  - CBC Auto Differential; Future  - Comprehensive Metabolic Panel; Future    2. Controlled type 2 diabetes mellitus with stage 3 chronic kidney disease, with long-term current use of insulin    - CBC Auto Differential; Future  - Comprehensive Metabolic Panel; Future  - Hemoglobin A1C; Future  - Microalbumin/creatinine urine ratio; Future  - Ambulatory referral/consult to Nephrology; Future        Patient discussed with attending physician, Dr. Wilver Ingram MD  \[facility]\"" Family Medicine PGY-3  07/06/2023      The following information is provided to all patients.  This information is to help you find resources for any of the problems found today that may be affecting your health:                Living healthy guide: www.[facility].[place].gov       Understanding Diabetes: www.diabetes.org       Eating healthy: www.cdc.gov/healthyweight      CDC home safety checklist: www.cdc.gov/steadi/patient.html      Agency on Aging: www.goea.[place].gov       Alcoholics anonymous (AA): www.aa.org      Physical Activity: www.patrice.nih.gov/wh3taiv       Tobacco use: www.quitwithusla.org

## 2023-07-10 NOTE — PROGRESS NOTES
I assume primary medical responsibility for this patient. I have reviewed the history, physical, and assessment & treatment plan with the resident and agree that the care is reasonable and necessary. This service has been performed by a resident without the presence of a teaching physician under the primary care exception. If necessary, an addendum of additional findings or evaluation beyond the resident documentation will be noted below.     Uncontrolled hypertension without signs/symptoms of end-organ damage.  Increased lisinopril.  Encouraged lifestyle modifications.  If BP remains elevated at next scheduled clinic visit may consider adding/adjusting current antihypertensive medication regimen.     Chidi Pettit Jr., DO    [facility] Family Medicine

## 2023-07-11 ENCOUNTER — TELEPHONE (OUTPATIENT)
Dept: FAMILY MEDICINE | Facility: HOSPITAL | Age: 53
End: 2023-07-11
Payer: MEDICAID

## 2023-07-12 ENCOUNTER — LAB VISIT (OUTPATIENT)
Dept: LAB | Facility: HOSPITAL | Age: 53
End: 2023-07-12
Attending: STUDENT IN AN ORGANIZED HEALTH CARE EDUCATION/TRAINING PROGRAM
Payer: MEDICAID

## 2023-07-12 DIAGNOSIS — R68.83 CHILLS: ICD-10-CM

## 2023-07-12 DIAGNOSIS — Z79.4 CONTROLLED TYPE 2 DIABETES MELLITUS WITH STAGE 3 CHRONIC KIDNEY DISEASE, WITH LONG-TERM CURRENT USE OF INSULIN: ICD-10-CM

## 2023-07-12 DIAGNOSIS — F32.0 CURRENT MILD EPISODE OF MAJOR DEPRESSIVE DISORDER WITHOUT PRIOR EPISODE: ICD-10-CM

## 2023-07-12 DIAGNOSIS — N18.30 CONTROLLED TYPE 2 DIABETES MELLITUS WITH STAGE 3 CHRONIC KIDNEY DISEASE, WITH LONG-TERM CURRENT USE OF INSULIN: ICD-10-CM

## 2023-07-12 DIAGNOSIS — E11.22 CONTROLLED TYPE 2 DIABETES MELLITUS WITH STAGE 3 CHRONIC KIDNEY DISEASE, WITH LONG-TERM CURRENT USE OF INSULIN: ICD-10-CM

## 2023-07-12 DIAGNOSIS — E07.89 THYROID PAIN: ICD-10-CM

## 2023-07-12 LAB
ALBUMIN SERPL BCP-MCNC: 3.9 G/DL (ref 3.5–5.2)
ALP SERPL-CCNC: 77 U/L (ref 55–135)
ALT SERPL W/O P-5'-P-CCNC: 12 U/L (ref 10–44)
ANION GAP SERPL CALC-SCNC: 11 MMOL/L (ref 8–16)
AST SERPL-CCNC: 16 U/L (ref 10–40)
BASOPHILS # BLD AUTO: 0.04 K/UL (ref 0–0.2)
BASOPHILS NFR BLD: 0.7 % (ref 0–1.9)
BILIRUB SERPL-MCNC: 0.5 MG/DL (ref 0.1–1)
BUN SERPL-MCNC: 32 MG/DL (ref 6–20)
CALCIUM SERPL-MCNC: 9.3 MG/DL (ref 8.7–10.5)
CHLORIDE SERPL-SCNC: 110 MMOL/L (ref 95–110)
CO2 SERPL-SCNC: 20 MMOL/L (ref 23–29)
CREAT SERPL-MCNC: 1.7 MG/DL (ref 0.5–1.4)
DIFFERENTIAL METHOD: ABNORMAL
EOSINOPHIL # BLD AUTO: 0.1 K/UL (ref 0–0.5)
EOSINOPHIL NFR BLD: 2.5 % (ref 0–8)
ERYTHROCYTE [DISTWIDTH] IN BLOOD BY AUTOMATED COUNT: 13.8 % (ref 11.5–14.5)
EST. GFR  (NO RACE VARIABLE): 35 ML/MIN/1.73 M^2
ESTIMATED AVG GLUCOSE: 192 MG/DL (ref 68–131)
GLUCOSE SERPL-MCNC: 134 MG/DL (ref 70–110)
HBA1C MFR BLD: 8.3 % (ref 4–5.6)
HCT VFR BLD AUTO: 40.6 % (ref 37–48.5)
HGB BLD-MCNC: 12.6 G/DL (ref 12–16)
IMM GRANULOCYTES # BLD AUTO: 0.01 K/UL (ref 0–0.04)
IMM GRANULOCYTES NFR BLD AUTO: 0.2 % (ref 0–0.5)
LYMPHOCYTES # BLD AUTO: 1.2 K/UL (ref 1–4.8)
LYMPHOCYTES NFR BLD: 21.7 % (ref 18–48)
MCH RBC QN AUTO: 25.2 PG (ref 27–31)
MCHC RBC AUTO-ENTMCNC: 31 G/DL (ref 32–36)
MCV RBC AUTO: 81 FL (ref 82–98)
MONOCYTES # BLD AUTO: 0.4 K/UL (ref 0.3–1)
MONOCYTES NFR BLD: 6.9 % (ref 4–15)
NEUTROPHILS # BLD AUTO: 3.8 K/UL (ref 1.8–7.7)
NEUTROPHILS NFR BLD: 68 % (ref 38–73)
NRBC BLD-RTO: 0 /100 WBC
PLATELET # BLD AUTO: 217 K/UL (ref 150–450)
PMV BLD AUTO: 12 FL (ref 9.2–12.9)
POTASSIUM SERPL-SCNC: 5 MMOL/L (ref 3.5–5.1)
PROT SERPL-MCNC: 7.6 G/DL (ref 6–8.4)
RBC # BLD AUTO: 5 M/UL (ref 4–5.4)
SODIUM SERPL-SCNC: 141 MMOL/L (ref 136–145)
TSH SERPL DL<=0.005 MIU/L-ACNC: 1.14 UIU/ML (ref 0.4–4)
WBC # BLD AUTO: 5.53 K/UL (ref 3.9–12.7)

## 2023-07-12 PROCEDURE — 36415 COLL VENOUS BLD VENIPUNCTURE: CPT | Performed by: STUDENT IN AN ORGANIZED HEALTH CARE EDUCATION/TRAINING PROGRAM

## 2023-07-12 PROCEDURE — 84443 ASSAY THYROID STIM HORMONE: CPT | Performed by: STUDENT IN AN ORGANIZED HEALTH CARE EDUCATION/TRAINING PROGRAM

## 2023-07-12 PROCEDURE — 80053 COMPREHEN METABOLIC PANEL: CPT | Performed by: STUDENT IN AN ORGANIZED HEALTH CARE EDUCATION/TRAINING PROGRAM

## 2023-07-12 PROCEDURE — 85025 COMPLETE CBC W/AUTO DIFF WBC: CPT | Performed by: STUDENT IN AN ORGANIZED HEALTH CARE EDUCATION/TRAINING PROGRAM

## 2023-07-12 PROCEDURE — 83036 HEMOGLOBIN GLYCOSYLATED A1C: CPT | Performed by: STUDENT IN AN ORGANIZED HEALTH CARE EDUCATION/TRAINING PROGRAM

## 2023-08-07 DIAGNOSIS — E11.59 HYPERTENSION ASSOCIATED WITH TYPE 2 DIABETES MELLITUS: ICD-10-CM

## 2023-08-07 DIAGNOSIS — E78.5 HYPERLIPIDEMIA, UNSPECIFIED HYPERLIPIDEMIA TYPE: ICD-10-CM

## 2023-08-07 DIAGNOSIS — I15.2 HYPERTENSION ASSOCIATED WITH TYPE 2 DIABETES MELLITUS: ICD-10-CM

## 2023-08-07 RX ORDER — ATORVASTATIN CALCIUM 20 MG/1
20 TABLET, FILM COATED ORAL DAILY
Qty: 90 TABLET | Refills: 3 | Status: SHIPPED | OUTPATIENT
Start: 2023-08-07 | End: 2024-08-06

## 2023-08-07 RX ORDER — LISINOPRIL 2.5 MG/1
TABLET ORAL
Qty: 90 TABLET | Refills: 3 | OUTPATIENT
Start: 2023-08-07

## 2023-09-06 RX ORDER — SEMAGLUTIDE 0.68 MG/ML
INJECTION, SOLUTION SUBCUTANEOUS
Qty: 7.5 ML | Refills: 0 | Status: SHIPPED | OUTPATIENT
Start: 2023-09-06 | End: 2023-10-04 | Stop reason: DRUGHIGH

## 2023-09-26 NOTE — TELEPHONE ENCOUNTER
----- Message from Teresa Ball MD sent at 9/21/2022  5:08 PM CDT -----  Regarding: wt loss  Pt called reporting massive weight loss. Ask her if PCP is aware, and is she eating? Vomiting, diarrhea? fever? Also is she seeing the psychiatrist? cg     No Repair - Repaired With Adjacent Surgical Defect Text (Leave Blank If You Do Not Want): After obtaining clear surgical margins the defect was repaired concurrently with another surgical defect which was in close approximation.

## 2023-09-27 ENCOUNTER — HOSPITAL ENCOUNTER (OUTPATIENT)
Facility: HOSPITAL | Age: 53
Discharge: HOME | End: 2023-09-28
Attending: STUDENT IN AN ORGANIZED HEALTH CARE EDUCATION/TRAINING PROGRAM | Admitting: STUDENT IN AN ORGANIZED HEALTH CARE EDUCATION/TRAINING PROGRAM
Payer: MEDICAID

## 2023-09-27 ENCOUNTER — OFFICE VISIT (OUTPATIENT)
Dept: FAMILY MEDICINE | Facility: HOSPITAL | Age: 53
End: 2023-09-27
Payer: MEDICAID

## 2023-09-27 ENCOUNTER — LAB VISIT (OUTPATIENT)
Dept: LAB | Facility: HOSPITAL | Age: 53
End: 2023-09-27
Payer: MEDICAID

## 2023-09-27 VITALS
SYSTOLIC BLOOD PRESSURE: 142 MMHG | HEART RATE: 95 BPM | BODY MASS INDEX: 24.06 KG/M2 | DIASTOLIC BLOOD PRESSURE: 87 MMHG | HEIGHT: 68 IN | WEIGHT: 158.75 LBS

## 2023-09-27 DIAGNOSIS — E78.5 HYPERLIPIDEMIA, UNSPECIFIED HYPERLIPIDEMIA TYPE: Chronic | ICD-10-CM

## 2023-09-27 DIAGNOSIS — Z79.4 CONTROLLED TYPE 2 DIABETES MELLITUS WITH STAGE 3 CHRONIC KIDNEY DISEASE, WITH LONG-TERM CURRENT USE OF INSULIN: ICD-10-CM

## 2023-09-27 DIAGNOSIS — I15.2 HYPERTENSION ASSOCIATED WITH DIABETES: ICD-10-CM

## 2023-09-27 DIAGNOSIS — Z79.4 CONTROLLED TYPE 2 DIABETES MELLITUS WITH STAGE 3 CHRONIC KIDNEY DISEASE, WITH LONG-TERM CURRENT USE OF INSULIN: Primary | ICD-10-CM

## 2023-09-27 DIAGNOSIS — N18.30 CONTROLLED TYPE 2 DIABETES MELLITUS WITH STAGE 3 CHRONIC KIDNEY DISEASE, WITH LONG-TERM CURRENT USE OF INSULIN: ICD-10-CM

## 2023-09-27 DIAGNOSIS — R73.9 HYPERGLYCEMIA: Primary | ICD-10-CM

## 2023-09-27 DIAGNOSIS — E11.22 CONTROLLED TYPE 2 DIABETES MELLITUS WITH STAGE 3 CHRONIC KIDNEY DISEASE, WITH LONG-TERM CURRENT USE OF INSULIN: Primary | ICD-10-CM

## 2023-09-27 DIAGNOSIS — E11.22 CONTROLLED TYPE 2 DIABETES MELLITUS WITH STAGE 3 CHRONIC KIDNEY DISEASE, WITH LONG-TERM CURRENT USE OF INSULIN: ICD-10-CM

## 2023-09-27 DIAGNOSIS — E11.59 HYPERTENSION ASSOCIATED WITH DIABETES: ICD-10-CM

## 2023-09-27 DIAGNOSIS — N18.30 CONTROLLED TYPE 2 DIABETES MELLITUS WITH STAGE 3 CHRONIC KIDNEY DISEASE, WITH LONG-TERM CURRENT USE OF INSULIN: Primary | ICD-10-CM

## 2023-09-27 DIAGNOSIS — N17.9 AKI (ACUTE KIDNEY INJURY): ICD-10-CM

## 2023-09-27 DIAGNOSIS — E11.65 TYPE 2 DIABETES MELLITUS WITH HYPERGLYCEMIA, WITHOUT LONG-TERM CURRENT USE OF INSULIN: Chronic | ICD-10-CM

## 2023-09-27 DIAGNOSIS — E87.5 HYPERKALEMIA: ICD-10-CM

## 2023-09-27 DIAGNOSIS — E11.9 TYPE 2 DIABETES MELLITUS WITHOUT COMPLICATION: ICD-10-CM

## 2023-09-27 DIAGNOSIS — R07.9 CHEST PAIN: ICD-10-CM

## 2023-09-27 LAB
ALBUMIN SERPL BCP-MCNC: 4.1 G/DL (ref 3.5–5.2)
ALBUMIN SERPL BCP-MCNC: 4.2 G/DL (ref 3.5–5.2)
ALP SERPL-CCNC: 85 U/L (ref 55–135)
ALP SERPL-CCNC: 87 U/L (ref 55–135)
ALT SERPL W/O P-5'-P-CCNC: 15 U/L (ref 10–44)
ALT SERPL W/O P-5'-P-CCNC: 17 U/L (ref 10–44)
ANION GAP SERPL CALC-SCNC: 14 MMOL/L (ref 8–16)
ANION GAP SERPL CALC-SCNC: 15 MMOL/L (ref 8–16)
AST SERPL-CCNC: 18 U/L (ref 10–40)
AST SERPL-CCNC: 27 U/L (ref 10–40)
BASOPHILS # BLD AUTO: 0.03 K/UL (ref 0–0.2)
BASOPHILS NFR BLD: 0.6 % (ref 0–1.9)
BILIRUB SERPL-MCNC: 0.6 MG/DL (ref 0.1–1)
BILIRUB SERPL-MCNC: 0.6 MG/DL (ref 0.1–1)
BUN SERPL-MCNC: 35 MG/DL (ref 6–20)
BUN SERPL-MCNC: 36 MG/DL (ref 6–20)
CALCIUM SERPL-MCNC: 9.5 MG/DL (ref 8.7–10.5)
CALCIUM SERPL-MCNC: 9.6 MG/DL (ref 8.7–10.5)
CHLORIDE SERPL-SCNC: 97 MMOL/L (ref 95–110)
CHLORIDE SERPL-SCNC: 98 MMOL/L (ref 95–110)
CO2 SERPL-SCNC: 19 MMOL/L (ref 23–29)
CO2 SERPL-SCNC: 23 MMOL/L (ref 23–29)
CREAT SERPL-MCNC: 2.3 MG/DL (ref 0.5–1.4)
CREAT SERPL-MCNC: 2.5 MG/DL (ref 0.5–1.4)
DIFFERENTIAL METHOD: ABNORMAL
EOSINOPHIL # BLD AUTO: 0.2 K/UL (ref 0–0.5)
EOSINOPHIL NFR BLD: 3.9 % (ref 0–8)
ERYTHROCYTE [DISTWIDTH] IN BLOOD BY AUTOMATED COUNT: 13.2 % (ref 11.5–14.5)
EST. GFR  (NO RACE VARIABLE): 22 ML/MIN/1.73 M^2
EST. GFR  (NO RACE VARIABLE): 24 ML/MIN/1.73 M^2
ESTIMATED AVG GLUCOSE: 326 MG/DL (ref 68–131)
GLUCOSE SERPL-MCNC: 501 MG/DL (ref 70–110)
GLUCOSE SERPL-MCNC: 515 MG/DL (ref 70–110)
HBA1C MFR BLD: 13 % (ref 4–5.6)
HCT VFR BLD AUTO: 38.8 % (ref 37–48.5)
HGB BLD-MCNC: 12.6 G/DL (ref 12–16)
IMM GRANULOCYTES # BLD AUTO: 0.01 K/UL (ref 0–0.04)
IMM GRANULOCYTES NFR BLD AUTO: 0.2 % (ref 0–0.5)
LYMPHOCYTES # BLD AUTO: 1.5 K/UL (ref 1–4.8)
LYMPHOCYTES NFR BLD: 29.9 % (ref 18–48)
MCH RBC QN AUTO: 25 PG (ref 27–31)
MCHC RBC AUTO-ENTMCNC: 32.5 G/DL (ref 32–36)
MCV RBC AUTO: 77 FL (ref 82–98)
MONOCYTES # BLD AUTO: 0.3 K/UL (ref 0.3–1)
MONOCYTES NFR BLD: 6.1 % (ref 4–15)
NEUTROPHILS # BLD AUTO: 3 K/UL (ref 1.8–7.7)
NEUTROPHILS NFR BLD: 59.3 % (ref 38–73)
NRBC BLD-RTO: 0 /100 WBC
PLATELET # BLD AUTO: 185 K/UL (ref 150–450)
PMV BLD AUTO: 12 FL (ref 9.2–12.9)
POCT GLUCOSE: 428 MG/DL (ref 70–110)
POCT GLUCOSE: 496 MG/DL (ref 70–110)
POTASSIUM SERPL-SCNC: 4.9 MMOL/L (ref 3.5–5.1)
POTASSIUM SERPL-SCNC: 5.4 MMOL/L (ref 3.5–5.1)
PROT SERPL-MCNC: 7.9 G/DL (ref 6–8.4)
PROT SERPL-MCNC: 8.6 G/DL (ref 6–8.4)
RBC # BLD AUTO: 5.03 M/UL (ref 4–5.4)
SODIUM SERPL-SCNC: 131 MMOL/L (ref 136–145)
SODIUM SERPL-SCNC: 135 MMOL/L (ref 136–145)
WBC # BLD AUTO: 5.11 K/UL (ref 3.9–12.7)

## 2023-09-27 PROCEDURE — 93010 ELECTROCARDIOGRAM REPORT: CPT | Mod: ,,, | Performed by: INTERNAL MEDICINE

## 2023-09-27 PROCEDURE — 80053 COMPREHEN METABOLIC PANEL: CPT | Mod: 91 | Performed by: PHYSICIAN ASSISTANT

## 2023-09-27 PROCEDURE — 85025 COMPLETE CBC W/AUTO DIFF WBC: CPT | Performed by: PHYSICIAN ASSISTANT

## 2023-09-27 PROCEDURE — 99214 OFFICE O/P EST MOD 30 MIN: CPT | Mod: 25 | Performed by: STUDENT IN AN ORGANIZED HEALTH CARE EDUCATION/TRAINING PROGRAM

## 2023-09-27 PROCEDURE — 80053 COMPREHEN METABOLIC PANEL: CPT | Performed by: STUDENT IN AN ORGANIZED HEALTH CARE EDUCATION/TRAINING PROGRAM

## 2023-09-27 PROCEDURE — 36415 COLL VENOUS BLD VENIPUNCTURE: CPT | Performed by: STUDENT IN AN ORGANIZED HEALTH CARE EDUCATION/TRAINING PROGRAM

## 2023-09-27 PROCEDURE — 82962 GLUCOSE BLOOD TEST: CPT | Mod: 91

## 2023-09-27 PROCEDURE — 96374 THER/PROPH/DIAG INJ IV PUSH: CPT

## 2023-09-27 PROCEDURE — 83036 HEMOGLOBIN GLYCOSYLATED A1C: CPT | Performed by: STUDENT IN AN ORGANIZED HEALTH CARE EDUCATION/TRAINING PROGRAM

## 2023-09-27 PROCEDURE — 96361 HYDRATE IV INFUSION ADD-ON: CPT

## 2023-09-27 PROCEDURE — 93005 ELECTROCARDIOGRAM TRACING: CPT

## 2023-09-27 PROCEDURE — 63600175 PHARM REV CODE 636 W HCPCS: Performed by: STUDENT IN AN ORGANIZED HEALTH CARE EDUCATION/TRAINING PROGRAM

## 2023-09-27 PROCEDURE — 99285 EMERGENCY DEPT VISIT HI MDM: CPT | Mod: 27,25

## 2023-09-27 PROCEDURE — G0378 HOSPITAL OBSERVATION PER HR: HCPCS

## 2023-09-27 PROCEDURE — 93010 EKG 12-LEAD: ICD-10-PCS | Mod: ,,, | Performed by: INTERNAL MEDICINE

## 2023-09-27 PROCEDURE — 25000003 PHARM REV CODE 250: Performed by: PHYSICIAN ASSISTANT

## 2023-09-27 RX ORDER — INSULIN ASPART 100 [IU]/ML
1-10 INJECTION, SOLUTION INTRAVENOUS; SUBCUTANEOUS
Status: DISCONTINUED | OUTPATIENT
Start: 2023-09-27 | End: 2023-09-28 | Stop reason: HOSPADM

## 2023-09-27 RX ORDER — ENOXAPARIN SODIUM 100 MG/ML
40 INJECTION SUBCUTANEOUS EVERY 24 HOURS
Status: DISCONTINUED | OUTPATIENT
Start: 2023-09-28 | End: 2023-09-27

## 2023-09-27 RX ORDER — ENOXAPARIN SODIUM 100 MG/ML
30 INJECTION SUBCUTANEOUS EVERY 24 HOURS
Status: DISCONTINUED | OUTPATIENT
Start: 2023-09-28 | End: 2023-09-28 | Stop reason: HOSPADM

## 2023-09-27 RX ORDER — ACETAMINOPHEN 325 MG/1
650 TABLET ORAL EVERY 4 HOURS PRN
Status: DISCONTINUED | OUTPATIENT
Start: 2023-09-27 | End: 2023-09-28 | Stop reason: HOSPADM

## 2023-09-27 RX ORDER — GLUCAGON 1 MG
1 KIT INJECTION
Status: DISCONTINUED | OUTPATIENT
Start: 2023-09-27 | End: 2023-09-28 | Stop reason: HOSPADM

## 2023-09-27 RX ORDER — IBUPROFEN 200 MG
24 TABLET ORAL
Status: DISCONTINUED | OUTPATIENT
Start: 2023-09-27 | End: 2023-09-28 | Stop reason: HOSPADM

## 2023-09-27 RX ORDER — IBUPROFEN 200 MG
16 TABLET ORAL
Status: DISCONTINUED | OUTPATIENT
Start: 2023-09-27 | End: 2023-09-28 | Stop reason: HOSPADM

## 2023-09-27 RX ORDER — NALOXONE HCL 0.4 MG/ML
0.02 VIAL (ML) INJECTION
Status: DISCONTINUED | OUTPATIENT
Start: 2023-09-27 | End: 2023-09-28 | Stop reason: HOSPADM

## 2023-09-27 RX ORDER — SODIUM CHLORIDE 0.9 % (FLUSH) 0.9 %
5 SYRINGE (ML) INJECTION
Status: DISCONTINUED | OUTPATIENT
Start: 2023-09-27 | End: 2023-09-28 | Stop reason: HOSPADM

## 2023-09-27 RX ORDER — ONDANSETRON 4 MG/1
4 TABLET, ORALLY DISINTEGRATING ORAL EVERY 6 HOURS PRN
Status: DISCONTINUED | OUTPATIENT
Start: 2023-09-27 | End: 2023-09-28 | Stop reason: HOSPADM

## 2023-09-27 RX ORDER — TALC
9 POWDER (GRAM) TOPICAL NIGHTLY PRN
Status: DISCONTINUED | OUTPATIENT
Start: 2023-09-27 | End: 2023-09-28 | Stop reason: HOSPADM

## 2023-09-27 RX ORDER — SODIUM CHLORIDE, SODIUM LACTATE, POTASSIUM CHLORIDE, CALCIUM CHLORIDE 600; 310; 30; 20 MG/100ML; MG/100ML; MG/100ML; MG/100ML
INJECTION, SOLUTION INTRAVENOUS CONTINUOUS
Status: ACTIVE | OUTPATIENT
Start: 2023-09-28 | End: 2023-09-28

## 2023-09-27 RX ORDER — ACETAMINOPHEN 325 MG/1
650 TABLET ORAL EVERY 8 HOURS PRN
Status: DISCONTINUED | OUTPATIENT
Start: 2023-09-27 | End: 2023-09-28 | Stop reason: HOSPADM

## 2023-09-27 RX ADMIN — INSULIN HUMAN 10 UNITS: 100 INJECTION, SOLUTION PARENTERAL at 10:09

## 2023-09-27 RX ADMIN — SODIUM CHLORIDE 1000 ML: 9 INJECTION, SOLUTION INTRAVENOUS at 09:09

## 2023-09-27 NOTE — Clinical Note
Diagnosis: ADELINA (acute kidney injury) [[id number]]   Future Attending Provider: TONY HE [[id number]]   Admitting Provider:: TURNER STRICKLAND [[id number]]

## 2023-09-27 NOTE — PROGRESS NOTES
I assume primary medical responsibility for this patient. I have reviewed the history, physical, and assessment & treatment plan with the resident and agree that the care is reasonable and necessary. This service has been performed by a resident without the presence of a teaching physician under the primary care exception. If necessary, an addendum of additional findings or evaluation beyond the resident documentation will be noted below.        Chidi Pettit Jr., DO    [facility] Family Medicine

## 2023-09-27 NOTE — PROGRESS NOTES
Clinic Note  [facility] Family Medicine    Subjective:      Bonnie Lino is a 53 y.o. female with PMHx DM, HTN, CKD3b here for follow up, feeling well.    DM - Stopped insulin about 3 weeks. A1c 8.3 2 months ago. Started ozempic 2 months ago. Will continue 0.5 mg until next appiontment in 2 weeks. Lost 10 lbs in past 2 months.    HTN - Per our chart, only on 10 mg lisinopril. Per nephro on 40 mg lisinopril, 90 mg nifedipine and 12.5 mg HCTZ. Patient does not know. Says she will start checking it at home. Says she will come back in 2 weeks with medications.     CKD3b - follows with nephro at [facility], renal US ordered there. Stable creatinine.    Review of Systems   Constitutional:  Negative for chills and fever.   HENT:  Negative for congestion and sore throat.    Respiratory:  Negative for cough.    Cardiovascular:  Negative for chest pain and palpitations.   Gastrointestinal:  Negative for abdominal pain, diarrhea, nausea and vomiting.   Genitourinary:  Negative for dysuria.   Musculoskeletal:  Negative for joint pain and myalgias.   Neurological:  Negative for dizziness, tingling, weakness and headaches.   Psychiatric/Behavioral:  Negative for depression. The patient is not nervous/anxious.           Objective:      Vitals:    09/27/23 1519   BP: (!) 142/87   Pulse: 95     Body mass index is 24.14 kg/m².      Physical Exam  Constitutional:       Appearance: Normal appearance. She is well-developed.   Cardiovascular:      Rate and Rhythm: Normal rate and regular rhythm.      Pulses: Normal pulses.   Pulmonary:      Effort: Pulmonary effort is normal.      Breath sounds: Normal breath sounds.   Abdominal:      General: Abdomen is flat. Bowel sounds are normal.      Palpations: Abdomen is soft.   Musculoskeletal:         General: Normal range of motion.      Cervical back: Normal range of motion.   Skin:     General: Skin is warm and dry.      Capillary Refill: Capillary refill takes less than 2 seconds.      Coloration:  Skin is not pale.   Neurological:      General: No focal deficit present.      Mental Status: She is alert and oriented to person, place, and time.   Psychiatric:         Mood and Affect: Mood normal.         Behavior: Behavior normal.            Assessment/Plan:      Patient to come back in 2 weeks with medications to sort out actual HTN regimen. Will also review today's A1C and CMP to assess need to adjust DM meds PRN. F/u with nephro for renal US.    1. Controlled type 2 diabetes mellitus with stage 3 chronic kidney disease, with long-term current use of insulin    - Hemoglobin A1C; Future  - Comprehensive Metabolic Panel; Future    2. Hypertension associated with diabetes    Patient discussed with attending physician, Dr. Wilver Ingram MD  [facility] Family Medicine PGY-3  09/27/2023      The following information is provided to all patients.  This information is to help you find resources for any of the problems found today that may be affecting your health:                Living healthy guide: www.[facility].[place].gov       Understanding Diabetes: www.diabetes.org       Eating healthy: www.cdc.gov/healthyweight      [facility] home safety checklist: www.cdc.gov/steadi/patient.html      Agency on Aging: www.goea.[place].[facility]       Alcoholics anonymous (AA): www.aa.org      Physical Activity: www.patrice.nih.gov/ty0bcco       Tobacco use: www.quitwithusla.org

## 2023-09-27 NOTE — PROGRESS NOTES
Called patient concerning recent laboratory work. Noted glucose >500 and increased Cr notable for ADELINA. Patient currently endorsing nausea at home but denies lethargy and abdominal pain. Patient to likely need ED evaluation with potential insulin administration and IV fluids. Patient understanding of plan and will have her son transport her to nearest ED immediately.    Savage Lala MD  [facility] Family Medicine PGY-3

## 2023-09-28 VITALS
OXYGEN SATURATION: 99 % | HEART RATE: 86 BPM | RESPIRATION RATE: 19 BRPM | DIASTOLIC BLOOD PRESSURE: 87 MMHG | TEMPERATURE: 98 F | SYSTOLIC BLOOD PRESSURE: 156 MMHG | BODY MASS INDEX: 24.02 KG/M2 | WEIGHT: 158 LBS

## 2023-09-28 PROBLEM — R73.9 HYPERGLYCEMIA: Status: ACTIVE | Noted: 2023-09-28

## 2023-09-28 PROBLEM — E11.65 TYPE 2 DIABETES MELLITUS WITH HYPERGLYCEMIA, WITHOUT LONG-TERM CURRENT USE OF INSULIN: Status: ACTIVE | Noted: 2023-09-28

## 2023-09-28 PROBLEM — E11.65 TYPE 2 DIABETES MELLITUS WITH HYPERGLYCEMIA, WITHOUT LONG-TERM CURRENT USE OF INSULIN: Chronic | Status: ACTIVE | Noted: 2023-09-28

## 2023-09-28 LAB
ANION GAP SERPL CALC-SCNC: 9 MMOL/L (ref 8–16)
BACTERIA #/AREA URNS HPF: NORMAL /HPF
BASOPHILS # BLD AUTO: 0.02 K/UL (ref 0–0.2)
BASOPHILS NFR BLD: 0.4 % (ref 0–1.9)
BILIRUB UR QL STRIP: NEGATIVE
BUN SERPL-MCNC: 35 MG/DL (ref 6–20)
CALCIUM SERPL-MCNC: 8.7 MG/DL (ref 8.7–10.5)
CHLORIDE SERPL-SCNC: 108 MMOL/L (ref 95–110)
CLARITY UR: CLEAR
CO2 SERPL-SCNC: 22 MMOL/L (ref 23–29)
COLOR UR: COLORLESS
CREAT SERPL-MCNC: 1.7 MG/DL (ref 0.5–1.4)
DIFFERENTIAL METHOD: ABNORMAL
EOSINOPHIL # BLD AUTO: 0.2 K/UL (ref 0–0.5)
EOSINOPHIL NFR BLD: 3.9 % (ref 0–8)
ERYTHROCYTE [DISTWIDTH] IN BLOOD BY AUTOMATED COUNT: 13.2 % (ref 11.5–14.5)
EST. GFR  (NO RACE VARIABLE): 35 ML/MIN/1.73 M^2
GLUCOSE SERPL-MCNC: 247 MG/DL (ref 70–110)
GLUCOSE UR QL STRIP: ABNORMAL
HCT VFR BLD AUTO: 32.4 % (ref 37–48.5)
HGB BLD-MCNC: 10.4 G/DL (ref 12–16)
HGB UR QL STRIP: NEGATIVE
IMM GRANULOCYTES # BLD AUTO: 0.01 K/UL (ref 0–0.04)
IMM GRANULOCYTES NFR BLD AUTO: 0.2 % (ref 0–0.5)
KETONES UR QL STRIP: NEGATIVE
LEUKOCYTE ESTERASE UR QL STRIP: NEGATIVE
LYMPHOCYTES # BLD AUTO: 1.3 K/UL (ref 1–4.8)
LYMPHOCYTES NFR BLD: 27.5 % (ref 18–48)
MAGNESIUM SERPL-MCNC: 2 MG/DL (ref 1.6–2.6)
MCH RBC QN AUTO: 25.1 PG (ref 27–31)
MCHC RBC AUTO-ENTMCNC: 32.1 G/DL (ref 32–36)
MCV RBC AUTO: 78 FL (ref 82–98)
MICROSCOPIC COMMENT: NORMAL
MONOCYTES # BLD AUTO: 0.4 K/UL (ref 0.3–1)
MONOCYTES NFR BLD: 7.9 % (ref 4–15)
NEUTROPHILS # BLD AUTO: 2.9 K/UL (ref 1.8–7.7)
NEUTROPHILS NFR BLD: 60.1 % (ref 38–73)
NITRITE UR QL STRIP: NEGATIVE
NRBC BLD-RTO: 0 /100 WBC
PH UR STRIP: 6 [PH] (ref 5–8)
PHOSPHATE SERPL-MCNC: 4.1 MG/DL (ref 2.7–4.5)
PLATELET # BLD AUTO: 148 K/UL (ref 150–450)
PMV BLD AUTO: 12 FL (ref 9.2–12.9)
POCT GLUCOSE: 241 MG/DL (ref 70–110)
POCT GLUCOSE: 243 MG/DL (ref 70–110)
POCT GLUCOSE: 244 MG/DL (ref 70–110)
POCT GLUCOSE: 263 MG/DL (ref 70–110)
POCT GLUCOSE: 270 MG/DL (ref 70–110)
POTASSIUM SERPL-SCNC: 4 MMOL/L (ref 3.5–5.1)
PROT UR QL STRIP: NEGATIVE
RBC # BLD AUTO: 4.14 M/UL (ref 4–5.4)
SODIUM SERPL-SCNC: 139 MMOL/L (ref 136–145)
SP GR UR STRIP: 1.02 (ref 1–1.03)
URN SPEC COLLECT METH UR: ABNORMAL
UROBILINOGEN UR STRIP-ACNC: NEGATIVE EU/DL
WBC # BLD AUTO: 4.83 K/UL (ref 3.9–12.7)
YEAST URNS QL MICRO: NORMAL

## 2023-09-28 PROCEDURE — 82962 GLUCOSE BLOOD TEST: CPT

## 2023-09-28 PROCEDURE — 80048 BASIC METABOLIC PNL TOTAL CA: CPT

## 2023-09-28 PROCEDURE — G0378 HOSPITAL OBSERVATION PER HR: HCPCS

## 2023-09-28 PROCEDURE — 96372 THER/PROPH/DIAG INJ SC/IM: CPT

## 2023-09-28 PROCEDURE — 85025 COMPLETE CBC W/AUTO DIFF WBC: CPT

## 2023-09-28 PROCEDURE — 81000 URINALYSIS NONAUTO W/SCOPE: CPT | Performed by: PHYSICIAN ASSISTANT

## 2023-09-28 PROCEDURE — 83735 ASSAY OF MAGNESIUM: CPT

## 2023-09-28 PROCEDURE — 96361 HYDRATE IV INFUSION ADD-ON: CPT

## 2023-09-28 PROCEDURE — 84100 ASSAY OF PHOSPHORUS: CPT

## 2023-09-28 PROCEDURE — 63600175 PHARM REV CODE 636 W HCPCS

## 2023-09-28 PROCEDURE — 25000003 PHARM REV CODE 250

## 2023-09-28 RX ORDER — DEXTROSE 4 G
TABLET,CHEWABLE ORAL
Qty: 1 EACH | Refills: 0 | Status: SHIPPED | OUTPATIENT
Start: 2023-09-28

## 2023-09-28 RX ORDER — PEN NEEDLE, DIABETIC 31 GX5/16"
1 NEEDLE, DISPOSABLE MISCELLANEOUS NIGHTLY
Qty: 100 EACH | Refills: 3 | Status: SHIPPED | OUTPATIENT
Start: 2023-09-28

## 2023-09-28 RX ORDER — CYCLOBENZAPRINE HCL 5 MG
5 TABLET ORAL ONCE
Status: COMPLETED | OUTPATIENT
Start: 2023-09-28 | End: 2023-09-28

## 2023-09-28 RX ORDER — NIFEDIPINE 30 MG/1
90 TABLET, EXTENDED RELEASE ORAL DAILY
Status: DISCONTINUED | OUTPATIENT
Start: 2023-09-28 | End: 2023-09-28 | Stop reason: HOSPADM

## 2023-09-28 RX ORDER — ATORVASTATIN CALCIUM 20 MG/1
20 TABLET, FILM COATED ORAL DAILY
Status: DISCONTINUED | OUTPATIENT
Start: 2023-09-28 | End: 2023-09-28 | Stop reason: HOSPADM

## 2023-09-28 RX ADMIN — NIFEDIPINE 90 MG: 30 TABLET, FILM COATED, EXTENDED RELEASE ORAL at 08:09

## 2023-09-28 RX ADMIN — SODIUM CHLORIDE, POTASSIUM CHLORIDE, SODIUM LACTATE AND CALCIUM CHLORIDE: 600; 310; 30; 20 INJECTION, SOLUTION INTRAVENOUS at 12:09

## 2023-09-28 RX ADMIN — INSULIN ASPART 3 UNITS: 100 INJECTION, SOLUTION INTRAVENOUS; SUBCUTANEOUS at 04:09

## 2023-09-28 RX ADMIN — ATORVASTATIN CALCIUM 20 MG: 20 TABLET, FILM COATED ORAL at 08:09

## 2023-09-28 RX ADMIN — INSULIN DETEMIR 15 UNITS: 100 INJECTION, SOLUTION SUBCUTANEOUS at 10:09

## 2023-09-28 RX ADMIN — ACETAMINOPHEN 650 MG: 325 TABLET ORAL at 12:09

## 2023-09-28 RX ADMIN — INSULIN DETEMIR 15 UNITS: 100 INJECTION, SOLUTION SUBCUTANEOUS at 01:09

## 2023-09-28 RX ADMIN — CYCLOBENZAPRINE HYDROCHLORIDE 5 MG: 5 TABLET, FILM COATED ORAL at 01:09

## 2023-09-28 RX ADMIN — INSULIN ASPART 6 UNITS: 100 INJECTION, SOLUTION INTRAVENOUS; SUBCUTANEOUS at 08:09

## 2023-09-28 RX ADMIN — MELATONIN TAB 3 MG 9 MG: 3 TAB at 12:09

## 2023-09-28 NOTE — SUBJECTIVE & OBJECTIVE
"Past Medical History:   Diagnosis Date    Aneurysm of cardiac wall, congenital     Bilateral lower extremity edema     2/2 calcium channel blocker    Conversion disorder with abnormal movement     Diabetes type 2, uncontrolled     HLD (hyperlipidemia)     Hypertension     Pseudoseizures     Vitamin D deficiency        Past Surgical History:   Procedure Laterality Date    BREAST CYST EXCISION  2003    BREAST SURGERY      cyst removal     CATARACT EXTRACTION W/  INTRAOCULAR LENS IMPLANT      COLONOSCOPY N/A 7/23/2021    Procedure: COLONOSCOPY;  Surgeon: Sapna Fitzgerald MD;  Location: [facility];  Service: Endoscopy;  Laterality: N/A;    HYSTERECTOMY  2002    PARTIAL HYSTERECTOMY  2002       Review of patient's allergies indicates:  No Known Allergies    No current facility-administered medications on file prior to encounter.     Current Outpatient Medications on File Prior to Encounter   Medication Sig    acetaminophen (TYLENOL) 500 MG tablet Take 500 mg by mouth daily as needed for Pain.    aspirin (ECOTRIN) 81 MG EC tablet Take 81 mg by mouth once daily.    atorvastatin (LIPITOR) 20 MG tablet Take 1 tablet (20 mg total) by mouth once daily.    BD ULTRA-FINE SHORT PEN NEEDLE 31 gauge x 5/16" Ndle Inject 1 pen into the skin every evening.    blood glucose strip-disp meter Kit 1 application by Misc.(Non-Drug; Combo Route) route 3 (three) times daily.    blood-glucose meter (RELION ALL-IN-ONE METER) kit Use as instructed    canagliflozin (INVOKANA) 100 mg Tab tablet Take 1 tablet (100 mg total) by mouth once daily.    gabapentin (NEURONTIN) 300 MG capsule Take 1 capsule (300 mg total) by mouth 3 (three) times daily.    hydrOXYzine pamoate (VISTARIL) 50 MG Cap Take 1 capsule (50 mg total) by mouth nightly as needed (sleep).    lisinopriL 10 MG tablet Take 1 tablet (10 mg total) by mouth once daily.    multivitamin (THERAGRAN) per tablet Take 1 tablet by mouth once daily.    NIFEdipine (PROCARDIA-XL) 90 MG (OSM) 24 hr " tablet Take 1 tablet (90 mg total) by mouth once daily.    semaglutide (OZEMPIC) 0.25 mg or 0.5 mg (2 mg/3 mL) pen injector Inject 0.25 mg into the skin every 7 days for 28 days, THEN 0.5 mg every 7 days.    [DISCONTINUED] albuterol (VENTOLIN HFA) 90 mcg/actuation inhaler Inhale 2 puffs into the lungs every 6 (six) hours as needed for Wheezing. Rescue (Patient not taking: Reported on 9/27/2023)    [DISCONTINUED] EScitalopram oxalate (LEXAPRO) 10 MG tablet one po qhs (Patient not taking: Reported on 9/27/2023)    [DISCONTINUED] insulin (LANTUS SOLOSTAR U-100 INSULIN) glargine 100 units/mL SubQ pen Inject 46 Units into the skin every evening. (Patient not taking: Reported on 9/27/2023)    [DISCONTINUED] lancets Misc 1 application by Misc.(Non-Drug; Combo Route) route 3 (three) times daily. (Patient not taking: Reported on 5/8/2023)     Family History       Problem Relation (Age of Onset)    Breast cancer Mother, Maternal Grandmother    Cancer Mother (40), Maternal Grandmother (82)    Diabetes Mother, Father    Heart disease Mother          Tobacco Use    Smoking status: Never    Smokeless tobacco: Never   Substance and Sexual Activity    Alcohol use: Yes     Comment: seldom    Drug use: No    Sexual activity: Yes     Review of Systems   Constitutional:  Negative for chills, diaphoresis and fever.   Respiratory:  Negative for shortness of breath.    Cardiovascular:  Negative for chest pain.   Gastrointestinal:  Positive for nausea. Negative for abdominal pain, diarrhea and vomiting.   Genitourinary:  Positive for frequency. Negative for dysuria.     Objective:     Vital Signs (Most Recent):  Temp: 98.4 °F (36.9 °C) (09/27/23 1938)  Pulse: 93 (09/27/23 1938)  Resp: 18 (09/27/23 1938)  BP: (!) 158/79 (09/27/23 1938)  SpO2: 98 % (09/27/23 1938) Vital Signs (24h Range):  Temp:  [98.4 °F (36.9 °C)] 98.4 °F (36.9 °C)  Pulse:  [93-95] 93  Resp:  [18] 18  SpO2:  [98 %] 98 %  BP: (142-158)/(79-87) 158/79     Weight: 71.7 kg  (158 lb)  Body mass index is 24.02 kg/m².     Physical Exam  Vitals and nursing note reviewed.   Constitutional:       Appearance: Normal appearance.   HENT:      Head: Normocephalic and atraumatic.   Cardiovascular:      Pulses: Normal pulses.      Heart sounds: Normal heart sounds.   Pulmonary:      Breath sounds: Normal breath sounds.   Neurological:      Mental Status: She is alert and oriented to person, place, and time.                Significant Labs: All pertinent labs within the past 24 hours have been reviewed.    Significant Imaging: I have reviewed all pertinent imaging results/findings within the past 24 hours.

## 2023-09-28 NOTE — H&P
[facility] Emergency [facility] Medicine  History & Physical    Patient Name: Bonnie Lino  MRN: [id number]  Patient Class: OP- Observation  Admission Date: 9/27/2023  Attending Physician: Orlando Franco MD   Primary Care Provider: Thomas Jones MD         Patient information was obtained from patient and ER records.     Subjective:     Principal Problem:Type 2 diabetes mellitus with hyperglycemia, without long-term current use of insulin    Chief Complaint:   Chief Complaint   Patient presents with    Hyperglycemia     States she had blood work done today. Was told CBG and kidney function both elevated. States she stopped taking Ozempic 2 weeks ago after hearing on TV the side affects. Was taking for 2 months. CBG in triage 496. Patient is asymptomatic.         HPI: 52 yo female with PMH of HTN, HLD, DM2 previously on insulin, CKD3b presented to the ED after labwork from PCP visit noted glucose > 500 and elevated Cr. Patient previously on insulin but stopped about 3 weeks ago and continued on ozempic and canagliflozin. Reports she stopped taking ozempic 2 weeks ago due to side effects reported on TV. A1c 10 weeks ago 8.3. Now 13.0    In the ED, patient mildly hypertensive otherwise VSS. Glucose > 500 and Cr 2.5 with GFR 22 (baseline Cr 1.5-1.7, GFR 38-42). Bicarb 23, AG 15, K 5.4.  No ketones in urine. Reports some nausea and polyuria otherwise feels well and tolerated PO intake. Patient given 1L NS and 10u regular insulin. Admitted to [facility] for management of hyperglycemia, hyperkalemia, and ADELINA      Past Medical History:   Diagnosis Date    Aneurysm of cardiac wall, congenital     Bilateral lower extremity edema     2/2 calcium channel blocker    Conversion disorder with abnormal movement     Diabetes type 2, uncontrolled     HLD (hyperlipidemia)     Hypertension     Pseudoseizures     Vitamin D deficiency        Past Surgical History:   Procedure Laterality Date    BREAST CYST EXCISION  2003     "BREAST SURGERY      cyst removal     CATARACT EXTRACTION W/  INTRAOCULAR LENS IMPLANT      COLONOSCOPY N/A 7/23/2021    Procedure: COLONOSCOPY;  Surgeon: Sapna Fitzgerald MD;  Location: [facility];  Service: Endoscopy;  Laterality: N/A;    HYSTERECTOMY  2002    PARTIAL HYSTERECTOMY  2002       Review of patient's allergies indicates:  No Known Allergies    No current facility-administered medications on file prior to encounter.     Current Outpatient Medications on File Prior to Encounter   Medication Sig    acetaminophen (TYLENOL) 500 MG tablet Take 500 mg by mouth daily as needed for Pain.    aspirin (ECOTRIN) 81 MG EC tablet Take 81 mg by mouth once daily.    atorvastatin (LIPITOR) 20 MG tablet Take 1 tablet (20 mg total) by mouth once daily.    BD ULTRA-FINE SHORT PEN NEEDLE 31 gauge x 5/16" Ndle Inject 1 pen into the skin every evening.    blood glucose strip-disp meter Kit 1 application by Misc.(Non-Drug; Combo Route) route 3 (three) times daily.    blood-glucose meter (RELION ALL-IN-ONE METER) kit Use as instructed    canagliflozin (INVOKANA) 100 mg Tab tablet Take 1 tablet (100 mg total) by mouth once daily.    gabapentin (NEURONTIN) 300 MG capsule Take 1 capsule (300 mg total) by mouth 3 (three) times daily.    hydrOXYzine pamoate (VISTARIL) 50 MG Cap Take 1 capsule (50 mg total) by mouth nightly as needed (sleep).    lisinopriL 10 MG tablet Take 1 tablet (10 mg total) by mouth once daily.    multivitamin (THERAGRAN) per tablet Take 1 tablet by mouth once daily.    NIFEdipine (PROCARDIA-XL) 90 MG (OSM) 24 hr tablet Take 1 tablet (90 mg total) by mouth once daily.    semaglutide (OZEMPIC) 0.25 mg or 0.5 mg (2 mg/3 mL) pen injector Inject 0.25 mg into the skin every 7 days for 28 days, THEN 0.5 mg every 7 days.    [DISCONTINUED] albuterol (VENTOLIN HFA) 90 mcg/actuation inhaler Inhale 2 puffs into the lungs every 6 (six) hours as needed for Wheezing. Rescue (Patient not taking: Reported on " 9/27/2023)    [DISCONTINUED] EScitalopram oxalate (LEXAPRO) 10 MG tablet one po qhs (Patient not taking: Reported on 9/27/2023)    [DISCONTINUED] insulin (LANTUS SOLOSTAR U-100 INSULIN) glargine 100 units/mL SubQ pen Inject 46 Units into the skin every evening. (Patient not taking: Reported on 9/27/2023)    [DISCONTINUED] lancets Misc 1 application by Misc.(Non-Drug; Combo Route) route 3 (three) times daily. (Patient not taking: Reported on 5/8/2023)     Family History       Problem Relation (Age of Onset)    Breast cancer Mother, Maternal Grandmother    Cancer Mother (40), Maternal Grandmother (82)    Diabetes Mother, Father    Heart disease Mother          Tobacco Use    Smoking status: Never    Smokeless tobacco: Never   Substance and Sexual Activity    Alcohol use: Yes     Comment: seldom    Drug use: No    Sexual activity: Yes     Review of Systems   Constitutional:  Negative for chills, diaphoresis and fever.   Respiratory:  Negative for shortness of breath.    Cardiovascular:  Negative for chest pain.   Gastrointestinal:  Positive for nausea. Negative for abdominal pain, diarrhea and vomiting.   Genitourinary:  Positive for frequency. Negative for dysuria.     Objective:     Vital Signs (Most Recent):  Temp: 98.4 °F (36.9 °C) (09/27/23 1938)  Pulse: 93 (09/27/23 1938)  Resp: 18 (09/27/23 1938)  BP: (!) 158/79 (09/27/23 1938)  SpO2: 98 % (09/27/23 1938) Vital Signs (24h Range):  Temp:  [98.4 °F (36.9 °C)] 98.4 °F (36.9 °C)  Pulse:  [93-95] 93  Resp:  [18] 18  SpO2:  [98 %] 98 %  BP: (142-158)/(79-87) 158/79     Weight: 71.7 kg (158 lb)  Body mass index is 24.02 kg/m².     Physical Exam  Vitals and nursing note reviewed.   Constitutional:       Appearance: Normal appearance.   HENT:      Head: Normocephalic and atraumatic.   Cardiovascular:      Pulses: Normal pulses.      Heart sounds: Normal heart sounds.   Pulmonary:      Breath sounds: Normal breath sounds.   Neurological:      Mental Status: She is  alert and oriented to person, place, and time.                Significant Labs: All pertinent labs within the past 24 hours have been reviewed.    Significant Imaging: I have reviewed all pertinent imaging results/findings within the past 24 hours.    Assessment/Plan:     * Type 2 diabetes mellitus with hyperglycemia, without long-term current use of insulin  Previously on glargine 46u qhs with a1c bw 7-8 for last two years  Insulin discontinued with addition to ozempic to canagliflozin, stopped ozempic 2 weeks ago   A1c now 13 with glucose > 500  Given 1L NS, 10u regular insulin in ED    Plan:  Recheck glucose 1 hr post regular insulin, will likely need additional long acting  Continue IVF overnight  Will likely need to be restarted on insulin at discharge      ADELINA (acute kidney injury)  PLAN:  -Will given IVF overnight and reassess in am, likely secondary to osmotic diuresis, hyperglycemia  - Maintain UOP at 0.5ml/kg/hr  - Hold nephrotoxic medications   - Renally-dose current meds if available  - Renal US ordered at last nephrology visit but not completed, will obtain during this admission   - Avoid Hypotension      Chronic kidney disease, stage 3b  Cr on admit 2.5 at baseline Cr 1.5-1.7, GFR 38-42   Follows with neprhology at [facility]  Avoid nephrotoxic medications NSAIDs.  Cont home medications.  Maintain euvolemic state        Hypertension associated with type 2 diabetes mellitus  Per PCP note from 9/27, patient unclear on home anti-hypertensive regimen  Per nephro on 40 mg lisinopril, 90 mg nifedipine and 12.5 mg HCTZ    Plan:  Will continue nifedipine, hold lisinopril for hyperkalemia, HCTZ for ADELINA      Hyperkalemia  5.4 on admit  Denies CP, palpitations, muscle cramps  Should improve with insulin, IVF  Will hold lisinopril       HLD (hyperlipidemia)  Continue home atorvastatin 20 mg       VTE Risk Mitigation (From admission, onward)         Ordered     enoxaparin injection 30 mg  Daily         09/27/23 0338      IP VTE LOW RISK PATIENT  Once         09/27/23 2255     Place sequential compression device  Until discontinued         09/27/23 2255                       On 09/28/2023, patient should be placed in hospital observation services under my care in collaboration with Dr. Bradford Lino.    Sterling Louise DO  Department of Hospital Medicine  [facility] PGY-2

## 2023-09-28 NOTE — ASSESSMENT & PLAN NOTE
Cr on admit 2.5 at baseline Cr 1.5-1.7, GFR 38-42   Follows with neprhology at [facility]  Avoid nephrotoxic medications NSAIDs.  Cont home medications.  Maintain euvolemic state

## 2023-09-28 NOTE — HPI
54 yo female with PMH of HTN, HLD, DM2 previously on insulin, CKD3b presented to the ED after labwork from PCP visit noted glucose > 500 and elevated Cr. Patient previously on insulin but stopped about 3 weeks ago and continued on ozempic and canagliflozin. Reports she stopped taking ozempic 2 weeks ago due to side effects reported on TV. A1c 10 weeks ago 8.3. Now 13.0    In the ED, patient mildly hypertensive otherwise VSS. Glucose > 500 and Cr 2.5 with GFR 22 (baseline Cr 1.5-1.7, GFR 38-42). Bicarb 23, AG 15. No ketones in urine. Reports some nausea and polyuria otherwise feels well and tolerated PO intake. Patient given 1L NS and 10u regular insulin. Admitted to [facility] for management of hyperglycemia and ADELINA

## 2023-09-28 NOTE — ED NOTES
Patient arrived to ED with complaints of elevated blood sugar levels. Reports that she stopped taking her diabetic medication because she was nervous about the side effects. Patient saw PCP today and was told to come in bc of elevated blood sugar and decreased kidney function.   Reports frequent urination and always thirsty.   Denies CP, SOB, abdominal pain, dysuria.   Reports eating a sandwich last around 1700.    APPEARANCE: Alert, oriented and in no acute distress.  CARDIAC: Normal rate and rhythm, no murmur heard.   PERIPHERAL VASCULAR: peripheral pulses present. Normal cap refill. No edema. Warm to touch.    RESPIRATORY:Normal rate and effort, breath sounds clear bilaterally throughout chest. Respirations are equal and unlabored no obvious signs of distress.  GASTRO: soft, bowel sounds normal, no tenderness, no abdominal distention.  MUSC: Full ROM. No bony tenderness or soft tissue tenderness. No obvious deformity.  SKIN: Skin is warm and dry, normal skin turgor, mucous membranes moist.  NEURO: 5/5 strength major flexors/extensors bilaterally. Sensory intact to light touch bilaterally. Anshu coma scale: eyes open spontaneously-4, oriented & converses-5, obeys commands-6. No neurological abnormalities.   MENTAL STATUS: awake, alert and aware of environment.  EYE: PERRL, both eyes: pupils brisk and reactive to light. Normal size.  ENT: EARS: no obvious drainage. NOSE: no active bleeding.

## 2023-09-28 NOTE — ASSESSMENT & PLAN NOTE
Previously on glargine 46u qhs with a1c bw 7-8 for last two years  Insulin discontinued with addition to ozempic to canagliflozin, stopped ozempic 2 weeks ago   A1c now 13 with glucose > 500  Given 1L NS, 10u regular insulin in ED    Plan:  Recheck glucose 1 hr post regular insulin, will likely need additional long acting  Continue IVF overnight  Will likely need to be restarted on insulin at discharge

## 2023-09-28 NOTE — ED NOTES
Patient complaining of muscle cramping in bilateral legs and feet. MD notified. Orders received.   Patient repositioned. Reports cramping improving with movement.

## 2023-09-28 NOTE — FIRST PROVIDER EVALUATION
Emergency Department TeleTriage Encounter Note      CHIEF COMPLAINT    Chief Complaint   Patient presents with    Hyperglycemia     States she had blood work done today. Was told CBG and kidney function both elevated. States she stopped taking Ozempic 2 weeks ago after hearing on TV the side affects. Was taking for 2 months. CBG in triage 496. Patient is asymptomatic.        VITAL SIGNS   Initial Vitals [09/27/23 1938]   BP Pulse Resp Temp SpO2   (!) 158/79 93 18 98.4 °F (36.9 °C) 98 %      MAP       --            ALLERGIES    Review of patient's allergies indicates:  No Known Allergies    PROVIDER TRIAGE NOTE  53-year-old female presents with hyperglycemia.  Recently stop taking Ozempic secondary to concern for reported side effects.  Blood sugar greater than 450.  Patient asymptomatic.      ORDERS  Labs Reviewed   POCT GLUCOSE - Abnormal; Notable for the following components:       Result Value    POCT Glucose 496 (*)     All other components within normal limits       ED Orders (720h ago, onward)      Start Ordered     Status Ordering Provider    09/27/23 1941 09/27/23 1941  POCT glucose  Once         Final result EMERGENCY, DEPT PHYSICIAN              Virtual Visit Note: The provider triage portion of this emergency department evaluation and documentation was performed via [employer], a HIPAA-compliant telemedicine application, in concert with a tele-presenter in the room. A face to face patient evaluation with one of my colleagues will occur once the patient is placed in an emergency department room.      DISCLAIMER: This note was prepared with [employer] voice recognition transcription software. Garbled syntax, mangled pronouns, and other bizarre constructions may be attributed to that software system.

## 2023-09-28 NOTE — ED NOTES
Review of patient's allergies indicates:  No Known Allergies    APPEARANCE: Alert, oriented x 4, and in no acute distress.  NEURO: +generalized HA 5/5 strength major flexors/extensors bilaterally. Sensory intact to light touch bilaterally. Anshu coma scale: eyes open spontaneously-4, oriented & converses-5, obeys commands-6. PERRLA. No neurological abnormalities. Denies dizziness, photophobia.  CARDIAC: Normal rate and rhythm through apical/radial pulse, S1 and S2 noted, denies CP.   RESPIRATORY:Normal rate and effort, Respirations are equal and unlabored, no obvious signs of distress. No SOB reported.  PERIPHERAL VASCULAR: +2 peripheral pulses present. Normal cap refill <3sec. No edema. Warm to touch.   GASTRO: Abdomen soft, bowel sounds normal and active in all 4 quadrants, no tenderness, no abdominal distention. Denies NVD.  MUSC: Full ROM. No bony tenderness or soft tissue tenderness. No obvious deformity.  SKIN: Skin is warm and dry, normal skin turgor, mucous membranes moist.   GENITOURINARY: Voids spontaneously, denies itching, burning, hematuria.    Patient remains in hospital gown. 2 personal belongings bags and 1 purse at bs.

## 2023-09-28 NOTE — DISCHARGE SUMMARY
[facility] Emergency [facility] Medicine  Discharge Summary      Patient Name: Bonnie Lino  MRN: [id number]  CATALINA: [id number]  Patient Class: OP- Observation  Admission Date: 9/27/2023  Hospital Length of Stay: 0 days  Discharge Date and Time:  09/28/2023 1:06 PM  Attending Physician: No att. providers found   Discharging Provider: Dwight Rhodes MD  Primary Care Provider: Thomsa Jones MD    Primary Care Team: Networked reference to record PCT     HPI:   54 yo female with PMH of HTN, HLD, DM2 previously on insulin, CKD3b presented to the ED after labwork from PCP visit noted glucose > 500 and elevated Cr. Patient previously on insulin but stopped about 3 weeks ago and continued on ozempic and canagliflozin. Reports she stopped taking ozempic 2 weeks ago due to side effects reported on TV. A1c 10 weeks ago 8.3. Now 13.0    In the ED, patient mildly hypertensive otherwise VSS. Glucose > 500 and Cr 2.5 with GFR 22 (baseline Cr 1.5-1.7, GFR 38-42). Bicarb 23, AG 15. No ketones in urine. Reports some nausea and polyuria otherwise feels well and tolerated PO intake. Patient given 1L NS and 10u regular insulin. Admitted to [facility] for management of hyperglycemia and ADELINA      * No surgery found *      Hospital Course:   The patient was found to have elevated blood glucose from labs ordered from a clinic visit. She was given 1L NS and 10 units regular insulin at admission and 30 units detemir with 9 units SS in total on 09/28/23. He glucose levels, ADELINA, and potassium levels all improved.  At discharge: The patient is in stable condition and understands her condition, return precautions, and the current plan. She will be started on 20 unit of insulin detemir nightly for additional glucose control. She will also be referred to outpatient Diabetes Education and has an appointment with her PCP on 10/11/23.     Goals of Care Treatment Preferences:  Code Status: Full Code      Consults:     No new Assessment & Plan notes  have been filed under this hospital service since the last note was generated.  Service: Hospital Medicine    Final Active Diagnoses:    Diagnosis Date Noted POA    PRINCIPAL PROBLEM:  Type 2 diabetes mellitus with hyperglycemia, without long-term current use of insulin [E11.65] 09/28/2023 Unknown     Chronic    Chronic kidney disease, stage 3b [N18.32] 04/06/2021 Yes    Hypertension associated with type 2 diabetes mellitus [E11.59, I15.2]  Yes     Chronic    ADELINA (acute kidney injury) [N17.9] 01/02/2020 Unknown    Hyperkalemia [E87.5] 09/06/2019 Unknown    HLD (hyperlipidemia) [E78.5] 09/23/2014 Yes     Chronic      Problems Resolved During this Admission:       Discharged Condition: stable    Disposition: Home or Self Care    Follow Up:    Patient Instructions:      Ambulatory referral/consult to Diabetes Education   Standing Status: Future   Referral Priority: Routine Referral Type: Consultation   Referral Reason: Specialty Services Required   Requested Specialty: Diabetes   Number of Visits Requested: 1 Expiration Date: 09/28/24       Significant Diagnostic Studies: Labs: CMP   Recent Labs   Lab 09/27/23  1613 09/27/23  2059 09/28/23  0555   * 135* 139   K 4.9 5.4* 4.0   CL 98 97 108   CO2 19* 23 22*   * 501* 247*   BUN 36* 35* 35*   CREATININE 2.3* 2.5* 1.7*   CALCIUM 9.5 9.6 8.7   PROT 7.9 8.6*  --    ALBUMIN 4.1 4.2  --    BILITOT 0.6 0.6  --    ALKPHOS 85 87  --    AST 18 27  --    ALT 15 17  --    ANIONGAP 14 15 9    and CBC   Recent Labs   Lab 09/27/23 2059 09/28/23  0556   WBC 5.11 4.83   HGB 12.6 10.4*   HCT 38.8 32.4*    148*       Pending Diagnostic Studies:       None           Medications:  Reconciled Home Medications:      Medication List        START taking these medications      LEVEMIR FLEXPEN 100 unit/mL (3 mL) Inpn pen  Generic drug: insulin detemir U-100 (Levemir)  Inject 20 Units into the skin every evening.     TRUE METRIX GLUCOSE METER Misc  Generic drug: blood-glucose  "meter  Use as directed.  Replaces: blood-glucose meter kit            CONTINUE taking these medications      acetaminophen 500 MG tablet  Commonly known as: TYLENOL  Take 500 mg by mouth daily as needed for Pain.     aspirin 81 MG EC tablet  Commonly known as: ECOTRIN  Take 81 mg by mouth once daily.     atorvastatin 20 MG tablet  Commonly known as: LIPITOR  Take 1 tablet (20 mg total) by mouth once daily.     BD ULTRA-FINE SHORT PEN NEEDLE 31 gauge x 5/16" Ndle  Generic drug: pen needle, diabetic  Inject 1 pen  into the skin every evening.     blood glucose strip-disp meter Kit  1 application  by Misc.(Non-Drug; Combo Route) route 3 (three) times daily.     gabapentin 300 MG capsule  Commonly known as: NEURONTIN  Take 1 capsule (300 mg total) by mouth 3 (three) times daily.     hydrOXYzine pamoate 50 MG Cap  Commonly known as: VISTARIL  Take 1 capsule (50 mg total) by mouth nightly as needed (sleep).     INVOKANA 100 mg Tab tablet  Generic drug: canagliflozin  Take 1 tablet (100 mg total) by mouth once daily.     lisinopriL 10 MG tablet  Take 1 tablet (10 mg total) by mouth once daily.     multivitamin per tablet  Commonly known as: THERAGRAN  Take 1 tablet by mouth once daily.     NIFEdipine 90 MG (OSM) 24 hr tablet  Commonly known as: PROCARDIA-XL  Take 1 tablet (90 mg total) by mouth once daily.     OZEMPIC 0.25 mg or 0.5 mg (2 mg/3 mL) pen injector  Generic drug: semaglutide  Inject 0.25 mg into the skin every 7 days for 28 days, THEN 0.5 mg every 7 days.  Start taking on: September 6, 2023            STOP taking these medications      blood-glucose meter kit  Commonly known as: RELION ALL-IN-ONE METER  Replaced by: TRUE METRIX GLUCOSE METER Misc            ASK your doctor about these medications      TRUE METRIX GLUCOSE TEST STRIP Strp  Generic drug: blood sugar diagnostic  Use with meter to check glucose levels once every evening.     TRUEPLUS LANCETS 33 gauge Misc  Generic drug: lancets  Use once every " evening to check glucoselevels with meter.              Indwelling Lines/Drains at time of discharge:   Lines/Drains/Airways       None                   Time spent on the discharge of patient: >30 minutes         Dwight Rhodes MD  Department of Hospital Medicine  [place] - Emergency Dept

## 2023-09-28 NOTE — HOSPITAL COURSE
The patient was found to have elevated blood glucose from labs ordered from a clinic visit. She was given 1L NS and 10 units regular insulin at admission and 30 units detemir with 9 units SS in total on 09/28/23. He glucose levels, ADELINA, and potassium levels all improved.  At discharge: The patient is in stable condition and understands her condition, return precautions, and the current plan. She will be started on 20 unit of insulin detemir nightly for additional glucose control. She will also be referred to outpatient Diabetes Education and has an appointment with her PCP on 10/11/23.

## 2023-09-28 NOTE — ASSESSMENT & PLAN NOTE
5.4 on admit  Denies CP, palpitations, muscle cramps  Should improve with insulin, IVF  Will hold lisinopril

## 2023-09-28 NOTE — PROGRESS NOTES
Pharmacist Renal Dose Adjustment Note    Bonnie Lino is a 53 y.o. female being treated with the medication enoxaparin    Patient Data:    Vital Signs (Most Recent):  Temp: 98.4 °F (36.9 °C) (09/27/23 1938)  Pulse: 93 (09/27/23 1938)  Resp: 18 (09/27/23 1938)  BP: (!) 158/79 (09/27/23 1938)  SpO2: 98 % (09/27/23 1938) Vital Signs (72h Range):  Temp:  [98.4 °F (36.9 °C)]   Pulse:  [93-95]   Resp:  [18]   BP: (142-158)/(79-87)   SpO2:  [98 %]      Recent Labs   Lab 09/27/23  1613 09/27/23 2059   CREATININE 2.3* 2.5*     Serum creatinine: 2.5 mg/dL (H) 09/27/23 2059  Estimated creatinine clearance: 25.6 mL/min (A)    Medication:enoxaparin dose: 40 mg frequency q24h will be changed to medication:enoxaparin dose:30 mg frequency:q24h    Pharmacist's Name: Anais Lundberg  Pharmacist's Extension: [phone]

## 2023-09-28 NOTE — ED PROVIDER NOTES
Encounter Date: 9/27/2023       History     Chief Complaint   Patient presents with    Hyperglycemia     States she had blood work done today. Was told CBG and kidney function both elevated. States she stopped taking Ozempic 2 weeks ago after hearing on TV the side affects. Was taking for 2 months. CBG in triage 496. Patient is asymptomatic.      53 y.o. female who  has a past medical history of Aneurysm of cardiac wall, congenital, Bilateral lower extremity edema, Conversion disorder with abnormal movement, Diabetes type 2, uncontrolled, HLD (hyperlipidemia), Hypertension, Pseudoseizures, and Vitamin D deficiency presents to the emergency department after referral by her primary care for worsening can fashion and elevated blood glucose levels.  Patient reports she was recently started on Ozempic but has not taking it for the past 2 weeks as she saw that it was bad free on television.  She reports since then having polyuria and polydipsia.  She reports she was seen by her primary care doctor earlier today where they obtain lab work and she was told to come to the emergency department for evaluation.  Currently she is asymptomatic and has no complaints.    The history is provided by the patient.     Review of patient's allergies indicates:  No Known Allergies  Past Medical History:   Diagnosis Date    Aneurysm of cardiac wall, congenital     Bilateral lower extremity edema     2/2 calcium channel blocker    Conversion disorder with abnormal movement     Diabetes type 2, uncontrolled     HLD (hyperlipidemia)     Hypertension     Pseudoseizures     Vitamin D deficiency      Past Surgical History:   Procedure Laterality Date    BREAST CYST EXCISION  2003    BREAST SURGERY      cyst removal     CATARACT EXTRACTION W/  INTRAOCULAR LENS IMPLANT      COLONOSCOPY N/A 7/23/2021    Procedure: COLONOSCOPY;  Surgeon: Sapna Fitzgerald MD;  Location: [facility];  Service: Endoscopy;  Laterality: N/A;    HYSTERECTOMY  2002     PARTIAL HYSTERECTOMY  2002     Family History   Problem Relation Age of Onset    Diabetes Mother     Heart disease Mother     Cancer Mother 40        breast    Breast cancer Mother     Diabetes Father     Cancer Maternal Grandmother 82        breast    Breast cancer Maternal Grandmother      Social History     Tobacco Use    Smoking status: Never    Smokeless tobacco: Never   Substance Use Topics    Alcohol use: Yes     Comment: seldom    Drug use: No     Review of Systems   Constitutional:  Negative for chills and fever.   HENT:  Negative for congestion and rhinorrhea.    Eyes:  Negative for pain.   Respiratory:  Negative for cough and shortness of breath.    Cardiovascular:  Negative for chest pain and leg swelling.   Gastrointestinal:  Negative for abdominal pain, nausea and vomiting.   Endocrine: Negative for polyuria.   Genitourinary:  Positive for frequency. Negative for dysuria and hematuria.   Musculoskeletal:  Negative for gait problem and neck pain.   Skin:  Negative for rash.   Allergic/Immunologic: Negative for immunocompromised state.   Neurological:  Negative for weakness and headaches.       Physical Exam     Initial Vitals [09/27/23 1938]   BP Pulse Resp Temp SpO2   (!) 158/79 93 18 98.4 °F (36.9 °C) 98 %      MAP       --         Physical Exam    Nursing note and vitals reviewed.  Constitutional: She is not diaphoretic. No distress.   HENT:   Head: Normocephalic and atraumatic.   Eyes: Conjunctivae and EOM are normal. Pupils are equal, round, and reactive to light.   Neck:   Normal range of motion.  Cardiovascular:  Regular rhythm.           Pulmonary/Chest: Breath sounds normal. No respiratory distress.   Abdominal: Abdomen is soft. Bowel sounds are normal. She exhibits no distension. There is no abdominal tenderness. There is no rebound and no guarding.   Musculoskeletal:         General: No tenderness. Normal range of motion.      Cervical back: Normal range of motion.     Lymphadenopathy:      She has no cervical adenopathy.   Neurological: She is alert.   Skin: Skin is warm. Capillary refill takes less than 2 seconds.   Psychiatric: She has a normal mood and affect. Her behavior is normal.         ED Course   Procedures  Labs Reviewed   CBC W/ AUTO DIFFERENTIAL - Abnormal; Notable for the following components:       Result Value    MCV 77 (*)     MCH 25.0 (*)     All other components within normal limits   COMPREHENSIVE METABOLIC PANEL - Abnormal; Notable for the following components:    Sodium 135 (*)     Potassium 5.4 (*)     Glucose 501 (*)     BUN 35 (*)     Creatinine 2.5 (*)     Total Protein 8.6 (*)     eGFR 22 (*)     All other components within normal limits    Narrative:      GLU  critical result(s) called and verbal readback obtained from   Annia Bateman RN by KB10 09/27/2023 21:27   POCT GLUCOSE - Abnormal; Notable for the following components:    POCT Glucose 496 (*)     All other components within normal limits   POCT GLUCOSE - Abnormal; Notable for the following components:    POCT Glucose 428 (*)     All other components within normal limits   POCT GLUCOSE - Abnormal; Notable for the following components:    POCT Glucose 243 (*)     All other components within normal limits   POCT GLUCOSE - Abnormal; Notable for the following components:    POCT Glucose 241 (*)     All other components within normal limits   POCT GLUCOSE - Abnormal; Notable for the following components:    POCT Glucose 270 (*)     All other components within normal limits   BETA - HYDROXYBUTYRATE, SERUM   URINALYSIS, REFLEX TO URINE CULTURE   BASIC METABOLIC PANEL   MAGNESIUM   PHOSPHORUS   CBC W/ AUTO DIFFERENTIAL   POCT GLUCOSE, HAND-HELD DEVICE   POCT GLUCOSE MONITORING CONTINUOUS   POCT GLUCOSE MONITORING CONTINUOUS     EKG Readings: (Independently Interpreted)   Independent Interpretation of EKG:  Rhythm: Sinus   Rate: 92  QTC: 450  No STEMI           Imaging Results    None          Medications   sodium chloride 0.9%  flush 5 mL (has no administration in time range)   melatonin tablet 9 mg (9 mg Oral Given 9/28/23 0030)   acetaminophen tablet 650 mg (650 mg Oral Given 9/28/23 0031)   acetaminophen tablet 650 mg (650 mg Oral Not Given 9/28/23 0028)   naloxone 0.4 mg/mL injection 0.02 mg (has no administration in time range)   insulin aspart U-100 pen 1-10 Units (3 Units Subcutaneous Given 9/28/23 0455)   glucose chewable tablet 16 g (has no administration in time range)   glucose chewable tablet 24 g (has no administration in time range)   glucagon (human recombinant) injection 1 mg (has no administration in time range)   ondansetron disintegrating tablet 4 mg (has no administration in time range)   dextrose 10% bolus 125 mL 125 mL (has no administration in time range)   dextrose 10% bolus 250 mL 250 mL (has no administration in time range)   enoxaparin injection 30 mg (has no administration in time range)   lactated ringers infusion ( Intravenous New Bag 9/28/23 0024)   atorvastatin tablet 20 mg (has no administration in time range)   NIFEdipine 24 hr tablet 90 mg (has no administration in time range)   sodium chloride 0.9% bolus 1,000 mL 1,000 mL (0 mLs Intravenous Stopped 9/28/23 0513)   insulin regular injection 10 Units 0.1 mL (10 Units Intravenous Given 9/27/23 2259)   insulin detemir U-100 (Levemir) pen 15 Units (15 Units Subcutaneous Given 9/28/23 0111)   cyclobenzaprine tablet 5 mg (5 mg Oral Given 9/28/23 0111)     Medical Decision Making:   History:   Old Medical Records: I decided to obtain old medical records.  Initial Assessment:   53-year-old female with past medical history of diabetes, hypertension, hyperlipidemia presents to the emergency department after referral by her primary care doctor for elevated blood glucose level and worsening kidney function.  Patient in no acute distress, vitals within normal limits.  Abdomen soft nontender.  Point of care glucose upon arrival 496.  Review of records from evaluation  earlier today were significant for a glucose level of 515, ADELINA with a creatinine of 2.3, and hemoglobin A1c of 13.  Patient currently receiving IV fluids.  Will obtain labs to assess for possible DKA although I have low suspicion.  Given worsening kidney function will likely need admission for further management.  Differential Diagnosis:   Differential Diagnosis includes, but is not limited to:  DKA, hyperglycemic hyperosmotic non-ketotic state, sepsis/infection, dehydration, UTI, dietary cause/increased sugar intake, device malfunction, alcohol/drug abuse, medication non-compliance.     Clinical Tests:   Lab Tests: Ordered and Reviewed  ED Management:  Repeat blood work notable for elevated blood glucose, high potassium without any evidence of EKG changes, and continued DAELINA.  No evidence of DKA at this time.  Will admit patient for further management.                ED Course as of 09/28/23 0531   Wed Sep 27, 2023   2223 Glucose(!!): 501  CBC without significant leukocytosis, anemia (at baseline), or platelet abnormalities.   [AS]   2223 Creatinine(!): 2.5 [AS]   2223 Anion Gap: 15 [AS]   2235 Potassium(!): 5.4 [AS]   2244 After review of the patient's physical exam, ED testing, and history/symptoms, the patient requires additional care in the hospital for the treatment of ADELINA, Hyper glycemia and hyperkalemia . Discussed patients case with [facility] who will accept the patient and any pending labs/imaging/interventions.   I discussed the objective findings with the patient including laboratory studies, diagnostic imaging, and any consultant involvement. The patient/ family was educated on their clinical presentation and all questions were answered. They acknowledged and verbally agreed to the treatment plan. The patient will be admitted to the hospital for further management.    [AS]      ED Course User Index  [AS] Felix Grace MD          Medical Decision Making  Amount and/or Complexity of Data Reviewed  Labs:   Decision-making details documented in ED Course.    Risk  OTC drugs.    Critical Care Procedure Note  Authorized and Performed by: Felix Grace MD  Total critical care time: 35 minutes  Due to a high probability of clinically significant, life threatening deterioration, the patient required my highest level of preparedness to intervene emergently and I personally spent this critical care time directly and personally managing the patient. This critical care time included obtaining a history; examining the patient; pulse oximetry; ordering and review of studies; arranging urgent treatment with development of a management plan; evaluation of patient's response to treatment; frequent reassessment; and, discussions with other providers.  This critical care time was performed to assess and manage the high probability of imminent, life-threatening deterioration that could result in multi-organ failure. It was exclusive of separately billable procedures and treating other patients and teaching time.  Please see MDM section and the rest of the note for further information on patient assessment and treatment.       The patient was reassessed frequently and managed aggressively while in the emergency department. Due to the clinical workup and presentation, the patients condition is concerning and will require additional evaluation. I discussed the objective findings with the patient including laboratory studies, diagnostic imaging, and any consultant involvement. The patient/ family was educated on their clinical presentation and all questions were answered. They acknowledged and verbally agreed to the treatment plan. The patient will be admitted to the hospital for further management.     Clinical Impression:   Final diagnoses:  [E87.5] Hyperkalemia  [N17.9] ADELINA (acute kidney injury)  [R73.9] Hyperglycemia (Primary)          ED Disposition Condition    Observation Stable               DISCLAIMER: This note was prepared  with [employer] voice recognition transcription software. Garbled syntax, mangled pronouns, and other bizarre constructions may be attributed to that software system.     Felix Grace MD  09/28/23 0532

## 2023-09-28 NOTE — ASSESSMENT & PLAN NOTE
Per PCP note from 9/27, patient unclear on home anti-hypertensive regimen  Per nephro on 40 mg lisinopril, 90 mg nifedipine and 12.5 mg HCTZ    Plan:  Will continue nifedipine, hold lisinopril for hyperkalemia, HCTZ for ADELINA

## 2023-09-28 NOTE — ASSESSMENT & PLAN NOTE
PLAN:  -Will given IVF overnight and reassess in am, likely secondary to osmotic diuresis, hyperglycemia  - Maintain UOP at 0.5ml/kg/hr  - Hold nephrotoxic medications   - Renally-dose current meds if available  - Renal US ordered at last nephrology visit but not completed, will obtain during this admission   - Avoid Hypotension

## 2023-09-28 NOTE — ED NOTES
Glucose-501 per lab. MILE Portillo notified.    Population Health Outreach.    REQUESTED PAP SMEAR FROM DR. JAMES

## 2023-10-04 ENCOUNTER — OFFICE VISIT (OUTPATIENT)
Dept: FAMILY MEDICINE | Facility: HOSPITAL | Age: 53
End: 2023-10-04
Payer: MEDICAID

## 2023-10-04 VITALS
SYSTOLIC BLOOD PRESSURE: 131 MMHG | HEART RATE: 91 BPM | HEIGHT: 68 IN | BODY MASS INDEX: 24.15 KG/M2 | WEIGHT: 159.38 LBS | DIASTOLIC BLOOD PRESSURE: 82 MMHG

## 2023-10-04 DIAGNOSIS — E11.59 HYPERTENSION ASSOCIATED WITH DIABETES: ICD-10-CM

## 2023-10-04 DIAGNOSIS — E11.65 UNCONTROLLED TYPE 2 DIABETES MELLITUS WITH HYPERGLYCEMIA: Primary | ICD-10-CM

## 2023-10-04 DIAGNOSIS — I10 WELL-CONTROLLED HYPERTENSION: ICD-10-CM

## 2023-10-04 DIAGNOSIS — I15.2 HYPERTENSION ASSOCIATED WITH DIABETES: ICD-10-CM

## 2023-10-04 PROCEDURE — 99214 OFFICE O/P EST MOD 30 MIN: CPT | Performed by: STUDENT IN AN ORGANIZED HEALTH CARE EDUCATION/TRAINING PROGRAM

## 2023-10-04 RX ORDER — SEMAGLUTIDE 0.68 MG/ML
0.5 INJECTION, SOLUTION SUBCUTANEOUS
Qty: 6 ML | Refills: 0 | Status: SHIPPED | OUTPATIENT
Start: 2023-10-04 | End: 2023-10-25

## 2023-10-04 NOTE — PROGRESS NOTES
"Clinic Note  [facility]    Subjective:      Bonnie Lino is a 53 y.o. female with PMHx uncontrolled DM2, HTN, HLD, CKD3. Patient here today for DM2 follow up - recently observed in hospital after outpatient lab reading of hyperglycemia > 500 with ADELINA. Patient today states she feels "tired" since discharge, not having much appetite. Otherwise feels well.     DM - Invokana 100 mg daily, 28 lantus daily. A1c had gone up from 8 to 13 at last visit. Said someone at hospital told her to stop using ozempic - possibly because she said she doesn't tolerate it though tells me today that it was not causing issues for her. Sugars 200's-300s since, 237 this morning.     HTN - lisinopril 10, nifedipine 90    HLD - lipitor 20, last lipid panel a year ago controlled    CKD - recent ADELINA in setting of hyperglycemia likely, kidney function improved with fluids, blood sugar reduction. Established with nephro, next appointment in 3 months.    Review of Systems   Constitutional:  Negative for chills and fever.   HENT:  Negative for congestion and sore throat.    Respiratory:  Negative for cough.    Cardiovascular:  Negative for chest pain and palpitations.   Gastrointestinal:  Negative for abdominal pain, diarrhea, nausea and vomiting.   Genitourinary:  Negative for dysuria.   Musculoskeletal:  Negative for joint pain and myalgias.   Neurological:  Negative for dizziness, tingling, weakness and headaches.   Psychiatric/Behavioral:  Negative for depression. The patient is not nervous/anxious.           Objective:      Vitals:    10/04/23 1553   BP: 131/82   Pulse: 91     Body mass index is 24.24 kg/m².      Physical Exam  Constitutional:       Appearance: Normal appearance. She is well-developed.   Cardiovascular:      Rate and Rhythm: Normal rate and regular rhythm.      Pulses: Normal pulses.   Pulmonary:      Effort: Pulmonary effort is normal.      Breath sounds: Normal breath sounds.   Abdominal:      General: Abdomen is " flat. Bowel sounds are normal.      Palpations: Abdomen is soft.   Musculoskeletal:         General: Normal range of motion.      Cervical back: Normal range of motion.   Skin:     General: Skin is warm and dry.      Capillary Refill: Capillary refill takes less than 2 seconds.      Coloration: Skin is not pale.   Neurological:      General: No focal deficit present.      Mental Status: She is alert and oriented to person, place, and time.   Psychiatric:         Mood and Affect: Mood normal.         Behavior: Behavior normal.            Assessment/Plan:      Patient overall feeling well since hospitalization and appears well today. As she states she tolerated ozempic well, do not see barrier to re-starting today. Advised patient to titrate lantus to 31 units nightly given continued elevated readings. Patient to do repeat A1C/CMP/lipid panel in 1 month prior to next visit, will adjust meds accordingly at that time if needed.    1. Uncontrolled type 2 diabetes mellitus with hyperglycemia    - semaglutide (OZEMPIC) 0.25 mg or 0.5 mg (2 mg/3 mL) pen injector; Inject 0.5 mg into the skin every 7 days.  Dispense: 6 mL; Refill: 0  - Comprehensive Metabolic Panel; Future  - Hemoglobin A1C; Future  - Lipid Panel; Future    2. Well-controlled hypertension    - Comprehensive Metabolic Panel; Future      Patient discussed with attending physician, Dr. Yifan Ingram MD  [facility] PGY-3  10/04/2023      The following information is provided to all patients.  This information is to help you find resources for any of the problems found today that may be affecting your health:                Living healthy guide: www.[facility].[place].gov       Understanding Diabetes: www.diabetes.org       Eating healthy: www.cdc.gov/healthyweight      CDC home safety checklist: www.cdc.gov/steadi/patient.html      Agency on Aging: www.goea.[place].gov       Alcoholics anonymous (AA): www.aa.org      Physical Activity:  www.patrice.nih.gov/vh9mwzb       Tobacco use: www.quitwithusla.org

## 2023-10-10 NOTE — PROGRESS NOTES
I assume primary medical responsibility for this patient. I have reviewed the history, physical, and assessement & treatment plan with the resident and agree that the care is reasonable and necessary. This service has been performed by a resident without the presence of a teaching physician under the primary care exception. If necessary, an addendum of additional findings or evaluation beyond the resident documentation will be noted below.      Dorothea Saha MD    [facility]

## 2023-10-23 ENCOUNTER — LAB VISIT (OUTPATIENT)
Dept: LAB | Facility: HOSPITAL | Age: 53
End: 2023-10-23
Attending: STUDENT IN AN ORGANIZED HEALTH CARE EDUCATION/TRAINING PROGRAM
Payer: MEDICAID

## 2023-10-23 DIAGNOSIS — E11.65 UNCONTROLLED TYPE 2 DIABETES MELLITUS WITH HYPERGLYCEMIA: ICD-10-CM

## 2023-10-23 DIAGNOSIS — I10 WELL-CONTROLLED HYPERTENSION: ICD-10-CM

## 2023-10-23 LAB
ALBUMIN SERPL BCP-MCNC: 3.7 G/DL (ref 3.5–5.2)
ALP SERPL-CCNC: 59 U/L (ref 55–135)
ALT SERPL W/O P-5'-P-CCNC: 9 U/L (ref 10–44)
ANION GAP SERPL CALC-SCNC: 7 MMOL/L (ref 8–16)
AST SERPL-CCNC: 13 U/L (ref 10–40)
BILIRUB SERPL-MCNC: 0.5 MG/DL (ref 0.1–1)
BUN SERPL-MCNC: 28 MG/DL (ref 6–20)
CALCIUM SERPL-MCNC: 9.2 MG/DL (ref 8.7–10.5)
CHLORIDE SERPL-SCNC: 109 MMOL/L (ref 95–110)
CHOLEST SERPL-MCNC: 148 MG/DL (ref 120–199)
CHOLEST/HDLC SERPL: 2.7 {RATIO} (ref 2–5)
CO2 SERPL-SCNC: 24 MMOL/L (ref 23–29)
CREAT SERPL-MCNC: 2 MG/DL (ref 0.5–1.4)
EST. GFR  (NO RACE VARIABLE): 29 ML/MIN/1.73 M^2
ESTIMATED AVG GLUCOSE: 292 MG/DL (ref 68–131)
GLUCOSE SERPL-MCNC: 215 MG/DL (ref 70–110)
HBA1C MFR BLD: 11.8 % (ref 4–5.6)
HDLC SERPL-MCNC: 54 MG/DL (ref 40–75)
HDLC SERPL: 36.5 % (ref 20–50)
LDLC SERPL CALC-MCNC: 71.4 MG/DL (ref 63–159)
NONHDLC SERPL-MCNC: 94 MG/DL
POTASSIUM SERPL-SCNC: 4.3 MMOL/L (ref 3.5–5.1)
PROT SERPL-MCNC: 7 G/DL (ref 6–8.4)
SODIUM SERPL-SCNC: 140 MMOL/L (ref 136–145)
TRIGL SERPL-MCNC: 113 MG/DL (ref 30–150)

## 2023-10-23 PROCEDURE — 80061 LIPID PANEL: CPT | Performed by: STUDENT IN AN ORGANIZED HEALTH CARE EDUCATION/TRAINING PROGRAM

## 2023-10-23 PROCEDURE — 80053 COMPREHEN METABOLIC PANEL: CPT | Performed by: STUDENT IN AN ORGANIZED HEALTH CARE EDUCATION/TRAINING PROGRAM

## 2023-10-23 PROCEDURE — 36415 COLL VENOUS BLD VENIPUNCTURE: CPT | Performed by: STUDENT IN AN ORGANIZED HEALTH CARE EDUCATION/TRAINING PROGRAM

## 2023-10-23 PROCEDURE — 83036 HEMOGLOBIN GLYCOSYLATED A1C: CPT | Performed by: STUDENT IN AN ORGANIZED HEALTH CARE EDUCATION/TRAINING PROGRAM

## 2023-10-25 ENCOUNTER — OFFICE VISIT (OUTPATIENT)
Dept: FAMILY MEDICINE | Facility: HOSPITAL | Age: 53
End: 2023-10-25
Payer: MEDICAID

## 2023-10-25 VITALS
WEIGHT: 156.75 LBS | BODY MASS INDEX: 23.76 KG/M2 | HEIGHT: 68 IN | SYSTOLIC BLOOD PRESSURE: 128 MMHG | DIASTOLIC BLOOD PRESSURE: 84 MMHG | HEART RATE: 94 BPM

## 2023-10-25 DIAGNOSIS — E11.65 UNCONTROLLED TYPE 2 DIABETES MELLITUS WITH HYPERGLYCEMIA: Primary | ICD-10-CM

## 2023-10-25 DIAGNOSIS — E11.59 HYPERTENSION ASSOCIATED WITH TYPE 2 DIABETES MELLITUS: ICD-10-CM

## 2023-10-25 DIAGNOSIS — I15.2 HYPERTENSION ASSOCIATED WITH TYPE 2 DIABETES MELLITUS: ICD-10-CM

## 2023-10-25 DIAGNOSIS — N18.32 STAGE 3B CHRONIC KIDNEY DISEASE: ICD-10-CM

## 2023-10-25 PROCEDURE — 99215 OFFICE O/P EST HI 40 MIN: CPT | Performed by: STUDENT IN AN ORGANIZED HEALTH CARE EDUCATION/TRAINING PROGRAM

## 2023-10-25 RX ORDER — SEMAGLUTIDE 1.34 MG/ML
1 INJECTION, SOLUTION SUBCUTANEOUS
Qty: 3 ML | Refills: 2 | Status: SHIPPED | OUTPATIENT
Start: 2023-10-25 | End: 2023-10-30 | Stop reason: SDUPTHER

## 2023-10-25 RX ORDER — NIFEDIPINE 90 MG/1
90 TABLET, EXTENDED RELEASE ORAL DAILY
Qty: 90 TABLET | Refills: 1 | Status: SHIPPED | OUTPATIENT
Start: 2023-10-25 | End: 2023-10-30 | Stop reason: SDUPTHER

## 2023-10-25 NOTE — PATIENT INSTRUCTIONS
Type 2 diabetes medications:  - Will increase Ozempic from 0.5 mg weekly to 1 mg weekly  - Insulin Levemir (Detemir) 16 units twice daily  - Check blood glucose level every morning (fasting)  - Follow-up in 1 week for management of your type 2 diabetes  - Canagliflozin 100 mg daily

## 2023-10-30 ENCOUNTER — OFFICE VISIT (OUTPATIENT)
Dept: FAMILY MEDICINE | Facility: HOSPITAL | Age: 53
End: 2023-10-30
Payer: MEDICAID

## 2023-10-30 DIAGNOSIS — E11.65 UNCONTROLLED TYPE 2 DIABETES MELLITUS WITH HYPERGLYCEMIA: Primary | ICD-10-CM

## 2023-10-30 DIAGNOSIS — E11.59 HYPERTENSION ASSOCIATED WITH TYPE 2 DIABETES MELLITUS: ICD-10-CM

## 2023-10-30 DIAGNOSIS — I15.2 HYPERTENSION ASSOCIATED WITH TYPE 2 DIABETES MELLITUS: ICD-10-CM

## 2023-10-30 DIAGNOSIS — Z12.31 ENCOUNTER FOR SCREENING MAMMOGRAM FOR MALIGNANT NEOPLASM OF BREAST: ICD-10-CM

## 2023-10-30 PROCEDURE — 99213 OFFICE O/P EST LOW 20 MIN: CPT | Performed by: STUDENT IN AN ORGANIZED HEALTH CARE EDUCATION/TRAINING PROGRAM

## 2023-10-30 RX ORDER — NIFEDIPINE 90 MG/1
90 TABLET, EXTENDED RELEASE ORAL DAILY
Qty: 90 TABLET | Refills: 1 | Status: SHIPPED | OUTPATIENT
Start: 2023-10-30 | End: 2024-10-29

## 2023-10-30 RX ORDER — CANAGLIFLOZIN 100 MG/1
100 TABLET, FILM COATED ORAL DAILY
Qty: 90 TABLET | Refills: 3 | Status: SHIPPED | OUTPATIENT
Start: 2023-10-30 | End: 2023-10-30 | Stop reason: SDUPTHER

## 2023-10-30 RX ORDER — SEMAGLUTIDE 1.34 MG/ML
1 INJECTION, SOLUTION SUBCUTANEOUS
Qty: 3 ML | Refills: 2 | Status: SHIPPED | OUTPATIENT
Start: 2023-10-30 | End: 2024-10-29

## 2023-10-30 RX ORDER — LISINOPRIL 10 MG/1
10 TABLET ORAL DAILY
Qty: 90 TABLET | Refills: 3 | Status: SHIPPED | OUTPATIENT
Start: 2023-10-30 | End: 2024-10-29

## 2023-10-30 RX ORDER — CANAGLIFLOZIN 100 MG/1
100 TABLET, FILM COATED ORAL DAILY
Qty: 90 TABLET | Refills: 3 | Status: SHIPPED | OUTPATIENT
Start: 2023-10-30 | End: 2024-10-29

## 2023-10-30 NOTE — PROGRESS NOTES
Progress Note  [facility]    Subjective:      Patient ID: Bonnie Lino is a 53 y.o. female    Chief Complaint: Follow-up    Bonnie Lino is a 53-year-old female with a PMHx uncontrolled T2DM, HTN, HLD, CKD stage IIIB presenting for follow-up regarding her type 2 diabetes.    #T2DM - current medications include insulin Levemir 16 units BID, Ozempic 1 mg weekly, and canagliflozin 100 mg daily.  Reports fasting blood glucose levels in the 150s to 160s.  Currently she is tolerating her medications.     #HTN - current medications include nifedipine 90 mg daily and lisinopril 10 mg daily. In office /84. Currently tolerating medications without side effects.  In need of refill medications.       Health Maintenance         Date Due Completion Date    Shingles Vaccine (2 of 2) 07/19/2022 5/24/2022    Eye Exam 08/18/2023 8/18/2022 (Done)    Override on 8/18/2022: Done    Influenza Vaccine (1) 09/01/2023 11/10/2022    Override on 10/26/2020: Done    COVID-19 Vaccine (4 - 2023-24 season) 09/01/2023 10/21/2021    Mammogram 09/22/2023 9/22/2022    Override on 7/31/2020: Done    Override on 3/25/2014: Done    Foot Exam 01/09/2024 1/9/2023 (Done)    Override on 1/9/2023: Done    Hemoglobin A1c 01/23/2024 10/23/2023    Override on 3/25/2014: Done    Diabetes Urine Screening 07/12/2024 7/12/2023    Lipid Panel 10/23/2024 10/23/2023    Override on 3/25/2014: Done    Colorectal Cancer Screening 07/23/2031 7/23/2021    TETANUS VACCINE 11/10/2032 11/10/2022            Review of Systems   Constitutional:  Positive for fatigue.   Endocrine: Negative for polydipsia, polyphagia and polyuria.   Genitourinary:  Negative for difficulty urinating.        Objective:    There were no vitals filed for this visit.  BP Readings from Last 3 Encounters:   10/25/23 128/84   10/04/23 131/82   09/28/23 (!) 156/87     There is no height or weight on file to calculate BMI.    Physical Exam  Vitals reviewed.   Constitutional:        Appearance: Normal appearance.   HENT:      Head: Normocephalic and atraumatic.   Cardiovascular:      Rate and Rhythm: Normal rate and regular rhythm.   Pulmonary:      Effort: Pulmonary effort is normal.      Breath sounds: Normal breath sounds.   Neurological:      Mental Status: She is alert and oriented to person, place, and time.   Psychiatric:         Mood and Affect: Mood normal.         Behavior: Behavior normal.         Assessment/Plan:     Bonnie SOLOMON was seen today for follow-up.    Diagnoses and all orders for this visit:    #Uncontrolled type 2 diabetes mellitus with hyperglycemia  -     insulin detemir U-100, Levemir, 100 unit/mL (3 mL) SubQ InPn pen; Inject 18 Units into the skin 2 (two) times daily.  -     canagliflozin (INVOKANA) 100 mg Tab tablet; Take 1 tablet (100 mg total) by mouth once daily.  -     semaglutide (OZEMPIC) 1 mg/dose (4 mg/3 mL); Inject 1 mg into the skin every 7 days.  -     Ambulatory referral/consult to Ophthalmology; Future  -     chronic issue and currently uncontrolled. Will plan to increase Levemir to 18 units BID, continue Ozempic 1 mg, and canagliflozin 100 mg daily.  Fasting blood glucose goal of  and Hgb A1c goal less than 7.     #Hypertension associated with type 2 diabetes mellitus  -     NIFEdipine (PROCARDIA-XL) 90 MG (OSM) 24 hr tablet; Take 1 tablet (90 mg total) by mouth once daily.  -     lisinopriL 10 MG tablet; Take 1 tablet (10 mg total) by mouth once daily.  -     chronic issue and currently controlled.  Will plan to continue current medications and patient needs to start monitoring her blood blood pressure daily.     Encounter for screening mammogram for malignant neoplasm of breast  -     Mammo Digital Screening Bilat w/ Suleiman; Future      Follow-up:  In 2 weeks for monitoring of blood glucose levels    Silvio Solis DO  \[facility]\"" Family Medicine, PGY-3  10/30/2023      This note was partially created using [employer] Voice Recognition software. Typographical  and content errors may occur with this process. While efforts are made to detect and correct such errors, in some cases errors will persist. For this reason, wording in this document should be considered in the proper context and not strictly verbatim

## 2023-10-30 NOTE — PATIENT INSTRUCTIONS
Type 2 diabetes medications:  - Will increase Ozempic from 0.5 mg weekly to 1 mg weekly  - Insulin Levemir (Detemir) 18 units twice daily  - Check blood glucose level every morning (fasting)  - Follow-up in 1 week for management of your type 2 diabetes  - Canagliflozin 100 mg daily

## 2023-11-01 NOTE — PROGRESS NOTES
I assume primary medical responsibility for this patient. I have reviewed the history, physical, and assessment & treatment plan with the resident and agree that the care is reasonable and necessary. This service has been performed by a resident with the presence of a teaching physician under the primary care exception. If necessary, an addendum of additional findings or evaluation beyond the resident documentation will be noted below.        Chidi Pettit Jr., DO    [facility]

## 2023-11-01 NOTE — PROGRESS NOTES
Progress Note  [facility]    Subjective:      Patient ID: Bonnie Lino is a 53 y.o. female    Chief Complaint: Follow-up (A1C) and Medication Refill    Bonnie Lino is a 53-year-old female with a PMHx uncontrolled T2DM, HTN, HLD, CKD stage 3b presenting for follow-up of her type 2 diabetes.    #T2DM - current medications include insulin Levemir 32 units daily, Ozempic 0.5 mg weekly, and canagliflozin 100 mg daily. Patient endorses she is not checking her fasting blood glucose levels. Last hemoglobin A1c 11.8 on 10/23/2023.  Currently in need of refill medications.    #CKD stage 3b - [place] [facility] [place] evaluation shows progression to possible stage 4 kidney disease. Etiology likely secondary to diabetic nephropathy.  Patient due for follow-up in 6 months with Dr. Castano.       Health Maintenance         Date Due Completion Date    Shingles Vaccine (2 of 2) 07/19/2022 5/24/2022    Eye Exam 08/18/2023 8/18/2022 (Done)    Override on 8/18/2022: Done    Influenza Vaccine (1) 09/01/2023 11/10/2022    Override on 10/26/2020: Done    COVID-19 Vaccine (4 - 2023-24 season) 09/01/2023 10/21/2021    Mammogram 09/22/2023 9/22/2022    Override on 7/31/2020: Done    Override on 3/25/2014: Done    Foot Exam 01/09/2024 1/9/2023 (Done)    Override on 1/9/2023: Done    Hemoglobin A1c 01/23/2024 10/23/2023    Override on 3/25/2014: Done    Diabetes Urine Screening 07/12/2024 7/12/2023    Lipid Panel 10/23/2024 10/23/2023    Override on 3/25/2014: Done    Colorectal Cancer Screening 07/23/2031 7/23/2021    TETANUS VACCINE 11/10/2032 11/10/2022            Review of Systems   Constitutional:  Negative for chills and fever.   HENT:  Negative for congestion.    Respiratory:  Negative for shortness of breath.    Cardiovascular:  Negative for chest pain.   Genitourinary:  Negative for difficulty urinating.        Objective:      Vitals:    10/25/23 1530   BP: 128/84   Pulse: 94     BP Readings from  Last 3 Encounters:   10/25/23 128/84   10/04/23 131/82   09/28/23 (!) 156/87     Body mass index is 23.83 kg/m².    Physical Exam  Vitals reviewed.   Constitutional:       Appearance: Normal appearance.   HENT:      Head: Normocephalic and atraumatic.   Cardiovascular:      Rate and Rhythm: Normal rate and regular rhythm.   Pulmonary:      Effort: Pulmonary effort is normal.      Breath sounds: Normal breath sounds.   Musculoskeletal:         General: Normal range of motion.      Right lower leg: No edema.      Left lower leg: No edema.   Skin:     General: Skin is warm.      Findings: No rash.   Neurological:      Mental Status: She is alert and oriented to person, place, and time.   Psychiatric:         Mood and Affect: Mood normal.         Behavior: Behavior normal.         Assessment/Plan:     #Uncontrolled type 2 diabetes mellitus with hyperglycemia  -     insulin detemir U-100, Levemir, 100 unit/mL (3 mL) SubQ InPn pen; Inject 16 Units into the skin 2 (two) times daily.  -      semaglutide (OZEMPIC) 1 mg/dose (4 mg/3 mL); Inject 1 mg into the skin every 7 days.  -      chronic issue and currently remains uncontrolled.  Will plan to increase Ozempic to 1 mg weekly, can take Levemir 16 units BID, and continue canagliflozin 100 mg daily.  Advised that she needs to start checking her blood glucose levels every morning fasting.  Can follow-up in one-week to determine need for insulin titration.    # CKD stage IIIB       -      chronic issue and currently uncontrolled. For review of recent labs worsening renal function over the past 6 months.  Will plan for tighter glycemic control and patient to follow-up with nephrology Dr. Csatano in 6 months.  Continue canagliflozin and lisinopril.     #Hypertension associated with type 2 diabetes mellitus  -     NIFEdipine (PROCARDIA-XL) 90 MG (OSM) 24 hr tablet; Take 1 tablet (90 mg total) by mouth once daily.  -     chronic issue and currently controlled.  Refill provided  of nifedipine 90 mg daily       Follow-up:  One-week    Silvio Solis DO  [facility], PGY-3  10/25/2023      This note was partially created using [employer] Voice Recognition software. Typographical and content errors may occur with this process. While efforts are made to detect and correct such errors, in some cases errors will persist. For this reason, wording in this document should be considered in the proper context and not strictly verbatim

## 2024-01-01 PROBLEM — N17.9 AKI (ACUTE KIDNEY INJURY): Status: RESOLVED | Noted: 2020-01-02 | Resolved: 2024-01-01

## 2024-03-22 NOTE — HOSPITAL COURSE
She was evaluated by neuro and psychiatry. She was not felt to have an underlying neurological process. AED's were not recommended. Family and patient decided to d/c prozac after it had been increased by psych. Her symptoms improved daily. She was to be transferred to  psych, but there was no bed availability. Given she was almost back to baseline, she was cleared for discharge.    [Alert] : alert [Well Nourished] : well nourished [Obese] : obese [Well Developed] : well developed [No Acute Distress] : no acute distress [Normal Sclera/Conjunctiva] : normal sclera/conjunctiva [EOMI] : extra ocular movement intact [PERRL] : pupils equal, round and reactive to light [Visual Fields Grossly Intact] : visual fields grossly intact [Normal Oropharynx] : the oropharynx was normal [No Proptosis] : no proptosis [No Respiratory Distress] : no respiratory distress [Thyroid Not Enlarged] : the thyroid was not enlarged [No Thyroid Nodules] : no palpable thyroid nodules [No Accessory Muscle Use] : no accessory muscle use [Clear to Auscultation] : lungs were clear to auscultation bilaterally [Normal S1, S2] : normal S1 and S2 [Normal Rate] : heart rate was normal [Regular Rhythm] : with a regular rhythm [No Edema] : no peripheral edema [Pedal Pulses Normal] : the pedal pulses are present [No Masses] : no palpable masses [Normal Bowel Sounds] : normal bowel sounds [No Nipple Discharge] : no nipple discharge [Not Tender] : non-tender [Not Distended] : not distended [Soft] : abdomen soft [Normal Anterior Cervical Nodes] : no anterior cervical lymphadenopathy [Normal Posterior Cervical Nodes] : no posterior cervical lymphadenopathy [No Spinal Tenderness] : no spinal tenderness [Spine Straight] : spine straight [No Stigmata of Cushings Syndrome] : no stigmata of Cushings Syndrome [Normal Gait] : normal gait [No Clubbing, Cyanosis] : no clubbing  or cyanosis of the fingernails [No Joint Swelling] : no joint swelling seen [Normal Strength/Tone] : muscle strength and tone were normal [No Rash] : no rash [No Skin Lesions] : no skin lesions [Acanthosis Nigricans] : no acanthosis nigricans [Hirsutism] : no hirsutism [Normal Reflexes] : deep tendon reflexes were 2+ and symmetric [No Tremors] : no tremors [Oriented x3] : oriented to person, place, and time [de-identified] : His BMI is 38.17 [de-identified] : Musculoskeletal strength is preserved

## 2024-05-03 DIAGNOSIS — E11.65 UNCONTROLLED TYPE 2 DIABETES MELLITUS WITH HYPERGLYCEMIA: ICD-10-CM

## 2024-05-07 RX ORDER — CANAGLIFLOZIN 100 MG/1
100 TABLET, FILM COATED ORAL DAILY
Qty: 90 TABLET | Refills: 3 | Status: SHIPPED | OUTPATIENT
Start: 2024-05-07 | End: 2024-05-10 | Stop reason: CLARIF

## 2024-05-10 ENCOUNTER — TELEPHONE (OUTPATIENT)
Dept: FAMILY MEDICINE | Facility: HOSPITAL | Age: 54
End: 2024-05-10
Payer: MEDICAID

## 2024-05-10 DIAGNOSIS — E11.65 UNCONTROLLED TYPE 2 DIABETES MELLITUS WITH HYPERGLYCEMIA: Primary | ICD-10-CM

## 2024-05-10 NOTE — TELEPHONE ENCOUNTER
Called patient left a message. Canagliflozin is not covered by insurance as was notified by prior authorization department. Will try Jardiance as currently is covered under her policy.  Prescription sent to pharmacy.  Will need follow-up in clinic in the next 1-2 months.    Silvio Solis,   [facility], PGY-3  05/10/2024

## 2025-04-25 DIAGNOSIS — Z12.31 ENCOUNTER FOR SCREENING MAMMOGRAM FOR MALIGNANT NEOPLASM OF BREAST: Primary | ICD-10-CM
